# Patient Record
Sex: MALE | Race: WHITE | NOT HISPANIC OR LATINO | Employment: OTHER | URBAN - METROPOLITAN AREA
[De-identification: names, ages, dates, MRNs, and addresses within clinical notes are randomized per-mention and may not be internally consistent; named-entity substitution may affect disease eponyms.]

---

## 2019-08-13 RX ORDER — AMLODIPINE BESYLATE 10 MG/1
10 TABLET ORAL DAILY
Qty: 90 TABLET | Refills: 1 | Status: SHIPPED | OUTPATIENT
Start: 2019-08-13 | End: 2020-12-29 | Stop reason: SDUPTHER

## 2020-12-29 ENCOUNTER — OFFICE VISIT (OUTPATIENT)
Dept: FAMILY MEDICINE CLINIC | Facility: CLINIC | Age: 75
End: 2020-12-29

## 2020-12-29 VITALS
BODY MASS INDEX: 25.58 KG/M2 | HEART RATE: 72 BPM | DIASTOLIC BLOOD PRESSURE: 83 MMHG | SYSTOLIC BLOOD PRESSURE: 120 MMHG | WEIGHT: 163 LBS | OXYGEN SATURATION: 96 % | TEMPERATURE: 98.5 F | HEIGHT: 67 IN

## 2020-12-29 DIAGNOSIS — K42.9 UMBILICAL HERNIA WITHOUT OBSTRUCTION AND WITHOUT GANGRENE: ICD-10-CM

## 2020-12-29 DIAGNOSIS — I10 ESSENTIAL HYPERTENSION: ICD-10-CM

## 2020-12-29 DIAGNOSIS — Z00.00 MEDICARE ANNUAL WELLNESS VISIT, SUBSEQUENT: Primary | ICD-10-CM

## 2020-12-29 DIAGNOSIS — Z12.11 COLON CANCER SCREENING: ICD-10-CM

## 2020-12-29 DIAGNOSIS — R35.0 BENIGN PROSTATIC HYPERPLASIA WITH URINARY FREQUENCY: ICD-10-CM

## 2020-12-29 DIAGNOSIS — N40.1 BENIGN PROSTATIC HYPERPLASIA WITH URINARY FREQUENCY: ICD-10-CM

## 2020-12-29 DIAGNOSIS — K21.9 GASTROESOPHAGEAL REFLUX DISEASE WITHOUT ESOPHAGITIS: ICD-10-CM

## 2020-12-29 PROCEDURE — 96160 PT-FOCUSED HLTH RISK ASSMT: CPT | Performed by: FAMILY MEDICINE

## 2020-12-29 PROCEDURE — G0439 PPPS, SUBSEQ VISIT: HCPCS | Performed by: FAMILY MEDICINE

## 2020-12-29 RX ORDER — OMEPRAZOLE 20 MG/1
20 CAPSULE, DELAYED RELEASE ORAL DAILY
COMMUNITY
End: 2020-12-29 | Stop reason: SDUPTHER

## 2020-12-29 RX ORDER — AMLODIPINE BESYLATE 10 MG/1
10 TABLET ORAL DAILY
Qty: 90 TABLET | Refills: 3 | Status: SHIPPED | OUTPATIENT
Start: 2020-12-29 | End: 2021-07-26 | Stop reason: SDUPTHER

## 2020-12-29 RX ORDER — OMEPRAZOLE 20 MG/1
20 CAPSULE, DELAYED RELEASE ORAL DAILY
Qty: 90 CAPSULE | Refills: 3 | Status: SHIPPED | OUTPATIENT
Start: 2020-12-29 | End: 2022-12-07 | Stop reason: SDUPTHER

## 2020-12-29 NOTE — PROGRESS NOTES
The ABCs of the Annual Wellness Visit  Subsequent Medicare Wellness Visit    Chief Complaint   Patient presents with   • Hypertension       Subjective   History of Present Illness:  Erickson Gerard is a 75 y.o. male who presents for a Subsequent Medicare Wellness Visit.  FU HTN.  No orhtostasis.  Doing well with meds.  BP running 120/80s at home.    F/U [facility].  Doing well with meds.     FU BPH.  Nocturia 1 time at night.    HEALTH RISK ASSESSMENT    Recent Hospitalizations:  No hospitalization(s) within the last year.    Current Medical Providers:  Patient Care Team:  Farhad Metcalf MD as PCP - General (Family Medicine)    Smoking Status:  Social History     Tobacco Use   Smoking Status Never Smoker   Smokeless Tobacco Never Used       Alcohol Consumption:  Social History     Substance and Sexual Activity   Alcohol Use Never   • Frequency: Never       Depression Screen:   No flowsheet data found.    Fall Risk Screen:  JUNO Fall Risk Assessment has not been completed.    Health Habits and Functional and Cognitive Screening:  Functional & Cognitive Status 12/29/2020   Do you have difficulty preparing food and eating? No   Do you have difficulty bathing yourself, getting dressed or grooming yourself? No   Do you have difficulty using the toilet? No   Do you have difficulty moving around from place to place? No   Do you have trouble with steps or getting out of a bed or a chair? No   Current Diet Well Balanced Diet   Dental Exam Not up to date   Eye Exam Up to date   Exercise (times per week) 3 times per week   Current Exercises Include Walking   Do you need help using the phone?  No   Are you deaf or do you have serious difficulty hearing?  No   Do you need help with transportation? No   Do you need help shopping? No   Do you need help preparing meals?  No   Do you need help with housework?  No   Do you need help with laundry? No   Do you need help taking your medications? No   Do you need help managing money? No    Do you ever drive or ride in a car without wearing a seat belt? No   Have you felt unusual stress, anger or loneliness in the last month? No   Who do you live with? Spouse   If you need help, do you have trouble finding someone available to you? No   Have you been bothered in the last four weeks by sexual problems? No   Do you have difficulty concentrating, remembering or making decisions? No         Does the patient have evidence of cognitive impairment? No    Asprin use counseling:Does not need ASA (and currently is not on it)    Age-appropriate Screening Schedule:  Refer to the list below for future screening recommendations based on patient's age, sex and/or medical conditions. Orders for these recommended tests are listed in the plan section. The patient has been provided with a written plan.    Health Maintenance   Topic Date Due   • COLONOSCOPY  1945   • TDAP/TD VACCINES (1 - Tdap) 07/31/1965   • ZOSTER VACCINE (1 of 2) 02/13/2013   • INFLUENZA VACCINE  12/29/2021 (Originally 8/1/2020)          The following portions of the patient's history were reviewed and updated as appropriate: allergies, current medications, past family history, past medical history, past social history, past surgical history and problem list.    Outpatient Medications Prior to Visit   Medication Sig Dispense Refill   • amLODIPine (NORVASC) 10 MG tablet Take 1 tablet by mouth Daily. 90 tablet 1   • omeprazole (priLOSEC) 20 MG capsule Take 20 mg by mouth Daily.       No facility-administered medications prior to visit.        Patient Active Problem List   Diagnosis   • Essential hypertension   • GERD (gastroesophageal reflux disease)   • Medicare annual wellness visit, subsequent   • Benign prostatic hyperplasia with urinary frequency   • Umbilical hernia without obstruction and without gangrene   • Colon cancer screening       Advanced Care Planning:  ACP discussion was held with the patient during this visit. Patient does not  "have an advance directive, information provided.    Review of Systems   Constitutional: Negative for activity change, appetite change and fatigue.   HENT: Negative for hearing loss and postnasal drip.    Eyes: Negative for discharge and itching.   Respiratory: Negative for cough and shortness of breath.    Cardiovascular: Negative for chest pain and leg swelling.   Gastrointestinal: Negative for abdominal distention and abdominal pain.   Endocrine: Negative for cold intolerance and heat intolerance.   Genitourinary: Negative for difficulty urinating and flank pain.   Musculoskeletal: Negative for arthralgias and myalgias.   Skin: Negative for color change.   Neurological: Negative for dizziness and facial asymmetry.   Hematological: Negative for adenopathy.   Psychiatric/Behavioral: Negative for agitation and confusion.       Compared to one year ago, the patient feels his physical health is the same.  Compared to one year ago, the patient feels his mental health is the same.    Reviewed chart for potential of high risk medication in the elderly: yes  Reviewed chart for potential of harmful drug interactions in the elderly:yes    Objective         Vitals:    12/29/20 1128 12/29/20 1204   BP: (!) 165/103 120/83   BP Location: Right arm    Patient Position: Sitting    Pulse: 72    Temp: 98.5 °F (36.9 °C)    SpO2: 96%    Weight: 73.9 kg (163 lb)    Height: 170.2 cm (67\")        Body mass index is 25.53 kg/m².  Discussed the patient's BMI with him. The BMI is above average; BMI management plan is completed.    Physical Exam  Vitals signs reviewed.   Constitutional:       Appearance: He is well-developed. He is not diaphoretic.   HENT:      Head: Normocephalic and atraumatic.   Eyes:      General: No scleral icterus.     Pupils: Pupils are equal, round, and reactive to light.   Neck:      Thyroid: No thyromegaly.   Cardiovascular:      Rate and Rhythm: Normal rate and regular rhythm.      Heart sounds: No murmur. No " friction rub. No gallop.    Pulmonary:      Effort: Pulmonary effort is normal. No respiratory distress.      Breath sounds: No wheezing or rales.   Chest:      Chest wall: No tenderness.   Abdominal:      General: Bowel sounds are normal. There is no distension.      Palpations: Abdomen is soft.      Tenderness: There is no abdominal tenderness.      Hernia: A hernia is present.      Comments: Reducible 2 cm umbilical hernia   Musculoskeletal: Normal range of motion.         General: No deformity.   Lymphadenopathy:      Cervical: No cervical adenopathy.   Skin:     General: Skin is warm and dry.      Findings: No rash.   Neurological:      Cranial Nerves: No cranial nerve deficit.      Motor: No abnormal muscle tone.               Assessment/Plan   Medicare Risks and Personalized Health Plan  CMS Preventative Services Quick Reference  Inactivity/Sedentary    The above risks/problems have been discussed with the patient.  Pertinent information has been shared with the patient in the After Visit Summary.  Follow up plans and orders are seen below in the Assessment/Plan Section.    Diagnoses and all orders for this visit:    1. Medicare annual wellness visit, subsequent (Primary)    2. Gastroesophageal reflux disease without esophagitis  -     omeprazole (priLOSEC) 20 MG capsule; Take 1 capsule by mouth Daily.  Dispense: 90 capsule; Refill: 3    3. Essential hypertension  -     TSH Rfx On Abnormal To Free T4  -     Lipid Panel With / Chol / HDL Ratio  -     Comprehensive Metabolic Panel  -     amLODIPine (NORVASC) 10 MG tablet; Take 1 tablet by mouth Daily.  Dispense: 90 tablet; Refill: 3    4. Benign prostatic hyperplasia with urinary frequency  -     PSA DIAGNOSTIC    5. Umbilical hernia without obstruction and without gangrene  -     Ambulatory Referral to General Surgery    6. Colon cancer screening  -     Ambulatory Referral to General Surgery      Follow Up:  Return in about 4 months (around 4/29/2021).     An  After Visit Summary and PPPS were given to the patient.     Preventive Counseling:  See optometry and dentistry.   Get shingrix.  Pt declines flu shots.

## 2020-12-30 LAB
ALBUMIN SERPL-MCNC: 4.3 G/DL (ref 3.5–5.2)
ALBUMIN/GLOB SERPL: 1.6 G/DL
ALP SERPL-CCNC: 79 U/L (ref 39–117)
ALT SERPL-CCNC: 18 U/L (ref 1–41)
AST SERPL-CCNC: 18 U/L (ref 1–40)
BILIRUB SERPL-MCNC: 0.5 MG/DL (ref 0–1.2)
BUN SERPL-MCNC: 18 MG/DL (ref 8–23)
BUN/CREAT SERPL: 16.5 (ref 7–25)
CALCIUM SERPL-MCNC: 9.1 MG/DL (ref 8.6–10.5)
CHLORIDE SERPL-SCNC: 104 MMOL/L (ref 98–107)
CHOLEST SERPL-MCNC: 251 MG/DL (ref 0–200)
CHOLEST/HDLC SERPL: 5.02 {RATIO}
CO2 SERPL-SCNC: 26.1 MMOL/L (ref 22–29)
CREAT SERPL-MCNC: 1.09 MG/DL (ref 0.76–1.27)
GLOBULIN SER CALC-MCNC: 2.7 GM/DL
GLUCOSE SERPL-MCNC: 85 MG/DL (ref 65–99)
HDLC SERPL-MCNC: 50 MG/DL (ref 40–60)
LDLC SERPL CALC-MCNC: 168 MG/DL (ref 0–100)
POTASSIUM SERPL-SCNC: 4.1 MMOL/L (ref 3.5–5.2)
PROT SERPL-MCNC: 7 G/DL (ref 6–8.5)
PSA SERPL-MCNC: 1.32 NG/ML (ref 0–4)
SODIUM SERPL-SCNC: 140 MMOL/L (ref 136–145)
T4 FREE SERPL-MCNC: 1.04 NG/DL (ref 0.93–1.7)
TRIGL SERPL-MCNC: 177 MG/DL (ref 0–150)
TSH SERPL DL<=0.005 MIU/L-ACNC: 4.81 UIU/ML (ref 0.27–4.2)
VLDLC SERPL CALC-MCNC: 33 MG/DL (ref 5–40)

## 2021-03-15 ENCOUNTER — BULK ORDERING (OUTPATIENT)
Dept: CASE MANAGEMENT | Facility: OTHER | Age: 76
End: 2021-03-15

## 2021-03-15 DIAGNOSIS — Z23 IMMUNIZATION DUE: ICD-10-CM

## 2021-04-29 ENCOUNTER — OFFICE VISIT (OUTPATIENT)
Dept: FAMILY MEDICINE CLINIC | Facility: CLINIC | Age: 76
End: 2021-04-29

## 2021-04-29 VITALS
HEART RATE: 73 BPM | WEIGHT: 161 LBS | BODY MASS INDEX: 25.27 KG/M2 | DIASTOLIC BLOOD PRESSURE: 84 MMHG | OXYGEN SATURATION: 98 % | HEIGHT: 67 IN | TEMPERATURE: 98.4 F | SYSTOLIC BLOOD PRESSURE: 130 MMHG

## 2021-04-29 DIAGNOSIS — I10 ESSENTIAL HYPERTENSION: Primary | ICD-10-CM

## 2021-04-29 DIAGNOSIS — E78.2 MIXED HYPERLIPIDEMIA: ICD-10-CM

## 2021-04-29 DIAGNOSIS — K21.9 GASTROESOPHAGEAL REFLUX DISEASE WITHOUT ESOPHAGITIS: ICD-10-CM

## 2021-04-29 PROCEDURE — 99214 OFFICE O/P EST MOD 30 MIN: CPT | Performed by: FAMILY MEDICINE

## 2021-04-29 NOTE — PROGRESS NOTES
Chief Complaint   Patient presents with   • Hypertension       Subjective   Erickson Gerard is a 75 y.o. male.     History of Present Illness   F/U HTN.  No orthostasis.  Doing well with meds.  NO Se.    F/U [facility].  Doing well with meds.  No SE.  I am taking PPI every other day.     The following portions of the patient's history were reviewed and updated as appropriate: allergies, current medications, past family history, past medical history, past social history, past surgical history and problem list.    Review of Systems   Constitutional: Negative for appetite change and fatigue.   HENT: Negative for nosebleeds and sore throat.    Eyes: Negative for blurred vision and visual disturbance.   Respiratory: Negative for shortness of breath and wheezing.    Cardiovascular: Negative for chest pain and leg swelling.   Gastrointestinal: Negative for abdominal distention and abdominal pain.   Endocrine: Negative for cold intolerance and polyuria.   Genitourinary: Negative for dysuria and hematuria.   Musculoskeletal: Negative for arthralgias and myalgias.   Skin: Negative for color change and rash.   Neurological: Negative for weakness and confusion.   Psychiatric/Behavioral: Negative for agitation and depressed mood.       Patient Active Problem List   Diagnosis   • Essential hypertension   • GERD (gastroesophageal reflux disease)   • Medicare annual wellness visit, subsequent   • Benign prostatic hyperplasia with urinary frequency   • Umbilical hernia without obstruction and without gangrene   • Colon cancer screening   • Mixed hyperlipidemia       No Known Allergies      Current Outpatient Medications:   •  amLODIPine (NORVASC) 10 MG tablet, Take 1 tablet by mouth Daily., Disp: 90 tablet, Rfl: 3  •  omeprazole (priLOSEC) 20 MG capsule, Take 1 capsule by mouth Daily., Disp: 90 capsule, Rfl: 3    Past Medical History:   Diagnosis Date   • Hypertension        Past Surgical History:   Procedure Laterality Date   • HERNIA  REPAIR Bilateral    • VEIN LIGATION AND STRIPPING N/A        Family History   Problem Relation Age of Onset   • No Known Problems Mother    • Heart attack Father        Social History     Tobacco Use   • Smoking status: Never Smoker   • Smokeless tobacco: Never Used   Substance Use Topics   • Alcohol use: Never            Objective     Vitals:    04/29/21 0905   BP: 130/84   Pulse: 73   Temp: 98.4 °F (36.9 °C)   SpO2: 98%     Body mass index is 25.22 kg/m².    Physical Exam  Vitals reviewed.   Constitutional:       Appearance: He is well-developed. He is not diaphoretic.   HENT:      Head: Normocephalic and atraumatic.   Eyes:      General: No scleral icterus.     Pupils: Pupils are equal, round, and reactive to light.   Neck:      Thyroid: No thyromegaly.   Cardiovascular:      Rate and Rhythm: Normal rate and regular rhythm.      Heart sounds: No murmur heard.   No friction rub. No gallop.    Pulmonary:      Effort: Pulmonary effort is normal. No respiratory distress.      Breath sounds: No wheezing or rales.   Chest:      Chest wall: No tenderness.   Abdominal:      General: Bowel sounds are normal. There is no distension.      Palpations: Abdomen is soft.      Tenderness: There is no abdominal tenderness.   Musculoskeletal:         General: No deformity. Normal range of motion.   Lymphadenopathy:      Cervical: No cervical adenopathy.   Skin:     General: Skin is warm and dry.      Findings: No rash.   Neurological:      Cranial Nerves: No cranial nerve deficit.      Motor: No abnormal muscle tone.         Lab Results   Component Value Date    BUN 18 12/29/2020    CREATININE 1.09 12/29/2020    EGFRIFNONA 66 12/29/2020    EGFRIFAFRI 80 12/29/2020    BCR 16.5 12/29/2020    K 4.1 12/29/2020    CO2 26.1 12/29/2020    CALCIUM 9.1 12/29/2020    PROTENTOTREF 7.0 12/29/2020    ALBUMIN 4.30 12/29/2020    LABIL2 1.6 12/29/2020    AST 18 12/29/2020    ALT 18 12/29/2020       No results found for: WBC, RBC, HGB, HCT, MCV,  MCH, MCHC, RDW, RDWSD, MPV, PLT, NEUTRORELPCT, LYMPHORELPCT, MONORELPCT, EOSRELPCT, BASORELPCT, AUTOIGPER, NEUTROABS, LYMPHSABS, MONOSABS, EOSABS, BASOSABS, AUTOIGNUM, NRBC    No results found for: HGBA1C    No results found for: [id number]    TSH   Date Value Ref Range Status   12/29/2020 4.810 (H) 0.270 - 4.200 uIU/mL Final       No results found for: CHOL  Lab Results   Component Value Date    TRIG 177 (H) 12/29/2020     Lab Results   Component Value Date    HDL 50 12/29/2020     Lab Results   Component Value Date     (H) 12/29/2020     Lab Results   Component Value Date    VLDL 33 12/29/2020     No results found for: LDLHDL      Procedures    Assessment/Plan   Problems Addressed this Visit     Essential hypertension - Primary    GERD (gastroesophageal reflux disease)    Mixed hyperlipidemia      Diagnoses       Codes Comments    Essential hypertension    -  Primary ICD-10-CM: I10  ICD-9-CM: 401.9     Gastroesophageal reflux disease without esophagitis     ICD-10-CM: K21.9  ICD-9-CM: 530.81     Mixed hyperlipidemia     ICD-10-CM: E78.2  ICD-9-CM: 272.2       HTN controlled.  Continue amlodipine. BMP reviewed and normal.  TSH sl elevated with normal T4.  RX today.     LESA.  Controlled.   Continue PPI.  RX given.    Hyperlipidemia.  Uncontrolled.   12/20.  Counseled on TLC.      No orders of the defined types were placed in this encounter.      Current Outpatient Medications   Medication Sig Dispense Refill   • amLODIPine (NORVASC) 10 MG tablet Take 1 tablet by mouth Daily. 90 tablet 3   • omeprazole (priLOSEC) 20 MG capsule Take 1 capsule by mouth Daily. 90 capsule 3     No current facility-administered medications for this visit.       Erickson Gerard had no medications administered during this visit.    Return in about 35 weeks (around 12/30/2021) for Medicare wellness and OV, 30 minutes.    There are no Patient Instructions on file for this visit.

## 2021-07-26 ENCOUNTER — OFFICE VISIT (OUTPATIENT)
Dept: FAMILY MEDICINE CLINIC | Facility: CLINIC | Age: 76
End: 2021-07-26

## 2021-07-26 ENCOUNTER — HOSPITAL ENCOUNTER (OUTPATIENT)
Dept: CARDIOLOGY | Facility: HOSPITAL | Age: 76
Discharge: HOME | End: 2021-07-26
Admitting: FAMILY MEDICINE

## 2021-07-26 VITALS
HEART RATE: 67 BPM | BODY MASS INDEX: 25.11 KG/M2 | TEMPERATURE: 98 F | SYSTOLIC BLOOD PRESSURE: 122 MMHG | OXYGEN SATURATION: 98 % | HEIGHT: 67 IN | WEIGHT: 160 LBS | DIASTOLIC BLOOD PRESSURE: 88 MMHG

## 2021-07-26 DIAGNOSIS — M79.89 RIGHT LEG SWELLING: ICD-10-CM

## 2021-07-26 DIAGNOSIS — I10 ESSENTIAL HYPERTENSION: ICD-10-CM

## 2021-07-26 DIAGNOSIS — I82.4Z1 ACUTE DEEP VEIN THROMBOSIS (DVT) OF DISTAL VEIN OF RIGHT LOWER EXTREMITY (HCC): ICD-10-CM

## 2021-07-26 DIAGNOSIS — I72.4 ANEURYSM OF RIGHT POPLITEAL ARTERY (HCC): ICD-10-CM

## 2021-07-26 DIAGNOSIS — R60.0 LOCALIZED EDEMA: Primary | ICD-10-CM

## 2021-07-26 LAB
BH CV LOW VAS RIGHT PERONEAL VESSEL: 1
BH CV LOW VAS RIGHT SOLEAL VESSEL: 1
BH CV LOWER VASCULAR LEFT COMMON FEMORAL AUGMENT: NORMAL
BH CV LOWER VASCULAR LEFT COMMON FEMORAL COMPETENT: NORMAL
BH CV LOWER VASCULAR LEFT COMMON FEMORAL COMPRESS: NORMAL
BH CV LOWER VASCULAR LEFT COMMON FEMORAL PHASIC: NORMAL
BH CV LOWER VASCULAR LEFT COMMON FEMORAL SPONT: NORMAL
BH CV LOWER VASCULAR RIGHT COMMON FEMORAL AUGMENT: NORMAL
BH CV LOWER VASCULAR RIGHT COMMON FEMORAL COMPETENT: NORMAL
BH CV LOWER VASCULAR RIGHT COMMON FEMORAL COMPRESS: NORMAL
BH CV LOWER VASCULAR RIGHT COMMON FEMORAL PHASIC: NORMAL
BH CV LOWER VASCULAR RIGHT COMMON FEMORAL SPONT: NORMAL
BH CV LOWER VASCULAR RIGHT DISTAL FEMORAL COMPRESS: NORMAL
BH CV LOWER VASCULAR RIGHT GASTRONEMIUS COMPRESS: NORMAL
BH CV LOWER VASCULAR RIGHT GREATER SAPH AK COMPRESS: NORMAL
BH CV LOWER VASCULAR RIGHT GREATER SAPH BK COMPRESS: NORMAL
BH CV LOWER VASCULAR RIGHT LESSER SAPH COMPRESS: NORMAL
BH CV LOWER VASCULAR RIGHT MID FEMORAL AUGMENT: NORMAL
BH CV LOWER VASCULAR RIGHT MID FEMORAL COMPETENT: NORMAL
BH CV LOWER VASCULAR RIGHT MID FEMORAL COMPRESS: NORMAL
BH CV LOWER VASCULAR RIGHT MID FEMORAL PHASIC: NORMAL
BH CV LOWER VASCULAR RIGHT MID FEMORAL SPONT: NORMAL
BH CV LOWER VASCULAR RIGHT PERONEAL COMPRESS: NORMAL
BH CV LOWER VASCULAR RIGHT PERONEAL THROMBUS: NORMAL
BH CV LOWER VASCULAR RIGHT POPLITEAL AUGMENT: NORMAL
BH CV LOWER VASCULAR RIGHT POPLITEAL COMPETENT: NORMAL
BH CV LOWER VASCULAR RIGHT POPLITEAL COMPRESS: NORMAL
BH CV LOWER VASCULAR RIGHT POPLITEAL PHASIC: NORMAL
BH CV LOWER VASCULAR RIGHT POPLITEAL SPONT: NORMAL
BH CV LOWER VASCULAR RIGHT POSTERIOR TIBIAL COMPRESS: NORMAL
BH CV LOWER VASCULAR RIGHT PROFUNDA FEMORAL COMPRESS: NORMAL
BH CV LOWER VASCULAR RIGHT PROXIMAL FEMORAL COMPRESS: NORMAL
BH CV LOWER VASCULAR RIGHT SAPHENOFEMORAL JUNCTION COMPRESS: NORMAL
BH CV LOWER VASCULAR RIGHT SOLEAL COMPRESS: NORMAL
BH CV LOWER VASCULAR RIGHT SOLEAL THROMBUS: NORMAL
MAXIMAL PREDICTED HEART RATE: 146 BPM
STRESS TARGET HR: 124 BPM

## 2021-07-26 PROCEDURE — 93971 EXTREMITY STUDY: CPT

## 2021-07-26 PROCEDURE — 99214 OFFICE O/P EST MOD 30 MIN: CPT | Performed by: FAMILY MEDICINE

## 2021-07-26 RX ORDER — AMLODIPINE BESYLATE 5 MG/1
5 TABLET ORAL DAILY
Qty: 90 TABLET | Refills: 3 | Status: SHIPPED | OUTPATIENT
Start: 2021-07-26 | End: 2021-09-28 | Stop reason: SDUPTHER

## 2021-07-26 NOTE — PROGRESS NOTES
Right leg venous Doppler study reading by Dr. Marquez:   Acute right lower extremity deep vein thrombosis noted in the peroneal and soleal.  All other right sided veins appeared normal.  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       Called report to Farhad Metcalf MD and he spoke with patient to give further instructions.  Patient is going to ordering providers office immediately after appointment.

## 2021-07-26 NOTE — PROGRESS NOTES
Chief Complaint   Patient presents with   • Leg Swelling       Subjective   Erickson Gerard is a 75 y.o. male.     History of Present Illness   C/o R leg swelling for 3 months.  No leg pain.  Off and on worse.  Had airplane flight to [facility] 7/10-7/16.  Increased since then.     F/U HTN.  No orthostasis.    The following portions of the patient's history were reviewed and updated as appropriate: allergies, current medications, past family history, past medical history, past social history, past surgical history and problem list.    Review of Systems   Constitutional: Negative for appetite change and fatigue.   HENT: Negative for nosebleeds and sore throat.    Eyes: Negative for blurred vision and visual disturbance.   Respiratory: Negative for shortness of breath and wheezing.    Cardiovascular: Positive for leg swelling. Negative for chest pain.   Gastrointestinal: Negative for abdominal distention and abdominal pain.   Endocrine: Negative for cold intolerance and polyuria.   Genitourinary: Negative for dysuria and hematuria.   Musculoskeletal: Negative for arthralgias and myalgias.   Skin: Negative for color change and rash.   Neurological: Negative for weakness and confusion.   Psychiatric/Behavioral: Negative for agitation and depressed mood.       Patient Active Problem List   Diagnosis   • Essential hypertension   • GERD (gastroesophageal reflux disease)   • Medicare annual wellness visit, subsequent   • Benign prostatic hyperplasia with urinary frequency   • Umbilical hernia without obstruction and without gangrene   • Colon cancer screening   • Mixed hyperlipidemia   • Localized edema   • Right leg swelling       No Known Allergies      Current Outpatient Medications:   •  amLODIPine (NORVASC) 5 MG tablet, Take 1 tablet by mouth Daily., Disp: 90 tablet, Rfl: 3  •  omeprazole (priLOSEC) 20 MG capsule, Take 1 capsule by mouth Daily., Disp: 90 capsule, Rfl: 3    Past Medical History:   Diagnosis Date   •  Hypertension        Past Surgical History:   Procedure Laterality Date   • HERNIA REPAIR Bilateral    • VEIN LIGATION AND STRIPPING N/A        Family History   Problem Relation Age of Onset   • No Known Problems Mother    • Heart attack Father        Social History     Tobacco Use   • Smoking status: Never Smoker   • Smokeless tobacco: Never Used   Substance Use Topics   • Alcohol use: Never            Objective     Vitals:    07/26/21 0906   BP: 122/88   Pulse: 67   Temp: 98 °F (36.7 °C)   SpO2: 98%     Body mass index is 25.06 kg/m².    Physical Exam  Vitals reviewed.   Constitutional:       Appearance: He is well-developed. He is not diaphoretic.   HENT:      Head: Normocephalic and atraumatic.   Eyes:      General: No scleral icterus.     Pupils: Pupils are equal, round, and reactive to light.   Neck:      Thyroid: No thyromegaly.   Cardiovascular:      Rate and Rhythm: Normal rate and regular rhythm.      Heart sounds: No murmur heard.   No friction rub. No gallop.       Comments: R leg with 1 plus edema to mid calf.  No calf tenderness.    Pulmonary:      Effort: Pulmonary effort is normal. No respiratory distress.      Breath sounds: No wheezing or rales.   Chest:      Chest wall: No tenderness.   Abdominal:      General: Bowel sounds are normal. There is no distension.      Palpations: Abdomen is soft.      Tenderness: There is no abdominal tenderness.   Musculoskeletal:         General: No deformity. Normal range of motion.   Lymphadenopathy:      Cervical: No cervical adenopathy.   Skin:     General: Skin is warm and dry.      Findings: No rash.   Neurological:      Cranial Nerves: No cranial nerve deficit.      Motor: No abnormal muscle tone.         Lab Results   Component Value Date    BUN 18 12/29/2020    CREATININE 1.09 12/29/2020    EGFRIFNONA 66 12/29/2020    EGFRIFAFRI 80 12/29/2020    BCR 16.5 12/29/2020    K 4.1 12/29/2020    CO2 26.1 12/29/2020    CALCIUM 9.1 12/29/2020    PROTENTOTREF 7.0  12/29/2020    ALBUMIN 4.30 12/29/2020    LABIL2 1.6 12/29/2020    AST 18 12/29/2020    ALT 18 12/29/2020       No results found for: WBC, RBC, HGB, HCT, MCV, MCH, MCHC, RDW, RDWSD, MPV, PLT, NEUTRORELPCT, LYMPHORELPCT, MONORELPCT, EOSRELPCT, BASORELPCT, AUTOIGPER, NEUTROABS, LYMPHSABS, MONOSABS, EOSABS, BASOSABS, AUTOIGNUM, NRBC    No results found for: HGBA1C    No results found for: EYODHLAI85    TSH   Date Value Ref Range Status   12/29/2020 4.810 (H) 0.270 - 4.200 uIU/mL Final       No results found for: CHOL  Lab Results   Component Value Date    TRIG 177 (H) 12/29/2020     Lab Results   Component Value Date    HDL 50 12/29/2020     Lab Results   Component Value Date     (H) 12/29/2020     Lab Results   Component Value Date    VLDL 33 12/29/2020     No results found for: LDLHDL      Procedures    Assessment/Plan   Problems Addressed this Visit     Essential hypertension    Relevant Medications    amLODIPine (NORVASC) 5 MG tablet    Localized edema - Primary    Right leg swelling    Relevant Orders    Duplex Venous Lower Extremity - Right CAR      Diagnoses       Codes Comments    Localized edema    -  Primary ICD-10-CM: R60.0  ICD-9-CM: 782.3     Essential hypertension     ICD-10-CM: I10  ICD-9-CM: 401.9     Right leg swelling     ICD-10-CM: M79.89  ICD-9-CM: 729.81       R leg swelling.  New problem.  Likely sec to venous insuff plus amlo.  R/o DVT . Check venous u/s.    HTN.  Controlled.  Decrease amlodipine to 5mg a day.    Addendum:  R DVT noted on U/S with popliteal artery aneurysm.  Xarelto 15 BID samples for 21 days and then 20 mg a day for 6 months.  Pt informed.  F/U with me in 2 months.      No orders of the defined types were placed in this encounter.      Current Outpatient Medications   Medication Sig Dispense Refill   • amLODIPine (NORVASC) 5 MG tablet Take 1 tablet by mouth Daily. 90 tablet 3   • omeprazole (priLOSEC) 20 MG capsule Take 1 capsule by mouth Daily. 90 capsule 3     No current  facility-administered medications for this visit.       Erickson Rajani had no medications administered during this visit.    No follow-ups on file.    There are no Patient Instructions on file for this visit.

## 2021-09-09 ENCOUNTER — TRANSCRIBE ORDERS (OUTPATIENT)
Dept: ADMINISTRATIVE | Facility: HOSPITAL | Age: 76
End: 2021-09-09

## 2021-09-09 DIAGNOSIS — I72.4 POPLITEAL ANEURYSM (HCC): Primary | ICD-10-CM

## 2021-09-28 ENCOUNTER — OFFICE VISIT (OUTPATIENT)
Dept: FAMILY MEDICINE CLINIC | Facility: CLINIC | Age: 76
End: 2021-09-28

## 2021-09-28 VITALS
HEIGHT: 67 IN | WEIGHT: 160 LBS | SYSTOLIC BLOOD PRESSURE: 134 MMHG | OXYGEN SATURATION: 98 % | BODY MASS INDEX: 25.11 KG/M2 | HEART RATE: 71 BPM | DIASTOLIC BLOOD PRESSURE: 84 MMHG | TEMPERATURE: 97.8 F

## 2021-09-28 DIAGNOSIS — I10 ESSENTIAL HYPERTENSION: Primary | ICD-10-CM

## 2021-09-28 DIAGNOSIS — I82.4Z1 ACUTE DEEP VEIN THROMBOSIS (DVT) OF DISTAL VEIN OF RIGHT LOWER EXTREMITY (HCC): ICD-10-CM

## 2021-09-28 DIAGNOSIS — L57.0 ACTINIC KERATOSIS OF FOREHEAD: ICD-10-CM

## 2021-09-28 DIAGNOSIS — I72.4 ANEURYSM OF RIGHT POPLITEAL ARTERY (HCC): ICD-10-CM

## 2021-09-28 DIAGNOSIS — M51.36 DDD (DEGENERATIVE DISC DISEASE), LUMBAR: ICD-10-CM

## 2021-09-28 PROBLEM — M51.369 DDD (DEGENERATIVE DISC DISEASE), LUMBAR: Status: ACTIVE | Noted: 2021-09-28

## 2021-09-28 PROCEDURE — 17000 DESTRUCT PREMALG LESION: CPT | Performed by: FAMILY MEDICINE

## 2021-09-28 PROCEDURE — 99214 OFFICE O/P EST MOD 30 MIN: CPT | Performed by: FAMILY MEDICINE

## 2021-09-28 RX ORDER — AMLODIPINE BESYLATE 5 MG/1
5 TABLET ORAL DAILY
Qty: 90 TABLET | Refills: 3 | Status: SHIPPED | OUTPATIENT
Start: 2021-09-28 | End: 2022-01-24

## 2021-09-28 NOTE — PROGRESS NOTES
Chief Complaint   Patient presents with   • Hypertension       Subjective   Erickson Gerard is a 76 y.o. male.     History of Present Illness   F/U HTN.  No orthostasis.   F/u R popliteal aneurysm.  Seeing Dr Archer.  Doing well with meds.   C/o pain in low back. Going on months.  Hard to get out of chair.    C/o R forehead with lesion.   Scaly and comes back.    F/U [facility].  Doing wel with omeprazole.      The following portions of the patient's history were reviewed and updated as appropriate: allergies, current medications, past family history, past medical history, past social history, past surgical history and problem list.    Review of Systems   Constitutional: Negative for appetite change and fatigue.   HENT: Negative for nosebleeds and sore throat.    Eyes: Negative for blurred vision and visual disturbance.   Respiratory: Negative for shortness of breath and wheezing.    Cardiovascular: Negative for chest pain and leg swelling.   Gastrointestinal: Negative for abdominal distention and abdominal pain.   Endocrine: Negative for cold intolerance and polyuria.   Genitourinary: Negative for dysuria and hematuria.   Musculoskeletal: Positive for back pain. Negative for arthralgias and myalgias.   Skin: Positive for skin lesions. Negative for color change and rash.   Neurological: Negative for weakness and confusion.   Psychiatric/Behavioral: Negative for agitation and depressed mood.       Patient Active Problem List   Diagnosis   • Essential hypertension   • GERD (gastroesophageal reflux disease)   • Medicare annual wellness visit, subsequent   • Benign prostatic hyperplasia with urinary frequency   • Umbilical hernia without obstruction and without gangrene   • Colon cancer screening   • Mixed hyperlipidemia   • Localized edema   • Right leg swelling   • Aneurysm of right popliteal artery (CMS/HCC)   • Acute deep vein thrombosis (DVT) of distal vein of right lower extremity (CMS/HCC)   • DDD (degenerative disc  disease), lumbar   • Actinic keratosis of forehead       No Known Allergies      Current Outpatient Medications:   •  amLODIPine (NORVASC) 5 MG tablet, Take 1 tablet by mouth Daily., Disp: 90 tablet, Rfl: 3  •  omeprazole (priLOSEC) 20 MG capsule, Take 1 capsule by mouth Daily., Disp: 90 capsule, Rfl: 3    Past Medical History:   Diagnosis Date   • Hypertension        Past Surgical History:   Procedure Laterality Date   • HERNIA REPAIR Bilateral    • VEIN LIGATION AND STRIPPING N/A        Family History   Problem Relation Age of Onset   • No Known Problems Mother    • Heart attack Father        Social History     Tobacco Use   • Smoking status: Never Smoker   • Smokeless tobacco: Never Used   Substance Use Topics   • Alcohol use: Never            Objective     Vitals:    09/28/21 0927   BP: 134/84   Pulse: 71   Temp: 97.8 °F (36.6 °C)   SpO2: 98%     Body mass index is 25.06 kg/m².    Physical Exam  Vitals reviewed.   Constitutional:       Appearance: He is well-developed. He is not diaphoretic.   HENT:      Head: Normocephalic and atraumatic.   Eyes:      General: No scleral icterus.     Pupils: Pupils are equal, round, and reactive to light.   Neck:      Thyroid: No thyromegaly.   Cardiovascular:      Rate and Rhythm: Normal rate and regular rhythm.      Heart sounds: No murmur heard.   No friction rub. No gallop.    Pulmonary:      Effort: Pulmonary effort is normal. No respiratory distress.      Breath sounds: No wheezing or rales.   Chest:      Chest wall: No tenderness.   Abdominal:      General: Bowel sounds are normal. There is no distension.      Palpations: Abdomen is soft.      Tenderness: There is no abdominal tenderness.   Musculoskeletal:         General: No deformity. Normal range of motion.   Lymphadenopathy:      Cervical: No cervical adenopathy.   Skin:     General: Skin is warm and dry.      Findings: Lesion present. No rash.      Comments: R scalp with 0.5 cm scaly lesion.     Neurological:       Cranial Nerves: No cranial nerve deficit.      Motor: No abnormal muscle tone.         Lab Results   Component Value Date    BUN 18 12/29/2020    CREATININE 1.09 12/29/2020    EGFRIFNONA 66 12/29/2020    EGFRIFAFRI 80 12/29/2020    BCR 16.5 12/29/2020    K 4.1 12/29/2020    CO2 26.1 12/29/2020    CALCIUM 9.1 12/29/2020    PROTENTOTREF 7.0 12/29/2020    ALBUMIN 4.30 12/29/2020    LABIL2 1.6 12/29/2020    AST 18 12/29/2020    ALT 18 12/29/2020       No results found for: WBC, RBC, HGB, HCT, MCV, MCH, MCHC, RDW, RDWSD, MPV, PLT, NEUTRORELPCT, LYMPHORELPCT, MONORELPCT, EOSRELPCT, BASORELPCT, AUTOIGPER, NEUTROABS, LYMPHSABS, MONOSABS, EOSABS, BASOSABS, AUTOIGNUM, NRBC    No results found for: HGBA1C    No results found for: MSVAXKAL96    TSH   Date Value Ref Range Status   12/29/2020 4.810 (H) 0.270 - 4.200 uIU/mL Final       No results found for: CHOL  Lab Results   Component Value Date    TRIG 177 (H) 12/29/2020     Lab Results   Component Value Date    HDL 50 12/29/2020     Lab Results   Component Value Date     (H) 12/29/2020     Lab Results   Component Value Date    VLDL 33 12/29/2020     No results found for: LDLHDL      Cryotherapy, Skin Lesion    Date/Time: 9/28/2021 9:54 AM  Performed by: Farhad Metcalf MD  Authorized by: Farhad Metcalf MD   Local anesthesia used: no    Anesthesia:  Local anesthesia used: no    Sedation:  Patient sedated: no    Patient tolerance: patient tolerated the procedure well with no immediate complications  Comments: Verbal informed consent obtained.  LN applied for 10 seconds to R forehead lesion.          Assessment/Plan   Problems Addressed this Visit     Actinic keratosis of forehead    Acute deep vein thrombosis (DVT) of distal vein of right lower extremity (CMS/HCC)    Aneurysm of right popliteal artery (CMS/HCC)    Essential hypertension - Primary    Relevant Medications    amLODIPine (NORVASC) 5 MG tablet      Diagnoses       Codes Comments    Essential  hypertension    -  Primary ICD-10-CM: I10  ICD-9-CM: 401.9     Aneurysm of right popliteal artery (CMS/HCC)     ICD-10-CM: I72.4  ICD-9-CM: 442.3     Actinic keratosis of forehead     ICD-10-CM: L57.0  ICD-9-CM: 702.0     Acute deep vein thrombosis (DVT) of distal vein of right lower extremity (CMS/HCC)     ICD-10-CM: I82.4Z1  ICD-9-CM: 453.42         HTN.  Controlled.  Continue amlodipine. RF today.  R LE dvt.  U/S showed resolution 9/21.  Stay off xarelto.  Use asa 81 one week before trip.  Actinic keratosis.  Sp cryo.  LWC described.   Lumbar DDD. New problem.  Home PT given.  Use motrin prn.       Orders Placed This Encounter   Procedures   • Cryotherapy, Skin Lesion     This order was created via procedure documentation     Order Specific Question:   Release to patient     Answer:   Immediate       Current Outpatient Medications   Medication Sig Dispense Refill   • amLODIPine (NORVASC) 5 MG tablet Take 1 tablet by mouth Daily. 90 tablet 3   • omeprazole (priLOSEC) 20 MG capsule Take 1 capsule by mouth Daily. 90 capsule 3     No current facility-administered medications for this visit.       Erickson Gerard had no medications administered during this visit.    Return in about 6 months (around 3/28/2022).    There are no Patient Instructions on file for this visit.

## 2021-10-01 ENCOUNTER — HOSPITAL ENCOUNTER (OUTPATIENT)
Dept: CT IMAGING | Facility: HOSPITAL | Age: 76
Discharge: HOME | End: 2021-10-01
Admitting: SURGERY

## 2021-10-01 DIAGNOSIS — I72.4 POPLITEAL ANEURYSM (HCC): ICD-10-CM

## 2021-10-01 LAB — CREAT BLDA-MCNC: 1.2 MG/DL (ref 0.6–1.3)

## 2021-10-01 PROCEDURE — 75635 CT ANGIO ABDOMINAL ARTERIES: CPT

## 2021-10-01 PROCEDURE — 0 IOPAMIDOL PER 1 ML: Performed by: SURGERY

## 2021-10-01 PROCEDURE — 82565 ASSAY OF CREATININE: CPT

## 2021-10-01 RX ADMIN — IOPAMIDOL 100 ML: 755 INJECTION, SOLUTION INTRAVENOUS at 06:24

## 2021-10-26 ENCOUNTER — OFFICE VISIT (OUTPATIENT)
Dept: CARDIOLOGY | Facility: CLINIC | Age: 76
End: 2021-10-26

## 2021-10-26 VITALS
BODY MASS INDEX: 25.15 KG/M2 | WEIGHT: 160.2 LBS | HEIGHT: 67 IN | SYSTOLIC BLOOD PRESSURE: 140 MMHG | DIASTOLIC BLOOD PRESSURE: 98 MMHG | HEART RATE: 72 BPM

## 2021-10-26 DIAGNOSIS — R06.09 EXERTIONAL DYSPNEA: ICD-10-CM

## 2021-10-26 DIAGNOSIS — I71.40 AAA (ABDOMINAL AORTIC ANEURYSM) WITHOUT RUPTURE (HCC): Primary | ICD-10-CM

## 2021-10-26 PROCEDURE — 99204 OFFICE O/P NEW MOD 45 MIN: CPT | Performed by: INTERNAL MEDICINE

## 2021-10-26 PROCEDURE — 93000 ELECTROCARDIOGRAM COMPLETE: CPT | Performed by: INTERNAL MEDICINE

## 2021-10-26 NOTE — PROGRESS NOTES
[facility]      Patient Name: Erickson Gerard  :1945  Age: 76 y.o.  Encounter Provider:  Hitesh Erickson Jr, MD      Chief Complaint:   Chief Complaint   Patient presents with   • Pre-op Exam         HPI  Erickson Gerard is a 76 y.o. male past medical history of hypertension and dyslipidemia who presents for evaluation of peripheral arterial disease.  Patient was on vacation in July when he noted a unilateral leg swelling.  He was diagnosed with DVT and ultimately had scans which diagnosed popliteal arterial and abdominal aortic aneurysm.  He saw Dr. Archer in the clinic who had follow-up scans performed and is now scheduling 2 separate surgeries for popliteal aneurysm repair and then abdominal aortic repair.  Patient is active as far as weight training but does not do any aerobic exercise.  Can go up and down the stairs in his home but he will get short of breath after 1-2 trips.  He denies angina.  No history of heart failure.  No history of CVA or CKD.  He is not a diabetic.  Is a lifelong non-smoker denies alcohol or illicit drug use.  Father with history of fatal MI in his 70s.      The following portions of the patient's history were reviewed and updated as appropriate: allergies, current medications, past family history, past medical history, past social history, past surgical history and problem list.      Review of Systems   Constitutional: Negative for chills and fever.   HENT: Negative for hoarse voice and sore throat.    Eyes: Negative for double vision and photophobia.   Cardiovascular: Positive for leg swelling. Negative for chest pain, near-syncope, orthopnea, palpitations, paroxysmal nocturnal dyspnea and syncope.   Respiratory: Negative for cough and wheezing.    Skin: Negative for poor wound healing and rash.   Musculoskeletal: Negative for arthritis and joint swelling.   Gastrointestinal: Negative for bloating, abdominal pain, hematemesis and hematochezia.   Neurological:  "Negative for dizziness and focal weakness.   Psychiatric/Behavioral: Negative for depression and suicidal ideas.       OBJECTIVE:   Vital Signs  Vitals:    10/26/21 1115   BP: 140/98   Pulse: 72     Estimated body mass index is 25.09 kg/m² as calculated from the following:    Height as of this encounter: 170.2 cm (67\").    Weight as of this encounter: 72.7 kg (160 lb 3.2 oz).    Vitals reviewed.   Constitutional:       Appearance: Healthy appearance. Not in distress.   Neck:      Vascular: No JVR. JVD normal.   Pulmonary:      Effort: Pulmonary effort is normal.      Breath sounds: Normal breath sounds. No wheezing. No rhonchi. No rales.   Chest:      Chest wall: Not tender to palpatation.   Cardiovascular:      PMI at left midclavicular line. Normal rate. Regular rhythm. Normal S1. Normal S2.      Murmurs: There is no murmur.      No gallop. No click. No rub.   Pulses:     Intact distal pulses.   Edema:     Peripheral edema absent.   Abdominal:      General: Bowel sounds are normal.      Palpations: Abdomen is soft.      Tenderness: There is no abdominal tenderness.   Musculoskeletal: Normal range of motion.         General: No tenderness. Skin:     General: Skin is warm and dry.   Neurological:      General: No focal deficit present.      Mental Status: Alert and oriented to person, place and time.           ECG 12 Lead    Date/Time: 10/26/2021 11:41 AM  Performed by: Hitesh Erickson Jr., MD  Authorized by: Hitesh Erickson Jr., MD   Comparison: not compared with previous ECG   Previous ECG: no previous ECG available  Rhythm: sinus rhythm    Clinical impression: normal ECG                  ASSESSMENT:     Abdominal aortic aneurysm  Popliteal arterial aneurysm  History of DVT on anticoagulation  Family history of sudden cardiac death    PLAN OF CARE:     1. Abdominal aortic aneurysm -plan is for surgical repair of both identified aneurysms.  Given the patient's exertional dyspnea, decreased functional capacity, high " risk surgery and family history of sudden cardiac death we will plan for treadmill nuclear stress study.  2. Popliteal aneurysm -as above  3. History of DVT on anticoagulation  4. Family history of sudden cardiac death  5. Preoperative risk assessment -patient is at intermediate risk of perioperative MACE.  Given the reasons listed above we will plan for stress study prior to final risk analysis.    Return to clinic 6 months             Discharge Medications          Accurate as of October 26, 2021 11:20 AM. If you have any questions, ask your nurse or doctor.            Continue These Medications      Instructions Start Date   amLODIPine 5 MG tablet  Commonly known as: NORVASC   5 mg, Oral, Daily      omeprazole 20 MG capsule  Commonly known as: priLOSEC   20 mg, Oral, Daily             Thank you for allowing me to participate in the care of your patient,      Sincerely,   Hitesh Erickson MD  [facility]  10/26/21  11:20 EDT

## 2021-11-02 ENCOUNTER — TELEPHONE (OUTPATIENT)
Dept: CARDIOLOGY | Facility: CLINIC | Age: 76
End: 2021-11-02

## 2021-11-02 ENCOUNTER — PATIENT ROUNDING (BHMG ONLY) (OUTPATIENT)
Dept: CARDIOLOGY | Facility: CLINIC | Age: 76
End: 2021-11-02

## 2021-11-02 NOTE — PROGRESS NOTES
November 2, 2021    Hello, may I speak with Erickson Gerard?    My name is CARLO Arredondo Supervisor.      I am  with [facility]  [address]  [facility] [place] [address] [phone].    Before we get started may I verify your date of birth? 1945    I am calling to officially welcome you to our practice and ask about your recent visit. Is this a good time to talk? yes    Tell me about your visit with us. What things went well?  The visit was good.         We're always looking for ways to make our patients' experiences even better. Do you have recommendations on ways we may improve?  no    Overall were you satisfied with your first visit to our practice? yes       I appreciate you taking the time to speak with me today. Is there anything else I can do for you? no      Thank you, and have a great day.

## 2021-11-02 NOTE — TELEPHONE ENCOUNTER
Spoke with pt today during a f/u phone call and he was concerned that he was supposed to get a stress test before his operation next Tuesday, but it has not been scheduled yet.  He also states that he has recently twisted his ankle and would probably not be able to complete test at this time.  He thinks maybe you discussed with his other doctor and the stress test is not needed before the operation, so I told him I would touch base with you just to confirm that he could proceed with the operation without a stress test.    Thanks

## 2021-11-05 ENCOUNTER — HOSPITAL ENCOUNTER (OUTPATIENT)
Dept: CARDIOLOGY | Facility: HOSPITAL | Age: 76
Discharge: HOME | End: 2021-11-05
Admitting: INTERNAL MEDICINE

## 2021-11-05 ENCOUNTER — PRE-ADMISSION TESTING (OUTPATIENT)
Dept: PREADMISSION TESTING | Facility: HOSPITAL | Age: 76
End: 2021-11-05

## 2021-11-05 VITALS
HEIGHT: 67 IN | OXYGEN SATURATION: 96 % | HEART RATE: 77 BPM | BODY MASS INDEX: 25.38 KG/M2 | RESPIRATION RATE: 16 BRPM | SYSTOLIC BLOOD PRESSURE: 152 MMHG | DIASTOLIC BLOOD PRESSURE: 92 MMHG | WEIGHT: 161.7 LBS | TEMPERATURE: 98 F

## 2021-11-05 DIAGNOSIS — R06.09 EXERTIONAL DYSPNEA: ICD-10-CM

## 2021-11-05 LAB
ANION GAP SERPL CALCULATED.3IONS-SCNC: 9.6 MMOL/L (ref 5–15)
BH CV NUCLEAR PRIOR STUDY: 3
BH CV REST NUCLEAR ISOTOPE DOSE: 11.1 MCI
BH CV STRESS BP STAGE 1: NORMAL
BH CV STRESS COMMENTS STAGE 1: NORMAL
BH CV STRESS DOSE REGADENOSON STAGE 1: 0.4
BH CV STRESS DURATION MIN STAGE 1: 0
BH CV STRESS DURATION SEC STAGE 1: 10
BH CV STRESS HR STAGE 1: 118
BH CV STRESS NUCLEAR ISOTOPE DOSE: 33.6 MCI
BH CV STRESS PROTOCOL 1: NORMAL
BH CV STRESS RECOVERY BP: NORMAL MMHG
BH CV STRESS RECOVERY HR: 94 BPM
BH CV STRESS STAGE 1: 1
BUN SERPL-MCNC: 21 MG/DL (ref 8–23)
BUN/CREAT SERPL: 22.1 (ref 7–25)
CALCIUM SPEC-SCNC: 9.1 MG/DL (ref 8.6–10.5)
CHLORIDE SERPL-SCNC: 105 MMOL/L (ref 98–107)
CO2 SERPL-SCNC: 25.4 MMOL/L (ref 22–29)
CREAT SERPL-MCNC: 0.95 MG/DL (ref 0.76–1.27)
DEPRECATED RDW RBC AUTO: 41.7 FL (ref 37–54)
ERYTHROCYTE [DISTWIDTH] IN BLOOD BY AUTOMATED COUNT: 12.7 % (ref 12.3–15.4)
GFR SERPL CREATININE-BSD FRML MDRD: 77 ML/MIN/1.73
GLUCOSE SERPL-MCNC: 82 MG/DL (ref 65–99)
HCT VFR BLD AUTO: 45.7 % (ref 37.5–51)
HGB BLD-MCNC: 15.2 G/DL (ref 13–17.7)
LV EF NUC BP: 70 %
MAXIMAL PREDICTED HEART RATE: 145 BPM
MCH RBC QN AUTO: 30.4 PG (ref 26.6–33)
MCHC RBC AUTO-ENTMCNC: 33.3 G/DL (ref 31.5–35.7)
MCV RBC AUTO: 91.4 FL (ref 79–97)
PERCENT MAX PREDICTED HR: 81.38 %
PLATELET # BLD AUTO: 237 10*3/MM3 (ref 140–450)
PMV BLD AUTO: 9.2 FL (ref 6–12)
POTASSIUM SERPL-SCNC: 4.1 MMOL/L (ref 3.5–5.2)
RBC # BLD AUTO: 5 10*6/MM3 (ref 4.14–5.8)
SODIUM SERPL-SCNC: 140 MMOL/L (ref 136–145)
STRESS BASELINE BP: NORMAL MMHG
STRESS BASELINE HR: 78 BPM
STRESS PERCENT HR: 96 %
STRESS POST EXERCISE DUR SEC: 10 SEC
STRESS POST PEAK BP: NORMAL MMHG
STRESS POST PEAK HR: 118 BPM
STRESS TARGET HR: 123 BPM
WBC # BLD AUTO: 6.76 10*3/MM3 (ref 3.4–10.8)

## 2021-11-05 PROCEDURE — 93018 CV STRESS TEST I&R ONLY: CPT | Performed by: INTERNAL MEDICINE

## 2021-11-05 PROCEDURE — 93017 CV STRESS TEST TRACING ONLY: CPT

## 2021-11-05 PROCEDURE — 78452 HT MUSCLE IMAGE SPECT MULT: CPT

## 2021-11-05 PROCEDURE — 36415 COLL VENOUS BLD VENIPUNCTURE: CPT

## 2021-11-05 PROCEDURE — 25010000002 REGADENOSON 0.4 MG/5ML SOLUTION: Performed by: INTERNAL MEDICINE

## 2021-11-05 PROCEDURE — A9502 TC99M TETROFOSMIN: HCPCS | Performed by: INTERNAL MEDICINE

## 2021-11-05 PROCEDURE — 0 TECHNETIUM TETROFOSMIN KIT: Performed by: INTERNAL MEDICINE

## 2021-11-05 PROCEDURE — 85027 COMPLETE CBC AUTOMATED: CPT

## 2021-11-05 PROCEDURE — 80048 BASIC METABOLIC PNL TOTAL CA: CPT

## 2021-11-05 PROCEDURE — 78452 HT MUSCLE IMAGE SPECT MULT: CPT | Performed by: INTERNAL MEDICINE

## 2021-11-05 PROCEDURE — 93016 CV STRESS TEST SUPVJ ONLY: CPT | Performed by: INTERNAL MEDICINE

## 2021-11-05 RX ADMIN — REGADENOSON 0.4 MG: 0.08 INJECTION, SOLUTION INTRAVENOUS at 14:32

## 2021-11-05 RX ADMIN — TETROFOSMIN 1 DOSE: 1.38 INJECTION, POWDER, LYOPHILIZED, FOR SOLUTION INTRAVENOUS at 13:20

## 2021-11-05 RX ADMIN — TETROFOSMIN 1 DOSE: 1.38 INJECTION, POWDER, LYOPHILIZED, FOR SOLUTION INTRAVENOUS at 14:32

## 2021-11-05 NOTE — DISCHARGE INSTRUCTIONS
Arrive to hospital on your day of surgery at 530AM    Take the following medications the morning of surgery:  AMLODIPINE AND OMEPRAZOLE      If you are on prescription narcotic pain medication to control your pain you may also take that medication the morning of surgery.    General Instructions:  • Do not eat solid food after midnight the night before surgery.  • You may drink clear liquids day of surgery but must stop at least one hour before your hospital arrival time.  • It is beneficial for you to have a clear drink that contains carbohydrates the day of surgery.  We suggest a 12 to 20 ounce bottle of Gatorade or Powerade for non-diabetic patients or a 12 to 20 ounce bottle of G2 or Powerade Zero for diabetic patients. (Pediatric patients, are not advised to drink a 12 to 20 ounce carbohydrate drink)    Clear liquids are liquids you can see through.  Nothing red in color.     Plain water                               Sports drinks  Sodas                                   Gelatin (Jell-O)  Fruit juices without pulp such as white grape juice and apple juice  Popsicles that contain no fruit or yogurt  Tea or coffee (no cream or milk added)  Gatorade / Powerade  G2 / Powerade Zero    • Infants may have breast milk up to four hours before surgery.  • Infants drinking formula may drink formula up to six hours before surgery.   • Patients who avoid smoking, chewing tobacco and alcohol for 4 weeks prior to surgery have a reduced risk of post-operative complications.  Quit smoking as many days before surgery as you can.  • Do not smoke, use chewing tobacco or drink alcohol the day of surgery.   • If applicable bring your C-PAP/ BI-PAP machine.  • Bring any papers given to you in the doctor’s office.  • Wear clean comfortable clothes.  • Do not wear contact lenses, false eyelashes or make-up.  Bring a case for your glasses.   • Bring crutches or walker if applicable.  • Remove all piercings.  Leave jewelry and any other  valuables at home.  • Hair extensions with metal clips must be removed prior to surgery.  • The Pre-Admission Testing nurse will instruct you to bring medications if unable to obtain an accurate list in Pre-Admission Testing.        If you were given a blood bank ID arm band remember to bring it with you the day of surgery.    Preventing a Surgical Site Infection:  • For 2 to 3 days before surgery, avoid shaving with a razor because the razor can irritate skin and make it easier to develop an infection.    • Any areas of open skin can increase the risk of a post-operative wound infection by allowing bacteria to enter and travel throughout the body.  Notify your surgeon if you have any skin wounds / rashes even if it is not near the expected surgical site.  The area will need assessed to determine if surgery should be delayed until it is healed.  • The night prior to surgery shower using a fresh bar of anti-bacterial soap (such as Dial) and clean washcloth.  Sleep in a clean bed with clean clothing.  Do not allow pets to sleep with you.  • Shower on the morning of surgery using a fresh bar of anti-bacterial soap (such as Dial) and clean washcloth.  Dry with a clean towel and dress in clean clothing.  • Ask your surgeon if you will be receiving antibiotics prior to surgery.  • Make sure you, your family, and all healthcare providers clean their hands with soap and water or an alcohol based hand  before caring for you or your wound.    Day of surgery:  Your arrival time is approximately two hours before your scheduled surgery time.  Upon arrival, a Pre-op nurse and Anesthesiologist will review your health history, obtain vital signs, and answer questions you may have.  The only belongings needed at this time will be a list of your home medications and if applicable your C-PAP/BI-PAP machine.  A Pre-op nurse will start an IV and you may receive medication in preparation for surgery, including something to help  you relax.     Please be aware that surgery does come with discomfort.  We want to make every effort to control your discomfort so please discuss any uncontrolled symptoms with your nurse.   Your doctor will most likely have prescribed pain medications.      If you are going home after surgery you will receive individualized written care instructions before being discharged.  A responsible adult must drive you to and from the hospital on the day of your surgery and stay with you for 24 hours.  Discharge prescriptions can be filled by the hospital pharmacy during regular pharmacy hours.  If you are having surgery late in the day/evening your prescription may be e-prescribed to your pharmacy.  Please verify your pharmacy hours or chose a 24 hour pharmacy to avoid not having access to your prescription because your pharmacy has closed for the day.    If you are staying overnight following surgery, you will be transported to your hospital room following the recovery period.  [facility] has all private rooms.    If you have any questions please call Pre-Admission Testing at [phone].  Deductibles and co-payments are collected on the day of service. Please be prepared to pay the required co-pay, deductible or deposit on the day of service as defined by your plan.    Patient Education for Self-Quarantine Process    • Following your COVID testing, we strongly recommend that you wear a mask when you are with other people and practice social distancing.   • Limit your activities to only required outings.  • Wash your hands with soap and water frequently for at least 20 seconds.   • Avoid touching your eyes, nose and mouth with unwashed hands.  • Do not share anything - utensils, drinking glasses, food from the same bowl.   • Sanitize household surfaces daily. Include all high touch areas (door handles, light switches, phones, countertops, etc.)    Call your surgeon immediately if you experience any of the  following symptoms:  • Sore Throat  • Shortness of Breath or difficulty breathing  • Cough  • Chills  • Body soreness or muscle pain  • Headache  • Fever  • New loss of taste or smell  • Do not arrive for your surgery ill.  Your procedure will need to be rescheduled to another time.  You will need to call your physician before the day of surgery to avoid any unnecessary exposure to hospital staff as well as other patients.        CHLORHEXIDINE CLOTH INSTRUCTIONS  The morning of surgery follow these instructions using the Chlorhexidine cloths you've been given.  These steps reduce bacteria on the body.  Do not use the cloths near your eyes, ears mouth, genitalia or on open wounds.  Throw the cloths away after use but do not try to flush them down a toilet.      • Open and remove one cloth at a time from the package.    • Leave the cloth unfolded and begin the bathing.  • Massage the skin with the cloths using gentle pressure to remove bacteria.  Do not scrub harshly.   • Follow the steps below with one 2% CHG cloth per area (6 total cloths).  • One cloth for neck, shoulders and chest.  • One cloth for both arms, hands, fingers and underarms (do underarms last).  • One cloth for the abdomen followed by groin.  • One cloth for right leg and foot including between the toes.  • One cloth for left leg and foot including between the toes.  • The last cloth is to be used for the back of the neck, back and buttocks.    Allow the CHG to air dry 3 minutes on the skin which will give it time to work and decrease the chance of irritation.  The skin may feel sticky until it is dry.  Do not rinse with water or any other liquid or you will lose the beneficial effects of the CHG.  If mild skin irritation occurs, do rinse the skin to remove the CHG.  Report this to the nurse at time of admission.  Do not apply lotions, creams, ointments, deodorants or perfumes after using the clothes. Dress in clean clothes before coming to the  \[facility]\"".

## 2021-11-05 NOTE — PROGRESS NOTES
Please let patient know this is a normal stress study and I will send letter to his vascular surgeon.

## 2021-11-06 ENCOUNTER — LAB (OUTPATIENT)
Dept: LAB | Facility: HOSPITAL | Age: 76
End: 2021-11-06

## 2021-11-06 PROCEDURE — C9803 HOPD COVID-19 SPEC COLLECT: HCPCS

## 2021-11-06 PROCEDURE — U0004 COV-19 TEST NON-CDC HGH THRU: HCPCS

## 2021-11-07 LAB — SARS-COV-2 RNA PNL SPEC NAA+PROBE: NOT DETECTED

## 2021-11-08 ENCOUNTER — TELEPHONE (OUTPATIENT)
Dept: CARDIOLOGY | Facility: CLINIC | Age: 76
End: 2021-11-08

## 2021-11-08 NOTE — TELEPHONE ENCOUNTER
----- Message from Hitesh Erickson Jr., MD sent at 11/5/2021  4:29 PM EDT -----  Please let patient know this is a normal stress study and I will send letter to his vascular surgeon.

## 2021-11-09 ENCOUNTER — APPOINTMENT (OUTPATIENT)
Dept: GENERAL RADIOLOGY | Facility: HOSPITAL | Age: 76
End: 2021-11-09

## 2021-11-09 ENCOUNTER — APPOINTMENT (OUTPATIENT)
Dept: CARDIOLOGY | Facility: HOSPITAL | Age: 76
End: 2021-11-09

## 2021-11-09 ENCOUNTER — ANESTHESIA (OUTPATIENT)
Dept: PERIOP | Facility: HOSPITAL | Age: 76
End: 2021-11-09

## 2021-11-09 ENCOUNTER — ANESTHESIA EVENT (OUTPATIENT)
Dept: PERIOP | Facility: HOSPITAL | Age: 76
End: 2021-11-09

## 2021-11-09 ENCOUNTER — HOSPITAL ENCOUNTER (OUTPATIENT)
Facility: HOSPITAL | Age: 76
Setting detail: HOSPITAL OUTPATIENT SURGERY
Discharge: HOME | End: 2021-11-09
Attending: SURGERY | Admitting: SURGERY

## 2021-11-09 VITALS
OXYGEN SATURATION: 95 % | BODY MASS INDEX: 25.45 KG/M2 | HEART RATE: 68 BPM | TEMPERATURE: 97.8 F | DIASTOLIC BLOOD PRESSURE: 94 MMHG | SYSTOLIC BLOOD PRESSURE: 141 MMHG | RESPIRATION RATE: 16 BRPM | WEIGHT: 162.48 LBS

## 2021-11-09 DIAGNOSIS — I72.4 ANEURYSM OF RIGHT POPLITEAL ARTERY (HCC): ICD-10-CM

## 2021-11-09 DIAGNOSIS — I82.4Z1 ACUTE DEEP VEIN THROMBOSIS (DVT) OF DISTAL VEIN OF RIGHT LOWER EXTREMITY (HCC): Primary | ICD-10-CM

## 2021-11-09 PROBLEM — I71.40 ABDOMINAL AORTIC ANEURYSM (AAA) 3.0 CM TO 5.5 CM IN DIAMETER IN MALE (HCC): Status: ACTIVE | Noted: 2021-11-09

## 2021-11-09 LAB
ABO GROUP BLD: NORMAL
BH CV LOW VAS RIGHT GASTRONEMIUS VESSEL: 1
BH CV LOWER VASCULAR LEFT COMMON FEMORAL AUGMENT: NORMAL
BH CV LOWER VASCULAR LEFT COMMON FEMORAL COMPETENT: NORMAL
BH CV LOWER VASCULAR LEFT COMMON FEMORAL COMPRESS: NORMAL
BH CV LOWER VASCULAR LEFT COMMON FEMORAL PHASIC: NORMAL
BH CV LOWER VASCULAR LEFT COMMON FEMORAL SPONT: NORMAL
BH CV LOWER VASCULAR RIGHT COMMON FEMORAL AUGMENT: NORMAL
BH CV LOWER VASCULAR RIGHT COMMON FEMORAL COMPETENT: NORMAL
BH CV LOWER VASCULAR RIGHT COMMON FEMORAL COMPRESS: NORMAL
BH CV LOWER VASCULAR RIGHT COMMON FEMORAL PHASIC: NORMAL
BH CV LOWER VASCULAR RIGHT COMMON FEMORAL SPONT: NORMAL
BH CV LOWER VASCULAR RIGHT DISTAL FEMORAL COMPRESS: NORMAL
BH CV LOWER VASCULAR RIGHT GASTRONEMIUS COMPRESS: NORMAL
BH CV LOWER VASCULAR RIGHT GASTRONEMIUS THROMBUS: NORMAL
BH CV LOWER VASCULAR RIGHT GREATER SAPH AK COMPRESS: NORMAL
BH CV LOWER VASCULAR RIGHT GREATER SAPH BK COMPRESS: NORMAL
BH CV LOWER VASCULAR RIGHT LESSER SAPH COMPRESS: NORMAL
BH CV LOWER VASCULAR RIGHT MID FEMORAL AUGMENT: NORMAL
BH CV LOWER VASCULAR RIGHT MID FEMORAL COMPETENT: NORMAL
BH CV LOWER VASCULAR RIGHT MID FEMORAL COMPRESS: NORMAL
BH CV LOWER VASCULAR RIGHT MID FEMORAL PHASIC: NORMAL
BH CV LOWER VASCULAR RIGHT MID FEMORAL SPONT: NORMAL
BH CV LOWER VASCULAR RIGHT PERONEAL COMPRESS: NORMAL
BH CV LOWER VASCULAR RIGHT POPLITEAL AUGMENT: NORMAL
BH CV LOWER VASCULAR RIGHT POPLITEAL COMPETENT: NORMAL
BH CV LOWER VASCULAR RIGHT POPLITEAL COMPRESS: NORMAL
BH CV LOWER VASCULAR RIGHT POPLITEAL PHASIC: NORMAL
BH CV LOWER VASCULAR RIGHT POPLITEAL SPONT: NORMAL
BH CV LOWER VASCULAR RIGHT POSTERIOR TIBIAL COMPRESS: NORMAL
BH CV LOWER VASCULAR RIGHT PROFUNDA FEMORAL COMPRESS: NORMAL
BH CV LOWER VASCULAR RIGHT PROXIMAL FEMORAL COMPRESS: NORMAL
BH CV LOWER VASCULAR RIGHT SAPHENOFEMORAL JUNCTION COMPRESS: NORMAL
BLD GP AB SCN SERPL QL: NEGATIVE
MAXIMAL PREDICTED HEART RATE: 145 BPM
RH BLD: POSITIVE
STRESS TARGET HR: 123 BPM
T&S EXPIRATION DATE: NORMAL

## 2021-11-09 PROCEDURE — 25010000002 FENTANYL CITRATE (PF) 50 MCG/ML SOLUTION: Performed by: NURSE ANESTHETIST, CERTIFIED REGISTERED

## 2021-11-09 PROCEDURE — C1887 CATHETER, GUIDING: HCPCS | Performed by: SURGERY

## 2021-11-09 PROCEDURE — C1894 INTRO/SHEATH, NON-LASER: HCPCS | Performed by: SURGERY

## 2021-11-09 PROCEDURE — C1874 STENT, COATED/COV W/DEL SYS: HCPCS | Performed by: SURGERY

## 2021-11-09 PROCEDURE — 25010000002 PROPOFOL 10 MG/ML EMULSION: Performed by: NURSE ANESTHETIST, CERTIFIED REGISTERED

## 2021-11-09 PROCEDURE — C1760 CLOSURE DEV, VASC: HCPCS | Performed by: SURGERY

## 2021-11-09 PROCEDURE — 86850 RBC ANTIBODY SCREEN: CPT | Performed by: ANESTHESIOLOGY

## 2021-11-09 PROCEDURE — C1769 GUIDE WIRE: HCPCS | Performed by: SURGERY

## 2021-11-09 PROCEDURE — 25010000002 HEPARIN (PORCINE) PER 1000 UNITS: Performed by: SURGERY

## 2021-11-09 PROCEDURE — 25010000002 HEPARIN (PORCINE) PER 1000 UNITS: Performed by: NURSE ANESTHETIST, CERTIFIED REGISTERED

## 2021-11-09 PROCEDURE — C1725 CATH, TRANSLUMIN NON-LASER: HCPCS | Performed by: SURGERY

## 2021-11-09 PROCEDURE — 0 LIDOCAINE 1 % SOLUTION 20 ML VIAL: Performed by: SURGERY

## 2021-11-09 PROCEDURE — 0 CEFAZOLIN IN DEXTROSE 2-4 GM/100ML-% SOLUTION: Performed by: SURGERY

## 2021-11-09 PROCEDURE — 86901 BLOOD TYPING SEROLOGIC RH(D): CPT | Performed by: ANESTHESIOLOGY

## 2021-11-09 PROCEDURE — 85347 COAGULATION TIME ACTIVATED: CPT

## 2021-11-09 PROCEDURE — 25010000002 FENTANYL CITRATE (PF) 50 MCG/ML SOLUTION: Performed by: ANESTHESIOLOGY

## 2021-11-09 PROCEDURE — 25010000002 PHENYLEPHRINE 10 MG/ML SOLUTION: Performed by: NURSE ANESTHETIST, CERTIFIED REGISTERED

## 2021-11-09 PROCEDURE — 86900 BLOOD TYPING SEROLOGIC ABO: CPT | Performed by: ANESTHESIOLOGY

## 2021-11-09 PROCEDURE — 0 IOPAMIDOL PER 1 ML: Performed by: SURGERY

## 2021-11-09 PROCEDURE — 93971 EXTREMITY STUDY: CPT

## 2021-11-09 DEVICE — GORE VIABAHN ENDOPROSTHESIS WITH HEPARIN 10MMX10CM 8FR 120CMCATH
Type: IMPLANTABLE DEVICE | Site: LEG | Status: FUNCTIONAL
Brand: GORE VIABAHN ENDOPROSTHESIS WITH HEPARIN

## 2021-11-09 RX ORDER — SODIUM CHLORIDE 0.9 % (FLUSH) 0.9 %
3 SYRINGE (ML) INJECTION EVERY 12 HOURS SCHEDULED
Status: DISCONTINUED | OUTPATIENT
Start: 2021-11-09 | End: 2021-11-09 | Stop reason: HOSPADM

## 2021-11-09 RX ORDER — ASPIRIN 81 MG/1
81 TABLET ORAL DAILY
COMMUNITY
End: 2022-01-24

## 2021-11-09 RX ORDER — IBUPROFEN 600 MG/1
600 TABLET ORAL ONCE AS NEEDED
Status: DISCONTINUED | OUTPATIENT
Start: 2021-11-09 | End: 2021-11-09 | Stop reason: HOSPADM

## 2021-11-09 RX ORDER — FLUMAZENIL 0.1 MG/ML
0.2 INJECTION INTRAVENOUS AS NEEDED
Status: DISCONTINUED | OUTPATIENT
Start: 2021-11-09 | End: 2021-11-09 | Stop reason: HOSPADM

## 2021-11-09 RX ORDER — LABETALOL HYDROCHLORIDE 5 MG/ML
5 INJECTION, SOLUTION INTRAVENOUS
Status: DISCONTINUED | OUTPATIENT
Start: 2021-11-09 | End: 2021-11-09 | Stop reason: HOSPADM

## 2021-11-09 RX ORDER — MULTIPLE VITAMINS W/ MINERALS TAB 9MG-400MCG
1 TAB ORAL DAILY
COMMUNITY

## 2021-11-09 RX ORDER — HEPARIN SODIUM 1000 [USP'U]/ML
INJECTION, SOLUTION INTRAVENOUS; SUBCUTANEOUS AS NEEDED
Status: DISCONTINUED | OUTPATIENT
Start: 2021-11-09 | End: 2021-11-09 | Stop reason: SURG

## 2021-11-09 RX ORDER — HYDRALAZINE HYDROCHLORIDE 20 MG/ML
5 INJECTION INTRAMUSCULAR; INTRAVENOUS
Status: DISCONTINUED | OUTPATIENT
Start: 2021-11-09 | End: 2021-11-09 | Stop reason: HOSPADM

## 2021-11-09 RX ORDER — ATORVASTATIN CALCIUM 20 MG/1
20 TABLET, FILM COATED ORAL DAILY
Qty: 30 TABLET | Refills: 11 | Status: SHIPPED | OUTPATIENT
Start: 2021-11-09 | End: 2022-01-24

## 2021-11-09 RX ORDER — FAMOTIDINE 10 MG/ML
20 INJECTION, SOLUTION INTRAVENOUS ONCE
Status: COMPLETED | OUTPATIENT
Start: 2021-11-09 | End: 2021-11-09

## 2021-11-09 RX ORDER — LIDOCAINE HYDROCHLORIDE 10 MG/ML
0.5 INJECTION, SOLUTION EPIDURAL; INFILTRATION; INTRACAUDAL; PERINEURAL ONCE AS NEEDED
Status: DISCONTINUED | OUTPATIENT
Start: 2021-11-09 | End: 2021-11-09 | Stop reason: HOSPADM

## 2021-11-09 RX ORDER — FENTANYL CITRATE 50 UG/ML
INJECTION, SOLUTION INTRAMUSCULAR; INTRAVENOUS
Status: COMPLETED | OUTPATIENT
Start: 2021-11-09 | End: 2021-11-09

## 2021-11-09 RX ORDER — HYDROCODONE BITARTRATE AND ACETAMINOPHEN 7.5; 325 MG/1; MG/1
1 TABLET ORAL ONCE AS NEEDED
Status: DISCONTINUED | OUTPATIENT
Start: 2021-11-09 | End: 2021-11-09 | Stop reason: HOSPADM

## 2021-11-09 RX ORDER — LIDOCAINE HYDROCHLORIDE 20 MG/ML
INJECTION, SOLUTION INFILTRATION; PERINEURAL AS NEEDED
Status: DISCONTINUED | OUTPATIENT
Start: 2021-11-09 | End: 2021-11-09 | Stop reason: SURG

## 2021-11-09 RX ORDER — ONDANSETRON 2 MG/ML
4 INJECTION INTRAMUSCULAR; INTRAVENOUS ONCE AS NEEDED
Status: DISCONTINUED | OUTPATIENT
Start: 2021-11-09 | End: 2021-11-09 | Stop reason: HOSPADM

## 2021-11-09 RX ORDER — DIPHENHYDRAMINE HYDROCHLORIDE 50 MG/ML
12.5 INJECTION INTRAMUSCULAR; INTRAVENOUS
Status: DISCONTINUED | OUTPATIENT
Start: 2021-11-09 | End: 2021-11-09 | Stop reason: HOSPADM

## 2021-11-09 RX ORDER — HYDROMORPHONE HYDROCHLORIDE 1 MG/ML
0.25 INJECTION, SOLUTION INTRAMUSCULAR; INTRAVENOUS; SUBCUTANEOUS
Status: DISCONTINUED | OUTPATIENT
Start: 2021-11-09 | End: 2021-11-09 | Stop reason: HOSPADM

## 2021-11-09 RX ORDER — PROMETHAZINE HYDROCHLORIDE 25 MG/1
25 SUPPOSITORY RECTAL ONCE AS NEEDED
Status: DISCONTINUED | OUTPATIENT
Start: 2021-11-09 | End: 2021-11-09 | Stop reason: HOSPADM

## 2021-11-09 RX ORDER — SODIUM CHLORIDE 0.9 % (FLUSH) 0.9 %
3-10 SYRINGE (ML) INJECTION AS NEEDED
Status: DISCONTINUED | OUTPATIENT
Start: 2021-11-09 | End: 2021-11-09 | Stop reason: HOSPADM

## 2021-11-09 RX ORDER — EPHEDRINE SULFATE 50 MG/ML
5 INJECTION, SOLUTION INTRAVENOUS ONCE AS NEEDED
Status: DISCONTINUED | OUTPATIENT
Start: 2021-11-09 | End: 2021-11-09 | Stop reason: HOSPADM

## 2021-11-09 RX ORDER — PROMETHAZINE HYDROCHLORIDE 25 MG/1
25 TABLET ORAL ONCE AS NEEDED
Status: DISCONTINUED | OUTPATIENT
Start: 2021-11-09 | End: 2021-11-09 | Stop reason: HOSPADM

## 2021-11-09 RX ORDER — DIPHENHYDRAMINE HCL 25 MG
25 CAPSULE ORAL
Status: DISCONTINUED | OUTPATIENT
Start: 2021-11-09 | End: 2021-11-09 | Stop reason: HOSPADM

## 2021-11-09 RX ORDER — HYDROCODONE BITARTRATE AND ACETAMINOPHEN 5; 325 MG/1; MG/1
1-2 TABLET ORAL EVERY 6 HOURS PRN
Qty: 20 TABLET | Refills: 0 | Status: SHIPPED | OUTPATIENT
Start: 2021-11-09 | End: 2022-01-24

## 2021-11-09 RX ORDER — NALOXONE HCL 0.4 MG/ML
0.2 VIAL (ML) INJECTION AS NEEDED
Status: DISCONTINUED | OUTPATIENT
Start: 2021-11-09 | End: 2021-11-09 | Stop reason: HOSPADM

## 2021-11-09 RX ORDER — FENTANYL CITRATE 50 UG/ML
50 INJECTION, SOLUTION INTRAMUSCULAR; INTRAVENOUS
Status: DISCONTINUED | OUTPATIENT
Start: 2021-11-09 | End: 2021-11-09 | Stop reason: HOSPADM

## 2021-11-09 RX ORDER — FENTANYL CITRATE 50 UG/ML
INJECTION, SOLUTION INTRAMUSCULAR; INTRAVENOUS AS NEEDED
Status: DISCONTINUED | OUTPATIENT
Start: 2021-11-09 | End: 2021-11-09 | Stop reason: SURG

## 2021-11-09 RX ORDER — PHENYLEPHRINE HYDROCHLORIDE 10 MG/ML
INJECTION INTRAVENOUS AS NEEDED
Status: DISCONTINUED | OUTPATIENT
Start: 2021-11-09 | End: 2021-11-09 | Stop reason: SURG

## 2021-11-09 RX ORDER — ALBUTEROL SULFATE 2.5 MG/3ML
2.5 SOLUTION RESPIRATORY (INHALATION) ONCE AS NEEDED
Status: DISCONTINUED | OUTPATIENT
Start: 2021-11-09 | End: 2021-11-09 | Stop reason: HOSPADM

## 2021-11-09 RX ORDER — SODIUM CHLORIDE, SODIUM LACTATE, POTASSIUM CHLORIDE, CALCIUM CHLORIDE 600; 310; 30; 20 MG/100ML; MG/100ML; MG/100ML; MG/100ML
9 INJECTION, SOLUTION INTRAVENOUS CONTINUOUS
Status: DISCONTINUED | OUTPATIENT
Start: 2021-11-09 | End: 2021-11-09 | Stop reason: HOSPADM

## 2021-11-09 RX ORDER — GLYCOPYRROLATE 0.2 MG/ML
INJECTION INTRAMUSCULAR; INTRAVENOUS AS NEEDED
Status: DISCONTINUED | OUTPATIENT
Start: 2021-11-09 | End: 2021-11-09 | Stop reason: SURG

## 2021-11-09 RX ORDER — FENTANYL CITRATE 50 UG/ML
25 INJECTION, SOLUTION INTRAMUSCULAR; INTRAVENOUS
Status: DISCONTINUED | OUTPATIENT
Start: 2021-11-09 | End: 2021-11-09 | Stop reason: HOSPADM

## 2021-11-09 RX ORDER — PROPOFOL 10 MG/ML
VIAL (ML) INTRAVENOUS AS NEEDED
Status: DISCONTINUED | OUTPATIENT
Start: 2021-11-09 | End: 2021-11-09 | Stop reason: SURG

## 2021-11-09 RX ORDER — OXYCODONE AND ACETAMINOPHEN 10; 325 MG/1; MG/1
1 TABLET ORAL EVERY 4 HOURS PRN
Status: DISCONTINUED | OUTPATIENT
Start: 2021-11-09 | End: 2021-11-09 | Stop reason: HOSPADM

## 2021-11-09 RX ORDER — CEFAZOLIN SODIUM 2 G/100ML
2 INJECTION, SOLUTION INTRAVENOUS ONCE
Status: COMPLETED | OUTPATIENT
Start: 2021-11-09 | End: 2021-11-09

## 2021-11-09 RX ADMIN — PHENYLEPHRINE HYDROCHLORIDE 100 MCG: 10 INJECTION, SOLUTION INTRAVENOUS at 08:19

## 2021-11-09 RX ADMIN — CEFAZOLIN SODIUM 2 G: 2 INJECTION, SOLUTION INTRAVENOUS at 07:24

## 2021-11-09 RX ADMIN — HEPARIN SODIUM 5000 UNITS: 1000 INJECTION INTRAVENOUS; SUBCUTANEOUS at 08:33

## 2021-11-09 RX ADMIN — PHENYLEPHRINE HYDROCHLORIDE 100 MCG: 10 INJECTION, SOLUTION INTRAVENOUS at 08:58

## 2021-11-09 RX ADMIN — IOPAMIDOL 136 ML: 510 INJECTION, SOLUTION INTRAVASCULAR at 09:06

## 2021-11-09 RX ADMIN — PHENYLEPHRINE HYDROCHLORIDE 100 MCG: 10 INJECTION, SOLUTION INTRAVENOUS at 08:11

## 2021-11-09 RX ADMIN — PHENYLEPHRINE HYDROCHLORIDE 100 MCG: 10 INJECTION, SOLUTION INTRAVENOUS at 07:50

## 2021-11-09 RX ADMIN — PROPOFOL 100 MG: 10 INJECTION, EMULSION INTRAVENOUS at 07:47

## 2021-11-09 RX ADMIN — GLYCOPYRROLATE 0.2 MG: 0.2 INJECTION INTRAMUSCULAR; INTRAVENOUS at 07:43

## 2021-11-09 RX ADMIN — SODIUM CHLORIDE, POTASSIUM CHLORIDE, SODIUM LACTATE AND CALCIUM CHLORIDE 9 ML/HR: 600; 310; 30; 20 INJECTION, SOLUTION INTRAVENOUS at 06:57

## 2021-11-09 RX ADMIN — PHENYLEPHRINE HYDROCHLORIDE 100 MCG: 10 INJECTION, SOLUTION INTRAVENOUS at 08:30

## 2021-11-09 RX ADMIN — FENTANYL CITRATE 25 MCG: 0.05 INJECTION, SOLUTION INTRAMUSCULAR; INTRAVENOUS at 06:56

## 2021-11-09 RX ADMIN — PHENYLEPHRINE HYDROCHLORIDE 100 MCG: 10 INJECTION, SOLUTION INTRAVENOUS at 08:37

## 2021-11-09 RX ADMIN — FENTANYL CITRATE 50 MCG: 0.05 INJECTION, SOLUTION INTRAMUSCULAR; INTRAVENOUS at 07:43

## 2021-11-09 RX ADMIN — PHENYLEPHRINE HYDROCHLORIDE 100 MCG: 10 INJECTION, SOLUTION INTRAVENOUS at 08:26

## 2021-11-09 RX ADMIN — PHENYLEPHRINE HYDROCHLORIDE 100 MCG: 10 INJECTION, SOLUTION INTRAVENOUS at 09:05

## 2021-11-09 RX ADMIN — HEPARIN SODIUM 5000 UNITS: 1000 INJECTION INTRAVENOUS; SUBCUTANEOUS at 08:16

## 2021-11-09 RX ADMIN — PROPOFOL 100 MCG/KG/MIN: 10 INJECTION, EMULSION INTRAVENOUS at 07:48

## 2021-11-09 RX ADMIN — PHENYLEPHRINE HYDROCHLORIDE 100 MCG: 10 INJECTION, SOLUTION INTRAVENOUS at 08:07

## 2021-11-09 RX ADMIN — FENTANYL CITRATE 50 MCG: 0.05 INJECTION, SOLUTION INTRAMUSCULAR; INTRAVENOUS at 08:54

## 2021-11-09 RX ADMIN — PHENYLEPHRINE HYDROCHLORIDE 100 MCG: 10 INJECTION, SOLUTION INTRAVENOUS at 08:40

## 2021-11-09 RX ADMIN — LIDOCAINE HYDROCHLORIDE 80 MG: 20 INJECTION, SOLUTION INFILTRATION; PERINEURAL at 07:46

## 2021-11-09 RX ADMIN — FAMOTIDINE 20 MG: 10 INJECTION INTRAVENOUS at 06:56

## 2021-11-09 RX ADMIN — PHENYLEPHRINE HYDROCHLORIDE 100 MCG: 10 INJECTION, SOLUTION INTRAVENOUS at 08:00

## 2021-11-09 RX ADMIN — PHENYLEPHRINE HYDROCHLORIDE 100 MCG: 10 INJECTION, SOLUTION INTRAVENOUS at 08:48

## 2021-11-09 NOTE — ANESTHESIA PREPROCEDURE EVALUATION
Anesthesia Evaluation     Patient summary reviewed and Nursing notes reviewed   history of anesthetic complications: PONV  NPO Solid Status: > 8 hours  NPO Liquid Status: > 2 hours           Airway   Mallampati: II  Dental - normal exam     Pulmonary - normal exam   Cardiovascular - normal exam    ECG reviewed    (+) hypertension, PVD, DVT, hyperlipidemia,     ROS comment: Reviewed stress test    Neuro/Psych  GI/Hepatic/Renal/Endo    (+)  GERD,      Musculoskeletal     Abdominal    Substance History      OB/GYN          Other   arthritis,                    Anesthesia Plan    ASA 2     general   (Plan arterial line for hemodynamic monitoring and lab draws)    Anesthetic plan, all risks, benefits, and alternatives have been provided, discussed and informed consent has been obtained with: patient.

## 2021-11-09 NOTE — ANESTHESIA PROCEDURE NOTES
Arterial Line      Patient reassessed immediately prior to procedure    Patient location during procedure: holding area  Start time: 11/9/2021 6:58 AM  Stop Time:11/9/2021 7:01 AM       Performed By   Anesthesiologist: Alejandra Sheriff MD  Preanesthetic Checklist  Completed: patient identified, IV checked, site marked, risks and benefits discussed, surgical consent, monitors and equipment checked, pre-op evaluation and timeout performed  Arterial Line Prep   Sterile Tech: cap and gloves  Prep: ChloraPrep  Patient monitoring: blood pressure monitoring, continuous pulse oximetry and EKG  Arterial Line Procedure   Laterality:right  Location:  radial artery  Catheter size: 20 G   Guidance: ultrasound guided and landmark technique  Number of attempts: 1  Successful placement: yes  Post Assessment   Dressing Type: occlusive dressing applied, secured with tape and wrist guard applied.   Complications no  Circ/Move/Sens Assessment: unchanged.   Patient Tolerance: patient tolerated the procedure well with no apparent complications  Additional Notes  Ultrasound guidance was used to verify needle placement

## 2021-11-09 NOTE — OP NOTE
Operative Note  Date of Admission:  11/9/2021  OR Date: 11/9/2021    Pre-op Diagnosis:   Right popliteal aneurysm    Post-Op Diagnosis Codes:  Same with abdominal aortic aneurysm and right iliac aneurysms x2    Procedure:   1) duplex directed access with aortoiliofemoral arteriogram and right leg runoff, repair of right popliteal aneurysm with Viabahn stent graft x2 with angioplasty.  Selective catheterization right peroneal artery fourth order.  Left femoral Perclose closure device 8 Maori     Surgeon: Ghulam Archer MD    Assistant: Brandi AUSTIN CSA and they provided critical assistance during the case including suctioning, exposure, retraction, and reduction of blood loss.    Anesthesia: General    Staff:   Circulator: Joe Koch RN  Scrub Person: Drew Llamas; Ni White PCT  Vendor Representative: Josue Bertrand  Assistant: Brandi Morrison CSA  Vascular Radiology Technician: Ritu Dick Kevin    Estimated Blood Loss: 50 mL    Specimens:   Order Name Source Comment Collection Info Order Time   TYPE AND SCREEN   Collected By: Suellen Vazquez RN 11/9/2021  7:06 AM     Release to patient   Immediate             Complications: None    Findings: See dictation    Indications:    The patient is an 76 y.o. male seen for evaluation 2.5 cm popliteal aneurysm with a large amount mural thrombus in concerns for thrombotic events to the lower leg raise.  Patient understands risk benefits complications of the procedure and consents to the procedure.       Procedure:    The patient was prepped and draped sterilely under LMA anesthetic with Grewal catheter and A-line in place.  Puncture site on left groin under duplex direction was performed with Perclose device placed after direct angiogram confirmed position in the common femoral.  Wire was actually passed over to the right without a selective catheter and using the wire we passed a Gonzalez cross over selecting the peroneal artery  and performing angiogram of the right leg and preparation for repair.  With a Glidewire advantage placed into the peroneal artery after selective angiogram was performed, we use this is a rail for an 8 Portuguese sheath to be passed over the horn into the right SFA.  Through this a Viabahn 10 mm x 15 cm stent graft was deployed from the mid segment of the below-knee popliteal up to the distal above-knee popliteal.  A Viabahn 10 mm x 10 cm segment was then deployed to complete the exclusion of the aneurysmal segment at the above-knee popliteal level.  Attempts were made to keep all major collateral vessels patent.  Postdilatation with a 10 mm x 6 cm balloon was performed.  Final angiograms were performed of the leg showing exclusion of the aneurysmal segments and thrombus and good flow down to the runoff vessels which were intact and without evidence of thrombotic or embolic disease.  Patient then had right leg runoff completed as above with multiple images of the right internal iliac artery section.  Pigtail was then placed in the level of the aorta where aortogram was performed and then pigtail was pulled down and aortoiliofemoral arteriogram was performed.  Patient was fully heparinized throughout the course and after closing the groin with the preplaced Perclose device we did not reverse the heparin effect given his known hypercoagulability and prior DVT.  Patient was transferred to recovery room after completion of the general anesthetic stable without obvious complication.    Radiographic Findings:  Angiographic findings include aortogram with single renal arteries bilaterally with no stenosis.  There is an infrarenal abdominal aortic aneurysm with long stable neck.  The aneurysm extends to the bifurcation and there is right iliac common iliac aneurysm degeneration extending down to the bifurcation.  The bifurcation is splayed wide and there is a difficult to find right internal iliac aneurysm due to mural thrombus  filling it making it appear that there is very little aneurysmal degeneration there.  On the left side the internal iliac and external iliac are widely patent.  Right external iliac is also widely patent without aneurysmal change.  Both common femorals profundus and SFA origins are widely patent with puncture site on the left good for closure device.  Runoff on the right leg shows widely patent SFA down to above-knee popliteal which becomes aneurysmal at the adductor canal.  Aneurysm is large with mural thrombus throughout the infrageniculate popliteal.  There is complex plaque and thrombus at the distal portion of the infrageniculate popliteal.  There is adequate below-knee popliteal segment above the origin of the anterior tibial and tibioperoneal trunk.  There is primary runoff via the posterior tibial with three-vessel runoff to the mid calf and ankle there is much slower in the anterior tibial and peroneal.  Deployment of Viabahn stent grafts from the below-knee popliteal segment up to the above-knee popliteal is performed with postdilatation using 10 mm balloon and then 10 x 15 and 10 x 10 cm sections of Viabahn stent graft deployed.  There is preservation of multiple collaterals above and below the segments.  There is complete exclusion of the mural thrombus and there is a very late filling of the aneurysm sac with retained dye at the distal portion of the aneurysmal degeneration.  No type I or II endoleak's are noted at the completion angiogram.      Active Hospital Problems    Diagnosis  POA   • Aneurysm of right popliteal artery (HCC) [I72.4]  Unknown   • Abdominal aortic aneurysm (AAA) 3.0 cm to 5.5 cm in diameter in male (HCC) [I71.4]  Unknown      Resolved Hospital Problems   No resolved problems to display.      Campos Archer MD     Date: 11/9/2021  Time: 10:02 EST

## 2021-11-09 NOTE — ANESTHESIA POSTPROCEDURE EVALUATION
Patient: Erickson Gerard    Procedure Summary     Date: 11/09/21 Room / Location[facility]    Anesthesia Start: 0731 Anesthesia Stop: 0926    Procedure: RIGHT POPITEAL ANEURYSM REPAIR VIABOHN (Right ) Diagnosis:     Surgeons: Campos Archer MD Provider: Alejandra Sheriff MD    Anesthesia Type: general ASA Status: 2          Anesthesia Type: general    Vitals  Vitals Value Taken Time   /86 11/09/21 1131   Temp 36.6 °C (97.8 °F) 11/09/21 0922   Pulse 66 11/09/21 1134   Resp 16 11/09/21 1130   SpO2 91 % 11/09/21 1135   Vitals shown include unvalidated device data.        Post Anesthesia Care and Evaluation    Patient location during evaluation: bedside  Patient participation: complete - patient participated  Level of consciousness: sleepy but conscious  Pain score: 0  Pain management: adequate  Airway patency: patent  Anesthetic complications: No anesthetic complications    Cardiovascular status: acceptable  Respiratory status: acceptable  Hydration status: acceptable    Comments: /89   Pulse 65   Temp 36.6 °C (97.8 °F) (Oral)   Resp 16   Wt 73.7 kg (162 lb 7.7 oz)   SpO2 93%   BMI 25.45 kg/m²

## 2021-11-09 NOTE — BRIEF OP NOTE
ANGIOPLASTY POPLITEAL ARTERY POSSIBLE STENT  Progress Note    Erickson Gerard  11/9/2021    Pre-op Diagnosis:   Right popliteal aneurysm       Post-Op Diagnosis Codes:  Same    Procedure/CPT® Codes:        Procedure(s):  RIGHT POPITEAL ANEURYSM WITH REPAIR VIABAHN stent graft placement x2    Surgeon(s):  Campos Archer MD    Anesthesia: General    Staff:   Circulator: Joe Koch RN  Scrub Person: Drew Llamas; Ni White PCT  Vendor Representative: Josue Bertrand  Assistant: Brandi Morrison CSA  Vascular Radiology Technician: Ritu Dick Kevin  Assistant: Brandi Morrison CSA      Estimated Blood Loss: 50 mL    Urine Voided: * No values recorded between 11/9/2021  7:27 AM and 11/9/2021  9:17 AM *    Specimens:                None          Drains:   Urethral Catheter Silicone 16 Fr. (Active)       Findings: See dictation    Complications: None    Assistant: Brandi Morrison CSA  was responsible for performing the following activities: Closing, Placing Dressing and Wire management and their skilled assistance was necessary for the success of this case.    Campos Archer MD     Date: 11/9/2021  Time: 09:28 EST

## 2021-11-09 NOTE — ANESTHESIA PROCEDURE NOTES
Airway  Urgency: elective    Date/Time: 11/9/2021 7:58 AM  Airway not difficult    General Information and Staff    Patient location during procedure: OR  Anesthesiologist: Alejandra Sheriff MD  CRNA: Mike Amaya CRNA    Indications and Patient Condition  Indications for airway management: airway protection    Preoxygenated: yes  Mask difficulty assessment: 1 - vent by mask    Final Airway Details  Final airway type: supraglottic airway      Successful airway: unique  Size 5  Cuff Pressure (cm H2O): 15  Airway Seal Pressure (cm H2O): 22    Number of attempts at approach: 1

## 2021-11-09 NOTE — H&P
[facility]   HISTORY AND PHYSICAL    Patient Name:Erickson Gerard  : 1945  MRN: [id number]  Primary Care Physician: Farhad Metcalf MD  Date of admission: 2021    Subjective   Subjective     Chief Complaint: Right popliteal and abdominal aortic aneurysms    History of Present Illness   Erickson Gerard is a 76 y.o. male with a 2.5 cm right popliteal aneurysm with possible claudicatory symptoms in the right foot ankle and calf.  His aneurysm is significant mural thrombus and is a significant risk to his limb.  He also has a 4.7 cm abdominal aortic aneurysm that is complex in nature and multiple images will be obtained.  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    Review of Systems complains of right ankle and foot pain    Personal History     Past Medical History:   Diagnosis Date   • Anesthesia complication     AFTER VEIN SURGERY STATES HE WAS HARD TO WAKE UP   • Arthritis    • At risk for sleep apnea    • GERD (gastroesophageal reflux disease)    • Hyperlipidemia    • Hypertension    • Low back pain    • PAD (peripheral artery disease) (HCC)    • PONV (postoperative nausea and vomiting)    • Popliteal aneurysm (HCC)        Past Surgical History:   Procedure Laterality Date   • COLONOSCOPY     • HERNIA REPAIR Bilateral    • VEIN LIGATION AND STRIPPING N/A        Family History: His family history includes Heart attack in his father; No Known Problems in his mother; Stroke in his brother, brother, and sister.     Social History: He  reports that he has never smoked. He has never used smokeless tobacco. He reports that he does not drink alcohol and does not use drugs.    Home Medications:  Chlorhexidine Gluconate, amLODIPine, aspirin, multivitamin with minerals, and omeprazole    Allergies:  He has No Known Allergies.    Objective    Objective     Vitals:    Temp:  [97.6 °F (36.4 °C)] 97.6 °F (36.4 °C)  Heart Rate:  [74-81] 76  Resp:  [13-16] 13  BP: (126-131)/(70-85) 126/70  Flow  (L/min):  [2] 2    Physical Exam   Lungs clear and equal  Heart regular rate and rhythm  Abdomen benign  Extremities viable    Result Review    Result Review:  I have personally reviewed the results from the time of this admission to 11/9/2021 07:25 EST and agree with these findings:  []  Laboratory  []  Microbiology  [x]  Radiology  []  EKG/Telemetry   []  Cardiology/Vascular   []  Pathology  []  Old records  []  Other:  Most notable findings include: CT findings    Assessment/Plan   Assessment / Plan     Brief Patient Summary:  Erickson Gerard is a 76 y.o. male with a 2.5 cm right popliteal aneurysm and complex aortic and aortoiliac aneurysmal disease.  Repair of right popliteal aneurysm with Viabahn stent graft plan.  Patient will be placed on full dose anticoagulation postop.  He understands this.  We will work-up his abdominal aortic and iliac aneurysms as well.    Active Hospital Problems:  Active Hospital Problems    Diagnosis    • Aneurysm of right popliteal artery (HCC)    • Abdominal aortic aneurysm (AAA) 3.0 cm to 5.5 cm in diameter in male (HCC)      Plan:   Diagnostic and aortoiliofemoral arteriogram with right popliteal Viabahn stent graft repair of aneurysm    DVT prophylaxis:  Intraoperative anticoagulation plan

## 2021-11-09 NOTE — DISCHARGE INSTRUCTIONS
[facility]  Adrian Cornejo, Gianni, Lady Wilburn, Jimmy Jiménez Vinyard  [address]  [place] [address]  [phone]      Discharge Instructions For Popiteal Aneurysm Repair   1. Go home, rest and take it easy today.       2. You may experience some dizziness or memory loss from the anesthesia.  This may last for the next 24 hours.  Someone should plan on staying with you for the first 24 hours for your safety.     3. Do not make any important legal decisions or sign any legal papers for the next 24 hours.       4. Eat and drink lightly today.  Start off with liquids, jello, soup, crackers or other bland foods at first. You may advance your diet tomorrow as tolerated as long as you do not experience any nausea or vomiting.     5. You may resume routine medications including blood thinners. However, Glucophage should be not be started for 72 hours after the dye is given.       6. No lifting over 10 pounds and no strenuous activity for the next 2-3 days.     7. Try not to strain or bear down when your bowels move or when you empty your bladder.     8. Limit going up and down steps for 2 days.     9. No driving for the remainder of the day.  You may resume limited driving tomorrow if necessary.      10.  You may shower tomorrow.  No bath or hot tubs for at least 2 days after the procedure.           11. Leave the pressure dressing on until tomorrow morning.  You may then take this off, as well as the small see through dressing with gauze tomorrow.  If it doesn’t come off easily, do not pull this off.  If it is stuck, shower and let the warm water loosen it before removal.        12. Check your groin dressing regularly for bleeding through the dressing (under the pressure dressing).  A small amount of blood contained by the gauze is normal; the whole dressing should not be filled with blood or leaking out under the sides.      13. If you experience bleeding through the  gauze/clear sticky dressing, lay flat and have someone apply direct pressure for 15 minutes.  If bleeding has stopped after this, you may put a clean gauze and tape over the area.  Continue to lie flat for 1-2 hours.  If bleeding continues after 15 minutes of pressure, call us at the number listed above.  There is an MD available after hours.       14. If you experience heavy bleeding or large swelling, continue to hold firm pressure and               call 911.  Do not call the MD on call.      15.  If you experience pain or discomfort you may take Tylenol or Ibuprofen, whichever you normally use for minor discomfort, unless otherwise instructed.         16.  If the MD gives you a prescription for narcotic pain pills (Tylenol #3, Vicodin, Hydrocodone, Oxycodone or Percocet), you cannot drive a vehicle or operate machinery while taking these.     17.  Severe pain is not expected after this procedure.  If you experience severe pain, please call our office at [phone].     18.  Remember to contact our office for any of the following:    • Fever > 101 degrees  • Severe pain that cannot be controlled by taking your pain pills  • Severe nausea or vomiting   • Significant bleeding of your incisions  • Drainage that has a bad smell or is yellow or green in appearance  • Any other questions or concerns

## 2021-11-10 LAB
ACT BLD: 136 SECONDS (ref 82–152)
ACT BLD: 191 SECONDS (ref 82–152)
ACT BLD: 235 SECONDS (ref 82–152)

## 2022-01-24 ENCOUNTER — PRE-ADMISSION TESTING (OUTPATIENT)
Dept: PREADMISSION TESTING | Facility: HOSPITAL | Age: 77
End: 2022-01-24

## 2022-01-24 VITALS
HEART RATE: 87 BPM | DIASTOLIC BLOOD PRESSURE: 91 MMHG | SYSTOLIC BLOOD PRESSURE: 150 MMHG | TEMPERATURE: 99.2 F | BODY MASS INDEX: 26.23 KG/M2 | RESPIRATION RATE: 16 BRPM | WEIGHT: 163.2 LBS | OXYGEN SATURATION: 97 % | HEIGHT: 66 IN

## 2022-01-24 LAB
ANION GAP SERPL CALCULATED.3IONS-SCNC: 9 MMOL/L (ref 5–15)
BUN SERPL-MCNC: 19 MG/DL (ref 8–23)
BUN/CREAT SERPL: 19.4 (ref 7–25)
CALCIUM SPEC-SCNC: 9.1 MG/DL (ref 8.6–10.5)
CHLORIDE SERPL-SCNC: 105 MMOL/L (ref 98–107)
CO2 SERPL-SCNC: 24 MMOL/L (ref 22–29)
CREAT SERPL-MCNC: 0.98 MG/DL (ref 0.76–1.27)
DEPRECATED RDW RBC AUTO: 39.7 FL (ref 37–54)
ERYTHROCYTE [DISTWIDTH] IN BLOOD BY AUTOMATED COUNT: 12.1 % (ref 12.3–15.4)
GFR SERPL CREATININE-BSD FRML MDRD: 75 ML/MIN/1.73
GLUCOSE SERPL-MCNC: 98 MG/DL (ref 65–99)
HCT VFR BLD AUTO: 42.7 % (ref 37.5–51)
HGB BLD-MCNC: 14.5 G/DL (ref 13–17.7)
MCH RBC QN AUTO: 30.7 PG (ref 26.6–33)
MCHC RBC AUTO-ENTMCNC: 34 G/DL (ref 31.5–35.7)
MCV RBC AUTO: 90.3 FL (ref 79–97)
PLATELET # BLD AUTO: 224 10*3/MM3 (ref 140–450)
PMV BLD AUTO: 9.1 FL (ref 6–12)
POTASSIUM SERPL-SCNC: 3.7 MMOL/L (ref 3.5–5.2)
RBC # BLD AUTO: 4.73 10*6/MM3 (ref 4.14–5.8)
SARS-COV-2 ORF1AB RESP QL NAA+PROBE: NOT DETECTED
SODIUM SERPL-SCNC: 138 MMOL/L (ref 136–145)
WBC NRBC COR # BLD: 6.49 10*3/MM3 (ref 3.4–10.8)

## 2022-01-24 PROCEDURE — 80048 BASIC METABOLIC PNL TOTAL CA: CPT

## 2022-01-24 PROCEDURE — 36415 COLL VENOUS BLD VENIPUNCTURE: CPT

## 2022-01-24 PROCEDURE — U0004 COV-19 TEST NON-CDC HGH THRU: HCPCS

## 2022-01-24 PROCEDURE — 85027 COMPLETE CBC AUTOMATED: CPT

## 2022-01-24 PROCEDURE — C9803 HOPD COVID-19 SPEC COLLECT: HCPCS

## 2022-01-24 RX ORDER — AMLODIPINE BESYLATE 10 MG/1
10 TABLET ORAL DAILY
COMMUNITY
End: 2022-03-24

## 2022-01-24 NOTE — DISCHARGE INSTRUCTIONS

## 2022-01-25 ENCOUNTER — ANESTHESIA (OUTPATIENT)
Dept: PERIOP | Facility: HOSPITAL | Age: 77
End: 2022-01-25

## 2022-01-25 ENCOUNTER — ANESTHESIA EVENT (OUTPATIENT)
Dept: PERIOP | Facility: HOSPITAL | Age: 77
End: 2022-01-25

## 2022-01-25 ENCOUNTER — HOSPITAL ENCOUNTER (OUTPATIENT)
Facility: HOSPITAL | Age: 77
Setting detail: HOSPITAL OUTPATIENT SURGERY
Discharge: HOME | End: 2022-01-25
Attending: SURGERY | Admitting: SURGERY

## 2022-01-25 ENCOUNTER — APPOINTMENT (OUTPATIENT)
Dept: GENERAL RADIOLOGY | Facility: HOSPITAL | Age: 77
End: 2022-01-25

## 2022-01-25 VITALS
OXYGEN SATURATION: 97 % | SYSTOLIC BLOOD PRESSURE: 106 MMHG | HEART RATE: 78 BPM | DIASTOLIC BLOOD PRESSURE: 69 MMHG | WEIGHT: 164.02 LBS | BODY MASS INDEX: 26.47 KG/M2 | TEMPERATURE: 97.7 F | RESPIRATION RATE: 18 BRPM

## 2022-01-25 DIAGNOSIS — I72.4 ANEURYSM OF RIGHT POPLITEAL ARTERY: ICD-10-CM

## 2022-01-25 DIAGNOSIS — I71.40 ABDOMINAL AORTIC ANEURYSM (AAA) 3.0 CM TO 5.5 CM IN DIAMETER IN MALE: Primary | ICD-10-CM

## 2022-01-25 PROCEDURE — C1760 CLOSURE DEV, VASC: HCPCS | Performed by: SURGERY

## 2022-01-25 PROCEDURE — C1874 STENT, COATED/COV W/DEL SYS: HCPCS | Performed by: SURGERY

## 2022-01-25 PROCEDURE — C1894 INTRO/SHEATH, NON-LASER: HCPCS | Performed by: SURGERY

## 2022-01-25 PROCEDURE — 25010000002 ALBUMIN HUMAN 5% PER 50 ML: Performed by: ANESTHESIOLOGY

## 2022-01-25 PROCEDURE — C1769 GUIDE WIRE: HCPCS | Performed by: SURGERY

## 2022-01-25 PROCEDURE — 0 LIDOCAINE 1 % SOLUTION 20 ML VIAL: Performed by: SURGERY

## 2022-01-25 PROCEDURE — C1725 CATH, TRANSLUMIN NON-LASER: HCPCS | Performed by: SURGERY

## 2022-01-25 PROCEDURE — 25010000002 HEPARIN (PORCINE) PER 1000 UNITS: Performed by: SURGERY

## 2022-01-25 PROCEDURE — 0 CEFAZOLIN IN DEXTROSE 2-4 GM/100ML-% SOLUTION: Performed by: SURGERY

## 2022-01-25 PROCEDURE — 25010000002 HYDROMORPHONE PER 4 MG: Performed by: ANESTHESIOLOGY

## 2022-01-25 PROCEDURE — C1887 CATHETER, GUIDING: HCPCS | Performed by: SURGERY

## 2022-01-25 PROCEDURE — 25010000002 PROPOFOL 10 MG/ML EMULSION: Performed by: ANESTHESIOLOGY

## 2022-01-25 PROCEDURE — 25010000002 HEPARIN (PORCINE) PER 1000 UNITS: Performed by: ANESTHESIOLOGY

## 2022-01-25 PROCEDURE — 25010000002 MIDAZOLAM PER 1 MG: Performed by: ANESTHESIOLOGY

## 2022-01-25 PROCEDURE — 85347 COAGULATION TIME ACTIVATED: CPT

## 2022-01-25 PROCEDURE — 0 IOPAMIDOL PER 1 ML: Performed by: SURGERY

## 2022-01-25 PROCEDURE — P9041 ALBUMIN (HUMAN),5%, 50ML: HCPCS | Performed by: ANESTHESIOLOGY

## 2022-01-25 PROCEDURE — 25010000002 PHENYLEPHRINE 10 MG/ML SOLUTION: Performed by: ANESTHESIOLOGY

## 2022-01-25 DEVICE — GORE VIABAHN ENDOPROSTHESIS WITH HEPARIN 10MMX10CM 8FR 120CMCATH
Type: IMPLANTABLE DEVICE | Site: ARTERIAL | Status: FUNCTIONAL
Brand: GORE VIABAHN ENDOPROSTHESIS WITH HEPARIN

## 2022-01-25 RX ORDER — HYDROCODONE BITARTRATE AND ACETAMINOPHEN 7.5; 325 MG/1; MG/1
1 TABLET ORAL ONCE AS NEEDED
Status: DISCONTINUED | OUTPATIENT
Start: 2022-01-25 | End: 2022-01-25 | Stop reason: HOSPADM

## 2022-01-25 RX ORDER — FLUMAZENIL 0.1 MG/ML
0.2 INJECTION INTRAVENOUS AS NEEDED
Status: DISCONTINUED | OUTPATIENT
Start: 2022-01-25 | End: 2022-01-25 | Stop reason: HOSPADM

## 2022-01-25 RX ORDER — ALBUMIN, HUMAN INJ 5% 5 %
SOLUTION INTRAVENOUS CONTINUOUS PRN
Status: DISCONTINUED | OUTPATIENT
Start: 2022-01-25 | End: 2022-01-25 | Stop reason: SURG

## 2022-01-25 RX ORDER — IBUPROFEN 600 MG/1
600 TABLET ORAL ONCE AS NEEDED
Status: DISCONTINUED | OUTPATIENT
Start: 2022-01-25 | End: 2022-01-25 | Stop reason: HOSPADM

## 2022-01-25 RX ORDER — PROMETHAZINE HYDROCHLORIDE 25 MG/1
25 SUPPOSITORY RECTAL ONCE AS NEEDED
Status: DISCONTINUED | OUTPATIENT
Start: 2022-01-25 | End: 2022-01-25 | Stop reason: HOSPADM

## 2022-01-25 RX ORDER — ONDANSETRON 2 MG/ML
4 INJECTION INTRAMUSCULAR; INTRAVENOUS ONCE AS NEEDED
Status: DISCONTINUED | OUTPATIENT
Start: 2022-01-25 | End: 2022-01-25 | Stop reason: HOSPADM

## 2022-01-25 RX ORDER — NALOXONE HCL 0.4 MG/ML
0.2 VIAL (ML) INJECTION AS NEEDED
Status: DISCONTINUED | OUTPATIENT
Start: 2022-01-25 | End: 2022-01-25 | Stop reason: HOSPADM

## 2022-01-25 RX ORDER — FAMOTIDINE 10 MG/ML
20 INJECTION, SOLUTION INTRAVENOUS ONCE
Status: COMPLETED | OUTPATIENT
Start: 2022-01-25 | End: 2022-01-25

## 2022-01-25 RX ORDER — PHENYLEPHRINE HYDROCHLORIDE 10 MG/ML
INJECTION INTRAVENOUS AS NEEDED
Status: DISCONTINUED | OUTPATIENT
Start: 2022-01-25 | End: 2022-01-25 | Stop reason: SURG

## 2022-01-25 RX ORDER — EPHEDRINE SULFATE 50 MG/ML
5 INJECTION, SOLUTION INTRAVENOUS ONCE AS NEEDED
Status: DISCONTINUED | OUTPATIENT
Start: 2022-01-25 | End: 2022-01-25 | Stop reason: HOSPADM

## 2022-01-25 RX ORDER — PROMETHAZINE HYDROCHLORIDE 12.5 MG/1
25 TABLET ORAL ONCE AS NEEDED
Status: DISCONTINUED | OUTPATIENT
Start: 2022-01-25 | End: 2022-01-25 | Stop reason: HOSPADM

## 2022-01-25 RX ORDER — SODIUM CHLORIDE, SODIUM LACTATE, POTASSIUM CHLORIDE, CALCIUM CHLORIDE 600; 310; 30; 20 MG/100ML; MG/100ML; MG/100ML; MG/100ML
9 INJECTION, SOLUTION INTRAVENOUS CONTINUOUS
Status: DISCONTINUED | OUTPATIENT
Start: 2022-01-25 | End: 2022-01-25 | Stop reason: HOSPADM

## 2022-01-25 RX ORDER — FENTANYL CITRATE 50 UG/ML
50 INJECTION, SOLUTION INTRAMUSCULAR; INTRAVENOUS
Status: DISCONTINUED | OUTPATIENT
Start: 2022-01-25 | End: 2022-01-25 | Stop reason: HOSPADM

## 2022-01-25 RX ORDER — DIPHENHYDRAMINE HYDROCHLORIDE 50 MG/ML
12.5 INJECTION INTRAMUSCULAR; INTRAVENOUS
Status: DISCONTINUED | OUTPATIENT
Start: 2022-01-25 | End: 2022-01-25 | Stop reason: HOSPADM

## 2022-01-25 RX ORDER — HEPARIN SODIUM 1000 [USP'U]/ML
INJECTION, SOLUTION INTRAVENOUS; SUBCUTANEOUS AS NEEDED
Status: DISCONTINUED | OUTPATIENT
Start: 2022-01-25 | End: 2022-01-25 | Stop reason: SURG

## 2022-01-25 RX ORDER — HYDRALAZINE HYDROCHLORIDE 20 MG/ML
5 INJECTION INTRAMUSCULAR; INTRAVENOUS
Status: DISCONTINUED | OUTPATIENT
Start: 2022-01-25 | End: 2022-01-25 | Stop reason: HOSPADM

## 2022-01-25 RX ORDER — PROPOFOL 10 MG/ML
VIAL (ML) INTRAVENOUS AS NEEDED
Status: DISCONTINUED | OUTPATIENT
Start: 2022-01-25 | End: 2022-01-25 | Stop reason: SURG

## 2022-01-25 RX ORDER — HYDROMORPHONE HCL 110MG/55ML
PATIENT CONTROLLED ANALGESIA SYRINGE INTRAVENOUS AS NEEDED
Status: DISCONTINUED | OUTPATIENT
Start: 2022-01-25 | End: 2022-01-25 | Stop reason: SURG

## 2022-01-25 RX ORDER — LIDOCAINE HYDROCHLORIDE 10 MG/ML
0.5 INJECTION, SOLUTION EPIDURAL; INFILTRATION; INTRACAUDAL; PERINEURAL ONCE AS NEEDED
Status: DISCONTINUED | OUTPATIENT
Start: 2022-01-25 | End: 2022-01-25 | Stop reason: HOSPADM

## 2022-01-25 RX ORDER — PROPOFOL 10 MG/ML
VIAL (ML) INTRAVENOUS CONTINUOUS PRN
Status: DISCONTINUED | OUTPATIENT
Start: 2022-01-25 | End: 2022-01-25 | Stop reason: SURG

## 2022-01-25 RX ORDER — OXYCODONE AND ACETAMINOPHEN 7.5; 325 MG/1; MG/1
1 TABLET ORAL EVERY 4 HOURS PRN
Status: DISCONTINUED | OUTPATIENT
Start: 2022-01-25 | End: 2022-01-25 | Stop reason: HOSPADM

## 2022-01-25 RX ORDER — HYDROMORPHONE HYDROCHLORIDE 1 MG/ML
0.5 INJECTION, SOLUTION INTRAMUSCULAR; INTRAVENOUS; SUBCUTANEOUS
Status: DISCONTINUED | OUTPATIENT
Start: 2022-01-25 | End: 2022-01-25 | Stop reason: HOSPADM

## 2022-01-25 RX ORDER — HYDROCODONE BITARTRATE AND ACETAMINOPHEN 5; 325 MG/1; MG/1
1 TABLET ORAL EVERY 4 HOURS PRN
Qty: 20 TABLET | Refills: 0 | Status: SHIPPED | OUTPATIENT
Start: 2022-01-25

## 2022-01-25 RX ORDER — MIDAZOLAM HYDROCHLORIDE 1 MG/ML
0.5 INJECTION INTRAMUSCULAR; INTRAVENOUS
Status: DISCONTINUED | OUTPATIENT
Start: 2022-01-25 | End: 2022-01-25 | Stop reason: HOSPADM

## 2022-01-25 RX ORDER — CEFAZOLIN SODIUM 2 G/100ML
2 INJECTION, SOLUTION INTRAVENOUS ONCE
Status: COMPLETED | OUTPATIENT
Start: 2022-01-25 | End: 2022-01-25

## 2022-01-25 RX ORDER — DIPHENHYDRAMINE HCL 25 MG
25 CAPSULE ORAL
Status: DISCONTINUED | OUTPATIENT
Start: 2022-01-25 | End: 2022-01-25 | Stop reason: HOSPADM

## 2022-01-25 RX ORDER — LABETALOL HYDROCHLORIDE 5 MG/ML
5 INJECTION, SOLUTION INTRAVENOUS
Status: DISCONTINUED | OUTPATIENT
Start: 2022-01-25 | End: 2022-01-25 | Stop reason: HOSPADM

## 2022-01-25 RX ORDER — SODIUM CHLORIDE 0.9 % (FLUSH) 0.9 %
3 SYRINGE (ML) INJECTION EVERY 12 HOURS SCHEDULED
Status: DISCONTINUED | OUTPATIENT
Start: 2022-01-25 | End: 2022-01-25 | Stop reason: HOSPADM

## 2022-01-25 RX ORDER — KETAMINE HYDROCHLORIDE 10 MG/ML
INJECTION INTRAMUSCULAR; INTRAVENOUS AS NEEDED
Status: DISCONTINUED | OUTPATIENT
Start: 2022-01-25 | End: 2022-01-25 | Stop reason: SURG

## 2022-01-25 RX ORDER — SODIUM CHLORIDE 0.9 % (FLUSH) 0.9 %
3-10 SYRINGE (ML) INJECTION AS NEEDED
Status: DISCONTINUED | OUTPATIENT
Start: 2022-01-25 | End: 2022-01-25 | Stop reason: HOSPADM

## 2022-01-25 RX ORDER — MIDAZOLAM HYDROCHLORIDE 1 MG/ML
INJECTION INTRAMUSCULAR; INTRAVENOUS AS NEEDED
Status: DISCONTINUED | OUTPATIENT
Start: 2022-01-25 | End: 2022-01-25 | Stop reason: SURG

## 2022-01-25 RX ADMIN — PHENYLEPHRINE HYDROCHLORIDE 100 MCG: 10 INJECTION, SOLUTION INTRAVENOUS at 08:45

## 2022-01-25 RX ADMIN — Medication 50 MG: at 07:36

## 2022-01-25 RX ADMIN — Medication 30 MG: at 08:56

## 2022-01-25 RX ADMIN — IOPAMIDOL 92 ML: 510 INJECTION, SOLUTION INTRAVASCULAR at 09:06

## 2022-01-25 RX ADMIN — HEPARIN SODIUM 5000 UNITS: 1000 INJECTION INTRAVENOUS; SUBCUTANEOUS at 08:24

## 2022-01-25 RX ADMIN — KETAMINE HYDROCHLORIDE 10 MG: 10 INJECTION INTRAMUSCULAR; INTRAVENOUS at 08:09

## 2022-01-25 RX ADMIN — KETAMINE HYDROCHLORIDE 10 MG: 10 INJECTION INTRAMUSCULAR; INTRAVENOUS at 08:32

## 2022-01-25 RX ADMIN — HEPARIN SODIUM 10000 UNITS: 1000 INJECTION INTRAVENOUS; SUBCUTANEOUS at 08:06

## 2022-01-25 RX ADMIN — KETAMINE HYDROCHLORIDE 10 MG: 10 INJECTION INTRAMUSCULAR; INTRAVENOUS at 08:55

## 2022-01-25 RX ADMIN — PHENYLEPHRINE HYDROCHLORIDE 100 MCG: 10 INJECTION, SOLUTION INTRAVENOUS at 08:41

## 2022-01-25 RX ADMIN — SODIUM CHLORIDE, POTASSIUM CHLORIDE, SODIUM LACTATE AND CALCIUM CHLORIDE: 600; 310; 30; 20 INJECTION, SOLUTION INTRAVENOUS at 07:48

## 2022-01-25 RX ADMIN — FAMOTIDINE 20 MG: 10 INJECTION, SOLUTION INTRAVENOUS at 07:27

## 2022-01-25 RX ADMIN — CEFAZOLIN SODIUM 2 G: 2 INJECTION, SOLUTION INTRAVENOUS at 07:27

## 2022-01-25 RX ADMIN — KETAMINE HYDROCHLORIDE 20 MG: 10 INJECTION INTRAMUSCULAR; INTRAVENOUS at 07:49

## 2022-01-25 RX ADMIN — SODIUM CHLORIDE, POTASSIUM CHLORIDE, SODIUM LACTATE AND CALCIUM CHLORIDE: 600; 310; 30; 20 INJECTION, SOLUTION INTRAVENOUS at 08:32

## 2022-01-25 RX ADMIN — MIDAZOLAM 2 MG: 1 INJECTION INTRAMUSCULAR; INTRAVENOUS at 07:32

## 2022-01-25 RX ADMIN — HYDROMORPHONE HYDROCHLORIDE 0.5 MG: 2 INJECTION, SOLUTION INTRAMUSCULAR; INTRAVENOUS; SUBCUTANEOUS at 07:37

## 2022-01-25 RX ADMIN — PHENYLEPHRINE HYDROCHLORIDE 200 MCG: 10 INJECTION, SOLUTION INTRAVENOUS at 08:53

## 2022-01-25 RX ADMIN — ALBUMIN HUMAN: 0.05 INJECTION, SOLUTION INTRAVENOUS at 08:51

## 2022-01-25 RX ADMIN — PROPOFOL 75 MCG/KG/MIN: 10 INJECTION, EMULSION INTRAVENOUS at 07:36

## 2022-01-25 RX ADMIN — PHENYLEPHRINE HYDROCHLORIDE 100 MCG: 10 INJECTION, SOLUTION INTRAVENOUS at 08:48

## 2022-01-25 RX ADMIN — SODIUM CHLORIDE, POTASSIUM CHLORIDE, SODIUM LACTATE AND CALCIUM CHLORIDE 9 ML/HR: 600; 310; 30; 20 INJECTION, SOLUTION INTRAVENOUS at 07:27

## 2022-01-25 RX ADMIN — ALBUMIN HUMAN: 0.05 INJECTION, SOLUTION INTRAVENOUS at 08:58

## 2022-01-25 NOTE — DISCHARGE INSTRUCTIONS
**SEE STENT CARD AND CLOSURE DEVICE GUIDE**    [facility]  Adrian Cornejo, Gianni, Lady Wilburn, Tino,Jimmy, Delvin  [address]  [place] [address]  [phone]      Discharge Instructions for Angiogram    1. Go home, rest and take it easy today.       2. You may experience some dizziness or memory loss from the anesthesia.  This may last for the next 24 hours.  Someone should plan on staying with you for the first 24 hours for your safety.     3. Do not make any important legal decisions or sign any legal papers for the next 24 hours.       4. Eat and drink lightly today.  Start off with liquids, jello, soup, crackers or other bland foods at first. You may advance your diet tomorrow as tolerated as long as you do not experience any nausea or vomiting.     5. You may resume routine medications including blood thinners. However, Glucophage should be not be started for 72 hours after the dye is given.       6. No lifting over 10 pounds and no strenuous activity for the next 2-3 days.     7. Hold light pressure on your groin site when you cough, laugh, sneeze or change positions, do this for the next 2 days.Try not to strain or bear down when your bowels move or when you empty your bladder.     8. Limit going up and down steps for 2 days.     9. No driving for the remainder of the day.  You may resume limited driving tomorrow if necessary.      10.  You may shower tomorrow.  No bath or hot tubs for at least 1 week after the procedure.            11. Check your groin regularly for bleeding and swelling.     12. If you experience bleeding, lay flat and have someone apply direct pressure for 15 minutes.  If bleeding has stopped after this, you may put a clean gauze and tape over the area.  Continue to lie flat for 1-2 hours.  If bleeding continues after 15 minutes of pressure, call us at the number listed above.  There is an MD available after hours.       13. If you  experience heavy bleeding or large swelling, continue to hold firm pressure and               call 911.  Do not call the MD on call.      14.  If you experience pain or discomfort you may take Tylenol or Ibuprofen, whichever you normally use for minor discomfort, unless otherwise instructed.         15.  If the MD gives you a prescription for narcotic pain pills (Tylenol #3, Vicodin, Hydrocodone, Oxycodone or Percocet), you cannot drive a vehicle or operate machinery while taking these.     16.  Severe pain is not expected after this procedure.  If you experience severe pain, please call our office at [phone].     17.  Remember to contact our office for any of the following:    • Fever > 101 degrees  • Severe pain that cannot be controlled by taking your pain pills  • Severe nausea or vomiting   • Significant bleeding of your incisions  • Drainage that has a bad smell or is yellow or green in appearance  • Any other questions or concerns

## 2022-01-25 NOTE — ANESTHESIA POSTPROCEDURE EVALUATION
Patient: Erickson Gerard    Procedure Summary     Date: 01/25/22 [facility]    Anesthesia Start: 0731 Anesthesia Stop: 0914    Procedure: AORTO ILIAC FEMORAL RIGHT POPLITEAL VIABOHN STENT PLACEMENT X 2 (Right ) Diagnosis:     Surgeons: Campos Archer MD Provider: Jeremy Arthur MD    Anesthesia Type: MAC ASA Status: 4          Anesthesia Type: MAC    Vitals  Vitals Value Taken Time   /76 01/25/22 1016   Temp 36.5 °C (97.7 °F) 01/25/22 0915   Pulse 71 01/25/22 1022   Resp 16 01/25/22 1015   SpO2 93 % 01/25/22 1022   Vitals shown include unvalidated device data.        Post Anesthesia Care and Evaluation    Patient location during evaluation: bedside  Patient participation: complete - patient participated  Level of consciousness: awake  Pain score: 2  Pain management: adequate  Airway patency: patent  Anesthetic complications: No anesthetic complications  PONV Status: none  Cardiovascular status: acceptable  Respiratory status: acceptable  Hydration status: acceptable    Comments: /76   Pulse 66   Temp 36.5 °C (97.7 °F) (Oral)   Resp 16   Wt 74.4 kg (164 lb 0.4 oz)   SpO2 96%   BMI 26.47 kg/m²

## 2022-01-25 NOTE — H&P
[facility]   HISTORY AND PHYSICAL    Patient Name:Erickson Gerard  : 1945  MRN: [id number]  Primary Care Physician: Farhad Metcalf MD  Date of admission: 2022    Subjective   Subjective     Chief Complaint: Right popliteal aneurysm with distal endoleak type Ib    History of Present Illness   Erickson Gerard is a 76 y.o. male with right popliteal aneurysm stent graft with type Ib endoleak.  Plans for popliteal stent extension made.  Unclear as to the reason for the large endoleak.  Patient understands risk benefits complications and agrees.    Review of Systems patient denies right leg pain    Personal History     Past Medical History:   Diagnosis Date   • Anesthesia complication     AFTER VEIN SURGERY STATES HE WAS HARD TO WAKE UP   • Arthritis    • GERD (gastroesophageal reflux disease)    • Hyperlipidemia    • Hypertension    • Low back pain    • PAD (peripheral artery disease) ([facility]    • PONV (postoperative nausea and vomiting)    • Popliteal aneurysm ([facility])        Past Surgical History:   Procedure Laterality Date   • ANGIOPLASTY POPLITEAL ARTERY Right 2021    Procedure: RIGHT POPITEAL ANEURYSM REPAIR VIABOHN;  Surgeon: Campos Archer MD;  Location: [facility] ;  Service: Vascular;  Laterality: Right;   • COLONOSCOPY     • HERNIA REPAIR Bilateral    • VEIN LIGATION AND STRIPPING N/A        Family History: His family history includes Heart attack in his father; No Known Problems in his mother; Stroke in his brother, brother, and sister.     Social History: He  reports that he has never smoked. He has never used smokeless tobacco. He reports that he does not drink alcohol and does not use drugs.    Home Medications:  Chlorhexidine Gluconate, amLODIPine, multivitamin with minerals, omeprazole, and rivaroxaban    Allergies:  He is allergic to bee venom.    Objective    Objective     Vitals:    Temp:  [98.5 °F (36.9 °C)-99.2 °F (37.3 °C)] 98.5 °F (36.9 °C)  Heart Rate:  [75-87]  75  Resp:  [16] 16  BP: (128-150)/(91) 128/91    Physical Exam   Lungs clear and equal  Heart regular rate and rhythm  Abdomen benign  Extremities viable      Result Review    Result Review:  I have personally reviewed the results from the time of this admission to 1/25/2022 07:29 EST and agree with these findings:  []  Laboratory  []  Microbiology  []  Radiology  []  EKG/Telemetry   []  Cardiology/Vascular   []  Pathology  []  Old records  []  Other:  Most notable findings include: Type Ib endoleak on duplex    Assessment/Plan   Assessment / Plan     Brief Patient Summary:  Erickson Gerard is a 76 y.o. male with large type Ib endoleak with right popliteal aneurysm on duplex.  Patient is undergoing extension of Viabahn stent graft.  He understands risk benefits complications of the procedure and consents to the procedure.    Active Hospital Problems:  Active Hospital Problems    Diagnosis    • Aneurysm of right popliteal artery (HCC)      Plan:   Viabahn stent graft repair of endoleak type Ib of prior repair of popliteal aneurysm on the right    DVT prophylaxis:  Intraoperative heparinization planned    Campos Archer MD

## 2022-01-25 NOTE — ANESTHESIA PREPROCEDURE EVALUATION
Anesthesia Evaluation     Patient summary reviewed and Nursing notes reviewed   history of anesthetic complications: PONV  NPO Solid Status: > 8 hours  NPO Liquid Status: > 2 hours           Airway   Mallampati: II  Dental - normal exam     Pulmonary - normal exam   Cardiovascular - normal exam    ECG reviewed    (+) hypertension, PVD, DVT, hyperlipidemia,     ROS comment: Reviewed stress test    Neuro/Psych  GI/Hepatic/Renal/Endo    (+)  GERD,      Musculoskeletal     Abdominal    Substance History      OB/GYN          Other   arthritis,                        Anesthesia Plan    ASA 4     MAC   (D/W pt. MAC and possible awareness intra op.  Pt understands MAC and GA are not the same and the possibility of GA being required for failed MAC)  intravenous induction     Anesthetic plan, all risks, benefits, and alternatives have been provided, discussed and informed consent has been obtained with: patient.

## 2022-01-25 NOTE — BRIEF OP NOTE
ANGIOPLASTY POPLITEAL ARTERY POSSIBLE STENT  Progress Note    Erickson Gerard  1/25/2022    Pre-op Diagnosis:   Right popliteal aneurysm endoleak type Ib       Post-Op Diagnosis Codes:   same    Procedure/CPT® Codes:        Procedure(s):  AORTO ILIAC FEMORAL RIGHT POPLITEAL VIABOHN STENT PLACEMENT X 2    Surgeon(s):  Campos Archer MD    Anesthesia: Monitored Anesthesia Care    Staff:   Circulator: Alyce Limon RN  Scrub Person: Drew Llamas  Vendor Representative: Josue Bertrand  Assistant: Petra Espinoza  Vascular Radiology Technician: Ritu Dick  Assistant: Petra Espinoza      Estimated Blood Loss: 300 mL    Urine Voided: * No values recorded between 1/25/2022  7:31 AM and 1/25/2022  9:11 AM *    Specimens:                None          Drains:   Urethral Catheter Silicone 16 Fr. (Active)       Findings: See dictation    Complications: None    Assistant: Petra Espinoza  was responsible for performing the following activities: Wire and catheter management and their skilled assistance was necessary for the success of this case.    Campos Archer MD     Date: 1/25/2022  Time: 09:27 EST

## 2022-01-26 NOTE — OP NOTE
Operative Note  Date of Admission:  1/25/2022  OR Date: 1/25/2022    Pre-op Diagnosis:   Right popliteal artery aneurysm stent graft repair with type Ib endoleak    Post-Op Diagnosis Codes:  Same    Procedure:   1) aortoiliofemoral arteriogram with right lower extremity runoff, repair of right popliteal aneurysm  with right popliteal Viabahn stent graft placement x2    Surgeon: Ghulam Archer MD    Assistant: Petra Espinoza RN and they provided critical assistance during the case including suctioning, exposure, retraction, and reduction of blood loss.    Anesthesia: Monitored Anesthesia Care    Staff:   Circulator: Alyce Limon RN  Scrub Person: Drew Llamas  Vendor Representative: Josue Bertrand  Assistant: Petra Espinoza  Vascular Radiology Technician: Ritu Dick    Estimated Blood Loss: 300 mL    Specimens: * No orders in the log *     Complications: None    Findings: See dictation    Indications:    The patient is an 76 y.o. male seen for evaluation right popliteal artery stent graft repair with type Ib endoleak filling the aneurysm sac briskly.  This is found on duplex and direct intervention is indicated.  Patient and family understand risk benefits complications of the procedure and agreed to the procedure.       Procedure:    Patient was prepped and draped sterilely under MAC sedation.  Access to the left femoral artery was performed with micropuncture and confirmation of the puncture site is performed with injection and then true Perclose closures were placed in a preclosed manner on the left groin.  Following this aortogram was performed followed by pulldown of the catheter to the bifurcation iliofemoral arteriograms performed crossing over the horn is performed with the lot of tortuosity noted.  There is positioning of a 10 [place] dry seal sheath over the horn into the right SFA.  Through this angiogram was performed of the right lower extremity and distal endoleak is noted at the popliteal  segment.  11 mm x 10 cm Viabahn stent graft is deployed across the endoleak section from the below-knee popliteal to the midsection of the infrageniculate popliteal.  Angioplasty is performed but causes dissection of likely pannus type of intimal layering of the popliteal segment.  This is subsequently treated with a 10 mm x 10 cm Viabahn stent graft placement.  No further angioplasty is performed and excellent flows noted down through the posterior tibial primary runoff.  No complicating features are seen and the patient has removal of the catheters and closure of the left groin with the Perclose's.  Patient tolerated procedure and was transferred to the recovery room stable.    Radiographic Findings:  Angiographic findings include single renal arteries with a renal fusiform abdominal aortic aneurysm and bilateral iliac systems are widely patent.  Both common femoral arteries widely patent with good puncture site on the left.  Both profunda and SFA origins are widely patent.  Right SFA is patent down through the above-knee popliteal where there is stent graft placement from there through the infrageniculate and into the below-knee popliteal.  The stent grafts in place have actually moved back a centimeter and a half causing a distal type Ib endoleak filling the aneurysm at the right popliteal site retrogreat.  Runoff views on the right dominant posterior tibial runoff with a patent anterior tibial and peroneal that are fairly atretic in nature.  Deployment of new Viabahn stent graft segment 11 mm x 10 cm segment to the mid segment of the below-knee popliteal is performed.  Subsequent angioplasty causes some dissection within a mural thrombus load at the popliteal segment and secondary placement of 10 mm x 10 cm Viabahn stent graft is deployed across the knee segment of the Viabahn stent graft trapping the dissected pannus.  No further angioplasty is needed.  Final angiograms were performed and there is no  complicating feature and excellent flow with late type II endoleak noted at the popliteal aneurysm level from collaterals.      Active Hospital Problems    Diagnosis  POA   • Aneurysm of right popliteal artery (HCC) [I72.4]  Yes      Resolved Hospital Problems   No resolved problems to display.      Campos Archer MD     Date: 1/25/2022  Time: 20:19 EST

## 2022-01-28 LAB — ACT BLD: 279 SECONDS (ref 82–152)

## 2022-03-24 RX ORDER — AMLODIPINE BESYLATE 10 MG/1
TABLET ORAL
Qty: 90 TABLET | Refills: 1 | Status: SHIPPED | OUTPATIENT
Start: 2022-03-24 | End: 2022-10-20

## 2022-07-21 ENCOUNTER — TRANSCRIBE ORDERS (OUTPATIENT)
Dept: ADMINISTRATIVE | Facility: HOSPITAL | Age: 77
End: 2022-07-21

## 2022-07-21 DIAGNOSIS — I73.9 PAD (PERIPHERAL ARTERY DISEASE): Primary | ICD-10-CM

## 2022-08-31 ENCOUNTER — TRANSCRIBE ORDERS (OUTPATIENT)
Dept: ADMINISTRATIVE | Facility: HOSPITAL | Age: 77
End: 2022-08-31

## 2022-08-31 DIAGNOSIS — I73.9 PAD (PERIPHERAL ARTERY DISEASE): Primary | ICD-10-CM

## 2022-09-02 ENCOUNTER — HOSPITAL ENCOUNTER (OUTPATIENT)
Dept: CT IMAGING | Facility: HOSPITAL | Age: 77
Discharge: HOME | End: 2022-09-02
Admitting: SURGERY

## 2022-09-02 DIAGNOSIS — I73.9 PAD (PERIPHERAL ARTERY DISEASE): ICD-10-CM

## 2022-09-02 PROCEDURE — 82565 ASSAY OF CREATININE: CPT

## 2022-09-02 PROCEDURE — 75635 CT ANGIO ABDOMINAL ARTERIES: CPT

## 2022-09-02 PROCEDURE — 0 IOPAMIDOL PER 1 ML: Performed by: SURGERY

## 2022-09-02 RX ADMIN — IOPAMIDOL 95 ML: 755 INJECTION, SOLUTION INTRAVENOUS at 12:53

## 2022-09-03 LAB — CREAT BLDA-MCNC: 1.1 MG/DL (ref 0.6–1.3)

## 2022-10-20 RX ORDER — AMLODIPINE BESYLATE 10 MG/1
TABLET ORAL
Qty: 90 TABLET | Refills: 1 | Status: SHIPPED | OUTPATIENT
Start: 2022-10-20 | End: 2022-12-07 | Stop reason: SDUPTHER

## 2022-12-07 ENCOUNTER — OFFICE VISIT (OUTPATIENT)
Dept: FAMILY MEDICINE CLINIC | Facility: CLINIC | Age: 77
End: 2022-12-07

## 2022-12-07 VITALS
HEART RATE: 84 BPM | TEMPERATURE: 98.6 F | HEIGHT: 66 IN | BODY MASS INDEX: 25.88 KG/M2 | DIASTOLIC BLOOD PRESSURE: 60 MMHG | OXYGEN SATURATION: 97 % | WEIGHT: 161 LBS | SYSTOLIC BLOOD PRESSURE: 112 MMHG

## 2022-12-07 DIAGNOSIS — Z79.899 HIGH RISK MEDICATION USE: ICD-10-CM

## 2022-12-07 DIAGNOSIS — I73.9 PVD (PERIPHERAL VASCULAR DISEASE): ICD-10-CM

## 2022-12-07 DIAGNOSIS — I72.4 ANEURYSM OF RIGHT POPLITEAL ARTERY: ICD-10-CM

## 2022-12-07 DIAGNOSIS — Z28.39 IMMUNIZATION DEFICIENCY: ICD-10-CM

## 2022-12-07 DIAGNOSIS — I10 ESSENTIAL HYPERTENSION: Primary | ICD-10-CM

## 2022-12-07 DIAGNOSIS — K21.9 GASTROESOPHAGEAL REFLUX DISEASE WITHOUT ESOPHAGITIS: ICD-10-CM

## 2022-12-07 DIAGNOSIS — Z78.9 STATIN INTOLERANCE: ICD-10-CM

## 2022-12-07 DIAGNOSIS — Z23 ENCOUNTER FOR IMMUNIZATION: ICD-10-CM

## 2022-12-07 PROCEDURE — 90677 PCV20 VACCINE IM: CPT | Performed by: FAMILY MEDICINE

## 2022-12-07 PROCEDURE — G0009 ADMIN PNEUMOCOCCAL VACCINE: HCPCS | Performed by: FAMILY MEDICINE

## 2022-12-07 PROCEDURE — 99214 OFFICE O/P EST MOD 30 MIN: CPT | Performed by: FAMILY MEDICINE

## 2022-12-07 RX ORDER — OMEPRAZOLE 20 MG/1
20 CAPSULE, DELAYED RELEASE ORAL DAILY
Qty: 90 CAPSULE | Refills: 3 | Status: SHIPPED | OUTPATIENT
Start: 2022-12-07

## 2022-12-07 RX ORDER — AMLODIPINE BESYLATE 10 MG/1
10 TABLET ORAL DAILY
Qty: 90 TABLET | Refills: 3 | Status: SHIPPED | OUTPATIENT
Start: 2022-12-07

## 2022-12-07 NOTE — PROGRESS NOTES
Chief Complaint   Patient presents with   • Hypertension       Subjective   Erickson Gerard is a 77 y.o. male.     History of Present Illness   F/U HTN.  Doing well with meds.    F/U Donny.  Doing well with meds.    F/u PVD with aneurysm.  Has R popliteal stent.  Kept on xarelto per pt by Dr Archer.    The following portions of the patient's history were reviewed and updated as appropriate: allergies, current medications, past family history, past medical history, past social history, past surgical history and problem list.    Review of Systems   Constitutional: Negative for appetite change and fatigue.   HENT: Negative for nosebleeds and sore throat.    Eyes: Negative for blurred vision and visual disturbance.   Respiratory: Negative for shortness of breath and wheezing.    Cardiovascular: Negative for chest pain and leg swelling.   Gastrointestinal: Negative for abdominal distention and abdominal pain.   Endocrine: Negative for cold intolerance and polyuria.   Genitourinary: Negative for dysuria and hematuria.   Musculoskeletal: Negative for arthralgias and myalgias.   Skin: Negative for color change and rash.   Neurological: Negative for weakness and confusion.   Psychiatric/Behavioral: Negative for agitation and depressed mood.       Patient Active Problem List   Diagnosis   • Essential hypertension   • GERD (gastroesophageal reflux disease)   • Medicare annual wellness visit, subsequent   • Benign prostatic hyperplasia with urinary frequency   • Umbilical hernia without obstruction and without gangrene   • Colon cancer screening   • Mixed hyperlipidemia   • Localized edema   • Right leg swelling   • Aneurysm of right popliteal artery (HCC)   • Acute deep vein thrombosis (DVT) of distal vein of right lower extremity (HCC)   • DDD (degenerative disc disease), lumbar   • Actinic keratosis of forehead   • Aneurysm of right popliteal artery (HCC)   • Abdominal aortic aneurysm (AAA) 3.0 cm to 5.5 cm in diameter in male   •  PVD (peripheral vascular disease) ([facility])   • High risk medication use   • Statin intolerance   • Immunization deficiency       Allergies   Allergen Reactions   • Bee Venom Anaphylaxis         Current Outpatient Medications:   •  amLODIPine (NORVASC) 10 MG tablet, TAKE 1 TABLET BY MOUTH DAILY, Disp: 90 tablet, Rfl: 1  •  CHLORHEXIDINE GLUCONATE CLOTH EX, Apply 1 application topically Take As Directed. PRIOR TO SURGERY, Disp: , Rfl:   •  HYDROcodone-acetaminophen (NORCO) 5-325 MG per tablet, Take 1 tablet by mouth Every 4 (Four) Hours As Needed for Severe Pain., Disp: 20 tablet, Rfl: 0  •  multivitamin with minerals tablet tablet, Take 1 tablet by mouth Daily., Disp: , Rfl:   •  omeprazole (priLOSEC) 20 MG capsule, Take 1 capsule by mouth Daily., Disp: 90 capsule, Rfl: 3  •  rivaroxaban (Xarelto) 20 MG tablet, Take 1 tablet by mouth Daily With Dinner. (Patient taking differently: Take 20 mg by mouth Daily With Dinner. HELD PER MD INSTRUCTIONS 2 DAYS PRIOR TO SURGERY), Disp: 30 tablet, Rfl: 6    Past Medical History:   Diagnosis Date   • Anesthesia complication     AFTER VEIN SURGERY STATES HE WAS HARD TO WAKE UP   • Arthritis    • GERD (gastroesophageal reflux disease)    • Hyperlipidemia    • Hypertension    • Low back pain    • PAD (peripheral artery disease) ([facility])    • PONV (postoperative nausea and vomiting)    • Popliteal aneurysm ([facility])        Past Surgical History:   Procedure Laterality Date   • ANGIOPLASTY POPLITEAL ARTERY Right 11/9/2021    Procedure: RIGHT POPITEAL ANEURYSM REPAIR VIABOHN;  Surgeon: Campos Archer MD;  Location: [facility] OR 18/19;  Service: Vascular;  Laterality: Right;   • ANGIOPLASTY POPLITEAL ARTERY Right 1/25/2022    Procedure: AORTO ILIAC FEMORAL RIGHT POPLITEAL VIABOHN STENT PLACEMENT X 2;  Surgeon: Campos Archer MD;  Location: [facility] OR 18/19;  Service: Vascular;  Laterality: Right;   • COLONOSCOPY     • HERNIA REPAIR Bilateral    • VEIN LIGATION AND STRIPPING N/A         Family History   Problem Relation Age of Onset   • No Known Problems Mother    • Heart attack Father    • Stroke Sister    • Stroke Brother    • Stroke Brother    • Malig Hyperthermia Neg Hx        Social History     Tobacco Use   • Smoking status: Never   • Smokeless tobacco: Never   Substance Use Topics   • Alcohol use: Never     Comment: caffeine use- coffee, soda            Objective     Vitals:    12/07/22 1422   BP: 112/60   Pulse: 84   Temp: 98.6 °F (37 °C)   SpO2: 97%     Body mass index is 25.89 kg/m².    Physical Exam  Vitals reviewed.   Constitutional:       Appearance: He is well-developed. He is not diaphoretic.   HENT:      Head: Normocephalic and atraumatic.   Eyes:      General: No scleral icterus.     Pupils: Pupils are equal, round, and reactive to light.   Neck:      Thyroid: No thyromegaly.   Cardiovascular:      Rate and Rhythm: Normal rate and regular rhythm.      Heart sounds: No murmur heard.    No friction rub. No gallop.   Pulmonary:      Effort: Pulmonary effort is normal. No respiratory distress.      Breath sounds: No wheezing or rales.   Chest:      Chest wall: No tenderness.   Abdominal:      General: Bowel sounds are normal. There is no distension.      Palpations: Abdomen is soft.      Tenderness: There is no abdominal tenderness.   Musculoskeletal:         General: No deformity. Normal range of motion.   Lymphadenopathy:      Cervical: No cervical adenopathy.   Skin:     General: Skin is warm and dry.      Findings: No rash.   Neurological:      Cranial Nerves: No cranial nerve deficit.      Motor: No abnormal muscle tone.         Lab Results   Component Value Date    GLUCOSE 98 01/24/2022    BUN 19 01/24/2022    CREATININE 1.10 09/02/2022    EGFRIFNONA 75 01/24/2022    EGFRIFAFRI 80 12/29/2020    BCR 19.4 01/24/2022    K 3.7 01/24/2022    CO2 24.0 01/24/2022    CALCIUM 9.1 01/24/2022    PROTENTOTREF 7.0 12/29/2020    ALBUMIN 4.30 12/29/2020    LABIL2 1.6 12/29/2020    AST 18  12/29/2020    ALT 18 12/29/2020       WBC   Date Value Ref Range Status   01/24/2022 6.49 3.40 - 10.80 10*3/mm3 Final     RBC   Date Value Ref Range Status   01/24/2022 4.73 4.14 - 5.80 10*6/mm3 Final     Hemoglobin   Date Value Ref Range Status   01/24/2022 14.5 13.0 - 17.7 g/dL Final     Hematocrit   Date Value Ref Range Status   01/24/2022 42.7 37.5 - 51.0 % Final     MCV   Date Value Ref Range Status   01/24/2022 90.3 79.0 - 97.0 fL Final     MCH   Date Value Ref Range Status   01/24/2022 30.7 26.6 - 33.0 pg Final     MCHC   Date Value Ref Range Status   01/24/2022 34.0 31.5 - 35.7 g/dL Final     RDW   Date Value Ref Range Status   01/24/2022 12.1 (L) 12.3 - 15.4 % Final     RDW-SD   Date Value Ref Range Status   01/24/2022 39.7 37.0 - 54.0 fl Final     MPV   Date Value Ref Range Status   01/24/2022 9.1 6.0 - 12.0 fL Final     Platelets   Date Value Ref Range Status   01/24/2022 224 140 - 450 10*3/mm3 Final       No results found for: HGBA1C    No results found for: NWMSEFTK60    TSH   Date Value Ref Range Status   12/29/2020 4.810 (H) 0.270 - 4.200 uIU/mL Final       No results found for: CHOL  Lab Results   Component Value Date    TRIG 177 (H) 12/29/2020     Lab Results   Component Value Date    HDL 50 12/29/2020     Lab Results   Component Value Date     (H) 12/29/2020     Lab Results   Component Value Date    VLDL 33 12/29/2020     No results found for: LDLHDL      Procedures    Assessment & Plan   Problems Addressed this Visit     Aneurysm of right popliteal artery (HCC)    Essential hypertension - Primary    GERD (gastroesophageal reflux disease)    High risk medication use    Immunization deficiency    PVD (peripheral vascular disease) (HCC)    Statin intolerance   Diagnoses       Codes Comments    Essential hypertension    -  Primary ICD-10-CM: I10  ICD-9-CM: 401.9     Gastroesophageal reflux disease without esophagitis     ICD-10-CM: K21.9  ICD-9-CM: 530.81     PVD (peripheral vascular disease)  (HCC)     ICD-10-CM: I73.9  ICD-9-CM: 443.9     Statin intolerance     ICD-10-CM: Z78.9  ICD-9-CM: 995.27     Aneurysm of right popliteal artery (HCC)     ICD-10-CM: I72.4  ICD-9-CM: 442.3     Immunization deficiency     ICD-10-CM: Z28.39  ICD-9-CM: V15.83     High risk medication use     ICD-10-CM: Z79.899  ICD-9-CM: V58.69       HTN.  Controlled.  Continue meds.  RF amlodipine.    Check CMp, FLP.    F/U LESA.  Doing well with meds.  RF omeprazole.  PCV 20 today.     HRM.  Check Cbc.      No orders of the defined types were placed in this encounter.      Current Outpatient Medications   Medication Sig Dispense Refill   • amLODIPine (NORVASC) 10 MG tablet TAKE 1 TABLET BY MOUTH DAILY 90 tablet 1   • CHLORHEXIDINE GLUCONATE CLOTH EX Apply 1 application topically Take As Directed. PRIOR TO SURGERY     • HYDROcodone-acetaminophen (NORCO) 5-325 MG per tablet Take 1 tablet by mouth Every 4 (Four) Hours As Needed for Severe Pain. 20 tablet 0   • multivitamin with minerals tablet tablet Take 1 tablet by mouth Daily.     • omeprazole (priLOSEC) 20 MG capsule Take 1 capsule by mouth Daily. 90 capsule 3   • rivaroxaban (Xarelto) 20 MG tablet Take 1 tablet by mouth Daily With Dinner. (Patient taking differently: Take 20 mg by mouth Daily With Dinner. HELD PER MD INSTRUCTIONS 2 DAYS PRIOR TO SURGERY) 30 tablet 6     No current facility-administered medications for this visit.       Erickson Gerard had no medications administered during this visit.    Return in about 1 month (around 1/7/2023) for Medicare Wellness.    There are no Patient Instructions on file for this visit.

## 2022-12-08 LAB
ALBUMIN SERPL-MCNC: 4.5 G/DL (ref 3.5–5.2)
ALBUMIN/GLOB SERPL: 2 G/DL
ALP SERPL-CCNC: 87 U/L (ref 39–117)
ALT SERPL-CCNC: 11 U/L (ref 1–41)
AST SERPL-CCNC: 13 U/L (ref 1–40)
BASOPHILS # BLD AUTO: 0.04 10*3/MM3 (ref 0–0.2)
BASOPHILS NFR BLD AUTO: 0.6 % (ref 0–1.5)
BILIRUB SERPL-MCNC: 0.3 MG/DL (ref 0–1.2)
BUN SERPL-MCNC: 24 MG/DL (ref 8–23)
BUN/CREAT SERPL: 20 (ref 7–25)
CALCIUM SERPL-MCNC: 9 MG/DL (ref 8.6–10.5)
CHLORIDE SERPL-SCNC: 104 MMOL/L (ref 98–107)
CHOLEST SERPL-MCNC: 246 MG/DL (ref 0–200)
CHOLEST/HDLC SERPL: 5.72 {RATIO}
CO2 SERPL-SCNC: 24.1 MMOL/L (ref 22–29)
CREAT SERPL-MCNC: 1.2 MG/DL (ref 0.76–1.27)
EGFRCR SERPLBLD CKD-EPI 2021: 62.7 ML/MIN/1.73
EOSINOPHIL # BLD AUTO: 0.15 10*3/MM3 (ref 0–0.4)
EOSINOPHIL NFR BLD AUTO: 2.2 % (ref 0.3–6.2)
ERYTHROCYTE [DISTWIDTH] IN BLOOD BY AUTOMATED COUNT: 13.2 % (ref 12.3–15.4)
GLOBULIN SER CALC-MCNC: 2.3 GM/DL
GLUCOSE SERPL-MCNC: 141 MG/DL (ref 65–99)
HCT VFR BLD AUTO: 43.6 % (ref 37.5–51)
HDLC SERPL-MCNC: 43 MG/DL (ref 40–60)
HGB BLD-MCNC: 14.8 G/DL (ref 13–17.7)
IMM GRANULOCYTES # BLD AUTO: 0.03 10*3/MM3 (ref 0–0.05)
IMM GRANULOCYTES NFR BLD AUTO: 0.4 % (ref 0–0.5)
LDLC SERPL CALC-MCNC: 151 MG/DL (ref 0–100)
LYMPHOCYTES # BLD AUTO: 1.6 10*3/MM3 (ref 0.7–3.1)
LYMPHOCYTES NFR BLD AUTO: 23.4 % (ref 19.6–45.3)
MCH RBC QN AUTO: 31 PG (ref 26.6–33)
MCHC RBC AUTO-ENTMCNC: 33.9 G/DL (ref 31.5–35.7)
MCV RBC AUTO: 91.4 FL (ref 79–97)
MONOCYTES # BLD AUTO: 0.65 10*3/MM3 (ref 0.1–0.9)
MONOCYTES NFR BLD AUTO: 9.5 % (ref 5–12)
NEUTROPHILS # BLD AUTO: 4.37 10*3/MM3 (ref 1.7–7)
NEUTROPHILS NFR BLD AUTO: 63.9 % (ref 42.7–76)
NRBC BLD AUTO-RTO: 0 /100 WBC (ref 0–0.2)
PLATELET # BLD AUTO: 231 10*3/MM3 (ref 140–450)
POTASSIUM SERPL-SCNC: 3.8 MMOL/L (ref 3.5–5.2)
PROT SERPL-MCNC: 6.8 G/DL (ref 6–8.5)
RBC # BLD AUTO: 4.77 10*6/MM3 (ref 4.14–5.8)
SODIUM SERPL-SCNC: 138 MMOL/L (ref 136–145)
TRIGL SERPL-MCNC: 282 MG/DL (ref 0–150)
VLDLC SERPL CALC-MCNC: 52 MG/DL (ref 5–40)
WBC # BLD AUTO: 6.84 10*3/MM3 (ref 3.4–10.8)

## 2023-12-09 DIAGNOSIS — K21.9 GASTROESOPHAGEAL REFLUX DISEASE WITHOUT ESOPHAGITIS: ICD-10-CM

## 2023-12-10 RX ORDER — OMEPRAZOLE 20 MG/1
20 CAPSULE, DELAYED RELEASE ORAL DAILY
Qty: 30 CAPSULE | Refills: 0 | Status: SHIPPED | OUTPATIENT
Start: 2023-12-10

## 2023-12-10 RX ORDER — AMLODIPINE BESYLATE 10 MG/1
10 TABLET ORAL DAILY
Qty: 30 TABLET | Refills: 0 | Status: SHIPPED | OUTPATIENT
Start: 2023-12-10

## 2024-02-15 ENCOUNTER — OFFICE VISIT (OUTPATIENT)
Dept: FAMILY MEDICINE CLINIC | Facility: CLINIC | Age: 79
End: 2024-02-15
Payer: MEDICARE

## 2024-02-15 VITALS
WEIGHT: 166 LBS | DIASTOLIC BLOOD PRESSURE: 78 MMHG | BODY MASS INDEX: 26.68 KG/M2 | HEIGHT: 66 IN | SYSTOLIC BLOOD PRESSURE: 120 MMHG | RESPIRATION RATE: 17 BRPM | OXYGEN SATURATION: 96 % | HEART RATE: 76 BPM

## 2024-02-15 DIAGNOSIS — Z79.899 HIGH RISK MEDICATION USE: ICD-10-CM

## 2024-02-15 DIAGNOSIS — I10 ESSENTIAL HYPERTENSION: ICD-10-CM

## 2024-02-15 DIAGNOSIS — E78.2 MIXED HYPERLIPIDEMIA: ICD-10-CM

## 2024-02-15 DIAGNOSIS — Z11.59 ENCOUNTER FOR HEPATITIS C SCREENING TEST FOR LOW RISK PATIENT: ICD-10-CM

## 2024-02-15 DIAGNOSIS — Z78.9 STATIN INTOLERANCE: ICD-10-CM

## 2024-02-15 DIAGNOSIS — I73.9 PVD (PERIPHERAL VASCULAR DISEASE): Primary | ICD-10-CM

## 2024-02-15 PROBLEM — I82.509 CHRONIC DEEP VEIN THROMBOSIS (DVT): Status: ACTIVE | Noted: 2024-02-15

## 2024-02-16 DIAGNOSIS — K21.9 GASTROESOPHAGEAL REFLUX DISEASE WITHOUT ESOPHAGITIS: ICD-10-CM

## 2024-02-16 LAB
ALBUMIN SERPL-MCNC: 4.4 G/DL (ref 3.5–5.2)
ALBUMIN/GLOB SERPL: 2 G/DL
ALP SERPL-CCNC: 81 U/L (ref 39–117)
ALT SERPL-CCNC: 20 U/L (ref 1–41)
AST SERPL-CCNC: 21 U/L (ref 1–40)
BASOPHILS # BLD AUTO: 0.04 10*3/MM3 (ref 0–0.2)
BASOPHILS NFR BLD AUTO: 0.6 % (ref 0–1.5)
BILIRUB SERPL-MCNC: 0.4 MG/DL (ref 0–1.2)
BUN SERPL-MCNC: 19 MG/DL (ref 8–23)
BUN/CREAT SERPL: 18.6 (ref 7–25)
CALCIUM SERPL-MCNC: 9.3 MG/DL (ref 8.6–10.5)
CHLORIDE SERPL-SCNC: 103 MMOL/L (ref 98–107)
CHOLEST SERPL-MCNC: 245 MG/DL (ref 0–200)
CHOLEST/HDLC SERPL: 5 {RATIO}
CO2 SERPL-SCNC: 23.9 MMOL/L (ref 22–29)
CREAT SERPL-MCNC: 1.02 MG/DL (ref 0.76–1.27)
EGFRCR SERPLBLD CKD-EPI 2021: 75.7 ML/MIN/1.73
EOSINOPHIL # BLD AUTO: 0.15 10*3/MM3 (ref 0–0.4)
EOSINOPHIL NFR BLD AUTO: 2.3 % (ref 0.3–6.2)
ERYTHROCYTE [DISTWIDTH] IN BLOOD BY AUTOMATED COUNT: 12.7 % (ref 12.3–15.4)
GLOBULIN SER CALC-MCNC: 2.2 GM/DL
GLUCOSE SERPL-MCNC: 110 MG/DL (ref 65–99)
HCT VFR BLD AUTO: 45.1 % (ref 37.5–51)
HCV IGG SERPL QL IA: NON REACTIVE
HDLC SERPL-MCNC: 49 MG/DL (ref 40–60)
HGB BLD-MCNC: 15.4 G/DL (ref 13–17.7)
IMM GRANULOCYTES # BLD AUTO: 0.04 10*3/MM3 (ref 0–0.05)
IMM GRANULOCYTES NFR BLD AUTO: 0.6 % (ref 0–0.5)
LDLC SERPL CALC-MCNC: 158 MG/DL (ref 0–100)
LYMPHOCYTES # BLD AUTO: 1.33 10*3/MM3 (ref 0.7–3.1)
LYMPHOCYTES NFR BLD AUTO: 20 % (ref 19.6–45.3)
MCH RBC QN AUTO: 30.3 PG (ref 26.6–33)
MCHC RBC AUTO-ENTMCNC: 34.1 G/DL (ref 31.5–35.7)
MCV RBC AUTO: 88.8 FL (ref 79–97)
MONOCYTES # BLD AUTO: 0.73 10*3/MM3 (ref 0.1–0.9)
MONOCYTES NFR BLD AUTO: 11 % (ref 5–12)
NEUTROPHILS # BLD AUTO: 4.36 10*3/MM3 (ref 1.7–7)
NEUTROPHILS NFR BLD AUTO: 65.5 % (ref 42.7–76)
NRBC BLD AUTO-RTO: 0 /100 WBC (ref 0–0.2)
PLATELET # BLD AUTO: 209 10*3/MM3 (ref 140–450)
POTASSIUM SERPL-SCNC: 4.1 MMOL/L (ref 3.5–5.2)
PROT SERPL-MCNC: 6.6 G/DL (ref 6–8.5)
RBC # BLD AUTO: 5.08 10*6/MM3 (ref 4.14–5.8)
SODIUM SERPL-SCNC: 139 MMOL/L (ref 136–145)
TRIGL SERPL-MCNC: 210 MG/DL (ref 0–150)
VLDLC SERPL CALC-MCNC: 38 MG/DL (ref 5–40)
WBC # BLD AUTO: 6.65 10*3/MM3 (ref 3.4–10.8)

## 2024-02-16 RX ORDER — OMEPRAZOLE 20 MG/1
20 CAPSULE, DELAYED RELEASE ORAL DAILY
Qty: 90 CAPSULE | Refills: 2 | Status: SHIPPED | OUTPATIENT
Start: 2024-02-16

## 2024-02-20 RX ORDER — AMLODIPINE BESYLATE 10 MG/1
10 TABLET ORAL DAILY
Qty: 90 TABLET | Refills: 3 | Status: SHIPPED | OUTPATIENT
Start: 2024-02-20

## 2024-04-10 PROBLEM — I72.8: Status: ACTIVE | Noted: 2024-04-10

## 2024-04-11 ENCOUNTER — HOSPITAL ENCOUNTER (OUTPATIENT)
Facility: HOSPITAL | Age: 79
Discharge: HOME | End: 2024-04-11
Payer: MEDICARE

## 2024-04-11 ENCOUNTER — OFFICE VISIT (OUTPATIENT)
Age: 79
End: 2024-04-11
Payer: MEDICARE

## 2024-04-11 VITALS
BODY MASS INDEX: 25.71 KG/M2 | SYSTOLIC BLOOD PRESSURE: 156 MMHG | DIASTOLIC BLOOD PRESSURE: 97 MMHG | HEIGHT: 66 IN | HEART RATE: 72 BPM | WEIGHT: 160 LBS

## 2024-04-11 DIAGNOSIS — I72.4 ANEURYSM OF RIGHT POPLITEAL ARTERY: ICD-10-CM

## 2024-04-11 DIAGNOSIS — I72.8: ICD-10-CM

## 2024-04-11 DIAGNOSIS — I72.4 ANEURYSM OF POPLITEAL ARTERY: ICD-10-CM

## 2024-04-11 DIAGNOSIS — I73.9 PVD (PERIPHERAL VASCULAR DISEASE): Primary | ICD-10-CM

## 2024-04-11 DIAGNOSIS — I71.43 INFRARENAL ABDOMINAL AORTIC ANEURYSM (AAA) WITHOUT RUPTURE: ICD-10-CM

## 2024-04-11 PROCEDURE — 93926 LOWER EXTREMITY STUDY: CPT

## 2024-04-11 PROCEDURE — 93978 VASCULAR STUDY: CPT

## 2024-04-11 NOTE — PROGRESS NOTES
Patient Name: Erickson Gerard    MRN: [id number] Encounter Date: 04/11/2024      Consulting Service: Vascular Surgery    Referring Provider: No ref. provider found       CHIEF COMPLAINT:  Chief Complaint   Patient presents with    Peripheral Vascular Disease    Aortic Aneurysm    Deep Vein Thrombosis       Subjective    HPI: Erickson Gerard is a 78 y.o. male is being seen for evaluation/management of known right popliteal aneurysm repair with Viabahn stent graft.  He has a type Ia endoleak that remained stable and was present on his last study.  His aneurysm is grown slightly from 2.7 to 3 cm but is sealed distally.  At this point in time we are not so worried about rupture of a popliteal aneurysm his clotting and the stent grafts look widely patent with good flow.  He is maintained on Xarelto 10 mg dosing daily.  His abdominal aortic aneurysm remains stable at 4.6 cm relative to his 6-month follow-up.    PAST MEDICAL HISTORY:   Past Medical History:   Diagnosis Date    AAA (abdominal aortic aneurysm) without rupture 10/14/2021    Abdominal aortic aneurysm, without rupture, unspecified 03/14/2023    Acute embolism and thrombosis of unspecified deep veins of right distal lower extremity     Anesthesia complication     AFTER VEIN SURGERY STATES HE WAS HARD TO WAKE UP    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     Aneurysm of artery of lower extremity 09/09/2021    Arthritis     Essential (primary) hypertension     GERD (gastroesophageal reflux disease)     Hyperlipidemia     Hypertension     Iliac artery aneurysm 10/14/2021    Localized edema     Low back pain     Mixed hyperlipidemia     Other specified soft tissue disorders     PAD (peripheral artery disease)     PONV (postoperative nausea and vomiting)     Popliteal aneurysm     Thrombosis 09/09/2021    Calf Vein Thrombosis Rt tibial vein    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      PAST SURGICAL HISTORY:   Past Surgical History:   Procedure Laterality Date     ANGIOPLASTY POPLITEAL ARTERY Right 11/09/2021    Procedure: RIGHT POPITEAL ANEURYSM REPAIR VIABOHN;  Surgeon: Campos Archer MD;  Location: [facility] OR 18/19;  Service: Vascular;  Laterality: Right;    ANGIOPLASTY POPLITEAL ARTERY Right 01/25/2022    Procedure: AORTO ILIAC FEMORAL RIGHT POPLITEAL VIABOHN STENT PLACEMENT X 2;  Surgeon: Campos Archer MD;  Location: [facility] OR 18/19;  Service: Vascular;  Laterality: Right;    COLONOSCOPY      HERNIA REPAIR Bilateral     POLITEAL ARTERY ANEURYSM REPAIR Right 11/09/2021    Viabahn Stent Graft    VEIN LIGATION AND STRIPPING N/A       FAMILY HISTORY:   Family History   Problem Relation Age of Onset    No Known Problems Mother     Heart attack Father     Heart disease Father     Stroke Sister     Stroke Brother     Stroke Brother     Malig Hyperthermia Neg Hx       SOCIAL HISTORY:   Social History     Tobacco Use    Smoking status: Never    Smokeless tobacco: Never    Tobacco comments:     Patient does not smoke   Vaping Use    Vaping status: Never Used   Substance Use Topics    Alcohol use: Never     Comment: caffeine use- coffee, soda; Patient is non drinker.    Drug use: Never     Comment: Drug Abuse: none      MEDICATIONS:   Current Outpatient Medications on File Prior to Visit   Medication Sig Dispense Refill    amLODIPine (NORVASC) 10 MG tablet TAKE 1 TABLET BY MOUTH DAILY 90 tablet 3    multivitamin with minerals tablet tablet Take 1 tablet by mouth Daily.      omeprazole (priLOSEC) 20 MG capsule TAKE 1 CAPSULE BY MOUTH DAILY 90 capsule 2    rivaroxaban (Xarelto) 10 MG tablet Take 1 tablet by mouth Daily With Dinner. 90 tablet 3    amLODIPine (NORVASC) 5 MG tablet Take 1 tablet by mouth Take As Directed.      rivaroxaban (XARELTO) 20 MG tablet Take 1 tablet by mouth Daily.       No current facility-administered medications on file prior to visit.       ALLERGIES: Bee venom       Objective   Vitals:    04/11/24 0958   BP: 156/97   Pulse: 72  "  Weight: 72.6 kg (160 lb)   Height: 168 cm (66.14\")     Body mass index is 25.72 kg/m².  BMI is >= 25 and <30. (Overweight) The following options were offered after discussion;: Information on healthy weight added to patient's after visit summary.      PHYSICAL EXAM:   Physical Exam  Constitutional:       Appearance: Normal appearance. He is normal weight.   HENT:      Head: Normocephalic and atraumatic.      Nose: Nose normal.   Eyes:      Extraocular Movements: Extraocular movements intact.      Pupils: Pupils are equal, round, and reactive to light.   Cardiovascular:      Rate and Rhythm: Normal rate.      Pulses:           Carotid pulses are 2+ on the right side and 2+ on the left side.       Radial pulses are 2+ on the right side and 2+ on the left side.        Femoral pulses are 2+ on the right side and 2+ on the left side.       Popliteal pulses are 3+ on the right side and 2+ on the left side.        Dorsalis pedis pulses are 2+ on the right side and 2+ on the left side.        Posterior tibial pulses are 2+ on the right side and 2+ on the left side.      Heart sounds: Normal heart sounds.   Pulmonary:      Effort: Pulmonary effort is normal.      Breath sounds: Normal breath sounds.   Abdominal:      General: Abdomen is flat. Bowel sounds are normal.      Palpations: Abdomen is soft.   Musculoskeletal:         General: Normal range of motion.      Cervical back: Normal range of motion and neck supple.      Right lower leg: No edema.      Left lower leg: No edema.   Skin:     General: Skin is warm and dry.   Neurological:      General: No focal deficit present.      Mental Status: He is alert and oriented to person, place, and time. Mental status is at baseline.   Psychiatric:         Mood and Affect: Mood normal.         Thought Content: Thought content normal.          Result Review   LABS:      Results Review:       I reviewed the patient's new clinical results.    The following radiologic or " non-invasive studies have been reviewed by me:   No radiology results for the last 30 days.                ASSESSMENT/PLAN:   Diagnoses and all orders for this visit:    1. PVD (peripheral vascular disease) (Primary)  -     Duplex Aorta IVC Iliac Graft Complete CAR; Future  -     Duplex Lower Extremity Art / Grafts - Right CAR; Future  -     Duplex Lower Extremity Art / Grafts - Left CAR; Future    2. Peripheral artery aneurysm  -     Duplex Aorta IVC Iliac Graft Complete CAR; Future  -     Duplex Lower Extremity Art / Grafts - Right CAR; Future  -     Duplex Lower Extremity Art / Grafts - Left CAR; Future    3. Aneurysm of right popliteal artery  -     Duplex Aorta IVC Iliac Graft Complete CAR; Future  -     Duplex Lower Extremity Art / Grafts - Right CAR; Future  -     Duplex Lower Extremity Art / Grafts - Left CAR; Future       78 y.o. 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  We discussed the nature of these aneurysms and the current risk between 1 and 2% for rupture.  At this juncture if he gets over 5 cm and I think a CT angiogram and intervention would be reasonable.  On the other side of the groin his right popliteal aneurysm is at 3 cm and still has a small type I endoleak if this continues to grow we may have to extend stent graft proximally.  He has a small left popliteal aneurysm that we will follow with sizing at 6 months when he is back for his right popliteal study as well.  At this point in time he will maintain the Xarelto 10 mg dosing and he does have a history of known DVT which is being covered by this as well.  At this juncture we will follow him up in 6 months with the appropriate studies  I discussed the plan with the patient and family who are agreeable to the plan of care at this point. Thank you for this consult.   Follow Up  Return in about 6 months (around 10/11/2024).    Campos Archer MD    04/11/24

## 2024-04-13 LAB
ABDOMINAL DIST AORTA AP: 4.6 CM
ABDOMINAL DIST AORTA TRANS: 4.6 CM
ABDOMINAL DIST AORTA VEL: 31.8 CM/S
ABDOMINAL LT COM ILIAC AP: 1.82 CM
ABDOMINAL LT COM ILIAC TRANS: 1.83 CM
ABDOMINAL LT COM ILIAC VEL: 72.7 CM/S
ABDOMINAL LT EXT ILIAC VEL: 83.7 CM/S
ABDOMINAL MID AORTA AP: 2.61 CM
ABDOMINAL MID AORTA TRANS: 2.61 CM
ABDOMINAL MID AORTA VEL: 50.1 CM/S
ABDOMINAL PROX AORTA AP: 2.73 CM
ABDOMINAL PROX AORTA TRANS: 2.89 CM
ABDOMINAL PROX AORTA VEL: 59.9 CM/S
ABDOMINAL RT COM ILIAC AP: 2.36 CM
ABDOMINAL RT COM ILIAC VEL: 38.9 CM/S
ABDOMINAL RT EXT ILIAC VEL: 58.7 CM/S
BH CV VAS ABD AO LT EXTERNAL ILIAC AP: 1.38 CM
BH CV VAS ABD AO RT EXTERNAL ILIAC AP: 1.3 CM
BH CV VAS LEA RIGHT FREE TEXT STENT ONE: NORMAL
BH CV VAS LEA RIGHT STENT ONE DIST STENT PSV: 35.8 CM/S
BH CV VAS LEA RIGHT STENT ONE MID STENT PSV: 38.3 CM/S
BH CV VAS LEA RIGHT STENT ONE POST STENT PSV: 23.8 CM/S
BH CV VAS LEA RIGHT STENT ONE PRE STENT PSV: 32.6 CM/S
BH CV VAS LEA RIGHT STENT ONE PROX STENT PSV: 42 CM/S

## 2024-08-15 ENCOUNTER — OFFICE VISIT (OUTPATIENT)
Dept: FAMILY MEDICINE CLINIC | Facility: CLINIC | Age: 79
End: 2024-08-15
Payer: MEDICARE

## 2024-08-15 VITALS
TEMPERATURE: 97.9 F | SYSTOLIC BLOOD PRESSURE: 124 MMHG | OXYGEN SATURATION: 95 % | HEART RATE: 78 BPM | BODY MASS INDEX: 25.94 KG/M2 | WEIGHT: 161.4 LBS | RESPIRATION RATE: 17 BRPM | DIASTOLIC BLOOD PRESSURE: 70 MMHG | HEIGHT: 66 IN

## 2024-08-15 DIAGNOSIS — Z78.9 STATIN INTOLERANCE: ICD-10-CM

## 2024-08-15 DIAGNOSIS — E78.2 MIXED HYPERLIPIDEMIA: ICD-10-CM

## 2024-08-15 DIAGNOSIS — Z00.00 MEDICARE ANNUAL WELLNESS VISIT, SUBSEQUENT: Primary | ICD-10-CM

## 2024-08-15 DIAGNOSIS — M25.521 RIGHT ELBOW PAIN: ICD-10-CM

## 2024-08-15 RX ORDER — EZETIMIBE 10 MG/1
10 TABLET ORAL DAILY
Qty: 90 TABLET | Refills: 3 | Status: SHIPPED | OUTPATIENT
Start: 2024-08-15

## 2024-08-15 NOTE — PROGRESS NOTES
Subjective   The ABCs of the Annual Wellness Visit  Medicare Wellness Visit      Erickson Gerard is a 79 y.o. patient who presents for a Medicare Wellness Visit.    The following portions of the patient's history were reviewed and   updated as appropriate: allergies, current medications, past family history, past medical history, past social history, past surgical history, and problem list.    Compared to one year ago, the patient's physical   health is the same.  Compared to one year ago, the patient's mental   health is the same.    Recent Hospitalizations:  He was not admitted to the hospital during the last year.     Current Medical Providers:  Patient Care Team:  Farhad Metcalf MD as PCP - General (Family Medicine)    Outpatient Medications Prior to Visit   Medication Sig Dispense Refill    amLODIPine (NORVASC) 10 MG tablet TAKE 1 TABLET BY MOUTH DAILY 90 tablet 3    multivitamin with minerals tablet tablet Take 1 tablet by mouth Daily.      omeprazole (priLOSEC) 20 MG capsule TAKE 1 CAPSULE BY MOUTH DAILY 90 capsule 2    rivaroxaban (Xarelto) 10 MG tablet Take 1 tablet by mouth Daily With Dinner. 90 tablet 3    amLODIPine (NORVASC) 5 MG tablet Take 1 tablet by mouth Take As Directed. (Patient not taking: Reported on 8/15/2024)      rivaroxaban (XARELTO) 20 MG tablet Take 1 tablet by mouth Daily. (Patient not taking: Reported on 8/15/2024)       No facility-administered medications prior to visit.     No opioid medication identified on active medication list. I have reviewed chart for other potential  high risk medication/s and harmful drug interactions in the elderly.      Aspirin is not on active medication list.  Aspirin use is not indicated based on review of current medical condition/s. Risk of harm outweighs potential benefits.  .    Patient Active Problem List   Diagnosis    Essential hypertension    GERD (gastroesophageal reflux disease)    Medicare annual wellness visit, subsequent    Benign  "prostatic hyperplasia with urinary frequency    Umbilical hernia without obstruction and without gangrene    Colon cancer screening    Mixed hyperlipidemia    Localized edema    Right leg swelling    Aneurysm of right popliteal artery    Acute deep vein thrombosis (DVT) of distal vein of right lower extremity    DDD (degenerative disc disease), lumbar    Actinic keratosis of forehead    Aneurysm of right popliteal artery    Abdominal aortic aneurysm (AAA) 3.0 cm to 5.5 cm in diameter in male    PVD (peripheral vascular disease)    High risk medication use    Statin intolerance    Immunization deficiency    Chronic deep vein thrombosis (DVT)    Encounter for hepatitis C screening test for low risk patient    Peripheral artery aneurysm     Advance Care Planning Advance Directive is not on file.  ACP discussion was held with the patient during this visit. Patient has an advance directive (not in EMR), copy requested.            Objective   Vitals:    08/15/24 0919   BP: 124/70   BP Location: Left arm   Patient Position: Sitting   Cuff Size: Small Adult   Pulse: 78   Resp: 17   Temp: 97.9 °F (36.6 °C)   TempSrc: Temporal   SpO2: 95%   Weight: 73.2 kg (161 lb 6.4 oz)   Height: 168 cm (66.14\")       Estimated body mass index is 25.94 kg/m² as calculated from the following:    Height as of this encounter: 168 cm (66.14\").    Weight as of this encounter: 73.2 kg (161 lb 6.4 oz).            Does the patient have evidence of cognitive impairment? No                                                                                                Health  Risk Assessment    Smoking Status:  Social History     Tobacco Use   Smoking Status Never   Smokeless Tobacco Never   Tobacco Comments    Patient does not smoke     Alcohol Consumption:  Social History     Substance and Sexual Activity   Alcohol Use Never    Comment: caffeine use- coffee, soda; Patient is non drinker.       Fall Risk Screen  IGLESIAADI Fall Risk Assessment was " completed, and patient is at HIGH risk for falls. Assessment completed on:8/15/2024    Depression Screenin/15/2024     9:26 AM   PHQ-2/PHQ-9 Depression Screening   Little Interest or Pleasure in Doing Things 0-->not at all   Feeling Down, Depressed or Hopeless 0-->not at all   PHQ-9: Brief Depression Severity Measure Score 0     Health Habits and Functional and Cognitive Screenin/15/2024     9:25 AM   Functional & Cognitive Status   Current Diet Well Balanced Diet   Dental Exam Not up to date   Eye Exam Not up to date   Exercise (times per week) 0 times per week   Current Exercises Include No Regular Exercise   Do you need help using the phone?  No   Are you deaf or do you have serious difficulty hearing?  No   Do you need help to go to places out of walking distance? No   Do you need help shopping? No   Do you need help preparing meals?  No   Do you need help with housework?  No   Do you need help with laundry? No   Do you need help taking your medications? No   Do you need help managing money? No   Do you ever drive or ride in a car without wearing a seat belt? No   Have you felt unusual stress, anger or loneliness in the last month? No   Who do you live with? Spouse   If you need help, do you have trouble finding someone available to you? No   Have you been bothered in the last four weeks by sexual problems? No   Do you have difficulty concentrating, remembering or making decisions? No           Age-appropriate Screening Schedule:  Refer to the list below for future screening recommendations based on patient's age, sex and/or medical conditions. Orders for these recommended tests are listed in the plan section. The patient has been provided with a written plan.    Health Maintenance List  Health Maintenance   Topic Date Due    RSV Vaccine - Adults (1 - 1-dose 60+ series) Never done    ZOSTER VACCINE (2 of 3) 2013    ANNUAL WELLNESS VISIT  2021    COVID-19 Vaccine (3 - 2023- season)  09/01/2023    INFLUENZA VACCINE  08/01/2024    LIPID PANEL  02/15/2025    BMI FOLLOWUP  04/11/2025    TDAP/TD VACCINES (2 - Tdap) 11/26/2028    HEPATITIS C SCREENING  Completed    Pneumococcal Vaccine 65+  Completed                                                                                                                                                CMS Preventative Services Quick Reference  Risk Factors Identified During Encounter  Inactivity/Sedentary: Patient was advised to exercise at least 150 minutes a week per CDC recommendations.    The above risks/problems have been discussed with the patient.  Pertinent information has been shared with the patient in the After Visit Summary.  An After Visit Summary and PPPS were made available to the patient.    Follow Up:   Next Medicare Wellness visit to be scheduled in 1 year.         Additional E&M Note during same encounter follows:  Patient has additional, significant, and separately identifiable condition(s)/problem(s) that require work above and beyond the Medicare Wellness Visit     Chief Complaint  Arm Injury (R arm ) and Elbow Injury (Right elbow )    Subjective   Arm Injury   Pertinent negatives include no chest pain.   Elbow Injury  Associated symptoms include arthralgias. Pertinent negatives include no abdominal pain, chest pain, chills, coughing, diaphoresis, fatigue or rash.     Erickson is also being seen today for additional medical problem/s.    Review of Systems   Constitutional:  Negative for chills, diaphoresis and fatigue.   HENT:  Negative for rhinorrhea.    Respiratory:  Negative for cough and shortness of breath.    Cardiovascular:  Negative for chest pain and leg swelling.   Gastrointestinal:  Negative for abdominal distention and abdominal pain.   Musculoskeletal:  Positive for arthralgias. Negative for back pain.   Skin:  Negative for rash.      C/o fall on R elbow after trying to get away from yard sprinkler.  Occurred 3 days ago.   "Tripped and hit r elbow on concrete. Swelled immedidately  with tenderness.  Tenderness is better.      F/U hyperlipidmeia.  Statin intolerant.        Objective   Vital Signs:  /70 (BP Location: Left arm, Patient Position: Sitting, Cuff Size: Small Adult)   Pulse 78   Temp 97.9 °F (36.6 °C) (Temporal)   Resp 17   Ht 168 cm (66.14\")   Wt 73.2 kg (161 lb 6.4 oz)   SpO2 95%   BMI 25.94 kg/m²   Physical Exam  Vitals reviewed.   Constitutional:       Appearance: He is well-developed. He is not diaphoretic.   HENT:      Head: Normocephalic and atraumatic.   Eyes:      General: No scleral icterus.     Pupils: Pupils are equal, round, and reactive to light.   Neck:      Thyroid: No thyromegaly.   Cardiovascular:      Rate and Rhythm: Normal rate and regular rhythm.      Heart sounds: No murmur heard.     No friction rub. No gallop.   Pulmonary:      Effort: Pulmonary effort is normal. No respiratory distress.      Breath sounds: No wheezing or rales.   Chest:      Chest wall: No tenderness.   Abdominal:      General: Bowel sounds are normal. There is no distension.      Palpations: Abdomen is soft.      Tenderness: There is no abdominal tenderness.   Musculoskeletal:         General: No deformity. Normal range of motion.      Comments: R elbow with TTP over olecranon area. Gen swelling noted.     Lymphadenopathy:      Cervical: No cervical adenopathy.   Skin:     General: Skin is warm and dry.      Findings: No rash.   Neurological:      Cranial Nerves: No cranial nerve deficit.      Motor: No abnormal muscle tone.         Procedure:    Right elbow x-ray 3 views performed of the right elbow pain.  No comparisons.  Findings: No acute fracture, generalized swelling noted.          Assessment and Plan               Medicare annual wellness visit, subsequent    Mixed hyperlipidemia     Right elbow pain    Statin intolerance    No orders of the defined types were placed in this encounter.    Hyperlipdimeia  " Uncontrolled. Chronic.   Start zetia(statin intolerant).   R elbow pain.  X-ray negative for fracture.  Generalized soft tissue swelling noted.  Ace bandage with heat 3 times daily.  Preventive Counseling:  Encouraged to stay active.  Covid vaccine declined.  FlU declined.  Pneumovax UTD.  Shingrix and RSV recommended.    Dentist recommended.  Optho recommended.              Follow Up   No follow-ups on file.  Patient was given instructions and counseling regarding his condition or for health maintenance advice. Please see specific information pulled into the AVS if appropriate.

## 2024-10-24 ENCOUNTER — HOSPITAL ENCOUNTER (OUTPATIENT)
Facility: HOSPITAL | Age: 79
Discharge: HOME | End: 2024-10-24
Payer: MEDICARE

## 2024-10-24 ENCOUNTER — OFFICE VISIT (OUTPATIENT)
Age: 79
End: 2024-10-24
Payer: MEDICARE

## 2024-10-24 VITALS
DIASTOLIC BLOOD PRESSURE: 91 MMHG | HEART RATE: 69 BPM | BODY MASS INDEX: 25.71 KG/M2 | HEIGHT: 66 IN | SYSTOLIC BLOOD PRESSURE: 153 MMHG | WEIGHT: 160 LBS

## 2024-10-24 DIAGNOSIS — I73.9 PVD (PERIPHERAL VASCULAR DISEASE): ICD-10-CM

## 2024-10-24 DIAGNOSIS — I72.8: ICD-10-CM

## 2024-10-24 DIAGNOSIS — I72.4 ANEURYSM OF RIGHT POPLITEAL ARTERY: ICD-10-CM

## 2024-10-24 DIAGNOSIS — I71.40 ABDOMINAL AORTIC ANEURYSM (AAA) 3.0 CM TO 5.5 CM IN DIAMETER IN MALE: Primary | ICD-10-CM

## 2024-10-24 DIAGNOSIS — I71.42 JUXTARENAL ABDOMINAL AORTIC ANEURYSM, WITHOUT RUPTURE: ICD-10-CM

## 2024-10-24 PROCEDURE — 93925 LOWER EXTREMITY STUDY: CPT

## 2024-10-24 PROCEDURE — 93978 VASCULAR STUDY: CPT

## 2024-10-24 NOTE — PROGRESS NOTES
Chief Complaint  Peripheral Vascular Disease    Subjective        Erickson Gerard presents to [facility] VASCULAR SURGERY  HPI   Erickson Gerard is a 79 y.o. male that has been followed in our office by Dr. 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  He returns today in follow up along with an aortic and popliteal artery duplex. He reports he  has been doing well without hospitalizations or surgeries. He denies any abdominal pain, back pain, pain that radiates to his  groin. He denies any worsening claudication symptoms, rest pain, or tissue loss.     Review of Systems   Constitutional:  Negative for fever.   Eyes:  Negative for visual disturbance.   Cardiovascular:  Negative for leg swelling.   Gastrointestinal:  Negative for abdominal pain.   Musculoskeletal:  Negative for back pain.   Skin:  Negative for color change, pallor and wound.   Neurological:  Negative for dizziness, facial asymmetry, speech difficulty and weakness.        Erickson Gerard  reports that he has never smoked. He has never used smokeless tobacco..        Objective   Vital Signs:  Vitals:    10/24/24 0847   BP: 153/91   Pulse: 69      Body mass index is 25.72 kg/m².           Physical Exam  Vitals reviewed.   Constitutional:       Appearance: Normal appearance.   HENT:      Head: Normocephalic.   Cardiovascular:      Rate and Rhythm: Normal rate and regular rhythm.      Pulses:           Dorsalis pedis pulses are 3+ on the right side and 3+ on the left side.        Posterior tibial pulses are 3+ on the right side and 3+ on the left side.   Pulmonary:      Effort: Pulmonary effort is normal.   Skin:     General: Skin is warm.   Neurological:      General: No focal deficit present.      Mental Status: He is alert and oriented to person, place, and time.   Psychiatric:         Mood and Affect: Mood normal.           Result Review :    Previous duplex: Largest measurement of his aorta 4.57 cm.  Right common iliac artery is 2.3 and the left 1.86.  His popliteal artery on the right is measuring 2.6 cm with evidence of an endoleak, though this is unchanged from previous imaging.    Previous ABIs greater than 1.    Duplex Lower Extremity Art / Grafts - Bilateral CAR (10/24/2024 08:44)   Duplex Aorta IVC Iliac Graft Complete CAR (10/24/2024 08:41)                  Assessment and Plan     Diagnoses and all orders for this visit:    1. Abdominal aortic aneurysm (AAA) 3.0 cm to 5.5 cm in diameter in male (Primary)  -     Duplex Aorta IVC Iliac Graft Complete CAR; Future    2. Peripheral artery aneurysm  -     Duplex Lower Extremity Art / Grafts - Right CAR; Future    3. Juxtarenal abdominal aortic aneurysm, without rupture  -     Duplex Aorta IVC Iliac Graft Complete CAR; Future          Patient presents today for follow up of his aneurysmal disease.  His right popliteal stent graft continues to have a type II endoleak, though today the largest size of his popliteal artery is 2.8 cm.  This is essentially stable from previous imaging.  He has a small left-sided popliteal artery aneurysm at 1.8 cm.  His abdominal aortic aneurysm is measuring 4.6 cm today.  He  is to continue his Xarelto.  He is to continue his statin for cholesterol control.  We discussed adequate blood pressure management.  We discussed continuing his walking protocol.  He will return in 6 months along with a right popliteal artery duplex and an aortic duplex.  We will check his left popliteal artery duplex in 1 year.         Follow Up     Return in about 6 months (around 4/24/2025) for aortic duplex, ADELINA.  Patient was given instructions and counseling regarding his condition or for health maintenance advice. Please see specific information pulled into the AVS if appropriate.     Jessica Khan, TATI

## 2024-10-25 LAB
ABDOMINAL DIST AORTA AP: 4.7 CM
ABDOMINAL DIST AORTA TRANS: 4.7 CM
ABDOMINAL DIST AORTA VEL: 36 CM/S
ABDOMINAL LT COM ILIAC AP: 1.4 CM
ABDOMINAL LT COM ILIAC TRANS: 1.5 CM
ABDOMINAL LT COM ILIAC VEL: 96.4 CM/S
ABDOMINAL LT EXT ILIAC VEL: 96.4 CM/S
ABDOMINAL MID AORTA AP: 2.9 CM
ABDOMINAL MID AORTA TRANS: 3 CM
ABDOMINAL MID AORTA VEL: 51.6 CM/S
ABDOMINAL PROX AORTA AP: 2.6 CM
ABDOMINAL PROX AORTA TRANS: 2.6 CM
ABDOMINAL PROX AORTA VEL: 71.1 CM/S
ABDOMINAL RT COM ILIAC AP: 2.2 CM
ABDOMINAL RT COM ILIAC TRANS: 2.2 CM
ABDOMINAL RT COM ILIAC VEL: 113.9 CM/S
ABDOMINAL RT EXT ILIAC VEL: 76 CM/S
BH CV VAS ABD AO LT EXTERNAL ILIAC AP: 1.1 CM
BH CV VAS ABD AO RT EXTERNAL ILIAC AP: 1.3 CM

## 2024-10-28 LAB
BH CV LEA LEFT CFA DISTAL PSV: 51.1 CM/S
BH CV LEA LEFT DFA PROX PSV: 41.3 CM/S
BH CV LEA LEFT POPITEAL A  DISTAL PSV: 26.1 CM/S
BH CV LEA LEFT POPITEAL A  MID PSV: 29.8 CM/S
BH CV LEA LEFT POPITEAL A  PROX PSV: 53.4 CM/S
BH CV LEA LEFT PTA PROX PSV: 46 CM/S
BH CV LEA LEFT SFA DISTAL PSV: 47.2 CM/S
BH CV LEA LEFT SFA MID PSV: 93.2 CM/S
BH CV LEA LEFT SFA PROX PSV: 58.8 CM/S
BH CV LEA LEFT TIBEOPERONEAL PSV: 48.5 CM/S
BH CV LEA RIGHT CFA DISTAL PSV: 41.5 CM/S
BH CV LEA RIGHT DFA PROX PSV: 41.6 CM/S
BH CV LEA RIGHT POPITEAL A  DISTAL PSV: 48.3 CM/S
BH CV LEA RIGHT POPITEAL A  MID PSV: 51.6 CM/S
BH CV LEA RIGHT POPITEAL A  PROX PSV: 58.2 CM/S
BH CV LEA RIGHT PTA PROX PSV: -46.9 CM/S
BH CV LEA RIGHT SFA DISTAL PSV: 39.1 CM/S
BH CV LEA RIGHT SFA MID PSV: 54.7 CM/S
BH CV LEA RIGHT SFA PROX PSV: 45.4 CM/S
BH CV LEA RIGHT TIBEOPERONEAL PSV: 48.3 CM/S
BH CV VAS LEA LEFT CFA DISTAL ANEURYSM LONG: 1.6 CM
BH CV VAS LEA LEFT CFA DISTAL ANEURYSM TRANS: 1.7 CM
BH CV VAS LEA LEFT POPLITEAL DISTAL ANEURYSM LONG: 1.2 CM
BH CV VAS LEA LEFT POPLITEAL DISTAL ANEURYSM TRANS: 1.2 CM
BH CV VAS LEA LEFT POPLITEAL MID ANEURYSM LONG: 1.9 CM
BH CV VAS LEA LEFT POPLITEAL MID ANEURYSM TRANS: 1.9 CM
BH CV VAS LEA LEFT POPLITEAL PROX ANEURYSM LONG: 1.2 CM
BH CV VAS LEA LEFT POPLITEAL PROX ANEURYSM TRANS: 1.2 CM
BH CV VAS LEA LEFT SFA DISTAL ANEURYSM LONG: 1.1 CM
BH CV VAS LEA LEFT SFA DISTAL ANEURYSM TRANS: 1.2 CM
BH CV VAS LEA LEFT SFA MID ANEURYSM LONG: 1 CM
BH CV VAS LEA LEFT SFA MID ANEURYSM TRANS: 1 CM
BH CV VAS LEA LEFT SFA PROX ANEURYSM LONG: 1.1 CM
BH CV VAS LEA LEFT SFA PROX ANEURYSM TRANS: 1.1 CM
BH CV VAS LEA RIGHT CFA DISTAL ANEURYSM LONG: 1.6 CM
BH CV VAS LEA RIGHT CFA DISTAL ANEURYSM TRANS: 1.6 CM
BH CV VAS LEA RIGHT FREE TEXT STENT ONE: NORMAL
BH CV VAS LEA RIGHT POPLITEAL DISTAL ANEURYSM LONG: 1.5 CM
BH CV VAS LEA RIGHT POPLITEAL DISTAL ANEURYSM TRANS: 1.7 CM
BH CV VAS LEA RIGHT POPLITEAL MID ANEURYSM LONG: 2.7 CM
BH CV VAS LEA RIGHT POPLITEAL MID ANEURYSM TRANS: 2.9 CM
BH CV VAS LEA RIGHT POPLITEAL PROX ANEURYSM LONG: 2.2 CM
BH CV VAS LEA RIGHT POPLITEAL PROX ANEURYSM TRANS: 2.3 CM
BH CV VAS LEA RIGHT SFA DISTAL ANEURYSM LONG: 1 CM
BH CV VAS LEA RIGHT SFA DISTAL ANEURYSM TRANS: 1.1 CM
BH CV VAS LEA RIGHT SFA MID ANEURYSM LONG: 1.1 CM
BH CV VAS LEA RIGHT SFA MID ANEURYSM TRANS: 1.1 CM
BH CV VAS LEA RIGHT SFA PROX ANEURYSM LONG: 1.1 CM
BH CV VAS LEA RIGHT SFA PROX ANEURYSM TRANS: 1.1 CM
BH CV VAS LEA RIGHT STENT ONE DIST STENT PSV: 48.3 CM/S
BH CV VAS LEA RIGHT STENT ONE MID STENT PSV: 58.2 CM/S
BH CV VAS LEA RIGHT STENT ONE POST STENT PSV: 46.2 CM/S
BH CV VAS LEA RIGHT STENT ONE PRE STENT PSV: 54.7 CM/S
BH CV VAS LEA RIGHT STENT ONE PROX STENT PSV: 39.1 CM/S
LEFT GROIN CFA SYS: 51.1 CM/SEC
Lab: 0.6 CM
Lab: 0.7 CM
Lab: 0.7 CM
Lab: 0.9 CM
Lab: 0.9 CM
RIGHT GROIN CFA SYS: 41.5 CM/SEC

## 2024-11-22 ENCOUNTER — OFFICE VISIT (OUTPATIENT)
Dept: FAMILY MEDICINE CLINIC | Facility: CLINIC | Age: 79
End: 2024-11-22
Payer: MEDICARE

## 2024-11-22 VITALS
RESPIRATION RATE: 14 BRPM | SYSTOLIC BLOOD PRESSURE: 136 MMHG | TEMPERATURE: 97.9 F | WEIGHT: 160.2 LBS | HEART RATE: 79 BPM | HEIGHT: 66 IN | BODY MASS INDEX: 25.75 KG/M2 | OXYGEN SATURATION: 97 % | DIASTOLIC BLOOD PRESSURE: 78 MMHG

## 2024-11-22 DIAGNOSIS — H53.8 BLURRED VISION: Primary | ICD-10-CM

## 2024-11-22 DIAGNOSIS — R11.0 NAUSEA: ICD-10-CM

## 2024-11-22 DIAGNOSIS — K21.9 GASTROESOPHAGEAL REFLUX DISEASE WITHOUT ESOPHAGITIS: ICD-10-CM

## 2024-11-22 DIAGNOSIS — R42 DIZZINESS: ICD-10-CM

## 2024-11-22 DIAGNOSIS — I73.9 PVD (PERIPHERAL VASCULAR DISEASE): ICD-10-CM

## 2024-11-22 NOTE — PROGRESS NOTES
"Chief Complaint  Abdominal Pain (With Vomiting x 3 weeks)    Subjective        Erickson Gerard presents to [facility] PRIMARY CARE  Abdominal Pain      Pt has been having some abd pain and nausea with motion sickness feeling for past 18 days.  He also left eye blurred vision about 18 days ago.  It has occurred 2 x over past 6 months.  Each time lasted about 2-3 minutes.  Currently stomach feels ok but feels a little light headed.  He does have PVD and has stent in right leg.  He has not seen eye doc in few years and had cataracts taken out then.  He did feel a little queazy in the morning today.   No diarrhea or constipation.  Normal diet but has been watching what he eats to see if he notices any issues with nausea.    He takes med for reflux- omeprazole.    Dramamine helps with the motion sickness when he takes it.  He had dark stool  a few months ago 1 time then cleared up  then occurred again more recently but then cleared up.    Objective   Vital Signs:  /78 (BP Location: Left arm, Patient Position: Sitting)   Pulse 79   Temp 97.9 °F (36.6 °C)   Resp 14   Ht 168 cm (66.14\")   Wt 72.7 kg (160 lb 3.2 oz)   SpO2 97%   BMI 25.75 kg/m²   Estimated body mass index is 25.75 kg/m² as calculated from the following:    Height as of this encounter: 168 cm (66.14\").    Weight as of this encounter: 72.7 kg (160 lb 3.2 oz).            Physical Exam  Vitals and nursing note reviewed.   Constitutional:       Appearance: Normal appearance. He is well-developed.   HENT:      Head: Normocephalic and atraumatic.      Right Ear: Tympanic membrane, ear canal and external ear normal. There is no impacted cerumen.      Left Ear: Tympanic membrane, ear canal and external ear normal. There is no impacted cerumen.      Nose: Nose normal. No congestion or rhinorrhea.      Mouth/Throat:      Mouth: Mucous membranes are moist.      Pharynx: Oropharynx is clear. No oropharyngeal exudate or posterior oropharyngeal " erythema.   Eyes:      General: No scleral icterus.        Right eye: No discharge.         Left eye: No discharge.      Extraocular Movements: Extraocular movements intact.      Conjunctiva/sclera: Conjunctivae normal.      Pupils: Pupils are equal, round, and reactive to light.   Neck:      Vascular: No carotid bruit.   Cardiovascular:      Rate and Rhythm: Normal rate and regular rhythm.      Heart sounds: Normal heart sounds. No murmur heard.  Pulmonary:      Effort: Pulmonary effort is normal. No respiratory distress.      Breath sounds: Normal breath sounds. No stridor. No wheezing or rhonchi.   Musculoskeletal:      Cervical back: Normal range of motion and neck supple. No rigidity or tenderness.   Lymphadenopathy:      Cervical: No cervical adenopathy.   Neurological:      General: No focal deficit present.      Mental Status: He is alert and oriented to person, place, and time. He is not disoriented.   Psychiatric:         Mood and Affect: Mood normal.         Behavior: Behavior normal.        Result Review :                Assessment and Plan   Diagnoses and all orders for this visit:    1. Blurred vision (Primary)  -     Ambulatory Referral to Ophthalmology    2. Nausea  -     CBC & Differential  -     Comprehensive Metabolic Panel    3. Dizziness    4. PVD (peripheral vascular disease)    5. Gastroesophageal reflux disease without esophagitis             Follow Up   No follow-ups on file.  Patient was given instructions and counseling regarding his condition or for health maintenance advice. Please see specific information pulled into the AVS if appropriate.       Recommend mwe 8/16/25  ER warning signs.  Consider GI doc.    Will set up with eye doc.    Labs today.      Answers submitted by the patient for this visit:  Primary Reason for Visit (Submitted on 11/20/2024)  What is the primary reason for your visit?: Problem Not Listed

## 2024-11-23 LAB
ALBUMIN SERPL-MCNC: 4.2 G/DL (ref 3.5–5.2)
ALBUMIN/GLOB SERPL: 1.6 G/DL
ALP SERPL-CCNC: 87 U/L (ref 39–117)
ALT SERPL-CCNC: 13 U/L (ref 1–41)
AST SERPL-CCNC: 17 U/L (ref 1–40)
BASOPHILS # BLD AUTO: 0.05 10*3/MM3 (ref 0–0.2)
BASOPHILS NFR BLD AUTO: 0.6 % (ref 0–1.5)
BILIRUB SERPL-MCNC: 0.7 MG/DL (ref 0–1.2)
BUN SERPL-MCNC: 19 MG/DL (ref 8–23)
BUN/CREAT SERPL: 15.4 (ref 7–25)
CALCIUM SERPL-MCNC: 9 MG/DL (ref 8.6–10.5)
CHLORIDE SERPL-SCNC: 103 MMOL/L (ref 98–107)
CO2 SERPL-SCNC: 24.8 MMOL/L (ref 22–29)
CREAT SERPL-MCNC: 1.23 MG/DL (ref 0.76–1.27)
EGFRCR SERPLBLD CKD-EPI 2021: 60.1 ML/MIN/1.73
EOSINOPHIL # BLD AUTO: 0.12 10*3/MM3 (ref 0–0.4)
EOSINOPHIL NFR BLD AUTO: 1.5 % (ref 0.3–6.2)
ERYTHROCYTE [DISTWIDTH] IN BLOOD BY AUTOMATED COUNT: 12.5 % (ref 12.3–15.4)
GLOBULIN SER CALC-MCNC: 2.7 GM/DL
GLUCOSE SERPL-MCNC: 97 MG/DL (ref 65–99)
HCT VFR BLD AUTO: 47.1 % (ref 37.5–51)
HGB BLD-MCNC: 15.3 G/DL (ref 13–17.7)
IMM GRANULOCYTES # BLD AUTO: 0.03 10*3/MM3 (ref 0–0.05)
IMM GRANULOCYTES NFR BLD AUTO: 0.4 % (ref 0–0.5)
LYMPHOCYTES # BLD AUTO: 1.58 10*3/MM3 (ref 0.7–3.1)
LYMPHOCYTES NFR BLD AUTO: 19.9 % (ref 19.6–45.3)
MCH RBC QN AUTO: 29.9 PG (ref 26.6–33)
MCHC RBC AUTO-ENTMCNC: 32.5 G/DL (ref 31.5–35.7)
MCV RBC AUTO: 92 FL (ref 79–97)
MONOCYTES # BLD AUTO: 0.94 10*3/MM3 (ref 0.1–0.9)
MONOCYTES NFR BLD AUTO: 11.9 % (ref 5–12)
NEUTROPHILS # BLD AUTO: 5.21 10*3/MM3 (ref 1.7–7)
NEUTROPHILS NFR BLD AUTO: 65.7 % (ref 42.7–76)
NRBC BLD AUTO-RTO: 0 /100 WBC (ref 0–0.2)
PLATELET # BLD AUTO: 265 10*3/MM3 (ref 140–450)
POTASSIUM SERPL-SCNC: 4.1 MMOL/L (ref 3.5–5.2)
PROT SERPL-MCNC: 6.9 G/DL (ref 6–8.5)
RBC # BLD AUTO: 5.12 10*6/MM3 (ref 4.14–5.8)
SODIUM SERPL-SCNC: 141 MMOL/L (ref 136–145)
WBC # BLD AUTO: 7.93 10*3/MM3 (ref 3.4–10.8)

## 2024-12-09 ENCOUNTER — OFFICE VISIT (OUTPATIENT)
Dept: FAMILY MEDICINE CLINIC | Facility: CLINIC | Age: 79
End: 2024-12-09
Payer: MEDICARE

## 2024-12-09 VITALS
HEIGHT: 66 IN | DIASTOLIC BLOOD PRESSURE: 100 MMHG | TEMPERATURE: 98.2 F | WEIGHT: 154.2 LBS | BODY MASS INDEX: 24.78 KG/M2 | SYSTOLIC BLOOD PRESSURE: 150 MMHG | OXYGEN SATURATION: 97 % | HEART RATE: 78 BPM

## 2024-12-09 DIAGNOSIS — H53.122 TRANSIENT VISUAL LOSS, LEFT EYE: ICD-10-CM

## 2024-12-09 DIAGNOSIS — H53.9 VISUAL DISTURBANCE: Primary | ICD-10-CM

## 2024-12-09 DIAGNOSIS — I10 ESSENTIAL HYPERTENSION: ICD-10-CM

## 2024-12-09 PROCEDURE — 99214 OFFICE O/P EST MOD 30 MIN: CPT | Performed by: FAMILY MEDICINE

## 2024-12-09 PROCEDURE — 1126F AMNT PAIN NOTED NONE PRSNT: CPT | Performed by: FAMILY MEDICINE

## 2024-12-09 PROCEDURE — 3077F SYST BP >= 140 MM HG: CPT | Performed by: FAMILY MEDICINE

## 2024-12-09 PROCEDURE — G2211 COMPLEX E/M VISIT ADD ON: HCPCS | Performed by: FAMILY MEDICINE

## 2024-12-09 PROCEDURE — 3080F DIAST BP >= 90 MM HG: CPT | Performed by: FAMILY MEDICINE

## 2024-12-09 RX ORDER — VALSARTAN 80 MG/1
80 TABLET ORAL DAILY
Qty: 90 TABLET | Refills: 3 | Status: SHIPPED | OUTPATIENT
Start: 2024-12-09

## 2024-12-09 NOTE — PROGRESS NOTES
Chief Complaint   Patient presents with    Blurred Vision       Follow up/needs carotid us       Subjective   Erickson Gerard is a 79 y.o. male.     History of Present Illness   C/o trouble with episodes times 3 in last 4 months.  L eye gets blurred.  Lasted 3-4 minutes.  Seems like they are always in the car.  No darkness comes down.  Saw optho and no issues with this.  CBC, CMP normal from 2 weeks ago.    The following portions of the patient's history were reviewed and updated as appropriate: allergies, current medications, past family history, past medical history, past social history, past surgical history and problem list.    Review of Systems   Constitutional:  Negative for appetite change and fatigue.   HENT:  Negative for nosebleeds and sore throat.    Eyes:  Negative for blurred vision and visual disturbance.   Respiratory:  Negative for shortness of breath and wheezing.    Cardiovascular:  Negative for chest pain and leg swelling.   Gastrointestinal:  Negative for abdominal distention and abdominal pain.   Endocrine: Negative for cold intolerance and polyuria.   Genitourinary:  Negative for dysuria and hematuria.   Musculoskeletal:  Negative for arthralgias and myalgias.   Skin:  Negative for color change and rash.   Neurological:  Negative for weakness and confusion.   Psychiatric/Behavioral:  Negative for agitation and depressed mood.        Patient Active Problem List   Diagnosis    Essential hypertension    GERD (gastroesophageal reflux disease)    Medicare annual wellness visit, subsequent    Benign prostatic hyperplasia with urinary frequency    Umbilical hernia without obstruction and without gangrene    Colon cancer screening    Mixed hyperlipidemia    Localized edema    Right leg swelling    Aneurysm of right popliteal artery    Acute deep vein thrombosis (DVT) of distal vein of right lower extremity    DDD (degenerative disc disease), lumbar    Actinic keratosis of forehead    Aneurysm of right  popliteal artery    Abdominal aortic aneurysm (AAA) 3.0 cm to 5.5 cm in diameter in male    PVD (peripheral vascular disease)    High risk medication use    Statin intolerance    Immunization deficiency    Chronic deep vein thrombosis (DVT)    Encounter for hepatitis C screening test for low risk patient    Peripheral artery aneurysm    Right elbow pain    Blurred vision    Nausea    Dizziness    Visual disturbance       Allergies   Allergen Reactions    Bee Venom Anaphylaxis         Current Outpatient Medications:     amLODIPine (NORVASC) 10 MG tablet, TAKE 1 TABLET BY MOUTH DAILY, Disp: 90 tablet, Rfl: 3    multivitamin with minerals tablet tablet, Take 1 tablet by mouth Daily., Disp: , Rfl:     omeprazole (priLOSEC) 20 MG capsule, TAKE 1 CAPSULE BY MOUTH DAILY, Disp: 90 capsule, Rfl: 2    rivaroxaban (Xarelto) 10 MG tablet, Take 1 tablet by mouth Daily With Dinner., Disp: 90 tablet, Rfl: 3    ezetimibe (Zetia) 10 MG tablet, Take 1 tablet by mouth Daily. 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     Aneurysm of artery of lower extremity 09/09/2021    Arthritis     Essential (primary) hypertension     GERD (gastroesophageal reflux disease)     Hyperlipidemia     Hypertension     Iliac artery aneurysm 10/14/2021    Localized edema     Low back pain     Mixed hyperlipidemia     Other specified soft tissue disorders     PAD (peripheral artery disease)     PONV (postoperative nausea and vomiting)     Popliteal aneurysm      Thrombosis 09/09/2021    Calf Vein Thrombosis Rt tibial vein    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       Past Surgical History:   Procedure Laterality Date    ANGIOPLASTY POPLITEAL ARTERY Right 11/09/2021    Procedure: RIGHT POPITEAL ANEURYSM REPAIR VIABOHN;  Surgeon: Campos Archer MD;  Location: [facility] OR 18/19;  Service: Vascular;  Laterality: Right;    ANGIOPLASTY POPLITEAL ARTERY Right 01/25/2022    Procedure: AORTO ILIAC FEMORAL RIGHT POPLITEAL VIABOHN STENT PLACEMENT X 2;  Surgeon: Campos Archer MD;  Location: [facility] OR 18/19;  Service: Vascular;  Laterality: Right;    COLONOSCOPY      HERNIA REPAIR Bilateral     POLITEAL ARTERY ANEURYSM REPAIR Right 11/09/2021    Viabahn Stent Graft    VEIN LIGATION AND STRIPPING N/A        Family History   Problem Relation Age of Onset    No Known Problems Mother     Heart attack Father     Heart disease Father     Stroke Sister     Stroke Brother     Stroke Brother     Malig Hyperthermia Neg Hx        Social History     Tobacco Use    Smoking status: Never    Smokeless tobacco: Never    Tobacco comments:     Patient does not smoke   Substance Use Topics    Alcohol use: Never     Comment: caffeine use- coffee, soda; Patient is non drinker.            Objective     Vitals:    12/09/24 1327   BP: 150/100   Pulse:    Temp:    SpO2:      Body mass index is 24.89 kg/m².    Physical Exam  Vitals reviewed.   Constitutional:       Appearance: He is well-developed. He is not diaphoretic.   HENT:      Head: Normocephalic and atraumatic.   Eyes:      General: No scleral icterus.     Pupils: Pupils are equal, round, and reactive to light.   Neck:      Thyroid: No thyromegaly.   Cardiovascular:      Rate and Rhythm: Normal rate and regular rhythm.      Heart sounds: No murmur heard.     No friction rub. No gallop.   Pulmonary:      Effort: Pulmonary effort is normal. No respiratory distress.      Breath sounds: No wheezing or rales.   Chest:      Chest  wall: No tenderness.   Abdominal:      General: Bowel sounds are normal. There is no distension.      Palpations: Abdomen is soft.      Tenderness: There is no abdominal tenderness.   Musculoskeletal:         General: No deformity. Normal range of motion.   Lymphadenopathy:      Cervical: No cervical adenopathy.   Skin:     General: Skin is warm and dry.      Findings: No rash.   Neurological:      Cranial Nerves: No cranial nerve deficit.      Motor: No abnormal muscle tone.         Lab Results   Component Value Date    GLUCOSE 97 11/22/2024    BUN 19 11/22/2024    CREATININE 1.23 11/22/2024    EGFRIFNONA 75 01/24/2022    EGFRIFAFRI 80 12/29/2020    BCR 15.4 11/22/2024    K 4.1 11/22/2024    CO2 24.8 11/22/2024    CALCIUM 9.0 11/22/2024    PROTENTOTREF 6.9 11/22/2024    ALBUMIN 4.2 11/22/2024    LABIL2 1.6 11/22/2024    AST 17 11/22/2024    ALT 13 11/22/2024       WBC   Date Value Ref Range Status   11/22/2024 7.93 3.40 - 10.80 10*3/mm3 Final     Comment:     **Effective December 2, 2024 profile [id number] WBC will be made**    non-orderable as a stand-alone order code.       RBC   Date Value Ref Range Status   11/22/2024 5.12 4.14 - 5.80 10*6/mm3 Final     Hemoglobin   Date Value Ref Range Status   11/22/2024 15.3 13.0 - 17.7 g/dL Final   01/24/2022 14.5 13.0 - 17.7 g/dL Final     Hematocrit   Date Value Ref Range Status   11/22/2024 47.1 37.5 - 51.0 % Final   01/24/2022 42.7 37.5 - 51.0 % Final     MCV   Date Value Ref Range Status   11/22/2024 92.0 79.0 - 97.0 fL Final   01/24/2022 90.3 79.0 - 97.0 fL Final     MCH   Date Value Ref Range Status   11/22/2024 29.9 26.6 - 33.0 pg Final   01/24/2022 30.7 26.6 - 33.0 pg Final     MCHC   Date Value Ref Range Status   11/22/2024 32.5 31.5 - 35.7 g/dL Final   01/24/2022 34.0 31.5 - 35.7 g/dL Final     RDW   Date Value Ref Range Status   11/22/2024 12.5 12.3 - 15.4 % Final   01/24/2022 12.1 (L) 12.3 - 15.4 % Final     RDW-SD   Date Value Ref Range Status   01/24/2022 39.7  "37.0 - 54.0 fl Final     MPV   Date Value Ref Range Status   01/24/2022 9.1 6.0 - 12.0 fL Final     Platelets   Date Value Ref Range Status   11/22/2024 265 140 - 450 10*3/mm3 Final   01/24/2022 224 140 - 450 10*3/mm3 Final     Neutrophil Rel %   Date Value Ref Range Status   11/22/2024 65.7 42.7 - 76.0 % Final     Lymphocyte Rel %   Date Value Ref Range Status   11/22/2024 19.9 19.6 - 45.3 % Final     Monocyte Rel %   Date Value Ref Range Status   11/22/2024 11.9 5.0 - 12.0 % Final     Eosinophil Rel %   Date Value Ref Range Status   11/22/2024 1.5 0.3 - 6.2 % Final     Basophil Rel %   Date Value Ref Range Status   11/22/2024 0.6 0.0 - 1.5 % Final     Neutrophils Absolute   Date Value Ref Range Status   11/22/2024 5.21 1.70 - 7.00 10*3/mm3 Final     Lymphocytes Absolute   Date Value Ref Range Status   11/22/2024 1.58 0.70 - 3.10 10*3/mm3 Final     Monocytes Absolute   Date Value Ref Range Status   11/22/2024 0.94 (H) 0.10 - 0.90 10*3/mm3 Final     Eosinophils Absolute   Date Value Ref Range Status   11/22/2024 0.12 0.00 - 0.40 10*3/mm3 Final     Basophils Absolute   Date Value Ref Range Status   11/22/2024 0.05 0.00 - 0.20 10*3/mm3 Final     nRBC   Date Value Ref Range Status   11/22/2024 0.0 0.0 - 0.2 /100 WBC Final       No results found for: \"HGBA1C\"    No results found for: \"GXXFONDZ47\"    TSH   Date Value Ref Range Status   12/29/2020 4.810 (H) 0.270 - 4.200 uIU/mL Final       No results found for: \"CHOL\"  Lab Results   Component Value Date    TRIG 210 (H) 02/15/2024     Lab Results   Component Value Date    HDL 49 02/15/2024     Lab Results   Component Value Date     (H) 02/15/2024     Lab Results   Component Value Date    VLDL 38 02/15/2024     No results found for: \"LDLHDL\"      Procedures    Assessment & Plan   Problems Addressed this Visit       Essential hypertension    Relevant Medications    valsartan (Diovan) 80 MG tablet    Visual disturbance - Primary    Relevant Orders    Duplex Carotid " Ultrasound CAR     Other Visit Diagnoses       Transient visual loss, left eye        Relevant Orders    Duplex Carotid Ultrasound CAR          Diagnoses         Codes Comments    Visual disturbance    -  Primary ICD-10-CM: H53.9  ICD-9-CM: 368.9     Essential hypertension     ICD-10-CM: I10  ICD-9-CM: 401.9     Transient visual loss, left eye     ICD-10-CM: H53.122  ICD-9-CM: 368.12           Visual disturbance.  New problem.  CBC, CMP okay.  Check carotid u/s.   HTN.  Uncontrolled.  Stay on amlo.  Start valsartan 80 mg a day.    No orders of the defined types were placed in this encounter.      Current Outpatient Medications   Medication Sig Dispense Refill    amLODIPine (NORVASC) 10 MG tablet TAKE 1 TABLET BY MOUTH DAILY 90 tablet 3    multivitamin with minerals tablet tablet Take 1 tablet by mouth Daily.      omeprazole (priLOSEC) 20 MG capsule TAKE 1 CAPSULE BY MOUTH DAILY 90 capsule 2    rivaroxaban (Xarelto) 10 MG tablet Take 1 tablet by mouth Daily With Dinner. 90 tablet 3    ezetimibe (Zetia) 10 MG tablet Take 1 tablet by mouth Daily. (Patient not taking: Reported on 12/9/2024) 90 tablet 3    valsartan (Diovan) 80 MG tablet Take 1 tablet by mouth Daily. 90 tablet 3     No current facility-administered medications for this visit.       Erickson Gerard had no medications administered during this visit.    No follow-ups on file.    There are no Patient Instructions on file for this visit.

## 2025-01-13 ENCOUNTER — HOSPITAL ENCOUNTER (OUTPATIENT)
Facility: HOSPITAL | Age: 80
Discharge: HOME | End: 2025-01-13
Admitting: FAMILY MEDICINE
Payer: MEDICARE

## 2025-01-13 DIAGNOSIS — H53.122 TRANSIENT VISUAL LOSS, LEFT EYE: ICD-10-CM

## 2025-01-13 DIAGNOSIS — H53.9 VISUAL DISTURBANCE: ICD-10-CM

## 2025-01-13 LAB
BH CV XLRA MEAS LEFT CAROTID BULB EDV: 23 CM/SEC
BH CV XLRA MEAS LEFT CAROTID BULB PSV: 56.5 CM/SEC
BH CV XLRA MEAS LEFT DIST CCA EDV: 23 CM/SEC
BH CV XLRA MEAS LEFT DIST CCA PSV: 69 CM/SEC
BH CV XLRA MEAS LEFT DIST ICA EDV: -19.9 CM/SEC
BH CV XLRA MEAS LEFT DIST ICA PSV: -41 CM/SEC
BH CV XLRA MEAS LEFT ICA/CCA RATIO: 0.84
BH CV XLRA MEAS LEFT MID CCA EDV: 24.2 CM/SEC
BH CV XLRA MEAS LEFT MID CCA PSV: 75.2 CM/SEC
BH CV XLRA MEAS LEFT MID ICA EDV: -26.1 CM/SEC
BH CV XLRA MEAS LEFT MID ICA PSV: -58.4 CM/SEC
BH CV XLRA MEAS LEFT PROX CCA EDV: 19.3 CM/SEC
BH CV XLRA MEAS LEFT PROX CCA PSV: 78.3 CM/SEC
BH CV XLRA MEAS LEFT PROX ECA EDV: -13.7 CM/SEC
BH CV XLRA MEAS LEFT PROX ECA PSV: -78.3 CM/SEC
BH CV XLRA MEAS LEFT PROX ICA EDV: -19.9 CM/SEC
BH CV XLRA MEAS LEFT PROX ICA PSV: -48.5 CM/SEC
BH CV XLRA MEAS LEFT PROX SCLA PSV: 47.2 CM/SEC
BH CV XLRA MEAS LEFT VERTEBRAL A EDV: 16.2 CM/SEC
BH CV XLRA MEAS LEFT VERTEBRAL A PSV: 41 CM/SEC
BH CV XLRA MEAS RIGHT CAROTID BULB EDV: -18.6 CM/SEC
BH CV XLRA MEAS RIGHT CAROTID BULB PSV: -65.2 CM/SEC
BH CV XLRA MEAS RIGHT DIST CCA EDV: 21.7 CM/SEC
BH CV XLRA MEAS RIGHT DIST CCA PSV: 70.2 CM/SEC
BH CV XLRA MEAS RIGHT DIST ICA EDV: -26.7 CM/SEC
BH CV XLRA MEAS RIGHT DIST ICA PSV: -65.9 CM/SEC
BH CV XLRA MEAS RIGHT ICA/CCA RATIO: 0.94
BH CV XLRA MEAS RIGHT MID CCA EDV: 23 CM/SEC
BH CV XLRA MEAS RIGHT MID CCA PSV: 71.4 CM/SEC
BH CV XLRA MEAS RIGHT MID ICA EDV: -22.4 CM/SEC
BH CV XLRA MEAS RIGHT MID ICA PSV: -55.3 CM/SEC
BH CV XLRA MEAS RIGHT PROX CCA EDV: 24.2 CM/SEC
BH CV XLRA MEAS RIGHT PROX CCA PSV: 75.2 CM/SEC
BH CV XLRA MEAS RIGHT PROX ECA EDV: -14.3 CM/SEC
BH CV XLRA MEAS RIGHT PROX ECA PSV: -77 CM/SEC
BH CV XLRA MEAS RIGHT PROX ICA EDV: -19.3 CM/SEC
BH CV XLRA MEAS RIGHT PROX ICA PSV: -52.2 CM/SEC
BH CV XLRA MEAS RIGHT PROX SCLA PSV: 88.2 CM/SEC
BH CV XLRA MEAS RIGHT VERTEBRAL A EDV: -13.7 CM/SEC
BH CV XLRA MEAS RIGHT VERTEBRAL A PSV: -34.2 CM/SEC
LEFT ARM BP: NORMAL MMHG
RIGHT ARM BP: NORMAL MMHG

## 2025-01-13 PROCEDURE — 93880 EXTRACRANIAL BILAT STUDY: CPT

## 2025-01-13 PROCEDURE — 93880 EXTRACRANIAL BILAT STUDY: CPT | Performed by: SURGERY

## 2025-01-29 ENCOUNTER — OFFICE VISIT (OUTPATIENT)
Dept: FAMILY MEDICINE CLINIC | Facility: CLINIC | Age: 80
End: 2025-01-29
Payer: MEDICARE

## 2025-01-29 VITALS
DIASTOLIC BLOOD PRESSURE: 78 MMHG | WEIGHT: 155.8 LBS | BODY MASS INDEX: 25.04 KG/M2 | RESPIRATION RATE: 17 BRPM | OXYGEN SATURATION: 98 % | HEART RATE: 71 BPM | HEIGHT: 66 IN | SYSTOLIC BLOOD PRESSURE: 132 MMHG | TEMPERATURE: 97.5 F

## 2025-01-29 DIAGNOSIS — I73.9 PVD (PERIPHERAL VASCULAR DISEASE): ICD-10-CM

## 2025-01-29 DIAGNOSIS — Z79.899 HIGH RISK MEDICATION USE: ICD-10-CM

## 2025-01-29 DIAGNOSIS — E78.2 MIXED HYPERLIPIDEMIA: ICD-10-CM

## 2025-01-29 DIAGNOSIS — I10 ESSENTIAL HYPERTENSION: Primary | ICD-10-CM

## 2025-01-29 PROCEDURE — 3075F SYST BP GE 130 - 139MM HG: CPT | Performed by: FAMILY MEDICINE

## 2025-01-29 PROCEDURE — 1126F AMNT PAIN NOTED NONE PRSNT: CPT | Performed by: FAMILY MEDICINE

## 2025-01-29 PROCEDURE — G2211 COMPLEX E/M VISIT ADD ON: HCPCS | Performed by: FAMILY MEDICINE

## 2025-01-29 PROCEDURE — 99214 OFFICE O/P EST MOD 30 MIN: CPT | Performed by: FAMILY MEDICINE

## 2025-01-29 PROCEDURE — 3078F DIAST BP <80 MM HG: CPT | Performed by: FAMILY MEDICINE

## 2025-01-29 RX ORDER — AMLODIPINE BESYLATE 10 MG/1
10 TABLET ORAL DAILY
Qty: 90 TABLET | Refills: 3 | Status: SHIPPED | OUTPATIENT
Start: 2025-01-29

## 2025-01-29 NOTE — PROGRESS NOTES
Chief Complaint   Patient presents with    Hypertension    Hyperlipidemia       Subjective   Erickson Gerard is a 79 y.o. male.     Hypertension  Pertinent negatives include no blurred vision, chest pain or shortness of breath.   Hyperlipidemia  Pertinent negatives include no chest pain, myalgias or shortness of breath.      F/U HTN.  No SE.  Doing well with meds.   F/U Hyperlipidemia.  Doing well with meds.  Had trouble with the statins .    The following portions of the patient's history were reviewed and updated as appropriate: allergies, current medications, past family history, past medical history, past social history, past surgical history and problem list.    Review of Systems   Constitutional:  Negative for appetite change and fatigue.   HENT:  Negative for nosebleeds and sore throat.    Eyes:  Negative for blurred vision and visual disturbance.   Respiratory:  Negative for shortness of breath and wheezing.    Cardiovascular:  Negative for chest pain and leg swelling.   Gastrointestinal:  Negative for abdominal distention and abdominal pain.   Endocrine: Negative for cold intolerance and polyuria.   Genitourinary:  Negative for dysuria and hematuria.   Musculoskeletal:  Negative for arthralgias and myalgias.   Skin:  Negative for color change and rash.   Neurological:  Negative for weakness and confusion.   Psychiatric/Behavioral:  Negative for agitation and depressed mood.        Patient Active Problem List   Diagnosis    Essential hypertension    GERD (gastroesophageal reflux disease)    Medicare annual wellness visit, subsequent    Benign prostatic hyperplasia with urinary frequency    Umbilical hernia without obstruction and without gangrene    Colon cancer screening    Mixed hyperlipidemia    Localized edema    Right leg swelling    Aneurysm of right popliteal artery    Acute deep vein thrombosis (DVT) of distal vein of right lower extremity    DDD (degenerative disc disease), lumbar    Actinic keratosis  of forehead    Aneurysm of right popliteal artery    Abdominal aortic aneurysm (AAA) 3.0 cm to 5.5 cm in diameter in male    PVD (peripheral vascular disease)    High risk medication use    Statin intolerance    Immunization deficiency    Chronic deep vein thrombosis (DVT)    Encounter for hepatitis C screening test for low risk patient    Peripheral artery aneurysm    Right elbow pain    Blurred vision    Nausea    Dizziness    Visual disturbance       Allergies   Allergen Reactions    Bee Venom Anaphylaxis         Current Outpatient Medications:     amLODIPine (NORVASC) 10 MG tablet, Take 1 tablet by mouth Daily., Disp: 90 tablet, Rfl: 3    multivitamin with minerals tablet tablet, Take 1 tablet by mouth Daily., Disp: , Rfl:     omeprazole (priLOSEC) 20 MG capsule, TAKE 1 CAPSULE BY MOUTH DAILY, Disp: 90 capsule, Rfl: 2    rivaroxaban (Xarelto) 10 MG tablet, Take 1 tablet by mouth Daily With Dinner., Disp: 90 tablet, Rfl: 3    valsartan (Diovan) 80 MG tablet, Take 1 tablet by mouth Daily., Disp: 90 tablet, Rfl: 3    ezetimibe (Zetia) 10 MG tablet, Take 1 tablet by mouth Daily. 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     Aneurysm of artery of lower extremity 09/09/2021    Arthritis     Essential (primary) hypertension     GERD (gastroesophageal reflux disease)     Hyperlipidemia     Hypertension     Iliac artery aneurysm 10/14/2021    Localized edema     Low back pain     Mixed hyperlipidemia     Other specified soft tissue disorders     PAD (peripheral artery disease)     PONV (postoperative nausea  and vomiting)     Popliteal aneurysm     Thrombosis 09/09/2021    Calf Vein Thrombosis Rt tibial vein    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       Past Surgical History:   Procedure Laterality Date    ANGIOPLASTY POPLITEAL ARTERY Right 11/09/2021    Procedure: RIGHT POPITEAL ANEURYSM REPAIR VIABOHN;  Surgeon: Campos Archer MD;  Location: [facility] OR 18/19;  Service: Vascular;  Laterality: Right;    ANGIOPLASTY POPLITEAL ARTERY Right 01/25/2022    Procedure: AORTO ILIAC FEMORAL RIGHT POPLITEAL VIABOHN STENT PLACEMENT X 2;  Surgeon: Campos Archer MD;  Location: [facility] OR 18/19;  Service: Vascular;  Laterality: Right;    COLONOSCOPY      HERNIA REPAIR Bilateral     POLITEAL ARTERY ANEURYSM REPAIR Right 11/09/2021    Viabahn Stent Graft    VEIN LIGATION AND STRIPPING N/A        Family History   Problem Relation Age of Onset    No Known Problems Mother     Heart attack Father     Heart disease Father     Stroke Sister     Stroke Brother     Stroke Brother     Malig Hyperthermia Neg Hx        Social History     Tobacco Use    Smoking status: Never    Smokeless tobacco: Never    Tobacco comments:     Patient does not smoke   Substance Use Topics    Alcohol use: Never     Comment: caffeine use- coffee, soda; Patient is non drinker.            Objective     Vitals:    01/29/25 1550   BP: 132/78   Pulse: 71   Resp: 17   Temp: 97.5 °F (36.4 °C)   SpO2: 98%     Body mass index is 25.16 kg/m².    Physical Exam  Vitals reviewed.   Constitutional:       Appearance: He is well-developed. He is not diaphoretic.   HENT:      Head: Normocephalic and atraumatic.   Eyes:      General: No scleral icterus.     Pupils: Pupils are equal, round, and reactive to light.   Neck:      Thyroid: No thyromegaly.   Cardiovascular:      Rate and Rhythm: Normal rate and regular rhythm.      Heart sounds: No murmur heard.     No friction rub. No gallop.   Pulmonary:      Effort: Pulmonary effort is normal. No respiratory  distress.      Breath sounds: No wheezing or rales.   Chest:      Chest wall: No tenderness.   Abdominal:      General: Bowel sounds are normal. There is no distension.      Palpations: Abdomen is soft.      Tenderness: There is no abdominal tenderness.   Musculoskeletal:         General: No deformity. Normal range of motion.   Lymphadenopathy:      Cervical: No cervical adenopathy.   Skin:     General: Skin is warm and dry.      Findings: No rash.   Neurological:      Cranial Nerves: No cranial nerve deficit.      Motor: No abnormal muscle tone.         Lab Results   Component Value Date    GLUCOSE 97 11/22/2024    BUN 19 11/22/2024    CREATININE 1.23 11/22/2024    EGFRIFNONA 75 01/24/2022    EGFRIFAFRI 80 12/29/2020    BCR 15.4 11/22/2024    K 4.1 11/22/2024    CO2 24.8 11/22/2024    CALCIUM 9.0 11/22/2024    PROTENTOTREF 6.9 11/22/2024    ALBUMIN 4.2 11/22/2024    LABIL2 1.6 11/22/2024    AST 17 11/22/2024    ALT 13 11/22/2024       WBC   Date Value Ref Range Status   11/22/2024 7.93 3.40 - 10.80 10*3/mm3 Final     Comment:     **Effective December 2, 2024 profile [id number] WBC will be made**    non-orderable as a stand-alone order code.       RBC   Date Value Ref Range Status   11/22/2024 5.12 4.14 - 5.80 10*6/mm3 Final     Hemoglobin   Date Value Ref Range Status   11/22/2024 15.3 13.0 - 17.7 g/dL Final   01/24/2022 14.5 13.0 - 17.7 g/dL Final     Hematocrit   Date Value Ref Range Status   11/22/2024 47.1 37.5 - 51.0 % Final   01/24/2022 42.7 37.5 - 51.0 % Final     MCV   Date Value Ref Range Status   11/22/2024 92.0 79.0 - 97.0 fL Final   01/24/2022 90.3 79.0 - 97.0 fL Final     MCH   Date Value Ref Range Status   11/22/2024 29.9 26.6 - 33.0 pg Final   01/24/2022 30.7 26.6 - 33.0 pg Final     MCHC   Date Value Ref Range Status   11/22/2024 32.5 31.5 - 35.7 g/dL Final   01/24/2022 34.0 31.5 - 35.7 g/dL Final     RDW   Date Value Ref Range Status   11/22/2024 12.5 12.3 - 15.4 % Final   01/24/2022 12.1 (L) 12.3 -  "15.4 % Final     RDW-SD   Date Value Ref Range Status   01/24/2022 39.7 37.0 - 54.0 fl Final     MPV   Date Value Ref Range Status   01/24/2022 9.1 6.0 - 12.0 fL Final     Platelets   Date Value Ref Range Status   11/22/2024 265 140 - 450 10*3/mm3 Final   01/24/2022 224 140 - 450 10*3/mm3 Final     Neutrophil Rel %   Date Value Ref Range Status   11/22/2024 65.7 42.7 - 76.0 % Final     Lymphocyte Rel %   Date Value Ref Range Status   11/22/2024 19.9 19.6 - 45.3 % Final     Monocyte Rel %   Date Value Ref Range Status   11/22/2024 11.9 5.0 - 12.0 % Final     Eosinophil Rel %   Date Value Ref Range Status   11/22/2024 1.5 0.3 - 6.2 % Final     Basophil Rel %   Date Value Ref Range Status   11/22/2024 0.6 0.0 - 1.5 % Final     Neutrophils Absolute   Date Value Ref Range Status   11/22/2024 5.21 1.70 - 7.00 10*3/mm3 Final     Lymphocytes Absolute   Date Value Ref Range Status   11/22/2024 1.58 0.70 - 3.10 10*3/mm3 Final     Monocytes Absolute   Date Value Ref Range Status   11/22/2024 0.94 (H) 0.10 - 0.90 10*3/mm3 Final     Eosinophils Absolute   Date Value Ref Range Status   11/22/2024 0.12 0.00 - 0.40 10*3/mm3 Final     Basophils Absolute   Date Value Ref Range Status   11/22/2024 0.05 0.00 - 0.20 10*3/mm3 Final     nRBC   Date Value Ref Range Status   11/22/2024 0.0 0.0 - 0.2 /100 WBC Final       No results found for: \"HGBA1C\"    No results found for: \"GAKEIHOK96\"    TSH   Date Value Ref Range Status   12/29/2020 4.810 (H) 0.270 - 4.200 uIU/mL Final       No results found for: \"CHOL\"  Lab Results   Component Value Date    TRIG 210 (H) 02/15/2024     Lab Results   Component Value Date    HDL 49 02/15/2024     Lab Results   Component Value Date     (H) 02/15/2024     Lab Results   Component Value Date    VLDL 38 02/15/2024     No results found for: \"LDLHDL\"      Procedures    Assessment & Plan   Problems Addressed this Visit       Essential hypertension - Primary    Relevant Medications    amLODIPine (NORVASC) " 10 MG tablet    Other Relevant Orders    Comprehensive Metabolic Panel    Mixed hyperlipidemia    Relevant Orders    Comprehensive Metabolic Panel    Lipid Panel With / Chol / HDL Ratio    PVD (peripheral vascular disease)    Relevant Medications    rivaroxaban (Xarelto) 10 MG tablet    High risk medication use    Relevant Orders    CBC & Differential     Diagnoses         Codes Comments    Essential hypertension    -  Primary ICD-10-CM: I10  ICD-9-CM: 401.9     Mixed hyperlipidemia     ICD-10-CM: E78.2  ICD-9-CM: 272.2     PVD (peripheral vascular disease)     ICD-10-CM: I73.9  ICD-9-CM: 443.9     High risk medication use     ICD-10-CM: Z79.899  ICD-9-CM: V58.69           HTN.  Controlled.  RF amlo.  Contneu valsartan.   Hyperlipidmeia.  Uncontrolled.  Contue zetia.  Intolerant of statins in the past.    Orders Placed This Encounter   Procedures    Comprehensive Metabolic Panel     Order Specific Question:   Release to patient     Answer:   Routine Release [[id number]]    Lipid Panel With / Chol / HDL Ratio     Order Specific Question:   Release to patient     Answer:   Routine Release [[id number]]    CBC & Differential     Order Specific Question:   Manual Differential     Answer:   No     Order Specific Question:   Release to patient     Answer:   Routine Release [[id number]]       Current Outpatient Medications   Medication Sig Dispense Refill    amLODIPine (NORVASC) 10 MG tablet Take 1 tablet by mouth Daily. 90 tablet 3    multivitamin with minerals tablet tablet Take 1 tablet by mouth Daily.      omeprazole (priLOSEC) 20 MG capsule TAKE 1 CAPSULE BY MOUTH DAILY 90 capsule 2    rivaroxaban (Xarelto) 10 MG tablet Take 1 tablet by mouth Daily With Dinner. 90 tablet 3    valsartan (Diovan) 80 MG tablet Take 1 tablet by mouth Daily. 90 tablet 3    ezetimibe (Zetia) 10 MG tablet Take 1 tablet by mouth Daily. (Patient not taking: Reported on 1/29/2025) 90 tablet 3     No current facility-administered medications for  this visit.       Erickson Gerard had no medications administered during this visit.    Return in about 6 months (around 7/29/2025).    There are no Patient Instructions on file for this visit.

## 2025-01-30 LAB
ALBUMIN SERPL-MCNC: 4.5 G/DL (ref 3.8–4.8)
ALP SERPL-CCNC: 95 IU/L (ref 44–121)
ALT SERPL-CCNC: 13 IU/L (ref 0–44)
AST SERPL-CCNC: 20 IU/L (ref 0–40)
BASOPHILS # BLD AUTO: 0.1 X10E3/UL (ref 0–0.2)
BASOPHILS NFR BLD AUTO: 1 %
BILIRUB SERPL-MCNC: 0.7 MG/DL (ref 0–1.2)
BUN SERPL-MCNC: 21 MG/DL (ref 8–27)
BUN/CREAT SERPL: 17 (ref 10–24)
CALCIUM SERPL-MCNC: 9.4 MG/DL (ref 8.6–10.2)
CHLORIDE SERPL-SCNC: 104 MMOL/L (ref 96–106)
CHOLEST SERPL-MCNC: 198 MG/DL (ref 100–199)
CHOLEST/HDLC SERPL: 4.1 RATIO (ref 0–5)
CO2 SERPL-SCNC: 21 MMOL/L (ref 20–29)
CREAT SERPL-MCNC: 1.25 MG/DL (ref 0.76–1.27)
EGFRCR SERPLBLD CKD-EPI 2021: 59 ML/MIN/1.73
EOSINOPHIL # BLD AUTO: 0.2 X10E3/UL (ref 0–0.4)
EOSINOPHIL NFR BLD AUTO: 2 %
ERYTHROCYTE [DISTWIDTH] IN BLOOD BY AUTOMATED COUNT: 12.4 % (ref 11.6–15.4)
GLOBULIN SER CALC-MCNC: 2.6 G/DL (ref 1.5–4.5)
GLUCOSE SERPL-MCNC: 84 MG/DL (ref 70–99)
HCT VFR BLD AUTO: 44 % (ref 37.5–51)
HDLC SERPL-MCNC: 48 MG/DL
HGB BLD-MCNC: 14.8 G/DL (ref 13–17.7)
IMM GRANULOCYTES # BLD AUTO: 0 X10E3/UL (ref 0–0.1)
IMM GRANULOCYTES NFR BLD AUTO: 1 %
LDLC SERPL CALC-MCNC: 125 MG/DL (ref 0–99)
LYMPHOCYTES # BLD AUTO: 1.6 X10E3/UL (ref 0.7–3.1)
LYMPHOCYTES NFR BLD AUTO: 20 %
MCH RBC QN AUTO: 30.1 PG (ref 26.6–33)
MCHC RBC AUTO-ENTMCNC: 33.6 G/DL (ref 31.5–35.7)
MCV RBC AUTO: 90 FL (ref 79–97)
MONOCYTES # BLD AUTO: 0.8 X10E3/UL (ref 0.1–0.9)
MONOCYTES NFR BLD AUTO: 11 %
NEUTROPHILS # BLD AUTO: 5.2 X10E3/UL (ref 1.4–7)
NEUTROPHILS NFR BLD AUTO: 65 %
PLATELET # BLD AUTO: 251 X10E3/UL (ref 150–450)
POTASSIUM SERPL-SCNC: 4.1 MMOL/L (ref 3.5–5.2)
PROT SERPL-MCNC: 7.1 G/DL (ref 6–8.5)
RBC # BLD AUTO: 4.91 X10E6/UL (ref 4.14–5.8)
SODIUM SERPL-SCNC: 140 MMOL/L (ref 134–144)
TRIGL SERPL-MCNC: 141 MG/DL (ref 0–149)
VLDLC SERPL CALC-MCNC: 25 MG/DL (ref 5–40)
WBC # BLD AUTO: 7.8 X10E3/UL (ref 3.4–10.8)

## 2025-04-24 ENCOUNTER — HOSPITAL ENCOUNTER (OUTPATIENT)
Facility: HOSPITAL | Age: 80
Discharge: HOME | End: 2025-04-24
Payer: MEDICARE

## 2025-04-24 ENCOUNTER — OFFICE VISIT (OUTPATIENT)
Age: 80
End: 2025-04-24
Payer: MEDICARE

## 2025-04-24 VITALS
DIASTOLIC BLOOD PRESSURE: 84 MMHG | BODY MASS INDEX: 25.28 KG/M2 | SYSTOLIC BLOOD PRESSURE: 144 MMHG | HEIGHT: 66 IN | WEIGHT: 157.3 LBS

## 2025-04-24 DIAGNOSIS — I71.42 JUXTARENAL ABDOMINAL AORTIC ANEURYSM, WITHOUT RUPTURE: ICD-10-CM

## 2025-04-24 DIAGNOSIS — I72.8: ICD-10-CM

## 2025-04-24 DIAGNOSIS — I71.40 ABDOMINAL AORTIC ANEURYSM (AAA) 3.0 CM TO 5.5 CM IN DIAMETER IN MALE: ICD-10-CM

## 2025-04-24 DIAGNOSIS — I72.4 ANEURYSM OF RIGHT POPLITEAL ARTERY: ICD-10-CM

## 2025-04-24 LAB
ABDOMINAL DIST AORTA AP: 4.8 CM
ABDOMINAL DIST AORTA TRANS: 4.7 CM
ABDOMINAL DIST AORTA VEL: 32.4 CM/S
ABDOMINAL LT COM ILIAC AP: 1.7 CM
ABDOMINAL LT COM ILIAC TRANS: 1.7 CM
ABDOMINAL LT COM ILIAC VEL: 66.4 CM/S
ABDOMINAL LT EXT ILIAC VEL: 79.1 CM/S
ABDOMINAL MID AORTA AP: 2.6 CM
ABDOMINAL MID AORTA TRANS: 2.4 CM
ABDOMINAL MID AORTA VEL: 53.5 CM/S
ABDOMINAL PROX AORTA AP: 2.8 CM
ABDOMINAL PROX AORTA TRANS: 2.8 CM
ABDOMINAL PROX AORTA VEL: 63.3 CM/S
ABDOMINAL RT COM ILIAC AP: 2.2 CM
ABDOMINAL RT COM ILIAC TRANS: 2.2 CM
ABDOMINAL RT COM ILIAC VEL: 60.2 CM/S
ABDOMINAL RT EXT ILIAC VEL: 67.5 CM/S
BH CV LEA RIGHT CFA DISTAL PSV: 32.5 CM/S
BH CV LEA RIGHT CFA PROX PSV: 34.4 CM/S
BH CV LEA RIGHT DFA PROX PSV: 38.8 CM/S
BH CV LEA RIGHT PERONEAL  PROX PSV: -43.8 CM/S
BH CV LEA RIGHT POPITEAL A  DISTAL PSV: -26.6 CM/S
BH CV LEA RIGHT POPITEAL A  MID PSV: 45.9 CM/S
BH CV LEA RIGHT POPITEAL A  PROX PSV: 57.6 CM/S
BH CV LEA RIGHT PTA PROX PSV: 49 CM/S
BH CV LEA RIGHT SFA DISTAL PSV: -31.7 CM/S
BH CV LEA RIGHT SFA MID PSV: -54.1 CM/S
BH CV LEA RIGHT SFA PROX PSV: 40.8 CM/S
BH CV LEA RIGHT TIBEOPERONEAL PSV: 43.1 CM/S
BH CV VAS ABD AO LT EXTERNAL ILIAC AP: 1.4 CM
BH CV VAS ABD AO RT EXTERNAL ILIAC AP: 1.4 CM
BH CV VAS LEA RIGHT FREE TEXT STENT ONE: NORMAL
BH CV VAS LEA RIGHT POPLITEAL DISTAL ANEURYSM LONG: 1.5 CM
BH CV VAS LEA RIGHT POPLITEAL DISTAL ANEURYSM TRANS: 1.6 CM
BH CV VAS LEA RIGHT POPLITEAL MID ANEURYSM LONG: 2.9 CM
BH CV VAS LEA RIGHT POPLITEAL MID ANEURYSM TRANS: 3.1 CM
BH CV VAS LEA RIGHT POPLITEAL PROX ANEURYSM LONG: 2.2 CM
BH CV VAS LEA RIGHT POPLITEAL PROX ANEURYSM TRANS: 2.3 CM
BH CV VAS LEA RIGHT PROX CFA ANEURYSM LONG: 1.5 CM
BH CV VAS LEA RIGHT PROX CFA ANEURYSM TRANS: 1.5 CM
BH CV VAS LEA RIGHT SFA DISTAL ANEURYSM LONG: 1.1 CM
BH CV VAS LEA RIGHT SFA DISTAL ANEURYSM TRANS: 1.1 CM
BH CV VAS LEA RIGHT SFA MID ANEURYSM LONG: 1 CM
BH CV VAS LEA RIGHT SFA MID ANEURYSM TRANS: 1 CM
BH CV VAS LEA RIGHT SFA PROX ANEURYSM LONG: 1.1 CM
BH CV VAS LEA RIGHT SFA PROX ANEURYSM TRANS: 1.2 CM
BH CV VAS LEA RIGHT STENT ONE DIST STENT PSV: 26.6 CM/S
BH CV VAS LEA RIGHT STENT ONE MID STENT PSV: 57.6 CM/S
BH CV VAS LEA RIGHT STENT ONE PRE STENT PSV: 57.5 CM/S
BH CV VAS LEA RIGHT STENT ONE PROX STENT PSV: 31.7 CM/S
Lab: 0.5 CM
Lab: 0.8 CM
Lab: 1 CM

## 2025-04-24 PROCEDURE — 93978 VASCULAR STUDY: CPT

## 2025-04-24 PROCEDURE — 93926 LOWER EXTREMITY STUDY: CPT

## 2025-04-24 NOTE — PROGRESS NOTES
Chief Complaint  Aortic Aneurysm    Subjective        Erickson Gerard presents to [facility] VASCULAR SURGERY  HPI   Erickson Gerard is a 79 y.o. male that has been followed in our office by Dr. 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  He returns today in follow up along with an aortic and popliteal artery duplex. He reports he  has been doing well without hospitalizations or surgeries. He denies any abdominal pain, back pain, pain that radiates to his  groin. He denies any worsening claudication symptoms, rest pain, or tissue loss.     Review of Systems   Constitutional:  Negative for fever.   Eyes:  Negative for visual disturbance.   Cardiovascular:  Negative for leg swelling.   Gastrointestinal:  Negative for abdominal pain.   Musculoskeletal:  Negative for back pain.   Skin:  Negative for color change, pallor and wound.   Neurological:  Negative for dizziness, facial asymmetry, speech difficulty and weakness.        Erickson Gerard  reports that he has never smoked. He has never been exposed to tobacco smoke. He has never used smokeless tobacco..        Objective   Vital Signs:  Vitals:    04/24/25 0858   BP: 144/84        Body mass index is 25.4 kg/m².           Physical Exam  Vitals reviewed.   Constitutional:       Appearance: Normal appearance.   HENT:      Head: Normocephalic.   Cardiovascular:      Rate and Rhythm: Normal rate and regular rhythm.      Pulses:           Dorsalis pedis pulses are 3+ on the right side and 3+ on the left side.        Posterior tibial pulses are 3+ on the right side and 3+ on the left side.   Pulmonary:      Effort: Pulmonary effort is normal.   Skin:     General: Skin is warm.   Neurological:      General: No focal deficit present.      Mental Status: He is alert and oriented to person, place, and time.   Psychiatric:         Mood and  Affect: Mood normal.        Result Review :    Previous duplex: Duplex Lower Extremity Art / Grafts - Bilateral CAR (10/24/2024 08:44)   Duplex Aorta IVC Iliac Graft Complete CAR (10/24/2024 08:41)       Duplex Carotid Ultrasound CAR (01/13/2025 12:02)   Duplex Lower Extremity Art / Grafts - Right CAR (04/24/2025 08:54)   Duplex Aorta IVC Iliac Graft Complete CAR (04/24/2025 08:26)     Comprehensive Metabolic Panel (01/29/2025 16:23)          Assessment and Plan     Diagnoses and all orders for this visit:    1. Abdominal aortic aneurysm (AAA) 3.0 cm to 5.5 cm in diameter in male    2. Aneurysm of right popliteal artery  -     CT Angio Abdominal Aorta Bilateral Iliofem Runoff; Future            Patient presents today for follow up of his aneurysmal disease.  His right popliteal stent graft continues to have a type II endoleak, though today the largest size of his popliteal artery is 3.09 cm.  There has been some growth since the previous imaging, where this measured 2.8 cm.  He has a small left-sided popliteal artery aneurysm at 1.8 cm, which was imaged 6 months ago.  His abdominal aortic aneurysm is measuring 4.8 cm today.  He  is to continue his Xarelto.  He is to continue his statin for cholesterol control.  We discussed adequate blood pressure management.  Due to the increase of size to his right popliteal artery with a known endoleak, I have recommended that he proceed with a CTA of the abdomen and pelvis with bilateral lower extremity runoff.  He will have this done at his earliest convenience and return to the office to discuss results with Dr. Archer.       Follow Up     Return in about 1 month (around 5/24/2025) for CTA review with MTJ.  Patient was given instructions and counseling regarding his condition or for health maintenance advice. Please see specific information pulled into the AVS if appropriate.     TATI Duran

## 2025-05-21 ENCOUNTER — HOSPITAL ENCOUNTER (OUTPATIENT)
Facility: HOSPITAL | Age: 80
Discharge: HOME | End: 2025-05-21
Admitting: NURSE PRACTITIONER
Payer: MEDICARE

## 2025-05-21 DIAGNOSIS — I72.4 ANEURYSM OF RIGHT POPLITEAL ARTERY: ICD-10-CM

## 2025-05-21 PROCEDURE — 25510000001 IOPAMIDOL PER 1 ML: Performed by: NURSE PRACTITIONER

## 2025-05-21 PROCEDURE — 75635 CT ANGIO ABDOMINAL ARTERIES: CPT

## 2025-05-21 RX ORDER — IOPAMIDOL 755 MG/ML
100 INJECTION, SOLUTION INTRAVASCULAR
Status: COMPLETED | OUTPATIENT
Start: 2025-05-21 | End: 2025-05-21

## 2025-05-21 RX ADMIN — IOPAMIDOL 95 ML: 755 INJECTION, SOLUTION INTRAVENOUS at 13:28

## 2025-05-27 DIAGNOSIS — I73.9 PVD (PERIPHERAL VASCULAR DISEASE): ICD-10-CM

## 2025-05-27 DIAGNOSIS — K21.9 GASTROESOPHAGEAL REFLUX DISEASE WITHOUT ESOPHAGITIS: ICD-10-CM

## 2025-05-28 RX ORDER — AMLODIPINE BESYLATE 10 MG/1
10 TABLET ORAL DAILY
Qty: 90 TABLET | Refills: 3 | Status: SHIPPED | OUTPATIENT
Start: 2025-05-28

## 2025-05-28 RX ORDER — RIVAROXABAN 10 MG/1
10 TABLET, FILM COATED ORAL
Qty: 90 TABLET | Refills: 3 | Status: SHIPPED | OUTPATIENT
Start: 2025-05-28

## 2025-05-28 RX ORDER — OMEPRAZOLE 20 MG/1
20 CAPSULE, DELAYED RELEASE ORAL DAILY
Qty: 90 CAPSULE | Refills: 2 | Status: SHIPPED | OUTPATIENT
Start: 2025-05-28

## 2025-05-28 NOTE — TELEPHONE ENCOUNTER
LOV                  1/29/2025                    NOV                  8/19/2025  LAST REFILL     2/16/2024, 1/29/2025,   PROTOCOL       Met

## 2025-05-29 ENCOUNTER — OFFICE VISIT (OUTPATIENT)
Age: 80
End: 2025-05-29
Payer: MEDICARE

## 2025-05-29 ENCOUNTER — TELEPHONE (OUTPATIENT)
Dept: CARDIOLOGY | Age: 80
End: 2025-05-29

## 2025-05-29 VITALS
DIASTOLIC BLOOD PRESSURE: 78 MMHG | HEIGHT: 66 IN | RESPIRATION RATE: 17 BRPM | WEIGHT: 157 LBS | BODY MASS INDEX: 25.23 KG/M2 | HEART RATE: 66 BPM | SYSTOLIC BLOOD PRESSURE: 128 MMHG

## 2025-05-29 DIAGNOSIS — I72.4 ANEURYSM OF RIGHT POPLITEAL ARTERY: ICD-10-CM

## 2025-05-29 DIAGNOSIS — Z78.9 STATIN INTOLERANCE: Primary | ICD-10-CM

## 2025-05-29 DIAGNOSIS — I73.9 PVD (PERIPHERAL VASCULAR DISEASE): ICD-10-CM

## 2025-05-29 DIAGNOSIS — I72.8: ICD-10-CM

## 2025-05-29 DIAGNOSIS — I71.40 ABDOMINAL AORTIC ANEURYSM (AAA) 3.0 CM TO 5.5 CM IN DIAMETER IN MALE: ICD-10-CM

## 2025-05-29 DIAGNOSIS — I82.511 CHRONIC DEEP VEIN THROMBOSIS (DVT) OF FEMORAL VEIN OF RIGHT LOWER EXTREMITY: ICD-10-CM

## 2025-05-29 RX ORDER — SODIUM CHLORIDE 0.9 % (FLUSH) 0.9 %
10 SYRINGE (ML) INJECTION EVERY 12 HOURS SCHEDULED
OUTPATIENT
Start: 2025-05-29

## 2025-05-29 RX ORDER — SODIUM CHLORIDE 0.9 % (FLUSH) 0.9 %
10 SYRINGE (ML) INJECTION AS NEEDED
OUTPATIENT
Start: 2025-05-29

## 2025-05-29 RX ORDER — SODIUM CHLORIDE 9 MG/ML
40 INJECTION, SOLUTION INTRAVENOUS AS NEEDED
OUTPATIENT
Start: 2025-05-29

## 2025-05-29 NOTE — TELEPHONE ENCOUNTER
Caller: DR. MARTELL'S OFFICE    Relationship to patient:     Best call back number: [phone]    Patient is needing: DR. MARTELL'S OFFICE IS NEEDING TO SCHEDULE PT FOR PRE-OPERATIVE CLEARANCE. NEEDING TO SCHEDULE SURGERY IN 5-6 WEEKS. PT HAS NOT BEEN SEEN IN OFFICE 10.26.2021. PLEASE CALL DR. MARTELL'S OFFICE TO SCHEDULE

## 2025-05-29 NOTE — PROGRESS NOTES
Patient Name: Erickson Gerard    MRN: [id number] Encounter Date: 05/29/2025      Consulting Service: Vascular Surgery    Referring Provider: No ref. provider found       CHIEF COMPLAINT:  Chief Complaint   Patient presents with    Popliteal Aneurysm       Subjective    HPI: Erickson Gerard is a 79 y.o. male being seen for evaluation/management of known aortic and peripheral aneurysmal disease.  The patient has known abdominal aortic, right common iliac, bilateral internal iliac, and bilateral popliteal aneurysm that is being followed.  Prior treatment of the popliteal artery on the right has been performed with endovascular stent graft repair.  They remain aware of the genetic component of aneurysm disease and the need to avoid smoking, uncontrolled hypertension and possibly the fluoroquinolone family of antibiotics as independent risk factors for aneurysm growth and rupture.  Patient follows up today for review of their current imaging studies including CT angiogram.  Today it appears the aneurysm disease has shown significant growth relative to last studies.  Based on these findings we have recommended intervention.    PAST MEDICAL HISTORY:   Past Medical History:   Diagnosis Date    AAA (abdominal aortic aneurysm) without rupture 10/14/2021    Abdominal aortic aneurysm, without rupture, unspecified 03/14/2023    Acute embolism and thrombosis of unspecified deep veins of right distal lower extremity     Anesthesia complication     AFTER VEIN SURGERY STATES HE WAS HARD TO WAKE UP    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     Aneurysm of artery of lower extremity 09/09/2021    Arthritis     Essential (primary) hypertension     GERD (gastroesophageal reflux disease)     Hyperlipidemia     Hypertension     Iliac artery aneurysm 10/14/2021    Localized edema     Low back pain     Mixed hyperlipidemia     Other specified soft tissue disorders     PAD (peripheral artery disease)     PONV (postoperative nausea and  vomiting)     Popliteal aneurysm     Thrombosis 09/09/2021    Calf Vein Thrombosis Rt tibial vein    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      PAST SURGICAL HISTORY:   Past Surgical History:   Procedure Laterality Date    ANGIOPLASTY POPLITEAL ARTERY Right 11/09/2021    Procedure: RIGHT POPITEAL ANEURYSM REPAIR VIABOHN;  Surgeon: Campos Archer MD;  Location: [facility] OR 18/19;  Service: Vascular;  Laterality: Right;    ANGIOPLASTY POPLITEAL ARTERY Right 01/25/2022    Procedure: AORTO ILIAC FEMORAL RIGHT POPLITEAL VIABOHN STENT PLACEMENT X 2;  Surgeon: Campos Archer MD;  Location: [facility] OR 18/19;  Service: Vascular;  Laterality: Right;    COLONOSCOPY      HERNIA REPAIR Bilateral     POLITEAL ARTERY ANEURYSM REPAIR Right 11/09/2021    Viabahn Stent Graft    VEIN LIGATION AND STRIPPING N/A       FAMILY HISTORY:   Family History   Problem Relation Age of Onset    No Known Problems Mother     Heart attack Father     Heart disease Father     Stroke Sister     Stroke Brother     Stroke Brother     Malig Hyperthermia Neg Hx       SOCIAL HISTORY:   Social History     Tobacco Use    Smoking status: Never     Passive exposure: Never    Smokeless tobacco: Never    Tobacco comments:     Patient does not smoke   Vaping Use    Vaping status: Never Used   Substance Use Topics    Alcohol use: Never     Comment: caffeine use- coffee, soda; Patient is non drinker.    Drug use: Never     Comment: Drug Abuse: none      MEDICATIONS:   Current Outpatient Medications on File Prior to Visit   Medication Sig Dispense Refill    amLODIPine (NORVASC) 10 MG tablet TAKE 1 TABLET BY MOUTH DAILY 90 tablet 3    ezetimibe (Zetia) 10 MG tablet Take 1 tablet by mouth Daily. 90 tablet 3    multivitamin with minerals tablet tablet Take 1 tablet by mouth Daily.      omeprazole (priLOSEC) 20 MG capsule TAKE 1 CAPSULE BY MOUTH DAILY 90 capsule 2    valsartan (Diovan) 80 MG tablet Take 1 tablet by mouth Daily. 90 tablet 3    Xarelto  "10 MG tablet TAKE 1 TABLET BY MOUTH DAILY WITH DINNER (Patient not taking: Reported on 5/29/2025) 90 tablet 3     No current facility-administered medications on file prior to visit.       ALLERGIES: Bee venom       Objective   Vitals:    05/29/25 1126   BP: 128/78   BP Location: Left arm   Patient Position: Sitting   Cuff Size: Adult   Pulse: 66   Resp: 17   Weight: 71.2 kg (157 lb)   Height: 167 cm (65.75\")     Body mass index is 25.54 kg/m².  BMI is >= 25 and <30. (Overweight) The following options were offered after discussion;: weight loss educational material (shared in after visit summary), exercise counseling/recommendations, and Information on healthy weight added to patient's after visit summary.      PHYSICAL EXAM:   Physical Exam     Result Review   LABS:    CBC          11/22/2024    11:44 1/29/2025    16:23   CBC   WBC 7.93  7.8    RBC 5.12  4.91    Hemoglobin 15.3  14.8    Hematocrit 47.1  44.0    MCV 92.0  90    MCH 29.9  30.1    MCHC 32.5  33.6    RDW 12.5  12.4    Platelets 265  251      BMP          11/22/2024    11:44 1/29/2025    16:23   BMP   BUN 19  21    Creatinine 1.23  1.25    Sodium 141  140    Potassium 4.1  4.1    Chloride 103  104    CO2 24.8  21    Calcium 9.0  9.4      Lipid Panel          1/29/2025    16:23   Lipid Panel   Total Cholesterol 198    Triglycerides 141    HDL Cholesterol 48    VLDL Cholesterol 25    LDL Cholesterol  125               Results Review:       I reviewed the patient's new clinical results.    The following radiologic or non-invasive studies have been reviewed by me: CT angiogram reviewed with images reviewed noting multiple aneurysms at both the aortic level internal iliac level and popliteal levels all of which are showing growth to some degree.  There is a endoleak at the popliteal on the right is difficult to visualize.  Duplex Lower Extremity Art / Grafts Right CAR - TONY/CHRISS 04/24/2025    Interpretation Summary    Arteriomegaly noted in right lower " extremity vessels with the common femoral artery 1.5 cm in diameter.  Patent superficial femoral artery without stenosis.  Covered stent present in popliteal artery aneurysm but with endoleak noted.  Aneurysm sac size remains 2.87 cm.  Runoff vessels with triphasic waveforms.  Left popliteal artery aneurysm measures 1.8 cm.     CT Angio Abdominal Aorta Bilateral Iliofem Runoff  Result Date: 5/28/2025  1. Infrarenal abdominal aortic aneurysm has increased in size now measuring about 5.3 cm in greatest AP dimension 2. Bilateral internal iliac artery aneurysms have increased in size, measurements above 3. Stent graft within the distal right superficial femoral artery extending into the right popliteal artery again demonstrated. The right popliteal artery aneurysm sac has increased in size and there is a sizable endoleak present within the aneurysm sac. 4. Mild fusiform aneurysmal dilation of the left popliteal artery has increased    Radiation dose reduction techniques were utilized, including automated exposure control and exposure modulation based on body size.   This report was finalized on 5/28/2025 1:40 PM by Dr. Jerzy Patrick M.D on Workstation: [id number]                    ASSESSMENT/PLAN:   Diagnoses and all orders for this visit:    1. Statin intolerance (Primary)    2. Aneurysm of right popliteal artery  -     Case Request; Standing  -     sodium chloride 0.9 % flush 10 mL  -     sodium chloride 0.9 % flush 10 mL  -     sodium chloride 0.9 % infusion 40 mL  -     ceFAZolin (ANCEF) 2,000 mg in sodium chloride 0.9 % 100 mL IVPB  -     Case Request  -     Ambulatory Referral to Cardiology for Preop Clearance    3. Abdominal aortic aneurysm (AAA) 3.0 cm to 5.5 cm in diameter in male  -     Case Request; Standing  -     sodium chloride 0.9 % flush 10 mL  -     sodium chloride 0.9 % flush 10 mL  -     sodium chloride 0.9 % infusion 40 mL  -     ceFAZolin (ANCEF) 2,000 mg in sodium chloride 0.9 % 100 mL IVPB  -      Case Request  -     Ambulatory Referral to Cardiology for Preop Clearance    4. PVD (peripheral vascular disease)    5. Peripheral artery aneurysm    6. Chronic deep vein thrombosis (DVT) of femoral vein of right lower extremity    Other orders  -     Follow Anesthesia Guidelines / Protocol; Future  -     Follow Anesthesia Guidelines / Protocol; Standing  -     Verify NPO Status; Standing  -     Clip Operative Site; Standing  -     Patient to Void Prior to Transfer to OR; Standing  -     No Hand or Wrist IV Placement, Place IV in AC; Standing  -     Verify / Perform Chlorhexidine Skin Prep; Standing  -     Provide Patient With Instructions on NPO Status; Future  -     Provide Chlorhexidine Skin Prep Wipes and Instructions; Future  -     Type & Screen; Standing  -     Insert Peripheral IV x2; Standing  -     Saline Lock & Maintain IV Access; Standing  -     Place Sequential Compression Device; Standing  -     Maintain Sequential Compression Device; Standing       79 y.o. male with expansion of his abdominal aortic aneurysm and right internal iliac aneurysm both which I believe can be fixed with stent graft based on CT angiogram findings but we will go over these results with a CAT scan with [employer] and see if an RICHARD could be used.  I think we will also try to fix the endoleak in the right popliteal space at the time of intervention.  Will be more difficult later on to do so once we got the stent graft in place.  His left popliteal has not grown significantly enough to pursue.  The left internal iliac aneurysm is stable at size to be watched.    I discussed the plan with the patient and family who are agreeable to the plan of care at this point. Thank you for this consult.   Follow Up  Return in about 1 month (around 6/29/2025).    Campos Archer MD   05/29/25

## 2025-06-09 ENCOUNTER — OFFICE VISIT (OUTPATIENT)
Dept: CARDIOLOGY | Age: 80
End: 2025-06-09
Payer: MEDICARE

## 2025-06-09 VITALS
SYSTOLIC BLOOD PRESSURE: 145 MMHG | OXYGEN SATURATION: 95 % | HEART RATE: 66 BPM | WEIGHT: 159 LBS | BODY MASS INDEX: 25.86 KG/M2 | DIASTOLIC BLOOD PRESSURE: 91 MMHG

## 2025-06-09 DIAGNOSIS — I71.40 ABDOMINAL AORTIC ANEURYSM (AAA) 3.0 CM TO 5.5 CM IN DIAMETER IN MALE: Primary | ICD-10-CM

## 2025-06-09 DIAGNOSIS — R06.09 DYSPNEA ON EXERTION: ICD-10-CM

## 2025-06-09 DIAGNOSIS — I73.9 PVD (PERIPHERAL VASCULAR DISEASE): ICD-10-CM

## 2025-06-09 DIAGNOSIS — E78.2 MIXED HYPERLIPIDEMIA: ICD-10-CM

## 2025-06-09 DIAGNOSIS — K21.9 GASTROESOPHAGEAL REFLUX DISEASE WITHOUT ESOPHAGITIS: ICD-10-CM

## 2025-06-09 DIAGNOSIS — Z01.810 PREOPERATIVE CARDIOVASCULAR EXAMINATION: ICD-10-CM

## 2025-06-09 DIAGNOSIS — I82.5Y9 CHRONIC DEEP VEIN THROMBOSIS (DVT) OF PROXIMAL VEIN OF LOWER EXTREMITY, UNSPECIFIED LATERALITY: ICD-10-CM

## 2025-06-09 PROCEDURE — 3080F DIAST BP >= 90 MM HG: CPT | Performed by: INTERNAL MEDICINE

## 2025-06-09 PROCEDURE — 1159F MED LIST DOCD IN RCRD: CPT | Performed by: INTERNAL MEDICINE

## 2025-06-09 PROCEDURE — 1160F RVW MEDS BY RX/DR IN RCRD: CPT | Performed by: INTERNAL MEDICINE

## 2025-06-09 PROCEDURE — 99204 OFFICE O/P NEW MOD 45 MIN: CPT | Performed by: INTERNAL MEDICINE

## 2025-06-09 PROCEDURE — 93000 ELECTROCARDIOGRAM COMPLETE: CPT | Performed by: INTERNAL MEDICINE

## 2025-06-09 PROCEDURE — 3077F SYST BP >= 140 MM HG: CPT | Performed by: INTERNAL MEDICINE

## 2025-06-09 RX ORDER — ROSUVASTATIN CALCIUM 5 MG/1
5 TABLET, COATED ORAL 3 TIMES WEEKLY
Qty: 36 TABLET | Refills: 3 | Status: SHIPPED | OUTPATIENT
Start: 2025-06-09

## 2025-06-09 RX ORDER — VALSARTAN 160 MG/1
160 TABLET ORAL DAILY
Qty: 90 TABLET | Refills: 3 | Status: SHIPPED | OUTPATIENT
Start: 2025-06-09

## 2025-06-09 NOTE — H&P (VIEW-ONLY)
CARDIOLOGY    Trace Cabrales MD    ENCOUNTER DATE:  06/09/25      Erickson Gerard / 79 y.o. / male        CHIEF COMPLAINT / REASON FOR OFFICE VISIT     No chief complaint on file.      HISTORY OF PRESENT ILLNESS       HPI    Erickson Gerard is a 79 y.o. male with PMH of PVD, DVT, hypertension, hyperlipidemia who presents to establish care as a new patient to    Per vascular note, patient has known abdominal aortic, right common iliac, bilateral internal iliac, and bilateral popliteal aneurysm with prior treatment of the popliteal artery on the right has been performed with endovascular stent graft repair.  He is being planned for additional endovascular aneurysm repair by Dr. Archer of vascular surgery.    Today, patient has no acute complaints. Retired  and tobacco worker. Does not exercise regularly. Does walk regularly and denies exertional chest pain or claudication, though he does get short of breath with climbing stairs which is not new but more notable recently. Denies palpitation, leg edema, orthopnea, PND Questionable dizziness and feeling off-balance, denies syncope or unexpected falls. No bleeding on Xarelto 10 daily. Does not recall having had a heart attack or stroke. Never smoker, denies alcohol or substance abuse.    REVIEW OF SYSTEMS     Review of Systems   Constitutional: Negative for weight loss.   Cardiovascular:  Positive for dyspnea on exertion. Negative for chest pain, claudication, leg swelling, orthopnea, palpitations, paroxysmal nocturnal dyspnea and syncope.   Respiratory:  Positive for shortness of breath.    Hematologic/Lymphatic: Negative for bleeding problem.   Musculoskeletal:  Negative for falls.   Gastrointestinal:  Negative for hematochezia and melena.   Genitourinary:  Negative for hematuria.   Neurological:  Positive for dizziness and loss of balance. Negative for light-headedness.   Psychiatric/Behavioral:  Negative for altered mental status.            MEDICATIONS       Outpatient Encounter Medications as of 6/9/2025   Medication Sig Dispense Refill    amLODIPine (NORVASC) 10 MG tablet TAKE 1 TABLET BY MOUTH DAILY 90 tablet 3    ezetimibe (Zetia) 10 MG tablet Take 1 tablet by mouth Daily. 90 tablet 3    multivitamin with minerals tablet tablet Take 1 tablet by mouth Daily.      omeprazole (priLOSEC) 20 MG capsule TAKE 1 CAPSULE BY MOUTH DAILY 90 capsule 2    valsartan (Diovan) 80 MG tablet Take 1 tablet by mouth Daily. 90 tablet 3    Xarelto 10 MG tablet TAKE 1 TABLET BY MOUTH DAILY WITH DINNER 90 tablet 3     No facility-administered encounter medications on file as of 6/9/2025.         VITAL SIGNS     Visit Vitals  /91   Pulse 66   Wt 72.1 kg (159 lb)   SpO2 95%   BMI 25.86 kg/m²         Wt Readings from Last 3 Encounters:   06/09/25 72.1 kg (159 lb)   05/29/25 71.2 kg (157 lb)   04/24/25 71.4 kg (157 lb 4.8 oz)     Body mass index is 25.86 kg/m².      PHYSICAL EXAMINATION     Vitals and nursing note reviewed.   Constitutional:       Appearance: Not in distress.   Neck:      Vascular: No JVR.   Pulmonary:      Effort: Pulmonary effort is normal.      Breath sounds: Normal breath sounds. No wheezing. No rhonchi. No rales.   Cardiovascular:      Normal rate. Regular rhythm.      Murmurs: There is no murmur.   Edema:     Peripheral edema absent.   Abdominal:      General: There is no distension.      Palpations: Abdomen is soft.      Tenderness: There is no abdominal tenderness.   Musculoskeletal:      Cervical back: Neck supple. Skin:     General: Skin is cool.   Neurological:      Mental Status: Alert and oriented to person, place and time.         REVIEWED DATA       ECG 12 Lead    Date/Time: 6/9/2025 11:13 AM  Performed by: Trace Cabrales MD    Authorized by: Trace Cabrales MD  Comparison: compared with previous ECG from 10/26/2021  Similar to previous ECG  Rhythm: sinus rhythm  Other findings: poor R wave progression    Clinical impression: non-specific ECG        Lab  "Results   Component Value Date    WBC 7.8 01/29/2025    HGB 14.8 01/29/2025    HCT 44.0 01/29/2025    MCV 90 01/29/2025     01/29/2025       No results found for: \"HGBA1C\"    Lab Results   Component Value Date    GLUCOSE 84 01/29/2025    BUN 21 01/29/2025    CREATININE 1.25 01/29/2025    EGFR 59 (L) 01/29/2025    BCR 17 01/29/2025     01/29/2025    K 4.1 01/29/2025    CO2 21 01/29/2025    CALCIUM 9.4 01/29/2025    ALBUMIN 4.5 01/29/2025    BILITOT 0.7 01/29/2025    AST 20 01/29/2025    ALT 13 01/29/2025       Lab Results   Component Value Date    CHLPL 198 01/29/2025    TRIG 141 01/29/2025    HDL 48 01/29/2025     (H) 01/29/2025       No results found for this or any previous visit.        ASSESSMENT & PLAN     Diagnoses and all orders for this visit:    1. Abdominal aortic aneurysm (AAA) 3.0 cm to 5.5 cm in diameter in male (Primary)  Overview:  Vascular Aneurysm Evaluation:Artery Aneurysm: Patient with an abdominal aorta aneurysm with a diameter 4.57 cm. Peripheral Aneurysm: Aneurysm The aneurysm was previously repaired. at right popliteal cm.        2. Mixed hyperlipidemia  Overview:  Had SE with statins.        3. PVD (peripheral vascular disease)    4. Gastroesophageal reflux disease without esophagitis    5. Chronic deep vein thrombosis (DVT) of proximal vein of lower extremity, unspecified laterality  Overview:  R tibial vein.          Pre-operative evaluation: Being planned for additional endovascular aneurysm repair by Dr. Archer of vascular surgery.  Patient not too physically active but denies obvious angina with activities, although he is getting a bit more short of breath.  Last NM stress test in 11/2021 was low risk.  ECG in office today showed NSR with abnormal R wave progression but no acute ischemic changes.  RCRI score 1, will arrange for outpatient echo and NM stress study per surgeon request, need to accommodate this week given upcoming travel plans.  BP and lipid control, see " below.  AAA/PAD: Per vascular note, patient has known abdominal aortic, right common iliac, bilateral internal iliac, and bilateral popliteal aneurysm with prior treatment of the popliteal artery on the right has been performed with endovascular stent graft repair.   Last ultrasound earlier this year 4/2025 reported infrarenal AAA at 4.8 cm.  Management per vascular surgery.  History of DVT: On Xarelto 10 daily for 2-3 years after developing DVT following last stenting for AAA and peripheral aneurysm as above.   Hypertension: In office /91, elevated, HR 66.  On amlodipine 10 daily, valsartan 80 daily, will uptitrate valsartan to 160 daily for better BP control.  Hyperlipidemia/statin intolerance: Lipid panel earlier this year 1/2025 showed HDL 48, , not controlled. Pt reported significant fatigue and intolerance with atorvastatin, unsure if he has been on other statins. On Zetia 10 daily, we had a long discussion on trying an alternative statin and patient reluctantly agrees to start Crestor 5, which we will initiate at 3 times per week and uptitrate if able.  GERD: Management per primary team and other specialists.    I spent 46 minutes caring for Erickson on this date of service. This time includes time spent by me in the following activities: preparing for the visit, reviewing tests, performing a medically appropriate examination and/or evaluation, counseling and educating the patient/family/caregiver, and referring and communicating with other health care professionals.     Additionally, I am providing longitudinal care which includes continuously being a focal point for all of the patient's health care services and ongoing medical care related to preoperative evaluation and hyperlipidemia as documented above.    Trace Cabrales MD. PhD. [facility]  06/09/25  10:47 EDT    Part of this note may be an electronic transcription/translation of spoken language to printed text using the Dragon dictation system.

## 2025-06-09 NOTE — PROGRESS NOTES
CARDIOLOGY    Trace Cabrales MD    ENCOUNTER DATE:  06/09/25      Erickson Gerard / 79 y.o. / male        CHIEF COMPLAINT / REASON FOR OFFICE VISIT     No chief complaint on file.      HISTORY OF PRESENT ILLNESS       HPI    Erickson Gerard is a 79 y.o. male with PMH of PVD, DVT, hypertension, hyperlipidemia who presents to establish care as a new patient to    Per vascular note, patient has known abdominal aortic, right common iliac, bilateral internal iliac, and bilateral popliteal aneurysm with prior treatment of the popliteal artery on the right has been performed with endovascular stent graft repair.  He is being planned for additional endovascular aneurysm repair by Dr. Archer of vascular surgery.    Today, patient has no acute complaints. Retired  and tobacco worker. Does not exercise regularly. Does walk regularly and denies exertional chest pain or claudication, though he does get short of breath with climbing stairs which is not new but more notable recently. Denies palpitation, leg edema, orthopnea, PND Questionable dizziness and feeling off-balance, denies syncope or unexpected falls. No bleeding on Xarelto 10 daily. Does not recall having had a heart attack or stroke. Never smoker, denies alcohol or substance abuse.    REVIEW OF SYSTEMS     Review of Systems   Constitutional: Negative for weight loss.   Cardiovascular:  Positive for dyspnea on exertion. Negative for chest pain, claudication, leg swelling, orthopnea, palpitations, paroxysmal nocturnal dyspnea and syncope.   Respiratory:  Positive for shortness of breath.    Hematologic/Lymphatic: Negative for bleeding problem.   Musculoskeletal:  Negative for falls.   Gastrointestinal:  Negative for hematochezia and melena.   Genitourinary:  Negative for hematuria.   Neurological:  Positive for dizziness and loss of balance. Negative for light-headedness.   Psychiatric/Behavioral:  Negative for altered mental status.            MEDICATIONS       Outpatient Encounter Medications as of 6/9/2025   Medication Sig Dispense Refill    amLODIPine (NORVASC) 10 MG tablet TAKE 1 TABLET BY MOUTH DAILY 90 tablet 3    ezetimibe (Zetia) 10 MG tablet Take 1 tablet by mouth Daily. 90 tablet 3    multivitamin with minerals tablet tablet Take 1 tablet by mouth Daily.      omeprazole (priLOSEC) 20 MG capsule TAKE 1 CAPSULE BY MOUTH DAILY 90 capsule 2    valsartan (Diovan) 80 MG tablet Take 1 tablet by mouth Daily. 90 tablet 3    Xarelto 10 MG tablet TAKE 1 TABLET BY MOUTH DAILY WITH DINNER 90 tablet 3     No facility-administered encounter medications on file as of 6/9/2025.         VITAL SIGNS     Visit Vitals  /91   Pulse 66   Wt 72.1 kg (159 lb)   SpO2 95%   BMI 25.86 kg/m²         Wt Readings from Last 3 Encounters:   06/09/25 72.1 kg (159 lb)   05/29/25 71.2 kg (157 lb)   04/24/25 71.4 kg (157 lb 4.8 oz)     Body mass index is 25.86 kg/m².      PHYSICAL EXAMINATION     Vitals and nursing note reviewed.   Constitutional:       Appearance: Not in distress.   Neck:      Vascular: No JVR.   Pulmonary:      Effort: Pulmonary effort is normal.      Breath sounds: Normal breath sounds. No wheezing. No rhonchi. No rales.   Cardiovascular:      Normal rate. Regular rhythm.      Murmurs: There is no murmur.   Edema:     Peripheral edema absent.   Abdominal:      General: There is no distension.      Palpations: Abdomen is soft.      Tenderness: There is no abdominal tenderness.   Musculoskeletal:      Cervical back: Neck supple. Skin:     General: Skin is cool.   Neurological:      Mental Status: Alert and oriented to person, place and time.         REVIEWED DATA       ECG 12 Lead    Date/Time: 6/9/2025 11:13 AM  Performed by: Trace Cabrales MD    Authorized by: Trace Cabrales MD  Comparison: compared with previous ECG from 10/26/2021  Similar to previous ECG  Rhythm: sinus rhythm  Other findings: poor R wave progression    Clinical impression: non-specific ECG        Lab  "Results   Component Value Date    WBC 7.8 01/29/2025    HGB 14.8 01/29/2025    HCT 44.0 01/29/2025    MCV 90 01/29/2025     01/29/2025       No results found for: \"HGBA1C\"    Lab Results   Component Value Date    GLUCOSE 84 01/29/2025    BUN 21 01/29/2025    CREATININE 1.25 01/29/2025    EGFR 59 (L) 01/29/2025    BCR 17 01/29/2025     01/29/2025    K 4.1 01/29/2025    CO2 21 01/29/2025    CALCIUM 9.4 01/29/2025    ALBUMIN 4.5 01/29/2025    BILITOT 0.7 01/29/2025    AST 20 01/29/2025    ALT 13 01/29/2025       Lab Results   Component Value Date    CHLPL 198 01/29/2025    TRIG 141 01/29/2025    HDL 48 01/29/2025     (H) 01/29/2025       No results found for this or any previous visit.        ASSESSMENT & PLAN     Diagnoses and all orders for this visit:    1. Abdominal aortic aneurysm (AAA) 3.0 cm to 5.5 cm in diameter in male (Primary)  Overview:  Vascular Aneurysm Evaluation:Artery Aneurysm: Patient with an abdominal aorta aneurysm with a diameter 4.57 cm. Peripheral Aneurysm: Aneurysm The aneurysm was previously repaired. at right popliteal cm.        2. Mixed hyperlipidemia  Overview:  Had SE with statins.        3. PVD (peripheral vascular disease)    4. Gastroesophageal reflux disease without esophagitis    5. Chronic deep vein thrombosis (DVT) of proximal vein of lower extremity, unspecified laterality  Overview:  R tibial vein.          Pre-operative evaluation: Being planned for additional endovascular aneurysm repair by Dr. Archer of vascular surgery.  Patient not too physically active but denies obvious angina with activities, although he is getting a bit more short of breath.  Last NM stress test in 11/2021 was low risk.  ECG in office today showed NSR with abnormal R wave progression but no acute ischemic changes.  RCRI score 1, will arrange for outpatient echo and NM stress study per surgeon request, need to accommodate this week given upcoming travel plans.  BP and lipid control, see " below.  AAA/PAD: Per vascular note, patient has known abdominal aortic, right common iliac, bilateral internal iliac, and bilateral popliteal aneurysm with prior treatment of the popliteal artery on the right has been performed with endovascular stent graft repair.   Last ultrasound earlier this year 4/2025 reported infrarenal AAA at 4.8 cm.  Management per vascular surgery.  History of DVT: On Xarelto 10 daily for 2-3 years after developing DVT following last stenting for AAA and peripheral aneurysm as above.   Hypertension: In office /91, elevated, HR 66.  On amlodipine 10 daily, valsartan 80 daily, will uptitrate valsartan to 160 daily for better BP control.  Hyperlipidemia/statin intolerance: Lipid panel earlier this year 1/2025 showed HDL 48, , not controlled. Pt reported significant fatigue and intolerance with atorvastatin, unsure if he has been on other statins. On Zetia 10 daily, we had a long discussion on trying an alternative statin and patient reluctantly agrees to start Crestor 5, which we will initiate at 3 times per week and uptitrate if able.  GERD: Management per primary team and other specialists.    I spent 46 minutes caring for Erickson on this date of service. This time includes time spent by me in the following activities: preparing for the visit, reviewing tests, performing a medically appropriate examination and/or evaluation, counseling and educating the patient/family/caregiver, and referring and communicating with other health care professionals.     Additionally, I am providing longitudinal care which includes continuously being a focal point for all of the patient's health care services and ongoing medical care related to preoperative evaluation and hyperlipidemia as documented above.    Trace Cabrales MD. PhD. [facility]  06/09/25  10:47 EDT    Part of this note may be an electronic transcription/translation of spoken language to printed text using the Dragon dictation system.

## 2025-06-10 ENCOUNTER — TELEPHONE (OUTPATIENT)
Dept: CARDIOLOGY | Age: 80
End: 2025-06-10
Payer: MEDICARE

## 2025-06-11 ENCOUNTER — HOSPITAL ENCOUNTER (OUTPATIENT)
Dept: CARDIOLOGY | Facility: HOSPITAL | Age: 80
Discharge: HOME | End: 2025-06-11
Payer: MEDICARE

## 2025-06-11 ENCOUNTER — TELEPHONE (OUTPATIENT)
Dept: CARDIOLOGY | Age: 80
End: 2025-06-11
Payer: MEDICARE

## 2025-06-11 VITALS — SYSTOLIC BLOOD PRESSURE: 130 MMHG | HEART RATE: 70 BPM | DIASTOLIC BLOOD PRESSURE: 72 MMHG

## 2025-06-11 VITALS — WEIGHT: 158.95 LBS | BODY MASS INDEX: 25.55 KG/M2 | HEIGHT: 66 IN

## 2025-06-11 DIAGNOSIS — I73.9 PVD (PERIPHERAL VASCULAR DISEASE): ICD-10-CM

## 2025-06-11 DIAGNOSIS — E78.2 MIXED HYPERLIPIDEMIA: ICD-10-CM

## 2025-06-11 DIAGNOSIS — I71.40 ABDOMINAL AORTIC ANEURYSM (AAA) 3.0 CM TO 5.5 CM IN DIAMETER IN MALE: ICD-10-CM

## 2025-06-11 DIAGNOSIS — K21.9 GASTROESOPHAGEAL REFLUX DISEASE WITHOUT ESOPHAGITIS: ICD-10-CM

## 2025-06-11 DIAGNOSIS — R06.09 DYSPNEA ON EXERTION: ICD-10-CM

## 2025-06-11 DIAGNOSIS — I25.118 CORONARY ARTERY DISEASE OF NATIVE ARTERY OF NATIVE HEART WITH STABLE ANGINA PECTORIS: Primary | ICD-10-CM

## 2025-06-11 DIAGNOSIS — I10 PRIMARY HYPERTENSION: Primary | ICD-10-CM

## 2025-06-11 DIAGNOSIS — R94.39 ABNORMAL STRESS TEST: ICD-10-CM

## 2025-06-11 LAB
AORTIC ARCH: 2.9 CM
AORTIC DIMENSIONLESS INDEX: 0.72 (DI)
ASCENDING AORTA: 3.5 CM
AV MEAN PRESS GRAD SYS DOP V1V2: 5 MMHG
AV VMAX SYS DOP: 152 CM/SEC
BH CV ECHO LEFT VENTRICLE GLOBAL LONGITUDINAL STRAIN: -18.8 %
BH CV ECHO MEAS - ACS: 1.91 CM
BH CV ECHO MEAS - AI P1/2T: 388.1 MSEC
BH CV ECHO MEAS - AO MAX PG: 9.2 MMHG
BH CV ECHO MEAS - AO ROOT AREA (BSA CORRECTED): 1.9 CM2
BH CV ECHO MEAS - AO ROOT DIAM: 3.5 CM
BH CV ECHO MEAS - AO V2 VTI: 33.1 CM
BH CV ECHO MEAS - AVA(I,D): 2.6 CM2
BH CV ECHO MEAS - EDV(CUBED): 86.8 ML
BH CV ECHO MEAS - EDV(MOD-SP2): 118 ML
BH CV ECHO MEAS - EDV(MOD-SP4): 139 ML
BH CV ECHO MEAS - EF(MOD-SP2): 55.9 %
BH CV ECHO MEAS - EF(MOD-SP4): 57.6 %
BH CV ECHO MEAS - ESV(CUBED): 24.7 ML
BH CV ECHO MEAS - ESV(MOD-SP2): 52 ML
BH CV ECHO MEAS - ESV(MOD-SP4): 59 ML
BH CV ECHO MEAS - FS: 34.2 %
BH CV ECHO MEAS - IVS/LVPW: 1.26 CM
BH CV ECHO MEAS - IVSD: 1.18 CM
BH CV ECHO MEAS - LAT PEAK E' VEL: 8.1 CM/SEC
BH CV ECHO MEAS - LV DIASTOLIC VOL/BSA (35-75): 76.8 CM2
BH CV ECHO MEAS - LV MASS(C)D: 161.9 GRAMS
BH CV ECHO MEAS - LV MAX PG: 4.9 MMHG
BH CV ECHO MEAS - LV MEAN PG: 3 MMHG
BH CV ECHO MEAS - LV SYSTOLIC VOL/BSA (12-30): 32.6 CM2
BH CV ECHO MEAS - LV V1 MAX: 111 CM/SEC
BH CV ECHO MEAS - LV V1 VTI: 23.7 CM
BH CV ECHO MEAS - LVIDD: 4.4 CM
BH CV ECHO MEAS - LVIDS: 2.9 CM
BH CV ECHO MEAS - LVOT AREA: 3.6 CM2
BH CV ECHO MEAS - LVOT DIAM: 2.15 CM
BH CV ECHO MEAS - LVPWD: 0.94 CM
BH CV ECHO MEAS - MED PEAK E' VEL: 6.9 CM/SEC
BH CV ECHO MEAS - MV A DUR: 0.17 SEC
BH CV ECHO MEAS - MV A MAX VEL: 93 CM/SEC
BH CV ECHO MEAS - MV DEC SLOPE: 238.8 CM/SEC2
BH CV ECHO MEAS - MV DEC TIME: 0.19 SEC
BH CV ECHO MEAS - MV E MAX VEL: 83.1 CM/SEC
BH CV ECHO MEAS - MV E/A: 0.89
BH CV ECHO MEAS - MV MAX PG: 3.3 MMHG
BH CV ECHO MEAS - MV MEAN PG: 1.61 MMHG
BH CV ECHO MEAS - MV P1/2T: 97.5 MSEC
BH CV ECHO MEAS - MV V2 VTI: 23.9 CM
BH CV ECHO MEAS - MVA(P1/2T): 2.26 CM2
BH CV ECHO MEAS - MVA(VTI): 3.6 CM2
BH CV ECHO MEAS - PA ACC TIME: 0.12 SEC
BH CV ECHO MEAS - PA V2 MAX: 122.2 CM/SEC
BH CV ECHO MEAS - PULM A REVS DUR: 0.15 SEC
BH CV ECHO MEAS - PULM A REVS VEL: 30.5 CM/SEC
BH CV ECHO MEAS - PULM DIAS VEL: 32.8 CM/SEC
BH CV ECHO MEAS - PULM S/D: 1.72
BH CV ECHO MEAS - PULM SYS VEL: 56.7 CM/SEC
BH CV ECHO MEAS - QP/QS: 0.58
BH CV ECHO MEAS - RAP SYSTOLE: 3 MMHG
BH CV ECHO MEAS - RV MAX PG: 1.72 MMHG
BH CV ECHO MEAS - RV V1 MAX: 65.6 CM/SEC
BH CV ECHO MEAS - RV V1 VTI: 15.1 CM
BH CV ECHO MEAS - RVOT DIAM: 2.05 CM
BH CV ECHO MEAS - RVSP: 26 MMHG
BH CV ECHO MEAS - SUP REN AO DIAM: 2.5 CM
BH CV ECHO MEAS - SV(LVOT): 85.7 ML
BH CV ECHO MEAS - SV(MOD-SP2): 66 ML
BH CV ECHO MEAS - SV(MOD-SP4): 80 ML
BH CV ECHO MEAS - SV(RVOT): 50 ML
BH CV ECHO MEAS - SVI(LVOT): 47.4 ML/M2
BH CV ECHO MEAS - SVI(MOD-SP2): 36.5 ML/M2
BH CV ECHO MEAS - SVI(MOD-SP4): 44.2 ML/M2
BH CV ECHO MEAS - TAPSE (>1.6): 2.04 CM
BH CV ECHO MEAS - TR MAX PG: 22.6 MMHG
BH CV ECHO MEAS - TR MAX VEL: 237.5 CM/SEC
BH CV ECHO MEASUREMENTS AVERAGE E/E' RATIO: 11.08
BH CV NUCLEAR PRIOR STUDY: 1
BH CV REST NUCLEAR ISOTOPE DOSE: 18.8 MCI
BH CV STRESS BP STAGE 1: NORMAL
BH CV STRESS COMMENTS STAGE 1: NORMAL
BH CV STRESS DOSE REGADENOSON STAGE 1: 0.4
BH CV STRESS DURATION MIN STAGE 1: 0
BH CV STRESS DURATION SEC STAGE 1: 10
BH CV STRESS HR STAGE 1: 93
BH CV STRESS NUCLEAR ISOTOPE DOSE: 18.8 MCI
BH CV STRESS PROTOCOL 1: NORMAL
BH CV STRESS RECOVERY BP: NORMAL MMHG
BH CV STRESS RECOVERY HR: 81 BPM
BH CV STRESS STAGE 1: 1
BH CV XLRA - RV BASE: 2.8 CM
BH CV XLRA - RV LENGTH: 7.3 CM
BH CV XLRA - RV MID: 2.47 CM
BH CV XLRA - TDI S': 13.4 CM/SEC
LEFT ATRIUM VOLUME INDEX: 33.5 ML/M2
LV EF BIPLANE MOD: 56.1 %
MAXIMAL PREDICTED HEART RATE: 142 BPM
PERCENT MAX PREDICTED HR: 65.49 %
SINUS: 3.3 CM
SPECT HRT GATED+EF W RNC IV: 46 %
STJ: 2.45 CM
STRESS BASELINE BP: NORMAL MMHG
STRESS BASELINE HR: 65 BPM
STRESS O2 SAT REST: 94 %
STRESS PERCENT HR: 77 %
STRESS POST EXERCISE DUR SEC: 10 SEC
STRESS POST O2 SAT PEAK: 92 %
STRESS POST PEAK BP: NORMAL MMHG
STRESS POST PEAK HR: 93 BPM
STRESS TARGET HR: 121 BPM

## 2025-06-11 PROCEDURE — 78492 MYOCRD IMG PET MLT RST&STRS: CPT

## 2025-06-11 PROCEDURE — A9555 RB82 RUBIDIUM: HCPCS | Performed by: INTERNAL MEDICINE

## 2025-06-11 PROCEDURE — 25010000002 REGADENOSON 0.4 MG/5ML SOLUTION: Performed by: INTERNAL MEDICINE

## 2025-06-11 PROCEDURE — 25510000001 PERFLUTREN 6.52 MG/ML SUSPENSION 2 ML VIAL: Performed by: INTERNAL MEDICINE

## 2025-06-11 PROCEDURE — 34310000005 RUBIDIUM CHLORIDE: Performed by: INTERNAL MEDICINE

## 2025-06-11 PROCEDURE — 93356 MYOCRD STRAIN IMG SPCKL TRCK: CPT

## 2025-06-11 PROCEDURE — 93306 TTE W/DOPPLER COMPLETE: CPT

## 2025-06-11 PROCEDURE — 93017 CV STRESS TEST TRACING ONLY: CPT

## 2025-06-11 RX ORDER — REGADENOSON 0.08 MG/ML
0.4 INJECTION, SOLUTION INTRAVENOUS
Status: COMPLETED | OUTPATIENT
Start: 2025-06-11 | End: 2025-06-11

## 2025-06-11 RX ADMIN — PERFLUTREN 2 ML: 6.52 INJECTION, SUSPENSION INTRAVENOUS at 08:53

## 2025-06-11 RX ADMIN — REGADENOSON 0.4 MG: 0.08 INJECTION, SOLUTION INTRAVENOUS at 08:09

## 2025-06-11 NOTE — TELEPHONE ENCOUNTER
Procedure Confirmed With Patient On: 6.11.2025    Date Scheduled & Patient Was Given Instructions:   6.11.2025                  Via: OFFICE (AND THEN PHONE & MYCHART)    Labs Completed On:     Patient Verbalized Understanding Of Arrival Time (7 am) Where To Park, Instructions For Procedure, and Medications To Hold: Yes

## 2025-06-12 ENCOUNTER — LAB (OUTPATIENT)
Facility: HOSPITAL | Age: 80
End: 2025-06-12
Payer: MEDICARE

## 2025-06-12 DIAGNOSIS — I10 PRIMARY HYPERTENSION: ICD-10-CM

## 2025-06-12 LAB
ANION GAP SERPL CALCULATED.3IONS-SCNC: 15 MMOL/L (ref 5–15)
BASOPHILS # BLD AUTO: 0.04 10*3/MM3 (ref 0–0.2)
BASOPHILS NFR BLD AUTO: 0.7 % (ref 0–1.5)
BUN SERPL-MCNC: 20 MG/DL (ref 8–23)
BUN/CREAT SERPL: 16.9 (ref 7–25)
CALCIUM SPEC-SCNC: 9 MG/DL (ref 8.6–10.5)
CHLORIDE SERPL-SCNC: 106 MMOL/L (ref 98–107)
CO2 SERPL-SCNC: 20 MMOL/L (ref 22–29)
CREAT SERPL-MCNC: 1.18 MG/DL (ref 0.76–1.27)
DEPRECATED RDW RBC AUTO: 43.3 FL (ref 37–54)
EGFRCR SERPLBLD CKD-EPI 2021: 63.2 ML/MIN/1.73
EOSINOPHIL # BLD AUTO: 0.17 10*3/MM3 (ref 0–0.4)
EOSINOPHIL NFR BLD AUTO: 3.1 % (ref 0.3–6.2)
ERYTHROCYTE [DISTWIDTH] IN BLOOD BY AUTOMATED COUNT: 12.7 % (ref 12.3–15.4)
GLUCOSE SERPL-MCNC: 98 MG/DL (ref 65–99)
HCT VFR BLD AUTO: 44.3 % (ref 37.5–51)
HGB BLD-MCNC: 14.8 G/DL (ref 13–17.7)
IMM GRANULOCYTES # BLD AUTO: 0.02 10*3/MM3 (ref 0–0.05)
IMM GRANULOCYTES NFR BLD AUTO: 0.4 % (ref 0–0.5)
LYMPHOCYTES # BLD AUTO: 1.44 10*3/MM3 (ref 0.7–3.1)
LYMPHOCYTES NFR BLD AUTO: 26.2 % (ref 19.6–45.3)
MCH RBC QN AUTO: 30.9 PG (ref 26.6–33)
MCHC RBC AUTO-ENTMCNC: 33.4 G/DL (ref 31.5–35.7)
MCV RBC AUTO: 92.5 FL (ref 79–97)
MONOCYTES # BLD AUTO: 0.6 10*3/MM3 (ref 0.1–0.9)
MONOCYTES NFR BLD AUTO: 10.9 % (ref 5–12)
NEUTROPHILS NFR BLD AUTO: 3.22 10*3/MM3 (ref 1.7–7)
NEUTROPHILS NFR BLD AUTO: 58.7 % (ref 42.7–76)
NRBC BLD AUTO-RTO: 0 /100 WBC (ref 0–0.2)
PLATELET # BLD AUTO: 230 10*3/MM3 (ref 140–450)
PMV BLD AUTO: 10.2 FL (ref 6–12)
POTASSIUM SERPL-SCNC: 3.9 MMOL/L (ref 3.5–5.2)
RBC # BLD AUTO: 4.79 10*6/MM3 (ref 4.14–5.8)
SODIUM SERPL-SCNC: 141 MMOL/L (ref 136–145)
WBC NRBC COR # BLD AUTO: 5.49 10*3/MM3 (ref 3.4–10.8)

## 2025-06-12 PROCEDURE — 36415 COLL VENOUS BLD VENIPUNCTURE: CPT

## 2025-06-12 PROCEDURE — 80048 BASIC METABOLIC PNL TOTAL CA: CPT

## 2025-06-12 PROCEDURE — 85025 COMPLETE CBC W/AUTO DIFF WBC: CPT

## 2025-06-18 LAB
AORTIC ARCH: 2.9 CM
AORTIC DIMENSIONLESS INDEX: 0.72 (DI)
ASCENDING AORTA: 3.5 CM
AV MEAN PRESS GRAD SYS DOP V1V2: 5 MMHG
AV VMAX SYS DOP: 152 CM/SEC
BH CV ECHO LEFT VENTRICLE GLOBAL LONGITUDINAL STRAIN: -18.8 %
BH CV ECHO MEAS - ACS: 1.91 CM
BH CV ECHO MEAS - AI P1/2T: 388.1 MSEC
BH CV ECHO MEAS - AO MAX PG: 9.2 MMHG
BH CV ECHO MEAS - AO ROOT AREA (BSA CORRECTED): 1.9 CM2
BH CV ECHO MEAS - AO ROOT DIAM: 3.5 CM
BH CV ECHO MEAS - AO V2 VTI: 33.1 CM
BH CV ECHO MEAS - AVA(I,D): 2.6 CM2
BH CV ECHO MEAS - EDV(CUBED): 86.8 ML
BH CV ECHO MEAS - EDV(MOD-SP2): 118 ML
BH CV ECHO MEAS - EDV(MOD-SP4): 139 ML
BH CV ECHO MEAS - EF(MOD-SP2): 55.9 %
BH CV ECHO MEAS - EF(MOD-SP4): 57.6 %
BH CV ECHO MEAS - ESV(CUBED): 24.7 ML
BH CV ECHO MEAS - ESV(MOD-SP2): 52 ML
BH CV ECHO MEAS - ESV(MOD-SP4): 59 ML
BH CV ECHO MEAS - FS: 34.2 %
BH CV ECHO MEAS - IVS/LVPW: 1.26 CM
BH CV ECHO MEAS - IVSD: 1.18 CM
BH CV ECHO MEAS - LAT PEAK E' VEL: 8.1 CM/SEC
BH CV ECHO MEAS - LV DIASTOLIC VOL/BSA (35-75): 76.8 CM2
BH CV ECHO MEAS - LV MASS(C)D: 161.9 GRAMS
BH CV ECHO MEAS - LV MAX PG: 4.9 MMHG
BH CV ECHO MEAS - LV MEAN PG: 3 MMHG
BH CV ECHO MEAS - LV SYSTOLIC VOL/BSA (12-30): 32.6 CM2
BH CV ECHO MEAS - LV V1 MAX: 111 CM/SEC
BH CV ECHO MEAS - LV V1 VTI: 23.7 CM
BH CV ECHO MEAS - LVIDD: 4.4 CM
BH CV ECHO MEAS - LVIDS: 2.9 CM
BH CV ECHO MEAS - LVOT AREA: 3.6 CM2
BH CV ECHO MEAS - LVOT DIAM: 2.15 CM
BH CV ECHO MEAS - LVPWD: 0.94 CM
BH CV ECHO MEAS - MED PEAK E' VEL: 6.9 CM/SEC
BH CV ECHO MEAS - MV A DUR: 0.17 SEC
BH CV ECHO MEAS - MV A MAX VEL: 93 CM/SEC
BH CV ECHO MEAS - MV DEC SLOPE: 238.8 CM/SEC2
BH CV ECHO MEAS - MV DEC TIME: 0.19 SEC
BH CV ECHO MEAS - MV E MAX VEL: 83.1 CM/SEC
BH CV ECHO MEAS - MV E/A: 0.89
BH CV ECHO MEAS - MV MAX PG: 3.3 MMHG
BH CV ECHO MEAS - MV MEAN PG: 1.61 MMHG
BH CV ECHO MEAS - MV P1/2T: 97.5 MSEC
BH CV ECHO MEAS - MV V2 VTI: 23.9 CM
BH CV ECHO MEAS - MVA(P1/2T): 2.26 CM2
BH CV ECHO MEAS - MVA(VTI): 3.6 CM2
BH CV ECHO MEAS - PA ACC TIME: 0.12 SEC
BH CV ECHO MEAS - PA V2 MAX: 122.2 CM/SEC
BH CV ECHO MEAS - PULM A REVS DUR: 0.15 SEC
BH CV ECHO MEAS - PULM A REVS VEL: 30.5 CM/SEC
BH CV ECHO MEAS - PULM DIAS VEL: 32.8 CM/SEC
BH CV ECHO MEAS - PULM S/D: 1.72
BH CV ECHO MEAS - PULM SYS VEL: 56.7 CM/SEC
BH CV ECHO MEAS - QP/QS: 0.58
BH CV ECHO MEAS - RAP SYSTOLE: 3 MMHG
BH CV ECHO MEAS - RV MAX PG: 1.72 MMHG
BH CV ECHO MEAS - RV V1 MAX: 65.6 CM/SEC
BH CV ECHO MEAS - RV V1 VTI: 15.1 CM
BH CV ECHO MEAS - RVOT DIAM: 2.05 CM
BH CV ECHO MEAS - RVSP: 26 MMHG
BH CV ECHO MEAS - SUP REN AO DIAM: 2.5 CM
BH CV ECHO MEAS - SV(LVOT): 85.7 ML
BH CV ECHO MEAS - SV(MOD-SP2): 66 ML
BH CV ECHO MEAS - SV(MOD-SP4): 80 ML
BH CV ECHO MEAS - SV(RVOT): 50 ML
BH CV ECHO MEAS - SVI(LVOT): 47.4 ML/M2
BH CV ECHO MEAS - SVI(MOD-SP2): 36.5 ML/M2
BH CV ECHO MEAS - SVI(MOD-SP4): 44.2 ML/M2
BH CV ECHO MEAS - TAPSE (>1.6): 2.04 CM
BH CV ECHO MEAS - TR MAX PG: 22.6 MMHG
BH CV ECHO MEAS - TR MAX VEL: 237.5 CM/SEC
BH CV ECHO MEASUREMENTS AVERAGE E/E' RATIO: 11.08
BH CV XLRA - RV BASE: 2.8 CM
BH CV XLRA - RV LENGTH: 7.3 CM
BH CV XLRA - RV MID: 2.47 CM
BH CV XLRA - TDI S': 13.4 CM/SEC
LEFT ATRIUM VOLUME INDEX: 33.5 ML/M2
LV EF BIPLANE MOD: 56.1 %
SINUS: 3.3 CM
STJ: 2.45 CM

## 2025-06-19 ENCOUNTER — HOSPITAL ENCOUNTER (OUTPATIENT)
Facility: HOSPITAL | Age: 80
Setting detail: HOSPITAL OUTPATIENT SURGERY
Discharge: HOME | End: 2025-06-19
Attending: STUDENT IN AN ORGANIZED HEALTH CARE EDUCATION/TRAINING PROGRAM | Admitting: STUDENT IN AN ORGANIZED HEALTH CARE EDUCATION/TRAINING PROGRAM
Payer: MEDICARE

## 2025-06-19 ENCOUNTER — PREP FOR SURGERY (OUTPATIENT)
Dept: OTHER | Facility: HOSPITAL | Age: 80
End: 2025-06-19
Payer: MEDICARE

## 2025-06-19 VITALS
RESPIRATION RATE: 16 BRPM | BODY MASS INDEX: 25.11 KG/M2 | WEIGHT: 160 LBS | HEART RATE: 85 BPM | HEIGHT: 67 IN | TEMPERATURE: 97.5 F | SYSTOLIC BLOOD PRESSURE: 136 MMHG | DIASTOLIC BLOOD PRESSURE: 95 MMHG | OXYGEN SATURATION: 93 %

## 2025-06-19 DIAGNOSIS — R79.1 ABNORMAL COAGULATION PROFILE: ICD-10-CM

## 2025-06-19 DIAGNOSIS — R93.1 ABNORMAL FINDINGS ON DIAGNOSTIC IMAGING OF HEART AND CORONARY CIRCULATION: ICD-10-CM

## 2025-06-19 DIAGNOSIS — R94.39 ABNORMAL STRESS TEST: ICD-10-CM

## 2025-06-19 DIAGNOSIS — I11.0 HYPERTENSIVE HEART DISEASE WITH HEART FAILURE: ICD-10-CM

## 2025-06-19 DIAGNOSIS — I25.10 CORONARY ARTERY DISEASE INVOLVING NATIVE HEART, UNSPECIFIED VESSEL OR LESION TYPE, UNSPECIFIED WHETHER ANGINA PRESENT: Primary | ICD-10-CM

## 2025-06-19 DIAGNOSIS — R79.9 ABNORMAL FINDING OF BLOOD CHEMISTRY, UNSPECIFIED: ICD-10-CM

## 2025-06-19 DIAGNOSIS — I79.8 OTHER DISORDERS OF ARTERIES, ARTERIOLES AND CAPILLARIES IN DISEASES CLASSIFIED ELSEWHERE: ICD-10-CM

## 2025-06-19 DIAGNOSIS — I25.118 CORONARY ARTERY DISEASE OF NATIVE ARTERY OF NATIVE HEART WITH STABLE ANGINA PECTORIS: ICD-10-CM

## 2025-06-19 PROCEDURE — 25010000002 MIDAZOLAM PER 1 MG: Performed by: STUDENT IN AN ORGANIZED HEALTH CARE EDUCATION/TRAINING PROGRAM

## 2025-06-19 PROCEDURE — 25010000002 HEPARIN (PORCINE) PER 1000 UNITS: Performed by: STUDENT IN AN ORGANIZED HEALTH CARE EDUCATION/TRAINING PROGRAM

## 2025-06-19 PROCEDURE — 25010000002 FENTANYL CITRATE (PF) 50 MCG/ML SOLUTION: Performed by: STUDENT IN AN ORGANIZED HEALTH CARE EDUCATION/TRAINING PROGRAM

## 2025-06-19 PROCEDURE — C1894 INTRO/SHEATH, NON-LASER: HCPCS | Performed by: STUDENT IN AN ORGANIZED HEALTH CARE EDUCATION/TRAINING PROGRAM

## 2025-06-19 PROCEDURE — 93458 L HRT ARTERY/VENTRICLE ANGIO: CPT | Performed by: STUDENT IN AN ORGANIZED HEALTH CARE EDUCATION/TRAINING PROGRAM

## 2025-06-19 PROCEDURE — 25510000001 IOPAMIDOL PER 1 ML: Performed by: STUDENT IN AN ORGANIZED HEALTH CARE EDUCATION/TRAINING PROGRAM

## 2025-06-19 PROCEDURE — 25810000003 SODIUM CHLORIDE 0.9 % SOLUTION: Performed by: STUDENT IN AN ORGANIZED HEALTH CARE EDUCATION/TRAINING PROGRAM

## 2025-06-19 PROCEDURE — 99152 MOD SED SAME PHYS/QHP 5/>YRS: CPT | Performed by: STUDENT IN AN ORGANIZED HEALTH CARE EDUCATION/TRAINING PROGRAM

## 2025-06-19 PROCEDURE — 25010000002 LIDOCAINE 2% SOLUTION: Performed by: STUDENT IN AN ORGANIZED HEALTH CARE EDUCATION/TRAINING PROGRAM

## 2025-06-19 PROCEDURE — 99215 OFFICE O/P EST HI 40 MIN: CPT | Performed by: NURSE PRACTITIONER

## 2025-06-19 PROCEDURE — C1769 GUIDE WIRE: HCPCS | Performed by: STUDENT IN AN ORGANIZED HEALTH CARE EDUCATION/TRAINING PROGRAM

## 2025-06-19 RX ORDER — HEPARIN SODIUM 1000 [USP'U]/ML
INJECTION, SOLUTION INTRAVENOUS; SUBCUTANEOUS
Status: DISCONTINUED | OUTPATIENT
Start: 2025-06-19 | End: 2025-06-19 | Stop reason: HOSPADM

## 2025-06-19 RX ORDER — FENTANYL CITRATE 50 UG/ML
INJECTION, SOLUTION INTRAMUSCULAR; INTRAVENOUS
Status: DISCONTINUED | OUTPATIENT
Start: 2025-06-19 | End: 2025-06-19 | Stop reason: HOSPADM

## 2025-06-19 RX ORDER — MIDAZOLAM HYDROCHLORIDE 1 MG/ML
INJECTION, SOLUTION INTRAMUSCULAR; INTRAVENOUS
Status: DISCONTINUED | OUTPATIENT
Start: 2025-06-19 | End: 2025-06-19 | Stop reason: HOSPADM

## 2025-06-19 RX ORDER — VERAPAMIL HYDROCHLORIDE 2.5 MG/ML
INJECTION INTRAVENOUS
Status: DISCONTINUED | OUTPATIENT
Start: 2025-06-19 | End: 2025-06-19 | Stop reason: HOSPADM

## 2025-06-19 RX ORDER — METOPROLOL TARTRATE 25 MG/1
12.5 TABLET, FILM COATED ORAL
OUTPATIENT
Start: 2025-06-20 | End: 2025-06-21

## 2025-06-19 RX ORDER — CHLORHEXIDINE GLUCONATE 500 MG/1
1 CLOTH TOPICAL EVERY 12 HOURS PRN
OUTPATIENT
Start: 2025-06-19

## 2025-06-19 RX ORDER — CHLORHEXIDINE GLUCONATE ORAL RINSE 1.2 MG/ML
15 SOLUTION DENTAL EVERY 12 HOURS
OUTPATIENT
Start: 2025-06-19 | End: 2025-06-20

## 2025-06-19 RX ORDER — SODIUM CHLORIDE 0.9 % (FLUSH) 0.9 %
10 SYRINGE (ML) INJECTION AS NEEDED
Status: DISCONTINUED | OUTPATIENT
Start: 2025-06-19 | End: 2025-06-19 | Stop reason: HOSPADM

## 2025-06-19 RX ORDER — SODIUM CHLORIDE 9 MG/ML
75 INJECTION, SOLUTION INTRAVENOUS CONTINUOUS
Status: DISCONTINUED | OUTPATIENT
Start: 2025-06-19 | End: 2025-06-19 | Stop reason: HOSPADM

## 2025-06-19 RX ORDER — LIDOCAINE HYDROCHLORIDE 20 MG/ML
INJECTION, SOLUTION INFILTRATION; PERINEURAL
Status: DISCONTINUED | OUTPATIENT
Start: 2025-06-19 | End: 2025-06-19 | Stop reason: HOSPADM

## 2025-06-19 RX ORDER — ACETAMINOPHEN 325 MG/1
650 TABLET ORAL EVERY 4 HOURS PRN
Status: DISCONTINUED | OUTPATIENT
Start: 2025-06-19 | End: 2025-06-19 | Stop reason: HOSPADM

## 2025-06-19 RX ORDER — METOPROLOL SUCCINATE 50 MG/1
50 TABLET, EXTENDED RELEASE ORAL DAILY
Qty: 90 TABLET | Refills: 3 | Status: SHIPPED | OUTPATIENT
Start: 2025-06-19

## 2025-06-19 RX ORDER — CHLORHEXIDINE GLUCONATE ORAL RINSE 1.2 MG/ML
15 SOLUTION DENTAL ONCE
OUTPATIENT
Start: 2025-06-19 | End: 2025-06-19

## 2025-06-19 RX ORDER — IOPAMIDOL 755 MG/ML
INJECTION, SOLUTION INTRAVASCULAR
Status: DISCONTINUED | OUTPATIENT
Start: 2025-06-19 | End: 2025-06-19 | Stop reason: HOSPADM

## 2025-06-19 RX ADMIN — SODIUM CHLORIDE 75 ML/HR: 9 INJECTION, SOLUTION INTRAVENOUS at 07:32

## 2025-06-19 NOTE — CONSULTS
[facility] Cardiac Surgery      Patient Care Team:  Farhad Metcalf MD as PCP - General (Family Medicine)    Chief complaint  Severe MV-CAD    Subjective     History of Present Illness  Patient is a 79 y.o. male with a past medical history of HTN, HLD, PAD, hx DVT, AAA (hx aorto iliac femoral right popliteal stent repair x2 with Dr. Archer) who was recently seen by cardiology on 6/9. Patient has been having worsening shortness of breath with exertion. Stress test was completed on 6/11 and this was abnormal. Patient underwent elective cardiac catheterization today and was found to have severe multi-vessel CAD (report currently pending. Cardiac surgery was consulted for evaluation and recommendation regarding surgical intervention. Patient does not smoke or drink alcohol. Patient takes Xarelto at home. Of note, patient is followed by Dr. Archer for infrarenal AAA measuring 5.3cm which per Dr. Archer is not urgent and can be postponed.    Review of Systems   Constitutional:  Positive for activity change and fatigue.   Respiratory:  Positive for chest tightness and shortness of breath.         Past Medical History:   Diagnosis Date    AAA (abdominal aortic aneurysm) without rupture 10/14/2021    Abdominal aortic aneurysm, without rupture, unspecified 03/14/2023    Acute embolism and thrombosis of unspecified deep veins of right distal lower extremity     Anesthesia complication     AFTER VEIN SURGERY STATES HE WAS HARD TO WAKE UP    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     Aneurysm of artery of lower extremity 09/09/2021    Arthritis     Essential (primary) hypertension     GERD (gastroesophageal reflux disease)     Hyperlipidemia     Hypertension     Iliac artery aneurysm 10/14/2021    Localized edema     Low back pain     Mixed hyperlipidemia     Other specified soft tissue disorders     PAD (peripheral artery disease)     PONV (postoperative nausea and vomiting)     Popliteal aneurysm     Thrombosis  09/09/2021    Calf Vein Thrombosis Rt tibial vein    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     Past Surgical History:   Procedure Laterality Date    ANGIOPLASTY POPLITEAL ARTERY Right 11/09/2021    Procedure: RIGHT POPITEAL ANEURYSM REPAIR VIABOHN;  Surgeon: Campos Archer MD;  Location: [facility] the [facility] OR 18/19;  Service: Vascular;  Laterality: Right;    ANGIOPLASTY POPLITEAL ARTERY Right 01/25/2022    Procedure: AORTO ILIAC FEMORAL RIGHT POPLITEAL VIABOHN STENT PLACEMENT X 2;  Surgeon: Campos Archer MD;  Location: [facility] includes the [facility] OR 18/19;  Service: Vascular;  Laterality: Right;    COLONOSCOPY      HERNIA REPAIR Bilateral     POLITEAL ARTERY ANEURYSM REPAIR Right 11/09/2021    Viabahn Stent Graft    VEIN LIGATION AND STRIPPING N/A      Family History   Problem Relation Age of Onset    No Known Problems Mother     Heart attack Father     Heart disease Father     Stroke Sister     Stroke Brother     Stroke Brother     Malig Hyperthermia Neg Hx      Social History     Tobacco Use    Smoking status: Never     Passive exposure: Never    Smokeless tobacco: Never    Tobacco comments:     Patient does not smoke   Vaping Use    Vaping status: Never Used   Substance Use Topics    Alcohol use: Never     Comment: caffeine use- coffee, soda; Patient is non drinker.    Drug use: Never     Comment: Drug Abuse: none     Medications Prior to Admission   Medication Sig Dispense Refill Last Dose/Taking    amLODIPine (NORVASC) 10 MG tablet TAKE 1 TABLET BY MOUTH DAILY 90 tablet 3 6/19/2025 Morning    ezetimibe (Zetia) 10 MG tablet Take 1 tablet by mouth Daily. 90 tablet 3 6/19/2025 Morning    omeprazole (priLOSEC) 20 MG capsule TAKE 1 CAPSULE BY MOUTH DAILY 90 capsule 2 6/19/2025 Morning    rosuvastatin (CRESTOR) 5 MG tablet Take 1 tablet by mouth 3 (Three) Times a Week. 36 tablet 3 6/18/2025    valsartan (DIOVAN) 160 MG tablet Take 1 tablet by mouth Daily. 90 tablet 3 6/19/2025 Morning    VITAMIN D, CHOLECALCIFEROL, PO Take 1  "capsule by mouth 2 (Two) Times a Week.   Past Week    multivitamin with minerals tablet tablet Take 1 tablet by mouth Daily.       Xarelto 10 MG tablet TAKE 1 TABLET BY MOUTH DAILY WITH DINNER 90 tablet 3 6/16/2025        Allergies:  Bee venom    Objective      Vital Signs  Temp:  [97.5 °F (36.4 °C)] 97.5 °F (36.4 °C)  Heart Rate:  [67-85] 85  Resp:  [14-21] 16  BP: (125-145)/() 136/95    Flowsheet Rows      Flowsheet Row First Filed Value   Admission Height 170.2 cm (67\") Documented at 06/19/2025 0733   Admission Weight 72.6 kg (160 lb) Documented at 06/19/2025 0733          170.2 cm (67\")    Physical Exam  Vitals and nursing note reviewed.   Constitutional:       Appearance: Normal appearance. He is not ill-appearing.   HENT:      Head: Normocephalic and atraumatic.   Eyes:      General: No scleral icterus.     Pupils: Pupils are equal, round, and reactive to light.   Cardiovascular:      Pulses: Normal pulses.      Heart sounds: Murmur heard.   Pulmonary:      Effort: Pulmonary effort is normal. No respiratory distress.      Breath sounds: Normal breath sounds.   Abdominal:      General: There is no distension.      Tenderness: There is no guarding.   Skin:     General: Skin is warm and dry.   Neurological:      General: No focal deficit present.      Mental Status: He is alert and oriented to person, place, and time.         Results Review:   Lab Results (last 24 hours)       ** No results found for the last 24 hours. **                Assessment & Plan       Coronary artery disease of native artery of native heart with stable angina pectoris    Abnormal stress test      Assessment & Plan    - Severe MV-CAD   - AAA (hx aorto iliac femoral right popliteal stent repair x2 with Dr. Archer) -- AAA measuring 5.3cm  - HTN  - HLD  - PAD  - Hx DVT  -right leg vein stripping      He is going to [place] to help his son.  He will be back on the 7/12.  Will plan on doing surgery on 7/15.  Patient takes Xarelto at home " last dose of xarelto will be on 7/11.  Our office will contact with PAT and OR times.    Thank you for the opportunity to participate in this patient' care    TATI Rocha  06/19/25  12:10 EDT

## 2025-06-19 NOTE — INTERVAL H&P NOTE
H&P updated. The patient was examined and the following changes are noted:  Stress test with lateral wall defect. Echo with hypokinesis of apical anterior, mid anterolateral, and apical lateral walls.

## 2025-06-19 NOTE — Clinical Note
Hemostasis started on the right radial artery. Radial compression device applied to vessel. Hemostasis achieved successfully. Closure device additional comment: Vasc band 12 cc air

## 2025-06-19 NOTE — DISCHARGE INSTRUCTIONS
[facility]  [address] [place] [address]    Coronary Angiogram (Radial/Ulnar Approach) After Care    Refer to this sheet in the next few weeks. These instructions provide you with information on caring for yourself after your procedure. Your caregiver may also give you more specific instructions. Your treatment has been planned according to current medical practices, but problems sometimes occur. Call your caregiver if you have any problems or questions after your procedure.    Home Care Instructions:  You may shower the day after the procedure. Remove the bandage (dressing) and gently wash the site with plain soap and water. Gently pat the site dry. You may apply a band aid daily for 2 days if desired.    Do not apply powder or lotion to the site.  Do not submerge the affected site in water for 3 to 5 days or until the site is completely healed.   Do not lift, push or pull anything over 5 pounds for 5 days after your procedure or as directed by your physician.  As a reference, a gallon of milk weighs 8 pounds.   Inspect the site at least twice daily. You may notice some bruising at the site and it may be tender for 1 to 2 weeks.     Increase your fluid intake for the next 2 days.    Keep arm elevated for 24 hours. For the remainder of the day, keep your arm in “Pledge of Allegiance” position when up and about.     You may drive 24 hours after the procedure unless otherwise instructed by your caregiver.  Do not operate machinery or power tools for 24 hours.  A responsible adult should be with you for the first 24 hours after you arrive home. Do not make any important legal decisions or sign legal papers for 24 hours.  Do not drink alcohol for 24 hours.    Metformin or any medications containing Metformin should not be taken for 48 hours after your procedure.      Call Your Doctor if:   You have unusual pain at the radial/ulnar (wrist) site.  You have redness, warmth, swelling, or pain at the  radial/ulnar (wrist) site.  You have drainage (other than a small amount of blood on the dressing).  `You have chills or a fever > 101.  Your arm becomes pale or dark, cool, tingly, or numb.  You develop chest pain, shortness of breath, feel faint or pass out.    You have heavy bleeding from the site, hold pressure on the site for 20 minutes.  If the bleeding stops, apply a fresh bandage and call your cardiologist.  However, if you        continue to have bleeding, call 911 and continue to apply pressure to the site.   You have any symptoms of a stroke.  Remember BE FAST  B-balance. Sudden trouble walking or loss of balance.  E-eyes.  Sudden changes in how you see or a sudden onset of a very bad headache.   F-face. Sudden weakness or loss of feeling of the face or facial droop on one side.   A-arms Sudden weakness or numbness in one arm.  One arm drifts down if they are both held out in front of you. This happens suddenly and usually on one side of the body.   S-speech.  Sudden trouble speaking, slurred speech or trouble understanding what are saying.   T-time  Time to call emergency services.  Write down the symptoms and the time they started.

## 2025-06-19 NOTE — H&P (VIEW-ONLY)
[facility] Cardiac Surgery      Patient Care Team:  Farhad Metcalf MD as PCP - General (Family Medicine)    Chief complaint  Severe MV-CAD    Subjective     History of Present Illness  Patient is a 79 y.o. male with a past medical history of HTN, HLD, PAD, hx DVT, AAA (hx aorto iliac femoral right popliteal stent repair x2 with Dr. Archer) who was recently seen by cardiology on 6/9. Patient has been having worsening shortness of breath with exertion. Stress test was completed on 6/11 and this was abnormal. Patient underwent elective cardiac catheterization today and was found to have severe multi-vessel CAD (report currently pending. Cardiac surgery was consulted for evaluation and recommendation regarding surgical intervention. Patient does not smoke or drink alcohol. Patient takes Xarelto at home. Of note, patient is followed by Dr. Archer for infrarenal AAA measuring 5.3cm which per Dr. Archer is not urgent and can be postponed.    Review of Systems   Constitutional:  Positive for activity change and fatigue.   Respiratory:  Positive for chest tightness and shortness of breath.         Past Medical History:   Diagnosis Date    AAA (abdominal aortic aneurysm) without rupture 10/14/2021    Abdominal aortic aneurysm, without rupture, unspecified 03/14/2023    Acute embolism and thrombosis of unspecified deep veins of right distal lower extremity     Anesthesia complication     AFTER VEIN SURGERY STATES HE WAS HARD TO WAKE UP    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     Aneurysm of artery of lower extremity 09/09/2021    Arthritis     Essential (primary) hypertension     GERD (gastroesophageal reflux disease)     Hyperlipidemia     Hypertension     Iliac artery aneurysm 10/14/2021    Localized edema     Low back pain     Mixed hyperlipidemia     Other specified soft tissue disorders     PAD (peripheral artery disease)     PONV (postoperative nausea and vomiting)     Popliteal aneurysm     Thrombosis  09/09/2021    Calf Vein Thrombosis Rt tibial vein    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     Past Surgical History:   Procedure Laterality Date    ANGIOPLASTY POPLITEAL ARTERY Right 11/09/2021    Procedure: RIGHT POPITEAL ANEURYSM REPAIR VIABOHN;  Surgeon: Campos Archer MD;  Location: [facility] OR 18/19;  Service: Vascular;  Laterality: Right;    ANGIOPLASTY POPLITEAL ARTERY Right 01/25/2022    Procedure: AORTO ILIAC FEMORAL RIGHT POPLITEAL VIABOHN STENT PLACEMENT X 2;  Surgeon: Campos Archer MD;  Location: [facility] OR 18/19;  Service: Vascular;  Laterality: Right;    COLONOSCOPY      HERNIA REPAIR Bilateral     POLITEAL ARTERY ANEURYSM REPAIR Right 11/09/2021    Viabahn Stent Graft    VEIN LIGATION AND STRIPPING N/A      Family History   Problem Relation Age of Onset    No Known Problems Mother     Heart attack Father     Heart disease Father     Stroke Sister     Stroke Brother     Stroke Brother     Malig Hyperthermia Neg Hx      Social History     Tobacco Use    Smoking status: Never     Passive exposure: Never    Smokeless tobacco: Never    Tobacco comments:     Patient does not smoke   Vaping Use    Vaping status: Never Used   Substance Use Topics    Alcohol use: Never     Comment: caffeine use- coffee, soda; Patient is non drinker.    Drug use: Never     Comment: Drug Abuse: none     Medications Prior to Admission   Medication Sig Dispense Refill Last Dose/Taking    amLODIPine (NORVASC) 10 MG tablet TAKE 1 TABLET BY MOUTH DAILY 90 tablet 3 6/19/2025 Morning    ezetimibe (Zetia) 10 MG tablet Take 1 tablet by mouth Daily. 90 tablet 3 6/19/2025 Morning    omeprazole (priLOSEC) 20 MG capsule TAKE 1 CAPSULE BY MOUTH DAILY 90 capsule 2 6/19/2025 Morning    rosuvastatin (CRESTOR) 5 MG tablet Take 1 tablet by mouth 3 (Three) Times a Week. 36 tablet 3 6/18/2025    valsartan (DIOVAN) 160 MG tablet Take 1 tablet by mouth Daily. 90 tablet 3 6/19/2025 Morning    VITAMIN D, CHOLECALCIFEROL, PO Take 1  "capsule by mouth 2 (Two) Times a Week.   Past Week    multivitamin with minerals tablet tablet Take 1 tablet by mouth Daily.       Xarelto 10 MG tablet TAKE 1 TABLET BY MOUTH DAILY WITH DINNER 90 tablet 3 6/16/2025        Allergies:  Bee venom    Objective      Vital Signs  Temp:  [97.5 °F (36.4 °C)] 97.5 °F (36.4 °C)  Heart Rate:  [67-85] 85  Resp:  [14-21] 16  BP: (125-145)/() 136/95    Flowsheet Rows      Flowsheet Row First Filed Value   Admission Height 170.2 cm (67\") Documented at 06/19/2025 0733   Admission Weight 72.6 kg (160 lb) Documented at 06/19/2025 0733          170.2 cm (67\")    Physical Exam  Vitals and nursing note reviewed.   Constitutional:       Appearance: Normal appearance. He is not ill-appearing.   HENT:      Head: Normocephalic and atraumatic.   Eyes:      General: No scleral icterus.     Pupils: Pupils are equal, round, and reactive to light.   Cardiovascular:      Pulses: Normal pulses.      Heart sounds: Murmur heard.   Pulmonary:      Effort: Pulmonary effort is normal. No respiratory distress.      Breath sounds: Normal breath sounds.   Abdominal:      General: There is no distension.      Tenderness: There is no guarding.   Skin:     General: Skin is warm and dry.   Neurological:      General: No focal deficit present.      Mental Status: He is alert and oriented to person, place, and time.         Results Review:   Lab Results (last 24 hours)       ** No results found for the last 24 hours. **                Assessment & Plan       Coronary artery disease of native artery of native heart with stable angina pectoris    Abnormal stress test      Assessment & Plan    - Severe MV-CAD   - AAA (hx aorto iliac femoral right popliteal stent repair x2 with Dr. Archer) -- AAA measuring 5.3cm  - HTN  - HLD  - PAD  - Hx DVT  -right leg vein stripping      He is going to [place] to help his son.  He will be back on the 7/12.  Will plan on doing surgery on 7/15.  Patient takes Xarelto at home " last dose of xarelto will be on 7/11.  Our office will contact with PAT and OR times.    Thank you for the opportunity to participate in this patient' care    TATI Rocha  06/19/25  12:10 EDT

## 2025-06-20 PROBLEM — R93.1 ABNORMAL FINDINGS ON DIAGNOSTIC IMAGING OF HEART AND CORONARY CIRCULATION: Status: ACTIVE | Noted: 2025-06-19

## 2025-06-20 PROBLEM — I25.10 CORONARY ARTERY DISEASE INVOLVING NATIVE HEART: Status: ACTIVE | Noted: 2025-06-19

## 2025-07-14 ENCOUNTER — TELEPHONE (OUTPATIENT)
Dept: CARDIAC SURGERY | Facility: CLINIC | Age: 80
End: 2025-07-14
Payer: MEDICARE

## 2025-07-14 ENCOUNTER — HOSPITAL ENCOUNTER (OUTPATIENT)
Dept: GENERAL RADIOLOGY | Facility: HOSPITAL | Age: 80
Discharge: HOME | End: 2025-07-14
Payer: MEDICARE

## 2025-07-14 ENCOUNTER — ANESTHESIA EVENT (OUTPATIENT)
Dept: PERIOP | Facility: HOSPITAL | Age: 80
End: 2025-07-14
Payer: MEDICARE

## 2025-07-14 ENCOUNTER — PRE-ADMISSION TESTING (OUTPATIENT)
Dept: PREADMISSION TESTING | Facility: HOSPITAL | Age: 80
DRG: 235 | End: 2025-07-14
Payer: MEDICARE

## 2025-07-14 ENCOUNTER — HOSPITAL ENCOUNTER (OUTPATIENT)
Dept: CARDIOLOGY | Facility: HOSPITAL | Age: 80
Discharge: HOME | End: 2025-07-14
Payer: MEDICARE

## 2025-07-14 VITALS
TEMPERATURE: 97.2 F | OXYGEN SATURATION: 95 % | HEART RATE: 71 BPM | HEIGHT: 65 IN | SYSTOLIC BLOOD PRESSURE: 127 MMHG | WEIGHT: 160.8 LBS | RESPIRATION RATE: 16 BRPM | DIASTOLIC BLOOD PRESSURE: 83 MMHG | BODY MASS INDEX: 26.79 KG/M2

## 2025-07-14 DIAGNOSIS — I25.10 CORONARY ARTERY DISEASE INVOLVING NATIVE HEART, UNSPECIFIED VESSEL OR LESION TYPE, UNSPECIFIED WHETHER ANGINA PRESENT: ICD-10-CM

## 2025-07-14 DIAGNOSIS — I11.0 HYPERTENSIVE HEART DISEASE WITH HEART FAILURE: ICD-10-CM

## 2025-07-14 DIAGNOSIS — I79.8 OTHER DISORDERS OF ARTERIES, ARTERIOLES AND CAPILLARIES IN DISEASES CLASSIFIED ELSEWHERE: ICD-10-CM

## 2025-07-14 DIAGNOSIS — R79.9 ABNORMAL FINDING OF BLOOD CHEMISTRY, UNSPECIFIED: ICD-10-CM

## 2025-07-14 DIAGNOSIS — R79.1 ABNORMAL COAGULATION PROFILE: ICD-10-CM

## 2025-07-14 LAB
ABO GROUP BLD: NORMAL
ALBUMIN SERPL-MCNC: 3.8 G/DL (ref 3.5–5.2)
ALBUMIN/GLOB SERPL: 1.3 G/DL
ALP SERPL-CCNC: 82 U/L (ref 39–117)
ALT SERPL W P-5'-P-CCNC: 18 U/L (ref 1–41)
ANION GAP SERPL CALCULATED.3IONS-SCNC: 12.8 MMOL/L (ref 5–15)
APTT PPP: 33.2 SECONDS (ref 22.7–35.4)
ARTERIAL PATENCY WRIST A: ABNORMAL
AST SERPL-CCNC: 20 U/L (ref 1–40)
ATMOSPHERIC PRESS: 749.7 MMHG
BASE EXCESS BLDA CALC-SCNC: -3.9 MMOL/L (ref 0–2)
BASOPHILS # BLD AUTO: 0.03 10*3/MM3 (ref 0–0.2)
BASOPHILS NFR BLD AUTO: 0.5 % (ref 0–1.5)
BDY SITE: ABNORMAL
BH CV XLRA MEAS - DIST GSV CALF DIST LEFT: 0.21 CM
BH CV XLRA MEAS - DIST GSV THIGH DIST LEFT: 0.18 CM
BH CV XLRA MEAS - DIST LSV CALF DIST LEFT: 0.21 CM
BH CV XLRA MEAS - DIST LSV CALF DIST RIGHT: 0.22 CM
BH CV XLRA MEAS - GSV ANKLE DIST LEFT: 0.32 CM
BH CV XLRA MEAS - GSV KNEE DIST LEFT: 0.16 CM
BH CV XLRA MEAS - GSV ORIGIN DIST LEFT: 0.71 CM
BH CV XLRA MEAS - MID GSV CALF LEFT: 0.22 CM
BH CV XLRA MEAS - MID GSV THIGH  LEFT: 0.19 CM
BH CV XLRA MEAS - MID LSV CALF DIST LEFT: 0.21 CM
BH CV XLRA MEAS - MID LSV CALF DIST RIGHT: 0.23 CM
BH CV XLRA MEAS - PROX GSV CALF DIST LEFT: 0.18 CM
BH CV XLRA MEAS - PROX GSV THIGH  LEFT: 0.21 CM
BH CV XLRA MEAS - PROX LSV CALF DIST LEFT: 0.4 CM
BH CV XLRA MEAS - PROX LSV CALF DIST RIGHT: 0.16 CM
BH CV XLRA MEAS LEFT DIST CCA EDV: -18 CM/SEC
BH CV XLRA MEAS LEFT DIST CCA PSV: -63.2 CM/SEC
BH CV XLRA MEAS LEFT DIST ICA EDV: -28 CM/SEC
BH CV XLRA MEAS LEFT DIST ICA PSV: -90.1 CM/SEC
BH CV XLRA MEAS LEFT ICA/CCA RATIO: 1.43
BH CV XLRA MEAS LEFT MID ICA EDV: -24.6 CM/SEC
BH CV XLRA MEAS LEFT MID ICA PSV: -53.6 CM/SEC
BH CV XLRA MEAS LEFT PROX CCA EDV: 10.1 CM/SEC
BH CV XLRA MEAS LEFT PROX CCA PSV: 57.1 CM/SEC
BH CV XLRA MEAS LEFT PROX ECA EDV: -5.7 CM/SEC
BH CV XLRA MEAS LEFT PROX ECA PSV: -58 CM/SEC
BH CV XLRA MEAS LEFT PROX ICA EDV: -18 CM/SEC
BH CV XLRA MEAS LEFT PROX ICA PSV: -38.2 CM/SEC
BH CV XLRA MEAS LEFT PROX SCLA PSV: 51.6 CM/SEC
BH CV XLRA MEAS LEFT VERTEBRAL A EDV: -14.1 CM/SEC
BH CV XLRA MEAS LEFT VERTEBRAL A PSV: -46.3 CM/SEC
BH CV XLRA MEAS RIGHT DIST CCA EDV: -16.9 CM/SEC
BH CV XLRA MEAS RIGHT DIST CCA PSV: -58.8 CM/SEC
BH CV XLRA MEAS RIGHT DIST ICA EDV: -23 CM/SEC
BH CV XLRA MEAS RIGHT DIST ICA PSV: -70.2 CM/SEC
BH CV XLRA MEAS RIGHT ICA/CCA RATIO: 1.19
BH CV XLRA MEAS RIGHT MID ICA EDV: 20.3 CM/SEC
BH CV XLRA MEAS RIGHT MID ICA PSV: 68 CM/SEC
BH CV XLRA MEAS RIGHT PROX CCA EDV: 11.8 CM/SEC
BH CV XLRA MEAS RIGHT PROX CCA PSV: 49 CM/SEC
BH CV XLRA MEAS RIGHT PROX ECA EDV: -9.4 CM/SEC
BH CV XLRA MEAS RIGHT PROX ECA PSV: -58.8 CM/SEC
BH CV XLRA MEAS RIGHT PROX ICA EDV: -15.3 CM/SEC
BH CV XLRA MEAS RIGHT PROX ICA PSV: -56.4 CM/SEC
BH CV XLRA MEAS RIGHT PROX SCLA PSV: 69.4 CM/SEC
BH CV XLRA MEAS RIGHT VERTEBRAL A EDV: 8.5 CM/SEC
BH CV XLRA MEAS RIGHT VERTEBRAL A PSV: 27.3 CM/SEC
BILIRUB SERPL-MCNC: 0.5 MG/DL (ref 0–1.2)
BILIRUB UR QL STRIP: NEGATIVE
BLD GP AB SCN SERPL QL: NEGATIVE
BUN SERPL-MCNC: 26 MG/DL (ref 8–23)
BUN/CREAT SERPL: 19 (ref 7–25)
CALCIUM SPEC-SCNC: 8.8 MG/DL (ref 8.6–10.5)
CHLORIDE SERPL-SCNC: 106 MMOL/L (ref 98–107)
CHOLEST SERPL-MCNC: 141 MG/DL (ref 0–200)
CLARITY UR: CLEAR
CLOSE TME COLL+ADP + EPINEP PNL BLD: 97 % (ref 86–100)
CO2 SERPL-SCNC: 22.2 MMOL/L (ref 22–29)
COLOR UR: YELLOW
CREAT SERPL-MCNC: 1.37 MG/DL (ref 0.76–1.27)
DEPRECATED RDW RBC AUTO: 41 FL (ref 37–54)
EGFRCR SERPLBLD CKD-EPI 2021: 52.8 ML/MIN/1.73
EOSINOPHIL # BLD AUTO: 0.2 10*3/MM3 (ref 0–0.4)
EOSINOPHIL NFR BLD AUTO: 3.1 % (ref 0.3–6.2)
ERYTHROCYTE [DISTWIDTH] IN BLOOD BY AUTOMATED COUNT: 12.1 % (ref 12.3–15.4)
GLOBULIN UR ELPH-MCNC: 2.9 GM/DL
GLUCOSE SERPL-MCNC: 131 MG/DL (ref 65–99)
GLUCOSE UR STRIP-MCNC: NEGATIVE MG/DL
HBA1C MFR BLD: 6.1 % (ref 4.8–5.6)
HCO3 BLDA-SCNC: 20.5 MMOL/L (ref 22–28)
HCT VFR BLD AUTO: 41.8 % (ref 37.5–51)
HDLC SERPL-MCNC: 42 MG/DL (ref 40–60)
HEMODILUTION: NO
HGB BLD-MCNC: 13.7 G/DL (ref 13–17.7)
HGB UR QL STRIP.AUTO: NEGATIVE
IMM GRANULOCYTES # BLD AUTO: 0.04 10*3/MM3 (ref 0–0.05)
IMM GRANULOCYTES NFR BLD AUTO: 0.6 % (ref 0–0.5)
INR PPP: 1.27 (ref 0.9–1.1)
KETONES UR QL STRIP: NEGATIVE
LDLC SERPL CALC-MCNC: 75 MG/DL (ref 0–100)
LDLC/HDLC SERPL: 1.7 {RATIO}
LEFT ARM BP: NORMAL MMHG
LEUKOCYTE ESTERASE UR QL STRIP.AUTO: NEGATIVE
LYMPHOCYTES # BLD AUTO: 1.46 10*3/MM3 (ref 0.7–3.1)
LYMPHOCYTES NFR BLD AUTO: 22.3 % (ref 19.6–45.3)
MAGNESIUM SERPL-MCNC: 2.1 MG/DL (ref 1.6–2.4)
MCH RBC QN AUTO: 30.2 PG (ref 26.6–33)
MCHC RBC AUTO-ENTMCNC: 32.8 G/DL (ref 31.5–35.7)
MCV RBC AUTO: 92.3 FL (ref 79–97)
MODALITY: ABNORMAL
MONOCYTES # BLD AUTO: 0.62 10*3/MM3 (ref 0.1–0.9)
MONOCYTES NFR BLD AUTO: 9.5 % (ref 5–12)
NEUTROPHILS NFR BLD AUTO: 4.19 10*3/MM3 (ref 1.7–7)
NEUTROPHILS NFR BLD AUTO: 64 % (ref 42.7–76)
NITRITE UR QL STRIP: NEGATIVE
NRBC BLD AUTO-RTO: 0 /100 WBC (ref 0–0.2)
NT-PROBNP SERPL-MCNC: 216 PG/ML (ref 0–1800)
PCO2 BLDA: 34.4 MM HG (ref 35–45)
PH BLDA: 7.38 PH UNITS (ref 7.35–7.45)
PH UR STRIP.AUTO: 5.5 [PH] (ref 5–8)
PLATELET # BLD AUTO: 168 10*3/MM3 (ref 140–450)
PMV BLD AUTO: 9.3 FL (ref 6–12)
PO2 BLDA: 74.6 MM HG (ref 80–100)
POTASSIUM SERPL-SCNC: 3.6 MMOL/L (ref 3.5–5.2)
PROT SERPL-MCNC: 6.7 G/DL (ref 6–8.5)
PROT UR QL STRIP: NEGATIVE
PROTHROMBIN TIME: 15.9 SECONDS (ref 11.7–14.2)
QT INTERVAL: 400 MS
QTC INTERVAL: 407 MS
RBC # BLD AUTO: 4.53 10*6/MM3 (ref 4.14–5.8)
RH BLD: POSITIVE
RIGHT ARM BP: NORMAL MMHG
SAO2 % BLDCOA: 94.7 % (ref 92–98.5)
SODIUM SERPL-SCNC: 141 MMOL/L (ref 136–145)
SP GR UR STRIP: 1.02 (ref 1–1.03)
T&S EXPIRATION DATE: NORMAL
TOTAL RATE: 14 BREATHS/MINUTE
TRIGL SERPL-MCNC: 139 MG/DL (ref 0–150)
UROBILINOGEN UR QL STRIP: NORMAL
VLDLC SERPL-MCNC: 24 MG/DL (ref 5–40)
WBC NRBC COR # BLD AUTO: 6.54 10*3/MM3 (ref 3.4–10.8)

## 2025-07-14 PROCEDURE — 36415 COLL VENOUS BLD VENIPUNCTURE: CPT

## 2025-07-14 PROCEDURE — 85730 THROMBOPLASTIN TIME PARTIAL: CPT

## 2025-07-14 PROCEDURE — 83880 ASSAY OF NATRIURETIC PEPTIDE: CPT

## 2025-07-14 PROCEDURE — 71046 X-RAY EXAM CHEST 2 VIEWS: CPT

## 2025-07-14 PROCEDURE — 86900 BLOOD TYPING SEROLOGIC ABO: CPT

## 2025-07-14 PROCEDURE — 36600 WITHDRAWAL OF ARTERIAL BLOOD: CPT | Performed by: THORACIC SURGERY (CARDIOTHORACIC VASCULAR SURGERY)

## 2025-07-14 PROCEDURE — 93880 EXTRACRANIAL BILAT STUDY: CPT | Performed by: SURGERY

## 2025-07-14 PROCEDURE — 93005 ELECTROCARDIOGRAM TRACING: CPT

## 2025-07-14 PROCEDURE — 93010 ELECTROCARDIOGRAM REPORT: CPT | Performed by: INTERNAL MEDICINE

## 2025-07-14 PROCEDURE — 86923 COMPATIBILITY TEST ELECTRIC: CPT

## 2025-07-14 PROCEDURE — 86850 RBC ANTIBODY SCREEN: CPT

## 2025-07-14 PROCEDURE — 86901 BLOOD TYPING SEROLOGIC RH(D): CPT

## 2025-07-14 PROCEDURE — 83036 HEMOGLOBIN GLYCOSYLATED A1C: CPT

## 2025-07-14 PROCEDURE — 81003 URINALYSIS AUTO W/O SCOPE: CPT

## 2025-07-14 PROCEDURE — 85025 COMPLETE CBC W/AUTO DIFF WBC: CPT

## 2025-07-14 PROCEDURE — 93970 EXTREMITY STUDY: CPT

## 2025-07-14 PROCEDURE — 93880 EXTRACRANIAL BILAT STUDY: CPT

## 2025-07-14 PROCEDURE — 85576 BLOOD PLATELET AGGREGATION: CPT

## 2025-07-14 PROCEDURE — 80053 COMPREHEN METABOLIC PANEL: CPT

## 2025-07-14 PROCEDURE — 80061 LIPID PANEL: CPT

## 2025-07-14 PROCEDURE — 83735 ASSAY OF MAGNESIUM: CPT

## 2025-07-14 PROCEDURE — 82803 BLOOD GASES ANY COMBINATION: CPT | Performed by: THORACIC SURGERY (CARDIOTHORACIC VASCULAR SURGERY)

## 2025-07-14 PROCEDURE — 85610 PROTHROMBIN TIME: CPT

## 2025-07-14 RX ORDER — MUPIROCIN 2 %
1 OINTMENT (GRAM) TOPICAL 3 TIMES DAILY
COMMUNITY
End: 2025-07-25 | Stop reason: HOSPADM

## 2025-07-14 RX ORDER — POLYMYXIN B SULFATE AND TRIMETHOPRIM 1; 10000 MG/ML; [USP'U]/ML
1 SOLUTION OPHTHALMIC EVERY 6 HOURS
COMMUNITY
Start: 2025-07-08

## 2025-07-14 RX ORDER — CHLORHEXIDINE GLUCONATE ORAL RINSE 1.2 MG/ML
15 SOLUTION DENTAL 2 TIMES DAILY
COMMUNITY
End: 2025-07-25 | Stop reason: HOSPADM

## 2025-07-14 RX ORDER — CHLORHEXIDINE GLUCONATE ORAL RINSE 1.2 MG/ML
15 SOLUTION DENTAL EVERY 12 HOURS
Status: DISPENSED | OUTPATIENT
Start: 2025-07-14 | End: 2025-07-15

## 2025-07-14 NOTE — TELEPHONE ENCOUNTER
Spoke to Maribell at surgery scheduling informing scheduling that Dr. Patino wanted to switch case times.  This patient is to be the the follow case. Office staff will call them to tell them to come at 8am.

## 2025-07-14 NOTE — PAT
ADDENDUM 7/14/2025:     Attested           Attestation signed by Jr Oscar Patino MD at 06/19/25 3106     I have reviewed this documentation and agree.  We are consulted for elective CABG.  His son is being treated for cancer in [place] and they will be back in town around 12 July.  Will schedule surgery for the 15th.  He will stop his Xarelto on the 11th.  I discussed all this with the patient and his family.  He has had vein stripping on the right and we will check the left preoperatively.               Expand All Collapse All    [facility] Cardiac Surgery        Patient Care Team:  Farhad Metcalf MD as PCP - General (Family Medicine)     Chief complaint  Severe MV-CAD     Subjective  History of Present Illness  Patient is a 79 y.o. male with a past medical history of HTN, HLD, PAD, hx DVT, AAA (hx aorto iliac femoral right popliteal stent repair x2 with Dr. Archer) who was recently seen by cardiology on 6/9. Patient has been having worsening shortness of breath with exertion. Stress test was completed on 6/11 and this was abnormal. Patient underwent elective cardiac catheterization today and was found to have severe multi-vessel CAD (report currently pending. Cardiac surgery was consulted for evaluation and recommendation regarding surgical intervention. Patient does not smoke or drink alcohol. Patient takes Xarelto at home. Of note, patient is followed by Dr. Archer for infrarenal AAA measuring 5.3cm which per Dr. Archer is not urgent and can be postponed.     Review of Systems   Constitutional:  Positive for activity change and fatigue.   Respiratory:  Positive for chest tightness and shortness of breath.          Medical History        Past Medical History:   Diagnosis Date    AAA (abdominal aortic aneurysm) without rupture 10/14/2021    Abdominal aortic aneurysm, without rupture, unspecified 03/14/2023    Acute embolism and thrombosis of unspecified deep veins of right distal lower extremity       Anesthesia complication       AFTER VEIN SURGERY STATES HE WAS HARD TO WAKE UP    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      Aneurysm of artery of lower extremity 09/09/2021    Arthritis      Essential (primary) hypertension      GERD (gastroesophageal reflux disease)      Hyperlipidemia      Hypertension      Iliac artery aneurysm 10/14/2021    Localized edema      Low back pain      Mixed hyperlipidemia      Other specified soft tissue disorders      PAD (peripheral artery disease)      PONV (postoperative nausea and vomiting)      Popliteal aneurysm      Thrombosis 09/09/2021     Calf Vein Thrombosis Rt tibial vein    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         Surgical History         Past Surgical History:   Procedure Laterality Date    ANGIOPLASTY POPLITEAL ARTERY Right 11/09/2021     Procedure: RIGHT POPITEAL ANEURYSM REPAIR VIABOHN;  Surgeon: Campos Archer MD;  Location: [facility] OR 18/19;  Service: Vascular;  Laterality: Right;    ANGIOPLASTY POPLITEAL ARTERY Right 01/25/2022     Procedure: AORTO ILIAC FEMORAL RIGHT POPLITEAL VIABOHN STENT PLACEMENT X 2;  Surgeon: Campos Archer MD;  Location: [facility] OR 18/19;  Service: Vascular;  Laterality: Right;    COLONOSCOPY        HERNIA REPAIR Bilateral      POLITEAL ARTERY ANEURYSM REPAIR Right 11/09/2021     Viabahn Stent Graft    VEIN LIGATION AND STRIPPING N/A                 Family History   Problem Relation Age of Onset    No Known Problems Mother      Heart attack Father      Heart disease Father      Stroke Sister      Stroke Brother      Stroke Brother      Malig Hyperthermia Neg Hx        Social History   Social History            Tobacco Use    Smoking status: Never       Passive exposure: Never    Smokeless tobacco: Never    Tobacco comments:       Patient does not smoke   Vaping Use    Vaping status: Never Used   Substance Use Topics    Alcohol use: Never       Comment: caffeine use- coffee, soda; Patient is non drinker.     "Drug use: Never       Comment: Drug Abuse: none         Prescriptions Prior to Admission           Medications Prior to Admission   Medication Sig Dispense Refill Last Dose/Taking    amLODIPine (NORVASC) 10 MG tablet TAKE 1 TABLET BY MOUTH DAILY 90 tablet 3 6/19/2025 Morning    ezetimibe (Zetia) 10 MG tablet Take 1 tablet by mouth Daily. 90 tablet 3 6/19/2025 Morning    omeprazole (priLOSEC) 20 MG capsule TAKE 1 CAPSULE BY MOUTH DAILY 90 capsule 2 6/19/2025 Morning    rosuvastatin (CRESTOR) 5 MG tablet Take 1 tablet by mouth 3 (Three) Times a Week. 36 tablet 3 6/18/2025    valsartan (DIOVAN) 160 MG tablet Take 1 tablet by mouth Daily. 90 tablet 3 6/19/2025 Morning    VITAMIN D, CHOLECALCIFEROL, PO Take 1 capsule by mouth 2 (Two) Times a Week.     Past Week    multivitamin with minerals tablet tablet Take 1 tablet by mouth Daily.          Xarelto 10 MG tablet TAKE 1 TABLET BY MOUTH DAILY WITH DINNER 90 tablet 3 6/16/2025         Scheduled Medication         Allergies:  Bee venom     Objective  Vital Signs  Temp:  [97.5 °F (36.4 °C)] 97.5 °F (36.4 °C)  Heart Rate:  [67-85] 85  Resp:  [14-21] 16  BP: (125-145)/() 136/95     Flowsheet Rows       Flowsheet Row First Filed Value   Admission Height 170.2 cm (67\") Documented at 06/19/2025 0733   Admission Weight 72.6 kg (160 lb) Documented at 06/19/2025 0733             170.2 cm (67\")     Physical Exam  Vitals and nursing note reviewed.   Constitutional:       Appearance: Normal appearance. He is not ill-appearing.   HENT:      Head: Normocephalic and atraumatic.   Eyes:      General: No scleral icterus.     Pupils: Pupils are equal, round, and reactive to light.   Cardiovascular:      Pulses: Normal pulses.      Heart sounds: Murmur heard.   Pulmonary:      Effort: Pulmonary effort is normal. No respiratory distress.      Breath sounds: Normal breath sounds.   Abdominal:      General: There is no distension.      Tenderness: There is no guarding.   Skin:     General: " Skin is warm and dry.   Neurological:      General: No focal deficit present.      Mental Status: He is alert and oriented to person, place, and time.            Results Review:   Lab Results (last 24 hours)         ** No results found for the last 24 hours. **                         Assessment & Plan    Coronary artery disease of native artery of native heart with stable angina pectoris    Abnormal stress test        Assessment & Plan     - Severe MV-CAD   - AAA (hx aorto iliac femoral right popliteal stent repair x2 with Dr. Archer) -- AAA measuring 5.3cm  - HTN  - HLD  - PAD  - Hx DVT  -right leg vein stripping        He is going to [place] to help his son.  He will be back on the 7/12.  Will plan on doing surgery on 7/15.  Patient takes Xarelto at home last dose of xarelto will be on 7/11.  Our office will contact with PAT and OR times.     Thank you for the opportunity to participate in this patient' care     Adelia Fregoso, TATI  06/19/25  12:10 EDT                ADDENDUM 7/14/2025:  Plan for Coronary Artery Bypass Grafting with MARCOS procedure on 7/15/2025 with Dr. Patino. Labs and diagnostics reviewed. H&P unchanged from above. Pre-operative testing listed below. Patient states last dose of Xarelto was on 7/11/2025. Patient instructed not to eat/drink/take any medications after midnight the night before surgery or morning of surgery. Patient verbalized understanding. Patient denies recent fever, rash, or lesion. Patient states he is taking polymyxin B sulfate and Trimethoprim eye drops for right conjunctivitis. Patient denies smoking or drinking. Spoke at length regarding surgery and preoperative course. All questions and concerns were addressed to patient satisfaction.    Pre-admission Labs:  ABG: pH 7.383, pCO2 34.4, pO2 74.6, HCO3 20.5, base excess -3.9  A1C: 6.10  Platelet aggregation: 97 (plt ct 168)  Lipids: WNL  Carotid duplex: PENDING  Vein mapping: PENDING. Patient has had known vein stripping of  the right leg  UA: WNL  CXR: WNL  EKG: WNL, SR    Review of Systems   Constitutional: Negative.   HENT: Negative.     Eyes:  Positive for redness.        Right eye conjunctivitis   Cardiovascular: Negative.    Respiratory: Negative.     Endocrine: Negative.    Hematologic/Lymphatic: Negative.    Skin: Negative.    Musculoskeletal: Negative.    Gastrointestinal: Negative.    Genitourinary: Negative.    Neurological: Negative.    Psychiatric/Behavioral: Negative.     Allergic/Immunologic: Negative.    All other systems reviewed and are negative.     Constitutional:       General: Awake.      Appearance: Normal and healthy appearance. Well-groomed and not in distress. Not ill-appearing.   Eyes:      General:         Right eye: No discharge.         Left eye: No discharge.      Pupils: Pupils are equal, round, and reactive to light.   Pulmonary:      Effort: Pulmonary effort is normal.      Breath sounds: Normal breath sounds.   Cardiovascular:      PMI at left midclavicular line. Normal rate. Regular rhythm.      Murmurs: There is no murmur.      No gallop.  No click. No rub.   Pulses:     Radial: 2+ bilaterally.  Abdominal:      General: Abdomen is flat. Bowel sounds are normal.   Musculoskeletal: Normal range of motion.      Cervical back: Normal range of motion and neck supple. Skin:     General: Skin is warm and dry.   Neurological:      General: No focal deficit present.      Mental Status: Alert and oriented to person, place, and time.   Psychiatric:         Attention and Perception: Attention normal.         Mood and Affect: Mood and affect normal.         Speech: Speech normal.         Behavior: Behavior normal. Behavior is cooperative.         Thought Content: Thought content normal.         Cognition and Memory: Cognition and memory normal.         Judgment: Judgment normal.        Maria Elena Patrick, TATI  7/14/2025

## 2025-07-14 NOTE — DISCHARGE INSTRUCTIONS
Take the following medications the morning of surgery with a small sip of water:  ONLY TAKE THE MEDICATION'S YOU ARE INSTRUCTED TO TAKE PER LEMUEL PALACIO'S NURSE    BE PREPARED THE DAY OF SURGERY TO TELL YOUR NURSE THE LAST DATE YOU TOOK EACH OF YOUR MEDICATION'S      If you are on an Aspirin or a Blood Thinner please clarify with the surgeon and prescribing physician if and when you are to hold the medication or if you are to continue the medication.  If you are on prescription narcotic pain medication to control your pain you may also take that medication the morning of surgery.    General Instructions:  Do not eat or drink anything after midnight the night before surgery.  Infants may have breast milk up to four hours before surgery.  Infants drinking formula may drink formula up to six hours before surgery.   Patients who avoid smoking, chewing tobacco and alcohol for 4 weeks prior to surgery have a reduced risk of post-operative complications.  Quit smoking as many days before surgery as you can.  Do not smoke, use chewing tobacco or drink alcohol the day of surgery.   If applicable bring your C-PAP/ BI-PAP machine in with you to preop day of surgery.  Bring any papers given to you in the doctor’s office.  Wear clean comfortable clothes.  Do not wear contact lenses, false eyelashes or make-up.  Bring a case for your glasses.   Bring crutches or walker if applicable.  Remove all piercings.  Leave jewelry and any other valuables at home.  Hair extensions with metal clips must be removed prior to surgery.  The Pre-Admission Testing nurse will instruct you to bring medications if unable to obtain an accurate list in Pre-Admission Testing.    Day of surgery you will need to let the preoperative nurse know the last time you took each of your medications.  To ensure a safe environment for patients and staff, we kindly ask that children under the age of 16 not accompany patients.  If you must bring a dependent child or  dependent adult please ensure a responsible adult, other than yourself, is present to supervise them.      If you were given a blood bank ID arm band remember to bring it with you the day of surgery.    Preventing a Surgical Site Infection:  For 2 to 3 days before surgery, avoid shaving with a razor because the razor can irritate skin and make it easier to develop an infection.    Any areas of open skin can increase the risk of a post-operative wound infection by allowing bacteria to enter and travel throughout the body.  Notify your surgeon if you have any skin wounds / rashes even if it is not near the expected surgical site.  The area will need assessed to determine if surgery should be delayed until it is healed.  The night prior to surgery shower using a fresh bar of anti-bacterial soap (such as Dial) and clean washcloth.  Sleep in a clean bed with clean clothing.  Do not allow pets to sleep with you.  Shower on the morning of surgery using a fresh bar of anti-bacterial soap (such as Dial) and clean washcloth.  Dry with a clean towel and dress in clean clothing.  Ask your surgeon if you will be receiving antibiotics prior to surgery.  Make sure you, your family, and all healthcare providers clean their hands with soap and water or an alcohol based hand  before caring for you or your wound.    Day of surgery:  Your arrival time is approximately two hours before your scheduled surgery time.  Please note if you have an early arrival time the surgery doors do not open before 5:00 AM.  Upon arrival, a Pre-op nurse and Anesthesiologist will review your health history, obtain vital signs, and answer questions you may have.  The only belongings needed at this time will be your home medications and if applicable your C-PAP/BI-PAP machine.  A Pre-op nurse will start an IV and you may receive medication in preparation for surgery, including something to help you relax.      Please be aware that surgery does come  with discomfort.  We want to make every effort to control your discomfort so please discuss any uncontrolled symptoms with your nurse.   Your doctor will most likely have prescribed pain medications.      If you are going home after surgery you will receive individualized written care instructions before being discharged.  A responsible adult must drive you to and from the hospital on the day of your surgery and ideally stay with you through the night.  Discharge prescriptions can be filled by the hospital pharmacy during regular pharmacy hours.  If you are having surgery late in the day/evening your prescription may be e-prescribed to your pharmacy.  Please verify your pharmacy hours or chose a 24 hour pharmacy to avoid not having access to your prescription because your pharmacy has closed for the day.    If you are staying overnight following surgery, you will be transported to your hospital room following the recovery period.  [facility] has all private rooms.    If you have any questions please call Pre-Admission Testing at [phone].  Deductibles and co-payments are collected on the day of service. Please be prepared to pay the required co-pay, deductible or deposit on the day of service as defined by your plan.    Call your surgeon immediately if you experience any of the following symptoms:  Sore Throat  Shortness of Breath or difficulty breathing  Cough  Chills  Body soreness or muscle pain  Headache  Fever  New loss of taste or smell  Do not arrive for your surgery ill.  Your procedure will need to be rescheduled to another time.  You will need to call your physician before the day of surgery to avoid any unnecessary exposure to hospital staff as well as other patients.            CHLORHEXIDINE CLOTH INSTRUCTIONS  The morning of surgery follow these instructions using the Chlorhexidine cloths you've been given.  These steps reduce bacteria on the body.  Do not use the cloths near your  eyes, ears mouth, genitalia or on open wounds.  Throw the cloths away after use but do not try to flush them down a toilet.      Open and remove one cloth at a time from the package.    Leave the cloth unfolded and begin the bathing.  Massage the skin with the cloths using gentle pressure to remove bacteria.  Do not scrub harshly.   Follow the steps below with one 2% CHG cloth per area (6 total cloths).  One cloth for neck, shoulders and chest.  One cloth for both arms, hands, fingers and underarms (do underarms last).  One cloth for the abdomen followed by groin.  One cloth for right leg and foot including between the toes.  One cloth for left leg and foot including between the toes.  The last cloth is to be used for the back of the neck, back and buttocks.    Allow the CHG to air dry 3 minutes on the skin which will give it time to work and decrease the chance of irritation.  The skin may feel sticky until it is dry.  Do not rinse with water or any other liquid or you will lose the beneficial effects of the CHG.  If mild skin irritation occurs, do rinse the skin to remove the CHG.  Report this to the nurse at time of admission.  Do not apply lotions, creams, ointments, deodorants or perfumes after using the clothes. Dress in clean clothes before coming to the hospital.    BACTROBAN NASAL OINTMENT  There are many germs normally in your nose. Bactroban is an ointment that will help reduce these germs. Please follow these instructions for Bactroban use:      ____The day before surgery in the morning  Date________    ____The day before surgery in the evening              Date________    ____The day of surgery in the morning    Date________    **Squirt ½ package of Bactroban Ointment onto a cotton applicator and apply to inside of 1st nostril.  Squirt the remaining Bactroban and apply to the inside of the other nostril.    PERIDEX- ORAL:  Use only if your surgeon has ordered  Use the night before and morning of surgery  - Swish, gargle, and spit - do not swallow.

## 2025-07-14 NOTE — ANESTHESIA PREPROCEDURE EVALUATION
Anesthesia Evaluation     history of anesthetic complications:  PONV, prolonged sedation  NPO Solid Status: > 8 hours             Airway   Mallampati: I  TM distance: >3 FB  Neck ROM: full  Dental - normal exam     Pulmonary - normal exam   Cardiovascular - normal exam    (+) hypertension, CAD, angina, PVD (abdominal aortic aneurysms, unruptured), DVT, hyperlipidemia      Neuro/Psych  GI/Hepatic/Renal/Endo    (+) GERD    Musculoskeletal     Abdominal    Substance History      OB/GYN          Other                      Anesthesia Plan    ASA 4     general, Jenni, CVL and PAC     (MARCOS    I have reviewed the patient's history with the patient and the chart, including all pertinent laboratory results and imaging. I have explained the risks of anesthesia including but not limited to dental damage, sore throat, nausea, corneal abrasion, nerve injury, MI, stroke, and death.  )  intravenous induction   Postoperative Plan: Expected vent after surgery  Anesthetic plan, risks, benefits, and alternatives have been provided, discussed and informed consent has been obtained with: patient.    CODE STATUS:

## 2025-07-15 ENCOUNTER — ANESTHESIA (OUTPATIENT)
Dept: PERIOP | Facility: HOSPITAL | Age: 80
End: 2025-07-15
Payer: MEDICARE

## 2025-07-15 ENCOUNTER — ANCILLARY PROCEDURE (OUTPATIENT)
Dept: PERIOP | Facility: HOSPITAL | Age: 80
DRG: 235 | End: 2025-07-15
Payer: MEDICARE

## 2025-07-15 ENCOUNTER — HOSPITAL ENCOUNTER (INPATIENT)
Facility: HOSPITAL | Age: 80
LOS: 10 days | Discharge: HOME | DRG: 235 | End: 2025-07-25
Attending: THORACIC SURGERY (CARDIOTHORACIC VASCULAR SURGERY) | Admitting: THORACIC SURGERY (CARDIOTHORACIC VASCULAR SURGERY)
Payer: MEDICARE

## 2025-07-15 ENCOUNTER — APPOINTMENT (OUTPATIENT)
Dept: GENERAL RADIOLOGY | Facility: HOSPITAL | Age: 80
DRG: 235 | End: 2025-07-15
Payer: MEDICARE

## 2025-07-15 DIAGNOSIS — I25.10 CORONARY ARTERY DISEASE INVOLVING NATIVE HEART, UNSPECIFIED VESSEL OR LESION TYPE, UNSPECIFIED WHETHER ANGINA PRESENT: ICD-10-CM

## 2025-07-15 DIAGNOSIS — Z95.1 S/P CABG (CORONARY ARTERY BYPASS GRAFT): ICD-10-CM

## 2025-07-15 DIAGNOSIS — R09.02 HYPOXIA: ICD-10-CM

## 2025-07-15 DIAGNOSIS — Z78.9 DECREASED ACTIVITIES OF DAILY LIVING (ADL): Primary | ICD-10-CM

## 2025-07-15 DIAGNOSIS — R93.1 ABNORMAL FINDINGS ON DIAGNOSTIC IMAGING OF HEART AND CORONARY CIRCULATION: ICD-10-CM

## 2025-07-15 LAB
ACT BLD: 124 SECONDS (ref 82–152)
ACT BLD: 141 SECONDS (ref 82–152)
ACT BLD: 452 SECONDS (ref 82–152)
ACT BLD: 619 SECONDS (ref 82–152)
ACT BLD: 734 SECONDS (ref 82–152)
ACT BLD: 803 SECONDS (ref 82–152)
ALBUMIN SERPL-MCNC: 4.1 G/DL (ref 3.5–5.2)
ALBUMIN SERPL-MCNC: 4.5 G/DL (ref 3.5–5.2)
ANION GAP SERPL CALCULATED.3IONS-SCNC: 11.3 MMOL/L (ref 5–15)
ANION GAP SERPL CALCULATED.3IONS-SCNC: 13.3 MMOL/L (ref 5–15)
APTT PPP: 36.7 SECONDS (ref 22.7–35.4)
ARTERIAL PATENCY WRIST A: ABNORMAL
ARTERIAL PATENCY WRIST A: ABNORMAL
ATMOSPHERIC PRESS: 746.5 MMHG
ATMOSPHERIC PRESS: 748 MMHG
BASE EXCESS BLDA CALC-SCNC: -1 MMOL/L (ref -5–5)
BASE EXCESS BLDA CALC-SCNC: -2.3 MMOL/L (ref 0–2)
BASE EXCESS BLDA CALC-SCNC: -3.7 MMOL/L (ref 0–2)
BASE EXCESS BLDA CALC-SCNC: -5 MMOL/L (ref -5–5)
BASE EXCESS BLDA CALC-SCNC: 0 MMOL/L (ref -5–5)
BASE EXCESS BLDA CALC-SCNC: 1 MMOL/L (ref -5–5)
BASE EXCESS BLDA CALC-SCNC: 1 MMOL/L (ref -5–5)
BASE EXCESS BLDA CALC-SCNC: 3 MMOL/L (ref -5–5)
BASOPHILS # BLD AUTO: 0.03 10*3/MM3 (ref 0–0.2)
BASOPHILS NFR BLD AUTO: 0.3 % (ref 0–1.5)
BDY SITE: ABNORMAL
BDY SITE: ABNORMAL
BUN SERPL-MCNC: 18 MG/DL (ref 8–23)
BUN SERPL-MCNC: 18 MG/DL (ref 8–23)
BUN/CREAT SERPL: 13.4 (ref 7–25)
BUN/CREAT SERPL: 13.8 (ref 7–25)
CA-I SERPL ISE-MCNC: 1.31 MMOL/L (ref 1.15–1.35)
CALCIUM SPEC-SCNC: 9 MG/DL (ref 8.6–10.5)
CALCIUM SPEC-SCNC: 9.8 MG/DL (ref 8.6–10.5)
CHLORIDE SERPL-SCNC: 109 MMOL/L (ref 98–107)
CHLORIDE SERPL-SCNC: 111 MMOL/L (ref 98–107)
CO2 BLDA-SCNC: 22 MMOL/L (ref 24–29)
CO2 BLDA-SCNC: 25 MMOL/L (ref 24–29)
CO2 BLDA-SCNC: 26 MMOL/L (ref 24–29)
CO2 BLDA-SCNC: 28 MMOL/L (ref 24–29)
CO2 BLDA-SCNC: 28 MMOL/L (ref 24–29)
CO2 BLDA-SCNC: 29 MMOL/L (ref 24–29)
CO2 SERPL-SCNC: 18.7 MMOL/L (ref 22–29)
CO2 SERPL-SCNC: 21.7 MMOL/L (ref 22–29)
CREAT SERPL-MCNC: 1.3 MG/DL (ref 0.76–1.27)
CREAT SERPL-MCNC: 1.34 MG/DL (ref 0.76–1.27)
DEPRECATED RDW RBC AUTO: 39.8 FL (ref 37–54)
DEPRECATED RDW RBC AUTO: 40.9 FL (ref 37–54)
DEVICE COMMENT: ABNORMAL
DEVICE COMMENT: ABNORMAL
EGFRCR SERPLBLD CKD-EPI 2021: 54.2 ML/MIN/1.73
EGFRCR SERPLBLD CKD-EPI 2021: 56.2 ML/MIN/1.73
EOSINOPHIL # BLD AUTO: 0.06 10*3/MM3 (ref 0–0.4)
EOSINOPHIL NFR BLD AUTO: 0.6 % (ref 0.3–6.2)
ERYTHROCYTE [DISTWIDTH] IN BLOOD BY AUTOMATED COUNT: 11.9 % (ref 12.3–15.4)
ERYTHROCYTE [DISTWIDTH] IN BLOOD BY AUTOMATED COUNT: 12.2 % (ref 12.3–15.4)
FIBRINOGEN PPP-MCNC: 268 MG/DL (ref 219–464)
FIBRINOGEN PPP-MCNC: 277 MG/DL (ref 219–464)
GLUCOSE BLDC GLUCOMTR-MCNC: 107 MG/DL (ref 70–130)
GLUCOSE BLDC GLUCOMTR-MCNC: 108 MG/DL (ref 70–130)
GLUCOSE BLDC GLUCOMTR-MCNC: 111 MG/DL (ref 70–130)
GLUCOSE BLDC GLUCOMTR-MCNC: 112 MG/DL (ref 70–130)
GLUCOSE BLDC GLUCOMTR-MCNC: 114 MG/DL (ref 70–130)
GLUCOSE BLDC GLUCOMTR-MCNC: 114 MG/DL (ref 70–130)
GLUCOSE BLDC GLUCOMTR-MCNC: 122 MG/DL (ref 70–130)
GLUCOSE BLDC GLUCOMTR-MCNC: 134 MG/DL (ref 70–130)
GLUCOSE BLDC GLUCOMTR-MCNC: 97 MG/DL (ref 70–130)
GLUCOSE BLDC GLUCOMTR-MCNC: 97 MG/DL (ref 70–130)
GLUCOSE BLDC GLUCOMTR-MCNC: 99 MG/DL (ref 70–130)
GLUCOSE SERPL-MCNC: 113 MG/DL (ref 65–99)
GLUCOSE SERPL-MCNC: 135 MG/DL (ref 65–99)
HCO3 BLDA-SCNC: 20.9 MMOL/L (ref 22–26)
HCO3 BLDA-SCNC: 21.5 MMOL/L (ref 22–28)
HCO3 BLDA-SCNC: 21.7 MMOL/L (ref 22–28)
HCO3 BLDA-SCNC: 24.3 MMOL/L (ref 22–26)
HCO3 BLDA-SCNC: 25 MMOL/L (ref 22–26)
HCO3 BLDA-SCNC: 26.3 MMOL/L (ref 22–26)
HCO3 BLDA-SCNC: 26.8 MMOL/L (ref 22–26)
HCO3 BLDA-SCNC: 27.3 MMOL/L (ref 22–26)
HCT VFR BLD AUTO: 30.2 % (ref 37.5–51)
HCT VFR BLD AUTO: 34.1 % (ref 37.5–51)
HCT VFR BLDA CALC: 27 % (ref 38–51)
HCT VFR BLDA CALC: 28 % (ref 38–51)
HCT VFR BLDA CALC: 28 % (ref 38–51)
HCT VFR BLDA CALC: 29 % (ref 38–51)
HCT VFR BLDA CALC: 31 % (ref 38–51)
HCT VFR BLDA CALC: 34 % (ref 38–51)
HEMODILUTION: NO
HEMODILUTION: NO
HGB BLD-MCNC: 10.3 G/DL (ref 13–17.7)
HGB BLD-MCNC: 11.7 G/DL (ref 13–17.7)
HGB BLDA-MCNC: 10.5 G/DL (ref 12–17)
HGB BLDA-MCNC: 11.6 G/DL (ref 12–17)
HGB BLDA-MCNC: 9.2 G/DL (ref 12–17)
HGB BLDA-MCNC: 9.5 G/DL (ref 12–17)
HGB BLDA-MCNC: 9.5 G/DL (ref 12–17)
HGB BLDA-MCNC: 9.9 G/DL (ref 12–17)
IMM GRANULOCYTES # BLD AUTO: 0.08 10*3/MM3 (ref 0–0.05)
IMM GRANULOCYTES NFR BLD AUTO: 0.7 % (ref 0–0.5)
INHALED O2 CONCENTRATION: 100 %
INHALED O2 CONCENTRATION: 40 %
INR PPP: 1.68 (ref 0.9–1.1)
LYMPHOCYTES # BLD AUTO: 0.83 10*3/MM3 (ref 0.7–3.1)
LYMPHOCYTES NFR BLD AUTO: 7.7 % (ref 19.6–45.3)
MAGNESIUM SERPL-MCNC: 2.5 MG/DL (ref 1.6–2.4)
MAGNESIUM SERPL-MCNC: 2.7 MG/DL (ref 1.6–2.4)
MCH RBC QN AUTO: 31.4 PG (ref 26.6–33)
MCH RBC QN AUTO: 31.7 PG (ref 26.6–33)
MCHC RBC AUTO-ENTMCNC: 34.1 G/DL (ref 31.5–35.7)
MCHC RBC AUTO-ENTMCNC: 34.3 G/DL (ref 31.5–35.7)
MCV RBC AUTO: 92.1 FL (ref 79–97)
MCV RBC AUTO: 92.4 FL (ref 79–97)
MODALITY: ABNORMAL
MODALITY: ABNORMAL
MONOCYTES # BLD AUTO: 0.29 10*3/MM3 (ref 0.1–0.9)
MONOCYTES NFR BLD AUTO: 2.7 % (ref 5–12)
NEUTROPHILS NFR BLD AUTO: 88 % (ref 42.7–76)
NEUTROPHILS NFR BLD AUTO: 9.52 10*3/MM3 (ref 1.7–7)
NRBC BLD AUTO-RTO: 0 /100 WBC (ref 0–0.2)
O2 A-A PPRESDIFF RESPIRATORY: 0.3 MMHG
O2 A-A PPRESDIFF RESPIRATORY: 0.4 MMHG
PCO2 BLDA: 33 MM HG (ref 35–45)
PCO2 BLDA: 39.6 MM HG (ref 35–45)
PCO2 BLDA: 40.3 MM HG (ref 35–45)
PCO2 BLDA: 40.4 MM HG (ref 35–45)
PCO2 BLDA: 41.7 MM HG (ref 35–45)
PCO2 BLDA: 42.1 MM HG (ref 35–45)
PCO2 BLDA: 44 MM HG (ref 35–45)
PCO2 BLDA: 47.7 MM HG (ref 35–45)
PEEP RESPIRATORY: 8 CM[H2O]
PEEP RESPIRATORY: 8 CM[H2O]
PH BLDA: 7.32 PH UNITS (ref 7.35–7.6)
PH BLDA: 7.35 PH UNITS (ref 7.35–7.45)
PH BLDA: 7.39 PH UNITS (ref 7.35–7.6)
PH BLDA: 7.39 PH UNITS (ref 7.35–7.6)
PH BLDA: 7.41 PH UNITS (ref 7.35–7.6)
PH BLDA: 7.41 PH UNITS (ref 7.35–7.6)
PH BLDA: 7.42 PH UNITS (ref 7.35–7.45)
PH BLDA: 7.46 PH UNITS (ref 7.35–7.6)
PHOSPHATE SERPL-MCNC: 3.3 MG/DL (ref 2.5–4.5)
PHOSPHATE SERPL-MCNC: 4.4 MG/DL (ref 2.5–4.5)
PLATELET # BLD AUTO: 100 10*3/MM3 (ref 140–450)
PLATELET # BLD AUTO: 106 10*3/MM3 (ref 140–450)
PLATELET # BLD AUTO: 81 10*3/MM3 (ref 140–450)
PMV BLD AUTO: 9 FL (ref 6–12)
PMV BLD AUTO: 9.7 FL (ref 6–12)
PO2 BLD: 246 MM[HG] (ref 0–500)
PO2 BLD: 268 MM[HG] (ref 0–500)
PO2 BLDA: 107.2 MM HG (ref 80–100)
PO2 BLDA: 245.9 MM HG (ref 80–100)
PO2 BLDA: 299 MMHG (ref 80–105)
PO2 BLDA: 434 MMHG (ref 80–105)
PO2 BLDA: 446 MMHG (ref 80–105)
PO2 BLDA: 456 MMHG (ref 80–105)
PO2 BLDA: 62 MMHG (ref 80–105)
PO2 BLDA: 73 MMHG (ref 80–105)
POTASSIUM BLDA-SCNC: 3.5 MMOL/L (ref 3.5–4.9)
POTASSIUM BLDA-SCNC: 4.2 MMOL/L (ref 3.5–4.9)
POTASSIUM BLDA-SCNC: 4.5 MMOL/L (ref 3.5–4.9)
POTASSIUM BLDA-SCNC: 4.5 MMOL/L (ref 3.5–4.9)
POTASSIUM BLDA-SCNC: 4.6 MMOL/L (ref 3.5–4.9)
POTASSIUM BLDA-SCNC: 4.7 MMOL/L (ref 3.5–4.9)
POTASSIUM SERPL-SCNC: 4.1 MMOL/L (ref 3.5–5.2)
POTASSIUM SERPL-SCNC: 4.6 MMOL/L (ref 3.5–5.2)
PROTHROMBIN TIME: 19.9 SECONDS (ref 11.7–14.2)
PSV: 8 CMH2O
QT INTERVAL: 372 MS
QTC INTERVAL: 445 MS
RBC # BLD AUTO: 3.28 10*6/MM3 (ref 4.14–5.8)
RBC # BLD AUTO: 3.69 10*6/MM3 (ref 4.14–5.8)
SAO2 % BLDA: 100 % (ref 95–98)
SAO2 % BLDA: 90 % (ref 95–98)
SAO2 % BLDA: 93 % (ref 95–98)
SAO2 % BLDCOA: 97.9 % (ref 92–98.5)
SAO2 % BLDCOA: 99.9 % (ref 92–98.5)
SET MECH RESP RATE: 14
SODIUM SERPL-SCNC: 142 MMOL/L (ref 136–145)
SODIUM SERPL-SCNC: 143 MMOL/L (ref 136–145)
TOTAL RATE: 14 BREATHS/MINUTE
TOTAL RATE: 20 BREATHS/MINUTE
VENTILATOR MODE: ABNORMAL
VENTILATOR MODE: ABNORMAL
VT ON VENT VENT: 685 ML
VT ON VENT VENT: 700 ML
WBC NRBC COR # BLD AUTO: 10.81 10*3/MM3 (ref 3.4–10.8)
WBC NRBC COR # BLD AUTO: 11.22 10*3/MM3 (ref 3.4–10.8)

## 2025-07-15 PROCEDURE — 25010000002 CALCIUM CHLORIDE 10 % SOLUTION: Performed by: ANESTHESIOLOGY

## 2025-07-15 PROCEDURE — 25010000002 PAPAVERINE PER 60 MG: Performed by: THORACIC SURGERY (CARDIOTHORACIC VASCULAR SURGERY)

## 2025-07-15 PROCEDURE — 82947 ASSAY GLUCOSE BLOOD QUANT: CPT

## 2025-07-15 PROCEDURE — C1751 CATH, INF, PER/CENT/MIDLINE: HCPCS | Performed by: ANESTHESIOLOGY

## 2025-07-15 PROCEDURE — 25010000002 CEFAZOLIN PER 500 MG

## 2025-07-15 PROCEDURE — 25010000002 ALBUMIN HUMAN 5% PER 50 ML

## 2025-07-15 PROCEDURE — 80069 RENAL FUNCTION PANEL: CPT

## 2025-07-15 PROCEDURE — 25010000002 FENTANYL CITRATE (PF) 50 MCG/ML SOLUTION: Performed by: ANESTHESIOLOGY

## 2025-07-15 PROCEDURE — 94799 UNLISTED PULMONARY SVC/PX: CPT

## 2025-07-15 PROCEDURE — 94761 N-INVAS EAR/PLS OXIMETRY MLT: CPT

## 2025-07-15 PROCEDURE — 33522 CABG ARTERY-VEIN FIVE: CPT | Performed by: SPECIALIST/TECHNOLOGIST, OTHER

## 2025-07-15 PROCEDURE — 02100Z9 BYPASS CORONARY ARTERY, ONE ARTERY FROM LEFT INTERNAL MAMMARY, OPEN APPROACH: ICD-10-PCS | Performed by: THORACIC SURGERY (CARDIOTHORACIC VASCULAR SURGERY)

## 2025-07-15 PROCEDURE — 25010000002 AMINOCAPROIC ACID PER 5 G: Performed by: ANESTHESIOLOGY

## 2025-07-15 PROCEDURE — 33533 CABG ARTERIAL SINGLE: CPT | Performed by: THORACIC SURGERY (CARDIOTHORACIC VASCULAR SURGERY)

## 2025-07-15 PROCEDURE — 85049 AUTOMATED PLATELET COUNT: CPT | Performed by: THORACIC SURGERY (CARDIOTHORACIC VASCULAR SURGERY)

## 2025-07-15 PROCEDURE — C1889 IMPLANT/INSERT DEVICE, NOC: HCPCS | Performed by: THORACIC SURGERY (CARDIOTHORACIC VASCULAR SURGERY)

## 2025-07-15 PROCEDURE — 25810000003 SODIUM CHLORIDE 0.9 % SOLUTION 250 ML FLEX CONT: Performed by: ANESTHESIOLOGY

## 2025-07-15 PROCEDURE — 85610 PROTHROMBIN TIME: CPT

## 2025-07-15 PROCEDURE — 0W9D0ZZ DRAINAGE OF PERICARDIAL CAVITY, OPEN APPROACH: ICD-10-PCS | Performed by: THORACIC SURGERY (CARDIOTHORACIC VASCULAR SURGERY)

## 2025-07-15 PROCEDURE — 33533 CABG ARTERIAL SINGLE: CPT | Performed by: SPECIALIST/TECHNOLOGIST, OTHER

## 2025-07-15 PROCEDURE — 93318 ECHO TRANSESOPHAGEAL INTRAOP: CPT | Performed by: ANESTHESIOLOGY

## 2025-07-15 PROCEDURE — 25010000002 NICARDIPINE 2.5 MG/ML SOLUTION 10 ML VIAL: Performed by: ANESTHESIOLOGY

## 2025-07-15 PROCEDURE — 33268 EXCL LAA OPN OTH PX ANY METH: CPT | Performed by: THORACIC SURGERY (CARDIOTHORACIC VASCULAR SURGERY)

## 2025-07-15 PROCEDURE — 25010000002 MAGNESIUM SULFATE IN D5W 1G/100ML (PREMIX) 1-5 GM/100ML-% SOLUTION

## 2025-07-15 PROCEDURE — 33522 CABG ARTERY-VEIN FIVE: CPT | Performed by: THORACIC SURGERY (CARDIOTHORACIC VASCULAR SURGERY)

## 2025-07-15 PROCEDURE — 85018 HEMOGLOBIN: CPT

## 2025-07-15 PROCEDURE — 5A1221Z PERFORMANCE OF CARDIAC OUTPUT, CONTINUOUS: ICD-10-PCS | Performed by: THORACIC SURGERY (CARDIOTHORACIC VASCULAR SURGERY)

## 2025-07-15 PROCEDURE — A4648 IMPLANTABLE TISSUE MARKER: HCPCS | Performed by: THORACIC SURGERY (CARDIOTHORACIC VASCULAR SURGERY)

## 2025-07-15 PROCEDURE — 85384 FIBRINOGEN ACTIVITY: CPT

## 2025-07-15 PROCEDURE — 93005 ELECTROCARDIOGRAM TRACING: CPT

## 2025-07-15 PROCEDURE — 33508 ENDOSCOPIC VEIN HARVEST: CPT | Performed by: THORACIC SURGERY (CARDIOTHORACIC VASCULAR SURGERY)

## 2025-07-15 PROCEDURE — 25010000002 HEPARIN (PORCINE) PER 1000 UNITS: Performed by: THORACIC SURGERY (CARDIOTHORACIC VASCULAR SURGERY)

## 2025-07-15 PROCEDURE — 94760 N-INVAS EAR/PLS OXIMETRY 1: CPT

## 2025-07-15 PROCEDURE — 99291 CRITICAL CARE FIRST HOUR: CPT | Performed by: ANESTHESIOLOGY

## 2025-07-15 PROCEDURE — 25010000002 PROPOFOL 10 MG/ML EMULSION: Performed by: ANESTHESIOLOGY

## 2025-07-15 PROCEDURE — 25010000002 LIDOCAINE 2% SOLUTION: Performed by: ANESTHESIOLOGY

## 2025-07-15 PROCEDURE — 82803 BLOOD GASES ANY COMBINATION: CPT

## 2025-07-15 PROCEDURE — 85027 COMPLETE CBC AUTOMATED: CPT

## 2025-07-15 PROCEDURE — 25010000002 HEPARIN (PORCINE) PER 1000 UNITS: Performed by: ANESTHESIOLOGY

## 2025-07-15 PROCEDURE — 33268 EXCL LAA OPN OTH PX ANY METH: CPT | Performed by: SPECIALIST/TECHNOLOGIST, OTHER

## 2025-07-15 PROCEDURE — 25010000002 MIDAZOLAM PER 1 MG: Performed by: ANESTHESIOLOGY

## 2025-07-15 PROCEDURE — 71045 X-RAY EXAM CHEST 1 VIEW: CPT

## 2025-07-15 PROCEDURE — 25010000002 CEFAZOLIN PER 500 MG: Performed by: NURSE PRACTITIONER

## 2025-07-15 PROCEDURE — 06BQ4ZZ EXCISION OF LEFT SAPHENOUS VEIN, PERCUTANEOUS ENDOSCOPIC APPROACH: ICD-10-PCS | Performed by: THORACIC SURGERY (CARDIOTHORACIC VASCULAR SURGERY)

## 2025-07-15 PROCEDURE — 25010000003 PROTAMINE SULFATE PER 10 MG: Performed by: THORACIC SURGERY (CARDIOTHORACIC VASCULAR SURGERY)

## 2025-07-15 PROCEDURE — C1729 CATH, DRAINAGE: HCPCS | Performed by: THORACIC SURGERY (CARDIOTHORACIC VASCULAR SURGERY)

## 2025-07-15 PROCEDURE — C1713 ANCHOR/SCREW BN/BN,TIS/BN: HCPCS | Performed by: THORACIC SURGERY (CARDIOTHORACIC VASCULAR SURGERY)

## 2025-07-15 PROCEDURE — 25010000002 NICARDIPINE 2.5 MG/ML SOLUTION: Performed by: ANESTHESIOLOGY

## 2025-07-15 PROCEDURE — 25010000002 METOCLOPRAMIDE PER 10 MG

## 2025-07-15 PROCEDURE — 82330 ASSAY OF CALCIUM: CPT

## 2025-07-15 PROCEDURE — 83735 ASSAY OF MAGNESIUM: CPT

## 2025-07-15 PROCEDURE — 82948 REAGENT STRIP/BLOOD GLUCOSE: CPT

## 2025-07-15 PROCEDURE — 25010000002 METHADONE PER 10 MG: Performed by: ANESTHESIOLOGY

## 2025-07-15 PROCEDURE — 85384 FIBRINOGEN ACTIVITY: CPT | Performed by: THORACIC SURGERY (CARDIOTHORACIC VASCULAR SURGERY)

## 2025-07-15 PROCEDURE — 25010000002 ACETAMINOPHEN 10 MG/ML SOLUTION

## 2025-07-15 PROCEDURE — 85025 COMPLETE CBC W/AUTO DIFF WBC: CPT

## 2025-07-15 PROCEDURE — 85730 THROMBOPLASTIN TIME PARTIAL: CPT

## 2025-07-15 PROCEDURE — 93010 ELECTROCARDIOGRAM REPORT: CPT | Performed by: INTERNAL MEDICINE

## 2025-07-15 PROCEDURE — 021309W BYPASS CORONARY ARTERY, FOUR OR MORE ARTERIES FROM AORTA WITH AUTOLOGOUS VENOUS TISSUE, OPEN APPROACH: ICD-10-PCS | Performed by: THORACIC SURGERY (CARDIOTHORACIC VASCULAR SURGERY)

## 2025-07-15 PROCEDURE — 94002 VENT MGMT INPAT INIT DAY: CPT

## 2025-07-15 PROCEDURE — 85347 COAGULATION TIME ACTIVATED: CPT

## 2025-07-15 PROCEDURE — 25010000002 FAMOTIDINE 10 MG/ML SOLUTION: Performed by: ANESTHESIOLOGY

## 2025-07-15 PROCEDURE — 85014 HEMATOCRIT: CPT

## 2025-07-15 PROCEDURE — 33508 ENDOSCOPIC VEIN HARVEST: CPT | Performed by: SPECIALIST/TECHNOLOGIST, OTHER

## 2025-07-15 PROCEDURE — 99221 1ST HOSP IP/OBS SF/LOW 40: CPT | Performed by: SURGERY

## 2025-07-15 PROCEDURE — P9041 ALBUMIN (HUMAN),5%, 50ML: HCPCS

## 2025-07-15 PROCEDURE — 02L70CK OCCLUSION OF LEFT ATRIAL APPENDAGE WITH EXTRALUMINAL DEVICE, OPEN APPROACH: ICD-10-PCS | Performed by: THORACIC SURGERY (CARDIOTHORACIC VASCULAR SURGERY)

## 2025-07-15 DEVICE — SS SUTURE, 3 PER SLEEVE
Type: IMPLANTABLE DEVICE | Site: STERNUM | Status: FUNCTIONAL
Brand: MYO/WIRE II

## 2025-07-15 DEVICE — SS SUTURE, 4 PER SLEEVE
Type: IMPLANTABLE DEVICE | Site: STERNUM | Status: FUNCTIONAL
Brand: MYO/WIRE II

## 2025-07-15 DEVICE — APPL CLIP LAA ATRICLIP FLXV 40MM: Type: IMPLANTABLE DEVICE | Site: HEART | Status: FUNCTIONAL

## 2025-07-15 RX ORDER — MAGNESIUM SULFATE 1 G/100ML
1 INJECTION INTRAVENOUS EVERY 8 HOURS
Status: DISPENSED | OUTPATIENT
Start: 2025-07-15 | End: 2025-07-16

## 2025-07-15 RX ORDER — ASPIRIN 81 MG/1
81 TABLET ORAL DAILY
Status: DISCONTINUED | OUTPATIENT
Start: 2025-07-16 | End: 2025-07-19

## 2025-07-15 RX ORDER — MIDAZOLAM HYDROCHLORIDE 1 MG/ML
0.5 INJECTION, SOLUTION INTRAMUSCULAR; INTRAVENOUS
Status: DISCONTINUED | OUTPATIENT
Start: 2025-07-15 | End: 2025-07-15 | Stop reason: HOSPADM

## 2025-07-15 RX ORDER — SODIUM CHLORIDE, SODIUM LACTATE, POTASSIUM CHLORIDE, CALCIUM CHLORIDE 600; 310; 30; 20 MG/100ML; MG/100ML; MG/100ML; MG/100ML
9 INJECTION, SOLUTION INTRAVENOUS CONTINUOUS
Status: ACTIVE | OUTPATIENT
Start: 2025-07-15 | End: 2025-07-16

## 2025-07-15 RX ORDER — PANTOPRAZOLE SODIUM 40 MG/10ML
40 INJECTION, POWDER, LYOPHILIZED, FOR SOLUTION INTRAVENOUS ONCE
Status: COMPLETED | OUTPATIENT
Start: 2025-07-15 | End: 2025-07-15

## 2025-07-15 RX ORDER — NALOXONE HCL 0.4 MG/ML
0.4 VIAL (ML) INJECTION
Status: DISCONTINUED | OUTPATIENT
Start: 2025-07-15 | End: 2025-07-16

## 2025-07-15 RX ORDER — MEPERIDINE HYDROCHLORIDE 25 MG/ML
25 INJECTION INTRAMUSCULAR; INTRAVENOUS; SUBCUTANEOUS EVERY 4 HOURS PRN
Status: DISCONTINUED | OUTPATIENT
Start: 2025-07-15 | End: 2025-07-16

## 2025-07-15 RX ORDER — ACETAMINOPHEN 650 MG/1
650 SUPPOSITORY RECTAL EVERY 4 HOURS PRN
Status: DISCONTINUED | OUTPATIENT
Start: 2025-07-16 | End: 2025-07-25 | Stop reason: HOSPADM

## 2025-07-15 RX ORDER — ACETAMINOPHEN 10 MG/ML
1000 INJECTION, SOLUTION INTRAVENOUS EVERY 8 HOURS
Status: COMPLETED | OUTPATIENT
Start: 2025-07-15 | End: 2025-07-16

## 2025-07-15 RX ORDER — LIDOCAINE HYDROCHLORIDE 10 MG/ML
0.5 INJECTION, SOLUTION INFILTRATION; PERINEURAL ONCE AS NEEDED
Status: DISCONTINUED | OUTPATIENT
Start: 2025-07-15 | End: 2025-07-15 | Stop reason: HOSPADM

## 2025-07-15 RX ORDER — PAPAVERINE HYDROCHLORIDE 30 MG/ML
INJECTION INTRAMUSCULAR; INTRAVENOUS AS NEEDED
Status: DISCONTINUED | OUTPATIENT
Start: 2025-07-15 | End: 2025-07-15 | Stop reason: HOSPADM

## 2025-07-15 RX ORDER — MIDAZOLAM HYDROCHLORIDE 1 MG/ML
INJECTION, SOLUTION INTRAMUSCULAR; INTRAVENOUS
Status: COMPLETED | OUTPATIENT
Start: 2025-07-15 | End: 2025-07-15

## 2025-07-15 RX ORDER — FAMOTIDINE 10 MG/ML
20 INJECTION, SOLUTION INTRAVENOUS ONCE
Status: COMPLETED | OUTPATIENT
Start: 2025-07-15 | End: 2025-07-15

## 2025-07-15 RX ORDER — DEXMEDETOMIDINE HYDROCHLORIDE 4 UG/ML
.2-1.5 INJECTION, SOLUTION INTRAVENOUS
Status: DISCONTINUED | OUTPATIENT
Start: 2025-07-15 | End: 2025-07-16

## 2025-07-15 RX ORDER — ACETAMINOPHEN 500 MG
1000 TABLET ORAL ONCE
Status: COMPLETED | OUTPATIENT
Start: 2025-07-15 | End: 2025-07-15

## 2025-07-15 RX ORDER — BISACODYL 5 MG/1
10 TABLET, DELAYED RELEASE ORAL DAILY PRN
Status: DISCONTINUED | OUTPATIENT
Start: 2025-07-15 | End: 2025-07-25 | Stop reason: HOSPADM

## 2025-07-15 RX ORDER — ENOXAPARIN SODIUM 100 MG/ML
40 INJECTION SUBCUTANEOUS DAILY
Status: DISCONTINUED | OUTPATIENT
Start: 2025-07-16 | End: 2025-07-21

## 2025-07-15 RX ORDER — SODIUM CHLORIDE 0.9 % (FLUSH) 0.9 %
3-10 SYRINGE (ML) INJECTION AS NEEDED
Status: DISCONTINUED | OUTPATIENT
Start: 2025-07-15 | End: 2025-07-15 | Stop reason: HOSPADM

## 2025-07-15 RX ORDER — MORPHINE SULFATE 2 MG/ML
1 INJECTION, SOLUTION INTRAMUSCULAR; INTRAVENOUS EVERY 4 HOURS PRN
Status: DISCONTINUED | OUTPATIENT
Start: 2025-07-15 | End: 2025-07-16

## 2025-07-15 RX ORDER — SODIUM CHLORIDE 0.9 % (FLUSH) 0.9 %
3 SYRINGE (ML) INJECTION EVERY 12 HOURS SCHEDULED
Status: DISCONTINUED | OUTPATIENT
Start: 2025-07-15 | End: 2025-07-15 | Stop reason: HOSPADM

## 2025-07-15 RX ORDER — NICARDIPINE HYDROCHLORIDE 2.5 MG/ML
INJECTION INTRAVENOUS AS NEEDED
Status: DISCONTINUED | OUTPATIENT
Start: 2025-07-15 | End: 2025-07-15 | Stop reason: SURG

## 2025-07-15 RX ORDER — HYDROCODONE BITARTRATE AND ACETAMINOPHEN 5; 325 MG/1; MG/1
2 TABLET ORAL EVERY 4 HOURS PRN
Status: DISCONTINUED | OUTPATIENT
Start: 2025-07-15 | End: 2025-07-25 | Stop reason: HOSPADM

## 2025-07-15 RX ORDER — BUPIVACAINE HCL/0.9 % NACL/PF 0.125 %
PLASTIC BAG, INJECTION (ML) EPIDURAL AS NEEDED
Status: DISCONTINUED | OUTPATIENT
Start: 2025-07-15 | End: 2025-07-15 | Stop reason: SURG

## 2025-07-15 RX ORDER — CYCLOBENZAPRINE HCL 10 MG
10 TABLET ORAL EVERY 8 HOURS PRN
Status: DISCONTINUED | OUTPATIENT
Start: 2025-07-16 | End: 2025-07-16

## 2025-07-15 RX ORDER — HEPARIN SODIUM 1000 [USP'U]/ML
INJECTION, SOLUTION INTRAVENOUS; SUBCUTANEOUS AS NEEDED
Status: DISCONTINUED | OUTPATIENT
Start: 2025-07-15 | End: 2025-07-15 | Stop reason: SURG

## 2025-07-15 RX ORDER — DOPAMINE HYDROCHLORIDE 160 MG/100ML
2-20 INJECTION, SOLUTION INTRAVENOUS CONTINUOUS PRN
Status: DISCONTINUED | OUTPATIENT
Start: 2025-07-15 | End: 2025-07-22

## 2025-07-15 RX ORDER — PANTOPRAZOLE SODIUM 40 MG/1
40 TABLET, DELAYED RELEASE ORAL
Status: COMPLETED | OUTPATIENT
Start: 2025-07-16 | End: 2025-07-18

## 2025-07-15 RX ORDER — MILRINONE LACTATE 0.2 MG/ML
.25-.75 INJECTION, SOLUTION INTRAVENOUS CONTINUOUS PRN
Status: DISCONTINUED | OUTPATIENT
Start: 2025-07-15 | End: 2025-07-16

## 2025-07-15 RX ORDER — DEXTROSE MONOHYDRATE 25 G/50ML
10-50 INJECTION, SOLUTION INTRAVENOUS
Status: DISCONTINUED | OUTPATIENT
Start: 2025-07-15 | End: 2025-07-16

## 2025-07-15 RX ORDER — CHLORHEXIDINE GLUCONATE 500 MG/1
1 CLOTH TOPICAL EVERY 12 HOURS PRN
Status: DISCONTINUED | OUTPATIENT
Start: 2025-07-15 | End: 2025-07-15

## 2025-07-15 RX ORDER — SODIUM CHLORIDE 9 MG/ML
INJECTION, SOLUTION INTRAVENOUS CONTINUOUS PRN
Status: DISCONTINUED | OUTPATIENT
Start: 2025-07-15 | End: 2025-07-15 | Stop reason: SURG

## 2025-07-15 RX ORDER — AMOXICILLIN 250 MG
2 CAPSULE ORAL 2 TIMES DAILY
Status: DISCONTINUED | OUTPATIENT
Start: 2025-07-15 | End: 2025-07-25 | Stop reason: HOSPADM

## 2025-07-15 RX ORDER — SODIUM CHLORIDE 9 MG/ML
30 INJECTION, SOLUTION INTRAVENOUS CONTINUOUS
Status: ACTIVE | OUTPATIENT
Start: 2025-07-15 | End: 2025-07-17

## 2025-07-15 RX ORDER — FAMOTIDINE 10 MG/ML
20 INJECTION, SOLUTION INTRAVENOUS ONCE
Status: DISCONTINUED | OUTPATIENT
Start: 2025-07-15 | End: 2025-07-15 | Stop reason: HOSPADM

## 2025-07-15 RX ORDER — FENTANYL CITRATE 50 UG/ML
50 INJECTION, SOLUTION INTRAMUSCULAR; INTRAVENOUS ONCE AS NEEDED
Status: DISCONTINUED | OUTPATIENT
Start: 2025-07-15 | End: 2025-07-15 | Stop reason: HOSPADM

## 2025-07-15 RX ORDER — CHLORHEXIDINE GLUCONATE ORAL RINSE 1.2 MG/ML
15 SOLUTION DENTAL ONCE
Status: DISCONTINUED | OUTPATIENT
Start: 2025-07-15 | End: 2025-07-15 | Stop reason: HOSPADM

## 2025-07-15 RX ORDER — METOCLOPRAMIDE HYDROCHLORIDE 5 MG/ML
10 INJECTION INTRAMUSCULAR; INTRAVENOUS EVERY 6 HOURS
Status: COMPLETED | OUTPATIENT
Start: 2025-07-15 | End: 2025-07-16

## 2025-07-15 RX ORDER — PROTAMINE SULFATE 10 MG/ML
INJECTION, SOLUTION INTRAVENOUS AS NEEDED
Status: DISCONTINUED | OUTPATIENT
Start: 2025-07-15 | End: 2025-07-15 | Stop reason: HOSPADM

## 2025-07-15 RX ORDER — AMINOCAPROIC ACID 250 MG/ML
INJECTION, SOLUTION INTRAVENOUS AS NEEDED
Status: DISCONTINUED | OUTPATIENT
Start: 2025-07-15 | End: 2025-07-15 | Stop reason: SURG

## 2025-07-15 RX ORDER — PHENYLEPHRINE HCL IN 0.9% NACL 0.5 MG/5ML
.2-3 SYRINGE (ML) INTRAVENOUS CONTINUOUS PRN
Status: DISCONTINUED | OUTPATIENT
Start: 2025-07-15 | End: 2025-07-22

## 2025-07-15 RX ORDER — ALPRAZOLAM 0.25 MG
0.25 TABLET ORAL EVERY 8 HOURS PRN
Status: DISCONTINUED | OUTPATIENT
Start: 2025-07-15 | End: 2025-07-25 | Stop reason: HOSPADM

## 2025-07-15 RX ORDER — EPHEDRINE SULFATE 50 MG/ML
INJECTION INTRAVENOUS AS NEEDED
Status: DISCONTINUED | OUTPATIENT
Start: 2025-07-15 | End: 2025-07-15 | Stop reason: SURG

## 2025-07-15 RX ORDER — CALCIUM CHLORIDE 100 MG/ML
INJECTION INTRAVENOUS; INTRAVENTRICULAR AS NEEDED
Status: DISCONTINUED | OUTPATIENT
Start: 2025-07-15 | End: 2025-07-15 | Stop reason: SURG

## 2025-07-15 RX ORDER — METOPROLOL TARTRATE 25 MG/1
12.5 TABLET, FILM COATED ORAL ONCE
Status: COMPLETED | OUTPATIENT
Start: 2025-07-15 | End: 2025-07-15

## 2025-07-15 RX ORDER — POLYETHYLENE GLYCOL 3350 17 G/17G
17 POWDER, FOR SOLUTION ORAL DAILY PRN
Status: DISCONTINUED | OUTPATIENT
Start: 2025-07-15 | End: 2025-07-25 | Stop reason: HOSPADM

## 2025-07-15 RX ORDER — METHADONE HYDROCHLORIDE 10 MG/ML
INJECTION, SOLUTION INTRAMUSCULAR; INTRAVENOUS; SUBCUTANEOUS AS NEEDED
Status: DISCONTINUED | OUTPATIENT
Start: 2025-07-15 | End: 2025-07-15 | Stop reason: SURG

## 2025-07-15 RX ORDER — IBUPROFEN 600 MG/1
1 TABLET ORAL
Status: DISCONTINUED | OUTPATIENT
Start: 2025-07-15 | End: 2025-07-16

## 2025-07-15 RX ORDER — LIDOCAINE HYDROCHLORIDE 20 MG/ML
INJECTION, SOLUTION INFILTRATION; PERINEURAL AS NEEDED
Status: DISCONTINUED | OUTPATIENT
Start: 2025-07-15 | End: 2025-07-15 | Stop reason: SURG

## 2025-07-15 RX ORDER — NOREPINEPHRINE BITARTRATE 1 MG/ML
INJECTION, SOLUTION INTRAVENOUS CONTINUOUS PRN
Status: DISCONTINUED | OUTPATIENT
Start: 2025-07-15 | End: 2025-07-15 | Stop reason: SURG

## 2025-07-15 RX ORDER — BISACODYL 10 MG
10 SUPPOSITORY, RECTAL RECTAL DAILY PRN
Status: DISCONTINUED | OUTPATIENT
Start: 2025-07-16 | End: 2025-07-25 | Stop reason: HOSPADM

## 2025-07-15 RX ORDER — ACETAMINOPHEN 325 MG/1
650 TABLET ORAL EVERY 4 HOURS PRN
Status: DISCONTINUED | OUTPATIENT
Start: 2025-07-16 | End: 2025-07-25 | Stop reason: HOSPADM

## 2025-07-15 RX ORDER — MIDAZOLAM HYDROCHLORIDE 1 MG/ML
2 INJECTION, SOLUTION INTRAMUSCULAR; INTRAVENOUS
Status: DISCONTINUED | OUTPATIENT
Start: 2025-07-15 | End: 2025-07-16

## 2025-07-15 RX ORDER — NOREPINEPHRINE BITARTRATE 0.03 MG/ML
.02-.3 INJECTION, SOLUTION INTRAVENOUS CONTINUOUS PRN
Status: DISCONTINUED | OUTPATIENT
Start: 2025-07-15 | End: 2025-07-22

## 2025-07-15 RX ORDER — MORPHINE SULFATE 2 MG/ML
4 INJECTION, SOLUTION INTRAMUSCULAR; INTRAVENOUS
Status: DISCONTINUED | OUTPATIENT
Start: 2025-07-15 | End: 2025-07-16

## 2025-07-15 RX ORDER — ALBUMIN HUMAN 50 G/1000ML
1500 SOLUTION INTRAVENOUS AS NEEDED
Status: DISPENSED | OUTPATIENT
Start: 2025-07-15 | End: 2025-07-16

## 2025-07-15 RX ORDER — NICOTINE POLACRILEX 4 MG
15 LOZENGE BUCCAL
Status: DISCONTINUED | OUTPATIENT
Start: 2025-07-15 | End: 2025-07-16

## 2025-07-15 RX ORDER — ACETAMINOPHEN 160 MG/5ML
650 SOLUTION ORAL EVERY 4 HOURS PRN
Status: DISCONTINUED | OUTPATIENT
Start: 2025-07-16 | End: 2025-07-25 | Stop reason: HOSPADM

## 2025-07-15 RX ORDER — ATORVASTATIN CALCIUM 20 MG/1
40 TABLET, FILM COATED ORAL NIGHTLY
Status: DISCONTINUED | OUTPATIENT
Start: 2025-07-15 | End: 2025-07-25 | Stop reason: HOSPADM

## 2025-07-15 RX ORDER — NITROGLYCERIN 0.4 MG/1
0.4 TABLET SUBLINGUAL
Status: DISCONTINUED | OUTPATIENT
Start: 2025-07-15 | End: 2025-07-25 | Stop reason: HOSPADM

## 2025-07-15 RX ORDER — FENTANYL CITRATE 50 UG/ML
INJECTION, SOLUTION INTRAMUSCULAR; INTRAVENOUS
Status: COMPLETED | OUTPATIENT
Start: 2025-07-15 | End: 2025-07-15

## 2025-07-15 RX ORDER — METOPROLOL TARTRATE 25 MG/1
12.5 TABLET, FILM COATED ORAL EVERY 12 HOURS SCHEDULED
Status: DISCONTINUED | OUTPATIENT
Start: 2025-07-16 | End: 2025-07-16

## 2025-07-15 RX ORDER — ONDANSETRON 2 MG/ML
4 INJECTION INTRAMUSCULAR; INTRAVENOUS EVERY 6 HOURS PRN
Status: DISCONTINUED | OUTPATIENT
Start: 2025-07-15 | End: 2025-07-25 | Stop reason: HOSPADM

## 2025-07-15 RX ORDER — NITROGLYCERIN 20 MG/100ML
5-200 INJECTION INTRAVENOUS
Status: DISCONTINUED | OUTPATIENT
Start: 2025-07-15 | End: 2025-07-21

## 2025-07-15 RX ORDER — METOPROLOL TARTRATE 25 MG/1
12.5 TABLET, FILM COATED ORAL
Status: DISCONTINUED | OUTPATIENT
Start: 2025-07-16 | End: 2025-07-15

## 2025-07-15 RX ORDER — PROPOFOL 10 MG/ML
VIAL (ML) INTRAVENOUS AS NEEDED
Status: DISCONTINUED | OUTPATIENT
Start: 2025-07-15 | End: 2025-07-15 | Stop reason: SURG

## 2025-07-15 RX ORDER — OXYCODONE HYDROCHLORIDE 10 MG/1
10 TABLET ORAL EVERY 4 HOURS PRN
Status: DISCONTINUED | OUTPATIENT
Start: 2025-07-15 | End: 2025-07-16

## 2025-07-15 RX ORDER — ROCURONIUM BROMIDE 10 MG/ML
INJECTION, SOLUTION INTRAVENOUS AS NEEDED
Status: DISCONTINUED | OUTPATIENT
Start: 2025-07-15 | End: 2025-07-15 | Stop reason: SURG

## 2025-07-15 RX ORDER — CHLORHEXIDINE GLUCONATE 500 MG/1
1 CLOTH TOPICAL EVERY 12 HOURS PRN
Status: DISCONTINUED | OUTPATIENT
Start: 2025-07-15 | End: 2025-07-15 | Stop reason: HOSPADM

## 2025-07-15 RX ADMIN — ROCURONIUM BROMIDE 20 MG: 10 INJECTION INTRAVENOUS at 13:46

## 2025-07-15 RX ADMIN — METOCLOPRAMIDE 10 MG: 5 INJECTION, SOLUTION INTRAMUSCULAR; INTRAVENOUS at 22:53

## 2025-07-15 RX ADMIN — PROPOFOL 100 MG: 10 INJECTION, EMULSION INTRAVENOUS at 12:02

## 2025-07-15 RX ADMIN — LIDOCAINE HYDROCHLORIDE 60 MG: 20 INJECTION, SOLUTION INFILTRATION; PERINEURAL at 12:02

## 2025-07-15 RX ADMIN — ROCURONIUM BROMIDE 20 MG: 10 INJECTION INTRAVENOUS at 14:47

## 2025-07-15 RX ADMIN — EPHEDRINE SULFATE 10 MG: 50 INJECTION INTRAVENOUS at 12:46

## 2025-07-15 RX ADMIN — ROCURONIUM BROMIDE 60 MG: 10 INJECTION INTRAVENOUS at 12:03

## 2025-07-15 RX ADMIN — MUPIROCIN 1 APPLICATION: 20 OINTMENT TOPICAL at 17:20

## 2025-07-15 RX ADMIN — Medication 100 MCG: at 12:16

## 2025-07-15 RX ADMIN — NOREPINEPHRINE BITARTRATE 0.03 MCG/KG/MIN: 1 SOLUTION INTRAVENOUS at 13:40

## 2025-07-15 RX ADMIN — MIDAZOLAM 2 MG: 1 INJECTION INTRAMUSCULAR; INTRAVENOUS at 09:40

## 2025-07-15 RX ADMIN — NICARDIPINE HYDROCHLORIDE 200 MCG: 25 INJECTION INTRAVENOUS at 15:37

## 2025-07-15 RX ADMIN — SODIUM CHLORIDE 2000 MG: 900 INJECTION INTRAVENOUS at 12:20

## 2025-07-15 RX ADMIN — FAMOTIDINE 20 MG: 10 INJECTION INTRAVENOUS at 09:46

## 2025-07-15 RX ADMIN — PANTOPRAZOLE SODIUM 40 MG: 40 INJECTION, POWDER, FOR SOLUTION INTRAVENOUS at 17:20

## 2025-07-15 RX ADMIN — SODIUM CHLORIDE 3 MG/HR: 9 INJECTION, SOLUTION INTRAVENOUS at 15:31

## 2025-07-15 RX ADMIN — ALBUMIN (HUMAN) 250 ML: 12.5 INJECTION, SOLUTION INTRAVENOUS at 18:30

## 2025-07-15 RX ADMIN — FENTANYL CITRATE 50 MCG: 50 INJECTION, SOLUTION INTRAMUSCULAR; INTRAVENOUS at 09:40

## 2025-07-15 RX ADMIN — Medication 100 MCG: at 13:13

## 2025-07-15 RX ADMIN — DEXMEDETOMIDINE 0.5 MCG/KG/HR: 200 INJECTION, SOLUTION INTRAVENOUS at 12:11

## 2025-07-15 RX ADMIN — SENNOSIDES AND DOCUSATE SODIUM 2 TABLET: 50; 8.6 TABLET ORAL at 22:53

## 2025-07-15 RX ADMIN — EPHEDRINE SULFATE 10 MG: 50 INJECTION INTRAVENOUS at 13:38

## 2025-07-15 RX ADMIN — PROPOFOL 50 MG: 10 INJECTION, EMULSION INTRAVENOUS at 15:33

## 2025-07-15 RX ADMIN — ACETAMINOPHEN 1000 MG: 500 TABLET, FILM COATED ORAL at 09:38

## 2025-07-15 RX ADMIN — PROPOFOL 35 MCG/KG/MIN: 10 INJECTION, EMULSION INTRAVENOUS at 13:26

## 2025-07-15 RX ADMIN — SODIUM CHLORIDE: 9 INJECTION, SOLUTION INTRAVENOUS at 11:54

## 2025-07-15 RX ADMIN — CALCIUM CHLORIDE 1 G: 100 INJECTION INTRAVENOUS; INTRAVENTRICULAR at 15:16

## 2025-07-15 RX ADMIN — Medication 100 MCG: at 13:28

## 2025-07-15 RX ADMIN — CEFAZOLIN 2000 MG: 2 INJECTION, POWDER, FOR SOLUTION INTRAMUSCULAR; INTRAVENOUS at 22:53

## 2025-07-15 RX ADMIN — Medication 10 MG: at 12:31

## 2025-07-15 RX ADMIN — ALBUMIN (HUMAN) 250 ML: 12.5 INJECTION, SOLUTION INTRAVENOUS at 17:19

## 2025-07-15 RX ADMIN — EPHEDRINE SULFATE 10 MG: 50 INJECTION INTRAVENOUS at 12:18

## 2025-07-15 RX ADMIN — SODIUM CHLORIDE: 9 INJECTION, SOLUTION INTRAVENOUS at 11:59

## 2025-07-15 RX ADMIN — HEPARIN SODIUM 22000 UNITS: 1000 INJECTION, SOLUTION INTRAVENOUS; SUBCUTANEOUS at 13:23

## 2025-07-15 RX ADMIN — SODIUM CHLORIDE 2000 MG: 900 INJECTION INTRAVENOUS at 15:30

## 2025-07-15 RX ADMIN — METOPROLOL TARTRATE 12.5 MG: 25 TABLET, FILM COATED ORAL at 09:38

## 2025-07-15 RX ADMIN — MAGNESIUM SULFATE HEPTAHYDRATE 1 G: 1 INJECTION, SOLUTION INTRAVENOUS at 17:21

## 2025-07-15 RX ADMIN — CYCLOBENZAPRINE HYDROCHLORIDE 10 MG: 10 TABLET, FILM COATED ORAL at 23:26

## 2025-07-15 RX ADMIN — ALBUMIN (HUMAN) 250 ML: 12.5 INJECTION, SOLUTION INTRAVENOUS at 17:59

## 2025-07-15 RX ADMIN — ACETAMINOPHEN 1000 MG: 10 INJECTION, SOLUTION INTRAVENOUS at 17:20

## 2025-07-15 RX ADMIN — ATORVASTATIN CALCIUM 40 MG: 20 TABLET, FILM COATED ORAL at 22:52

## 2025-07-15 RX ADMIN — Medication 10 MG: at 11:54

## 2025-07-15 RX ADMIN — ALBUMIN (HUMAN) 250 ML: 12.5 INJECTION, SOLUTION INTRAVENOUS at 18:13

## 2025-07-15 RX ADMIN — METOCLOPRAMIDE 10 MG: 5 INJECTION, SOLUTION INTRAMUSCULAR; INTRAVENOUS at 17:20

## 2025-07-15 RX ADMIN — AMINOCAPROIC ACID 10 G: 250 INJECTION, SOLUTION INTRAVENOUS at 15:18

## 2025-07-15 NOTE — ANESTHESIA PROCEDURE NOTES
Central Line      Patient location during procedure: holding area  Indications: central pressure monitoring  Preanesthetic Checklist  Completed: patient identified, IV checked, site marked, risks and benefits discussed, surgical consent, monitors and equipment checked, pre-op evaluation and timeout performed  Central Line Prep  Sterile Tech:cap, gloves, gown, mask and sterile barriers  Prep: chloraprep  Patient monitoring: continuous pulse oximetry, blood pressure monitoring and EKG  Central Line Procedure  Laterality:right  Location:internal jugular  Catheter Type:[place]-Karie  Guidance:Waveform Monitoring  Assessment  Assessement:blood return through all ports and free fluid flow  Complications:no  Patient Tolerance:patient tolerated the procedure well with no apparent complications

## 2025-07-15 NOTE — ANESTHESIA PROCEDURE NOTES
Diagnostic IntraOp Robetr    Procedure Performed: Diagnostic IntraOp Robert       Start Time:  7/15/2025 1:00 PM       End Time:   7/15/2025 1:10 PM      General Procedure Information  Physician Requesting Echo: Jr Oscar Patino MD  Intubated  Bite block placed  Heart visualized  Probe Insertion:  Difficult  Modalities:  Color flow mapping, continuous wave Doppler and pulse wave Doppler    Echocardiographic and Doppler Measurements    Ventricles    Left Ventricle:  Cavity size normal.  Global Function normal.  Ejection Fraction 60%.          Valves    Aortic Valve:  Annulus normal.  Stenosis not present.  Regurgitation mild.      Mitral Valve:  Annulus normal.  Regurgitation mild.      Tricuspid Valve:  Annulus normal.  Regurgitation trace.    Pulmonic Valve:  Annulus normal.        Aorta    Ascending Aorta:  Size normal.    Descending Aorta:  Size normal.        Atria      Left Atrium:  Size dilated.  Left atrial appendage normal.                  Anesthesia Information  Performed Personally      Echocardiogram Comments:       Diagnosis: nonrheumatic aortic regurgitation    Post: EF unchanged. No aortic dissection.

## 2025-07-15 NOTE — PERIOPERATIVE NURSING NOTE
Spoke with Lonny in blood bank---patient has 2 units packed cells and 2 units ptls ready in blood bank

## 2025-07-15 NOTE — PERIOPERATIVE NURSING NOTE
Spoke with Luh--patient does not need any further labs in pre op. Last dose of metoprolol was yesterday morning so he will get 12.5mg on call to OR.

## 2025-07-15 NOTE — ANESTHESIA PROCEDURE NOTES
Airway  Date/Time: 7/15/2025 12:06 PM  Airway not difficult    General Information and Staff    Patient location during procedure: OR  Anesthesiologist: Peter Booth MD    Indications and Patient Condition    Preoxygenated: yes    Mask difficulty assessment: 2 - vent by mask + OA or adjuvant +/- NMBA    Final Airway Details    Final airway type: endotracheal airway      Successful airway: ETT  Cuffed: yes   Successful intubation technique: direct laryngoscopy  Adjuncts used in placement: intubating stylet  Endotracheal tube insertion site: oral  Blade: Jackson  Blade size: 3  ETT size (mm): 8.0  Cormack-Lehane Classification: grade I - full view of glottis  Placement verified by: capnometry   Measured from: teeth  ETT/EBT  to teeth (cm): 23  Number of attempts at approach: 1  Assessment: lips, teeth, and gum same as pre-op and atraumatic intubation

## 2025-07-15 NOTE — OP NOTE
[facility] CARDIAC SURGERY OP NOTE    Preop Diagnosis: Severe coronary artery disease.  Abdominal aortic aneurysm.  Status post vein stripping on the left.  Iliac artery aneurysm.    Postop Diagnosis: Same    Chronic Comorbid Conditions relative to CABG include:  Cardiovascular: Coronary Artery Disease, Hyperlipidemia, Hypertension, Peripheral Artery Disease, and Peripheral Venous Disease  Respiratory: None  Endocrine: None   Nephrology: CKD GFR III 46 ml/min  Hematology: None   Other: None    Indications: This patient had an abdominal aortic aneurysm with expansion.  He has been followed by Dr. JESSICA Thakkar and was advisable before any repair of his AAA.  hazel.  Operation was advisable to prolong life and relieve symptoms.The  calculated STS Risk score was discussed with the patient and family. All risks and alternatives were discussed with the patient and family.  Counseling was done regarding abuse of tobacco, alcohol and drugs as needed.  A discussion about advanced directive was done with the patient. They understand and wish to proceed.    Procedure: CABG x 6.  Skeletonized LIMA to distal LAD.  Vein graft acute marginal branch of right coronary artery.  Vein graft to PDA.  Vein graft to left ventricular branch of right coronary artery.  Vein graft to OM1.  Vein graft to D1.  Closure of left atrial appendage with a 40 mm clip.  Temporary cardiopulmonary bypass.  Antegrade and retrograde cold blood cardioplegia with warm reperfusion.  Neurologic monitoring.  Transesophageal echo.  Endoscopic vein harvest of the left greater saphenous vein.    Surgeon: Oscar Patino MD    Assistant: Assistant: Yina Buck CSA was responsible for performing the following activities: Cardiac Surgery First assist, Endoscopic Vein Spurger if needed for CABG,  surgical wound closure and their skilled assistance was necessary for the success of this case.     Anesthesia: GET    Findings : There is  no height or weight on file to calculate BMI.  The right vein was not usable the left is been predicted does not usable but was excellent at 4 mm.  He had diffuse LAD disease but we are able to bypass it in the mid distal portion where it was 1.5 mm.  The acute marginal branch was 1.5 mm.  The PDA was 2 mm and the left ventricular branch was 1.5 mm.  The marginal branch was a little bit diffusely diseased was 2 mm.  The diagonal branch was 2 mm.  The DANIEL was excellent at 2.5 mm with excellent blood flow.    Operative Procedure: A primary median sternotomy was made by the left greater saphenous vein was harvested with the endoscope.  The internal mammary artery was dissected off the left chest wall with the cautery at low voltage.  Cardiopulmonary bypass was then established for 91 minutes drifting to 34 °C and appropriate flow rates.  The aorta was crossclamped for 69 minutes and we gave a liter of antegrade cold blood cardioplegia then 1.8 L of retrograde cold blood cardioplegia repeated doses every 10 to 15 minutes to good effect.  5 veins were anastomosed to the ascending aorta with 6-0 Prolene and then sewn to the RV branch, the PDA, left ventricular branch, marginal branch, and then the diagonal branch with 7-0 Prolene.  The mammary was sewn to the distal LAD with 7-0 Prolene.  The left atrial appendage was isolated with a 40 mm clip.  We made a 7 mm left posterolateral pericardial window for drainage.  A warm dose of retrograde cardioplegia was given and then with strong suction on the aortic Natalvit the cross-clamp was released.  The patient was rewarmed and cardiopulmonary bypass was weaned and discontinued.  Decannulation was effected and usual devices were placed.  Hemostasis was obtained.  Chest tubes were inserted.  We applied vancomycin enriched platelet rich plasma.  The sternum was closed with stainless steel wires.  The fascia, soft tissues and skin were closed as usual.  All the grafts lay nicely  and all the anastomoses were hemostatic.  The sponge, needle and instrument counts were noted to be correct.  He went to open-heart recovery room in good condition.    Complications: None    Tubes: 3    Epicardial Wires: 3    Blood Loss:  Minimal, 500 ml Cell Saver Reinfused    Ultrafiltration: 2600 ml     Bypass Time: 91 min    Aortic cross-clamp time: 69 min    Specimen: None    Condition: Good      Patient Care Team:  Farhad Metcalf MD as PCP - General (Family Medicine)        Oscar Patino MD  7/15/2025  16:17 EDT

## 2025-07-15 NOTE — ANESTHESIA POSTPROCEDURE EVALUATION
Patient: Erickson Gerard    Procedure Summary       Date: 07/15/25 Room / Location: [address] CARDIOVASCULAR OPERATING ROOM    Anesthesia Start: 1142 Anesthesia Stop: 1637    Procedure: STERNOTOMY; CORONARY ARTERY BYPASS GRAFTING TIMES 6 USING LEFT INTERNAL MAMMARY ARTERY AND LEFT SAPHENOUS VEIN; TRANSESOPHAGEAL ECHOCARDIOGRAM WITH ANESTHESIA;LEFT ATRIAL APPENDAGE EXCLUSION; PRP (Chest) Diagnosis:       Coronary artery disease involving native heart, unspecified vessel or lesion type, unspecified whether angina present      Abnormal findings on diagnostic imaging of heart and coronary circulation      (Coronary artery disease involving native heart, unspecified vessel or lesion type, unspecified whether angina present [I25.10])      (Abnormal findings on diagnostic imaging of heart and coronary circulation [R93.1])    Surgeons: Jr Oscar Patino MD Provider: Peter Booth MD    Anesthesia Type: general, [place], CVL, PAC ASA Status: 4            Anesthesia Type: general, [place], CVL, PAC    Vitals  Vitals Value Taken Time   BP 96/77 07/15/25 17:45   Temp 35.8 °C (96.44 °F) 07/15/25 17:50   Pulse 74 07/15/25 17:50   Resp 14 07/15/25 17:00   SpO2 97 % 07/15/25 17:50   Vitals shown include unfiled device data.        Post Anesthesia Care and Evaluation    Patient location during evaluation: bedside  Pain management: adequate    Airway patency: patent  Anesthetic complications: No anesthetic complications    Cardiovascular status: acceptable  Respiratory status: acceptable  Hydration status: acceptable

## 2025-07-15 NOTE — CONSULTS
CVICU Intensivist Consult Note    HPI/Chief Complaint: 80 yo male with CAD who presents to the CVICU immediately s/p 6V CABG + WEN clip    PMHx: HTN, HL, PAD s/p aorto-iliac fem-pop bypass with stent repair on Xarelto, h/o right DVT, PONV, GERD, 5.3 cm AAA followed by Dr. Archer w/ plans for repair in the near future    Procedure: 7/15/25 6V CABG (LIMA - LAD, SVG - acute marginal of RCA, PDA, OM1, D1) + WEN clip by Dr. Patino. He was easily intubated, but MARCOS placement was somewhat difficult. MARCOS showed LVEF 60%, nonrheumatic mild AI. He received 500ml of cellsaver only, no other products and required no shocks coming off CPB. He was hemoconcentrated 2.6L. He was brought to the CVICU on sedation and low dose cardene. Initial CI 2.1 without inotropes.    Hospital Course: n/a    Daily Assessment and Plan 7/15: Maintain sedation while awaiting stability of hemodynamics. Volume resuscitation to address low CI; biventricular function normal on MARCOS so likely just hypovolemic. Unable to place OG past 35cm, some difficulty placed MARCOS probe during surgery as well. Making adequate urine, chest tube output minimal. Watch output closely given pre-op use of Xarelto. INR slightly elevated at 1.7 but no evidence of ongoing bleeding, no need to transfuse for now.  Initial ABG with mild hypocarbia, adjust vent to decrease minute ventilation. Cr bumped to 1.37 this morning prior to surgery, VERN may be related      Neuro: propofol/precedex for sedation in immediate post-op period. Wean when hemodynamically appropriate for SBT. Pain control w/ morphine vs. Apap vs. Oxy prn    CV: CAD now s/p CABG. Maintain MAP 65-85. Avoid hypertension in setting of known AAA, cardene as needed to address   Rhythm: epicardial wires in place, set to backup rate for now    Pulm: maintain mechanical ventilation for now, adjust vent settings for appropriate oxygenation/ventilation.    Renal: appropriate UOP at this time, continue volume resuscitation as  needed. Replete electrolytes.    GI: start bowel regimen to avoid constipation/ileus. NPO for now, advance diet once appropriate. PPI for prophy and h/o GERD.    Endo: stress induced hyperglycemia, A1C 6.1. Insulin drip for goal euglycemia, transition when appropriate    ID: periop cefazolin for prophylaxis    Heme: acute blood loss anemia, received cellsaver only in OR. Follow chest tube output closely, transfuse as necessary. Start Lovenox POD#1 for prophy. On Xarelto for PAD and stents, last dose 7/11.    acetaminophen, 1,000 mg, Intravenous, Q8H  [START ON 7/16/2025] aspirin, 81 mg, Oral, Daily  atorvastatin, 40 mg, Oral, Nightly  ceFAZolin, 2,000 mg, Intravenous, Q8H  [START ON 7/16/2025] enoxaparin sodium, 40 mg, Subcutaneous, Daily  magnesium sulfate, 1 g, Intravenous, Q8H  metoclopramide, 10 mg, Intravenous, Q6H  metoprolol tartrate, 12.5 mg, Oral, Q12H  mupirocin, 1 Application, Each Nare, BID  pantoprazole, 40 mg, Intravenous, Once   Followed by  [START ON 7/16/2025] pantoprazole, 40 mg, Oral, QAM  senna-docusate sodium, 2 tablet, Oral, BID        Relevant Physical Exam:  Feet warm, 1+ pulses bilaterally.  Abdomen soft, nondistended  Lungs clear    ------------------------------------------------------------------------------------------------------------------------------------------------------  Prophy: HOB elevated + oral care + scds + buitrago stat lock + PPI + subglottic suction + no chemical DVT prophy 2/2 risk for bleeding  Code Status: full  Lines: [facility] CVL/swan (7/15)      Critical Care time excluding procedures on this date: 33 mins on 7/15/25 by Kayy Melgar MD for the assessment and treatment of: interpretation of serial cardiac output measurements, evaluation of CXR, interpretation of serial blood gases, ventilator management, coagulopathy, VERN

## 2025-07-15 NOTE — CONSULTS
Patient Name: Erickson Gerard Account #: [id number]    MRN: [id number] Admission Date: 7/15/2025      Consulting Service: Vascular Surgery Date of Evaluation: July 15, 2025    Requesting Provider: Jack Patino MD    CHIEF COMPLAINT: 5.3 cm abdominal aortic aneurysm  HPI: Erickson Gerard is a 79 y.o. male is being seen for a consultation and evaluation/management of 5.3 cm abdominal aortic aneurysm with apparent significant growth from 4.7-5.3 over a year.  This is concerning enough that we have scheduled him surgery but preoperative evaluation indicated need for CABG as a priority.  He is currently recovering from this.  He has known right common iliac aneurysm as well as an internal iliac aneurysm.  He has bilateral popliteal aneurysms with a right popliteal that was fixed and is now has a small endoleak.  It is 2.8 cm.  His internal iliac measures 2.8 on the right.  There is some growth there.  At this point there is no indication for emergent repair of these aneurysms but obviously there is always concern for aneurysm growth and rupture in the periprocedural times and watchful waiting is our best answer for now.  Will recommend avoiding bouts of hypertension and the use of fluoroquinolones.    PAST MEDICAL HISTORY:   Past Medical History:   Diagnosis Date    AAA (abdominal aortic aneurysm) without rupture 10/14/2021    Abdominal aortic aneurysm, without rupture, unspecified 03/14/2023    Acute embolism and thrombosis of unspecified deep veins of right distal lower extremity     Anesthesia complication     AFTER VEIN SURGERY STATES HE WAS HARD TO WAKE UP    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     Aneurysm of artery of lower extremity 09/09/2021    Arthritis     At risk for sleep apnea     STOP BANG 5    Coronary artery disease     Dyspnea on exertion     Essential (primary) hypertension     GERD (gastroesophageal reflux disease)     [place] (hard of hearing)     Hyperlipidemia     Hypertension     Iliac artery  aneurysm 10/14/2021    Localized edema     Low back pain     Mixed hyperlipidemia     Other specified soft tissue disorders     PAD (peripheral artery disease)     Peripheral artery aneurysm     PONV (postoperative nausea and vomiting)     Popliteal aneurysm     Thrombosis 09/09/2021    Calf Vein Thrombosis Rt tibial vein    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      PAST SURGICAL HISTORY:   Past Surgical History:   Procedure Laterality Date    ANGIOPLASTY POPLITEAL ARTERY Right 11/09/2021    Procedure: RIGHT POPITEAL ANEURYSM REPAIR VIABOHN;  Surgeon: Campos Archer MD;  Location: [facility] OR 18/19;  Service: Vascular;  Laterality: Right;    ANGIOPLASTY POPLITEAL ARTERY Right 01/25/2022    Procedure: AORTO ILIAC FEMORAL RIGHT POPLITEAL VIABOHN STENT PLACEMENT X 2;  Surgeon: Campos Archer MD;  Location: [facility] OR 18/19;  Service: Vascular;  Laterality: Right;    CARDIAC CATHETERIZATION N/A 06/19/2025    Procedure: Left Heart Cath;  Surgeon: Oneil Escobar MD;  Location: [facility] CATH INVASIVE LOCATION;  Service: Cardiovascular;  Laterality: N/A;    CARDIAC CATHETERIZATION N/A 06/19/2025    Procedure: Left ventriculography;  Surgeon: Oneil Escobar MD;  Location: [facility] CATH INVASIVE LOCATION;  Service: Cardiovascular;  Laterality: N/A;    COLONOSCOPY      EYE SURGERY Bilateral     CATARACTS    HERNIA REPAIR Bilateral     POLITEAL ARTERY ANEURYSM REPAIR Right 11/09/2021    Viabahn Stent Graft    VEIN LIGATION AND STRIPPING N/A       FAMILY HISTORY:   Family History   Problem Relation Age of Onset    No Known Problems Mother     Heart attack Father     Heart disease Father     Stroke Sister     Stroke Brother     Stroke Brother     Malig Hyperthermia Neg Hx       SOCIAL HISTORY:   Social History     Tobacco Use    Smoking status: Never     Passive exposure: Never    Smokeless tobacco: Never    Tobacco comments:     Patient does not smoke   Vaping Use    Vaping status: Never Used   Substance Use Topics     Alcohol use: Never     Comment: caffeine use- coffee, soda; Patient is non drinker.    Drug use: Never     Comment: Drug Abuse: none      MEDICATIONS:   No current facility-administered medications on file prior to encounter.     Current Outpatient Medications on File Prior to Encounter   Medication Sig Dispense Refill    amLODIPine (NORVASC) 10 MG tablet TAKE 1 TABLET BY MOUTH DAILY 90 tablet 3    ezetimibe (Zetia) 10 MG tablet Take 1 tablet by mouth Daily. 90 tablet 3    metoprolol succinate XL (TOPROL-XL) 50 MG 24 hr tablet Take 1 tablet by mouth Daily. 90 tablet 3    omeprazole (priLOSEC) 20 MG capsule TAKE 1 CAPSULE BY MOUTH DAILY 90 capsule 2    rosuvastatin (CRESTOR) 5 MG tablet Take 1 tablet by mouth 3 (Three) Times a Week. 36 tablet 3    valsartan (DIOVAN) 160 MG tablet Take 1 tablet by mouth Daily. 90 tablet 3    VITAMIN D, CHOLECALCIFEROL, PO Take 1 capsule by mouth 2 (Two) Times a Week. HOLDING FOR DOS      Xarelto 10 MG tablet TAKE 1 TABLET BY MOUTH DAILY WITH DINNER (Patient taking differently: Take 1 tablet by mouth Daily With Dinner. HOLDING FOR DOS      LAST DOSE PRIOR TO SURGERY 7-11-25) 90 tablet 3             ALLERGIES: Bee venom   COMPLETE REVIEW OF SYSTEMS:     Intubated and unresponsive    PHYSICAL EXAM:   Patient Vitals for the past 24 hrs:   BP Temp Temp src Pulse Resp SpO2   07/15/25 1700 107/85 96.3 °F (35.7 °C) Core 85 14 100 %   07/15/25 1655 -- -- -- -- 14 --   07/15/25 1654 -- 96.3 °F (35.7 °C) Core 78 -- 100 %   07/15/25 1640 -- 95.9 °F (35.5 °C) Core 78 14 99 %   07/15/25 1630 (!) 89/72 96.8 °F (36 °C) -- 85 14 98 %   07/15/25 1624 92/72 96.8 °F (36 °C) Core 84 14 98 %   07/15/25 1130 -- -- -- 61 -- 96 %   07/15/25 1115 -- -- -- 59 -- 94 %   07/15/25 1100 -- -- -- 58 -- 97 %   07/15/25 1045 -- -- -- 59 -- 95 %   07/15/25 1030 -- -- -- 60 -- 95 %   07/15/25 1027 -- -- -- 64 -- 96 %   07/15/25 1024 -- -- -- 63 -- 97 %   07/15/25 1021 -- -- -- 64 -- 96 %   07/15/25 1020 -- -- -- 60  -- 95 %   07/15/25 1018 -- -- -- 62 -- 96 %   07/15/25 1015 -- -- -- 62 -- 94 %   07/15/25 1012 -- -- -- 63 -- 95 %   07/15/25 1010 -- -- -- 63 -- 96 %   07/15/25 1009 -- -- -- 64 -- 96 %   07/15/25 1006 -- -- -- 56 -- 96 %   07/15/25 1005 -- -- -- 58 -- 96 %   07/15/25 1003 -- -- -- 64 -- 90 %   07/15/25 1001 -- -- -- 59 -- 92 %   07/15/25 1000 -- -- -- 58 -- 92 %   07/15/25 0958 -- -- -- 63 -- --   07/15/25 0957 -- -- -- 62 -- 96 %   07/15/25 0956 -- -- -- 67 -- 97 %   07/15/25 0955 -- -- -- 62 -- 92 %   07/15/25 0954 -- -- -- 59 -- 92 %   07/15/25 0950 -- -- -- 64 -- 93 %   07/15/25 0948 -- -- -- 64 -- 92 %   07/15/25 0945 -- -- -- 59 -- 95 %   07/15/25 0942 -- -- -- 66 -- 94 %   07/15/25 0939 -- -- -- 63 -- 94 %   07/15/25 0938 -- -- -- 64 -- --   07/15/25 0936 -- -- -- 65 -- 90 %   07/15/25 0900 126/94 -- -- 67 -- (!) 87 %   07/15/25 0828 144/87 -- -- -- -- --   07/15/25 0827 135/92 98.3 °F (36.8 °C) Oral 66 16 93 %        General appearance: Debated and ill.  Neurological exam reveals intubated and unresponsive.  ENT exam reveals -intubated.  CVS exam: normal rate, regular rhythm,   Chest: clear to auscultation, no wheezes, rales or rhonchi, symmetric air entry.  Abdominal exam: soft, nontender, nondistended, no masses or organomegaly.  Examination of the feet reveals warm, good capillary refill.        LABS:      Results Review:       I reviewed the patient's new clinical results.  Results from last 7 days   Lab Units 07/15/25  1625 07/15/25  1524 07/15/25  1448 07/15/25  1446 07/15/25  1419 07/15/25  1403 07/15/25  1400 07/15/25  1229 07/14/25  0716   WBC 10*3/mm3 10.81*  --   --   --   --   --   --   --  6.54   HEMOGLOBIN g/dL 11.7*  --   --   --   --   --   --   --  13.7   HEMOGLOBIN, POC g/dL  --  9.9*  --  10.5* 9.5* 9.5* 9.2* 11.6*  --    PLATELETS 10*3/mm3 100*  --  106*  --   --   --   --   --  168     Results from last 7 days   Lab Units 07/15/25  1625 07/14/25  0716   SODIUM mmol/L 142 141    POTASSIUM mmol/L 4.1 3.6   CHLORIDE mmol/L 109* 106   CO2 mmol/L 21.7* 22.2   BUN mg/dL 18.0 26.0*   CREATININE mg/dL 1.30* 1.37*   GLUCOSE mg/dL 113* 131*   Estimated Creatinine Clearance: 48.3 mL/min (A) (by C-G formula based on SCr of 1.3 mg/dL (H)).  Results from last 7 days   Lab Units 07/15/25  1625 07/14/25  0716   CALCIUM mg/dL 9.8 8.8   ALBUMIN g/dL 4.1 3.8   MAGNESIUM mg/dL 2.5* 2.1   PHOSPHORUS mg/dL 3.3  --      Results from last 7 days   Lab Units 07/15/25  1625 07/14/25  0716   PROTIME Seconds 19.9* 15.9*   INR  1.68* 1.27*       The following radiologic or non-invasive studies have been reviewed by me: Chest x-ray as noted.  Carotid duplex also reviewed which was mild disease on the left only.    Active Hospital Problems    Diagnosis  POA    **Hx of CABG [Z95.1]  Not Applicable    Coronary artery disease involving native heart [I25.10]  Unknown    Abnormal findings on diagnostic imaging of heart and coronary circulation [R93.1]  Unknown      Resolved Hospital Problems   No resolved problems to display.         ASSESSMENT/PLAN: 79 y.o. male with known abdominal aortic aneurysm is actually preoperative next week but has now been canceled obviously and need of this surgery and recovery first.  He has not bilateral known popliteal aneurysms of the right fix in the past with a small endoleak as well.  All this will be watched.  There is currently no evidence of lower extremity occlusion or aneurysm change that would push towards studies or intervention.  We will follow along peripherally.  Will get him back on the schedule for treatment when he has recovered      I discussed the plan with the nursing staff who is agreeable to the plan of care at this point. Thank you for this consult.     Campos Archer MD   07/15/25

## 2025-07-15 NOTE — ANESTHESIA PROCEDURE NOTES
Central Line      Patient location during procedure: holding area  Indications: central pressure monitoring and vascular access  Staff  Anesthesiologist: Arnulfo Arellano MD  Preanesthetic Checklist  Completed: patient identified, IV checked, site marked, risks and benefits discussed, surgical consent, monitors and equipment checked, pre-op evaluation and timeout performed  Central Line Prep  Sterile Tech:cap, gloves, gown, mask and sterile barriers  Prep: chloraprep  Patient monitoring: blood pressure monitoring, continuous pulse oximetry and EKG  Central Line Procedure  Laterality:right  Location:internal jugular  Catheter Type:Cordis, MAC and double lumen  Catheter Size:9 Fr  Guidance:ultrasound guided  PROCEDURE NOTE/ULTRASOUND INTERPRETATION.  Using ultrasound guidance the potential vascular sites for insertion of the catheter were visualized to determine the patency of the vessel to be used for vascular access.  After selecting the appropriate site for insertion, the needle was visualized under ultrasound being inserted into the internal jugular vein, followed by ultrasound confirmation of wire and catheter placement. There were no abnormalities seen on ultrasound; an image was taken; and the patient tolerated the procedure with no complications.   Assessment  Post procedure:biopatch applied, line sutured, occlusive dressing applied and secured with tape  Assessement:blood return through all ports, free fluid flow and chest x-ray ordered  Complications:no  Patient Tolerance:patient tolerated the procedure well with no apparent complications  Additional Notes  Ultrasound interpretation note:  Under ultrasound guidance, the available vessels were evaluated and the selected vessel deemed patent.  The needle, wire and catheter were seen in the vessel.  An image was taken.  No abnormalities noted.

## 2025-07-15 NOTE — ANESTHESIA PROCEDURE NOTES
Arterial Line      Patient location during procedure: holding area   Line placed for hemodynamic monitoring.  Performed By   Anesthesiologist: Arnulfo Arellano MD   Preanesthetic Checklist  Completed: patient identified, IV checked, site marked, risks and benefits discussed, surgical consent, monitors and equipment checked, pre-op evaluation and timeout performed  Arterial Line Prep    Sterile Tech: gloves  Prep: ChloraPrep  Patient monitoring: blood pressure monitoring, continuous pulse oximetry and EKG  Arterial Line Procedure   Laterality:left  Location:  radial artery  Catheter size: 20 G   Guidance: ultrasound guided  PROCEDURE NOTE/ULTRASOUND INTERPRETATION.  Using ultrasound guidance the potential vascular sites for insertion of the catheter were visualized to determine the patency of the vessel to be used for vascular access.  After selecting the appropriate site for insertion, the needle was visualized under ultrasound being inserted into the radial artery, followed by ultrasound confirmation of wire and catheter placement. There were no abnormalities seen on ultrasound; an image was taken; and the patient tolerated the procedure with no complications.   Number of attempts: 2  Successful placement: yes   Post Assessment   Dressing Type: occlusive dressing applied, secured with tape and wrist guard applied.   Complications no  Circ/Move/Sens Assessment: normal and unchanged.   Patient Tolerance: patient tolerated the procedure well with no apparent complications  Additional Notes  Ultrasound interpretation note:  Permanent image recorded.  Under U/S guidance, vessels analyzed, vessel seen patent.  Needle, wire and catheter seen entering vessel.  No abnormalities noted.

## 2025-07-16 ENCOUNTER — APPOINTMENT (OUTPATIENT)
Dept: GENERAL RADIOLOGY | Facility: HOSPITAL | Age: 80
DRG: 235 | End: 2025-07-16
Payer: MEDICARE

## 2025-07-16 LAB
ALBUMIN SERPL-MCNC: 4.5 G/DL (ref 3.5–5.2)
ALBUMIN/GLOB SERPL: 2.3 G/DL
ALP SERPL-CCNC: 48 U/L (ref 39–117)
ALT SERPL W P-5'-P-CCNC: 10 U/L (ref 1–41)
ANION GAP SERPL CALCULATED.3IONS-SCNC: 11.5 MMOL/L (ref 5–15)
ANION GAP SERPL CALCULATED.3IONS-SCNC: 12.8 MMOL/L (ref 5–15)
ARTERIAL PATENCY WRIST A: ABNORMAL
ARTERIAL PATENCY WRIST A: ABNORMAL
AST SERPL-CCNC: 37 U/L (ref 1–40)
ATMOSPHERIC PRESS: 745 MMHG
ATMOSPHERIC PRESS: 746.3 MMHG
BASE EXCESS BLDA CALC-SCNC: -1.3 MMOL/L (ref 0–2)
BASE EXCESS BLDA CALC-SCNC: -3.2 MMOL/L (ref 0–2)
BASOPHILS # BLD AUTO: 0.02 10*3/MM3 (ref 0–0.2)
BASOPHILS NFR BLD AUTO: 0.2 % (ref 0–1.5)
BDY SITE: ABNORMAL
BDY SITE: ABNORMAL
BILIRUB SERPL-MCNC: 0.8 MG/DL (ref 0–1.2)
BUN SERPL-MCNC: 19 MG/DL (ref 8–23)
BUN SERPL-MCNC: 22 MG/DL (ref 8–23)
BUN/CREAT SERPL: 12.2 (ref 7–25)
BUN/CREAT SERPL: 12.9 (ref 7–25)
CA-I SERPL ISE-MCNC: 1.15 MMOL/L (ref 1.15–1.35)
CA-I SERPL ISE-MCNC: 1.18 MMOL/L (ref 1.15–1.35)
CALCIUM SPEC-SCNC: 8.5 MG/DL (ref 8.6–10.5)
CALCIUM SPEC-SCNC: 8.8 MG/DL (ref 8.6–10.5)
CHLORIDE SERPL-SCNC: 109 MMOL/L (ref 98–107)
CHLORIDE SERPL-SCNC: 111 MMOL/L (ref 98–107)
CO2 SERPL-SCNC: 19.5 MMOL/L (ref 22–29)
CO2 SERPL-SCNC: 20.2 MMOL/L (ref 22–29)
CREAT SERPL-MCNC: 1.56 MG/DL (ref 0.76–1.27)
CREAT SERPL-MCNC: 1.7 MG/DL (ref 0.76–1.27)
DEPRECATED RDW RBC AUTO: 40.9 FL (ref 37–54)
DEVICE COMMENT: ABNORMAL
EGFRCR SERPLBLD CKD-EPI 2021: 40.8 ML/MIN/1.73
EGFRCR SERPLBLD CKD-EPI 2021: 45.2 ML/MIN/1.73
EOSINOPHIL # BLD AUTO: 0 10*3/MM3 (ref 0–0.4)
EOSINOPHIL NFR BLD AUTO: 0 % (ref 0.3–6.2)
ERYTHROCYTE [DISTWIDTH] IN BLOOD BY AUTOMATED COUNT: 12 % (ref 12.3–15.4)
GAS FLOW AIRWAY: 8 LPM
GLOBULIN UR ELPH-MCNC: 2 GM/DL
GLUCOSE BLDC GLUCOMTR-MCNC: 118 MG/DL (ref 70–130)
GLUCOSE BLDC GLUCOMTR-MCNC: 132 MG/DL (ref 70–130)
GLUCOSE BLDC GLUCOMTR-MCNC: 148 MG/DL (ref 70–130)
GLUCOSE BLDC GLUCOMTR-MCNC: 155 MG/DL (ref 70–130)
GLUCOSE BLDC GLUCOMTR-MCNC: 164 MG/DL (ref 70–130)
GLUCOSE BLDC GLUCOMTR-MCNC: 178 MG/DL (ref 70–130)
GLUCOSE BLDC GLUCOMTR-MCNC: 181 MG/DL (ref 70–130)
GLUCOSE BLDC GLUCOMTR-MCNC: 187 MG/DL (ref 70–130)
GLUCOSE SERPL-MCNC: 136 MG/DL (ref 65–99)
GLUCOSE SERPL-MCNC: 163 MG/DL (ref 65–99)
HCO3 BLDA-SCNC: 21.5 MMOL/L (ref 22–28)
HCO3 BLDA-SCNC: 23.1 MMOL/L (ref 22–28)
HCT VFR BLD AUTO: 29.4 % (ref 37.5–51)
HEMODILUTION: NO
HEMODILUTION: NO
HGB BLD-MCNC: 9.8 G/DL (ref 13–17.7)
IMM GRANULOCYTES # BLD AUTO: 0.05 10*3/MM3 (ref 0–0.05)
IMM GRANULOCYTES NFR BLD AUTO: 0.5 % (ref 0–0.5)
INHALED O2 CONCENTRATION: 75 %
INR PPP: 1.65 (ref 0.9–1.1)
LYMPHOCYTES # BLD AUTO: 0.41 10*3/MM3 (ref 0.7–3.1)
LYMPHOCYTES NFR BLD AUTO: 4 % (ref 19.6–45.3)
MAGNESIUM SERPL-MCNC: 2.7 MG/DL (ref 1.6–2.4)
MCH RBC QN AUTO: 31.2 PG (ref 26.6–33)
MCHC RBC AUTO-ENTMCNC: 33.3 G/DL (ref 31.5–35.7)
MCV RBC AUTO: 93.6 FL (ref 79–97)
MODALITY: ABNORMAL
MODALITY: ABNORMAL
MONOCYTES # BLD AUTO: 0.57 10*3/MM3 (ref 0.1–0.9)
MONOCYTES NFR BLD AUTO: 5.6 % (ref 5–12)
NEUTROPHILS NFR BLD AUTO: 89.7 % (ref 42.7–76)
NEUTROPHILS NFR BLD AUTO: 9.11 10*3/MM3 (ref 1.7–7)
NRBC BLD AUTO-RTO: 0 /100 WBC (ref 0–0.2)
O2 A-A PPRESDIFF RESPIRATORY: 0.1 MMHG
PCO2 BLDA: 36.5 MM HG (ref 35–45)
PCO2 BLDA: 36.7 MM HG (ref 35–45)
PH BLDA: 7.38 PH UNITS (ref 7.35–7.45)
PH BLDA: 7.41 PH UNITS (ref 7.35–7.45)
PLATELET # BLD AUTO: 82 10*3/MM3 (ref 140–450)
PMV BLD AUTO: 10 FL (ref 6–12)
PO2 BLD: 78 MM[HG] (ref 0–500)
PO2 BLDA: 58.3 MM HG (ref 80–100)
PO2 BLDA: 58.4 MM HG (ref 80–100)
POTASSIUM SERPL-SCNC: 4.3 MMOL/L (ref 3.5–5.2)
POTASSIUM SERPL-SCNC: 4.3 MMOL/L (ref 3.5–5.2)
PROT SERPL-MCNC: 6.5 G/DL (ref 6–8.5)
PROTHROMBIN TIME: 19.6 SECONDS (ref 11.7–14.2)
QT INTERVAL: 387 MS
QTC INTERVAL: 431 MS
RBC # BLD AUTO: 3.14 10*6/MM3 (ref 4.14–5.8)
SAO2 % BLDCOA: 89.4 % (ref 92–98.5)
SAO2 % BLDCOA: 90.3 % (ref 92–98.5)
SODIUM SERPL-SCNC: 140 MMOL/L (ref 136–145)
SODIUM SERPL-SCNC: 144 MMOL/L (ref 136–145)
TOTAL RATE: 18 BREATHS/MINUTE
WBC NRBC COR # BLD AUTO: 10.16 10*3/MM3 (ref 3.4–10.8)

## 2025-07-16 PROCEDURE — 94003 VENT MGMT INPAT SUBQ DAY: CPT

## 2025-07-16 PROCEDURE — 99222 1ST HOSP IP/OBS MODERATE 55: CPT | Performed by: INTERNAL MEDICINE

## 2025-07-16 PROCEDURE — 97530 THERAPEUTIC ACTIVITIES: CPT

## 2025-07-16 PROCEDURE — 97535 SELF CARE MNGMENT TRAINING: CPT

## 2025-07-16 PROCEDURE — 85025 COMPLETE CBC W/AUTO DIFF WBC: CPT

## 2025-07-16 PROCEDURE — 71045 X-RAY EXAM CHEST 1 VIEW: CPT

## 2025-07-16 PROCEDURE — 25010000002 CEFAZOLIN PER 500 MG

## 2025-07-16 PROCEDURE — 74018 RADEX ABDOMEN 1 VIEW: CPT

## 2025-07-16 PROCEDURE — 94664 DEMO&/EVAL PT USE INHALER: CPT

## 2025-07-16 PROCEDURE — 83735 ASSAY OF MAGNESIUM: CPT | Performed by: ANESTHESIOLOGY

## 2025-07-16 PROCEDURE — 82330 ASSAY OF CALCIUM: CPT

## 2025-07-16 PROCEDURE — 25010000002 METOCLOPRAMIDE PER 10 MG

## 2025-07-16 PROCEDURE — 99291 CRITICAL CARE FIRST HOUR: CPT | Performed by: ANESTHESIOLOGY

## 2025-07-16 PROCEDURE — 97162 PT EVAL MOD COMPLEX 30 MIN: CPT

## 2025-07-16 PROCEDURE — 25010000002 ONDANSETRON PER 1 MG

## 2025-07-16 PROCEDURE — 97166 OT EVAL MOD COMPLEX 45 MIN: CPT

## 2025-07-16 PROCEDURE — 63710000001 INSULIN GLARGINE PER 5 UNITS: Performed by: ANESTHESIOLOGY

## 2025-07-16 PROCEDURE — 94799 UNLISTED PULMONARY SVC/PX: CPT

## 2025-07-16 PROCEDURE — 25010000002 BUMETANIDE PER 0.5 MG: Performed by: ANESTHESIOLOGY

## 2025-07-16 PROCEDURE — 93010 ELECTROCARDIOGRAM REPORT: CPT | Performed by: INTERNAL MEDICINE

## 2025-07-16 PROCEDURE — 99231 SBSQ HOSP IP/OBS SF/LOW 25: CPT | Performed by: SURGERY

## 2025-07-16 PROCEDURE — 25010000002 MAGNESIUM SULFATE IN D5W 1G/100ML (PREMIX) 1-5 GM/100ML-% SOLUTION

## 2025-07-16 PROCEDURE — 93005 ELECTROCARDIOGRAM TRACING: CPT

## 2025-07-16 PROCEDURE — 82803 BLOOD GASES ANY COMBINATION: CPT | Performed by: ANESTHESIOLOGY

## 2025-07-16 PROCEDURE — 25010000002 ACETAMINOPHEN 10 MG/ML SOLUTION

## 2025-07-16 PROCEDURE — 85610 PROTHROMBIN TIME: CPT

## 2025-07-16 PROCEDURE — 25010000002 FUROSEMIDE PER 20 MG: Performed by: ANESTHESIOLOGY

## 2025-07-16 PROCEDURE — 82330 ASSAY OF CALCIUM: CPT | Performed by: ANESTHESIOLOGY

## 2025-07-16 PROCEDURE — 82948 REAGENT STRIP/BLOOD GLUCOSE: CPT

## 2025-07-16 PROCEDURE — 80053 COMPREHEN METABOLIC PANEL: CPT

## 2025-07-16 RX ORDER — METOPROLOL TARTRATE 25 MG/1
25 TABLET, FILM COATED ORAL EVERY 12 HOURS SCHEDULED
Status: DISCONTINUED | OUTPATIENT
Start: 2025-07-16 | End: 2025-07-17

## 2025-07-16 RX ORDER — FUROSEMIDE 10 MG/ML
40 INJECTION INTRAMUSCULAR; INTRAVENOUS ONCE
Status: COMPLETED | OUTPATIENT
Start: 2025-07-16 | End: 2025-07-16

## 2025-07-16 RX ORDER — INSULIN LISPRO 100 [IU]/ML
2-7 INJECTION, SOLUTION INTRAVENOUS; SUBCUTANEOUS
Status: DISCONTINUED | OUTPATIENT
Start: 2025-07-16 | End: 2025-07-24

## 2025-07-16 RX ORDER — SODIUM CHLORIDE FOR INHALATION 7 %
4 VIAL, NEBULIZER (ML) INHALATION
Status: COMPLETED | OUTPATIENT
Start: 2025-07-16 | End: 2025-07-20

## 2025-07-16 RX ORDER — IPRATROPIUM BROMIDE AND ALBUTEROL SULFATE 2.5; .5 MG/3ML; MG/3ML
3 SOLUTION RESPIRATORY (INHALATION)
Status: DISCONTINUED | OUTPATIENT
Start: 2025-07-16 | End: 2025-07-24

## 2025-07-16 RX ORDER — NICOTINE POLACRILEX 4 MG
15 LOZENGE BUCCAL
Status: DISCONTINUED | OUTPATIENT
Start: 2025-07-16 | End: 2025-07-24

## 2025-07-16 RX ORDER — QUETIAPINE FUMARATE 25 MG/1
25 TABLET, FILM COATED ORAL NIGHTLY PRN
Status: DISCONTINUED | OUTPATIENT
Start: 2025-07-16 | End: 2025-07-20

## 2025-07-16 RX ORDER — BUMETANIDE 0.25 MG/ML
2 INJECTION, SOLUTION INTRAMUSCULAR; INTRAVENOUS ONCE
Status: COMPLETED | OUTPATIENT
Start: 2025-07-16 | End: 2025-07-16

## 2025-07-16 RX ORDER — CYCLOBENZAPRINE HCL 10 MG
5 TABLET ORAL EVERY 8 HOURS PRN
Status: DISCONTINUED | OUTPATIENT
Start: 2025-07-16 | End: 2025-07-25 | Stop reason: HOSPADM

## 2025-07-16 RX ORDER — IBUPROFEN 600 MG/1
1 TABLET ORAL
Status: DISCONTINUED | OUTPATIENT
Start: 2025-07-16 | End: 2025-07-24

## 2025-07-16 RX ORDER — DEXTROSE MONOHYDRATE 25 G/50ML
25 INJECTION, SOLUTION INTRAVENOUS
Status: DISCONTINUED | OUTPATIENT
Start: 2025-07-16 | End: 2025-07-24

## 2025-07-16 RX ORDER — OXYMETAZOLINE HYDROCHLORIDE 0.05 G/100ML
2 SPRAY NASAL ONCE
Status: COMPLETED | OUTPATIENT
Start: 2025-07-16 | End: 2025-07-16

## 2025-07-16 RX ORDER — HYDRALAZINE HYDROCHLORIDE 20 MG/ML
10 INJECTION INTRAMUSCULAR; INTRAVENOUS EVERY 4 HOURS PRN
Status: DISCONTINUED | OUTPATIENT
Start: 2025-07-16 | End: 2025-07-21

## 2025-07-16 RX ORDER — GUAIFENESIN 600 MG/1
1200 TABLET, EXTENDED RELEASE ORAL EVERY 12 HOURS SCHEDULED
Status: DISCONTINUED | OUTPATIENT
Start: 2025-07-16 | End: 2025-07-25 | Stop reason: HOSPADM

## 2025-07-16 RX ORDER — POLYMYXIN B SULFATE AND TRIMETHOPRIM 1; 10000 MG/ML; [USP'U]/ML
1 SOLUTION OPHTHALMIC
Status: DISCONTINUED | OUTPATIENT
Start: 2025-07-16 | End: 2025-07-21

## 2025-07-16 RX ADMIN — GUAIFENESIN 1200 MG: 600 TABLET, EXTENDED RELEASE ORAL at 21:39

## 2025-07-16 RX ADMIN — METOPROLOL TARTRATE 25 MG: 25 TABLET, FILM COATED ORAL at 21:43

## 2025-07-16 RX ADMIN — IPRATROPIUM BROMIDE AND ALBUTEROL SULFATE 3 ML: .5; 3 SOLUTION RESPIRATORY (INHALATION) at 21:09

## 2025-07-16 RX ADMIN — METOCLOPRAMIDE 10 MG: 5 INJECTION, SOLUTION INTRAMUSCULAR; INTRAVENOUS at 11:34

## 2025-07-16 RX ADMIN — MUPIROCIN 1 APPLICATION: 20 OINTMENT TOPICAL at 08:10

## 2025-07-16 RX ADMIN — POLYMYXIN B SULFATE AND TRIMETHOPRIM 1 DROP: 10000; 1 SOLUTION OPHTHALMIC at 16:01

## 2025-07-16 RX ADMIN — OXYCODONE HYDROCHLORIDE 10 MG: 10 TABLET ORAL at 07:13

## 2025-07-16 RX ADMIN — POLYMYXIN B SULFATE AND TRIMETHOPRIM 1 DROP: 10000; 1 SOLUTION OPHTHALMIC at 13:01

## 2025-07-16 RX ADMIN — CEFAZOLIN 2000 MG: 2 INJECTION, POWDER, FOR SOLUTION INTRAMUSCULAR; INTRAVENOUS at 23:35

## 2025-07-16 RX ADMIN — METOCLOPRAMIDE 10 MG: 5 INJECTION, SOLUTION INTRAMUSCULAR; INTRAVENOUS at 07:14

## 2025-07-16 RX ADMIN — BUMETANIDE 2 MG: 0.25 INJECTION INTRAMUSCULAR; INTRAVENOUS at 17:40

## 2025-07-16 RX ADMIN — IPRATROPIUM BROMIDE AND ALBUTEROL SULFATE 3 ML: .5; 3 SOLUTION RESPIRATORY (INHALATION) at 14:31

## 2025-07-16 RX ADMIN — POLYMYXIN B SULFATE AND TRIMETHOPRIM 1 DROP: 10000; 1 SOLUTION OPHTHALMIC at 19:00

## 2025-07-16 RX ADMIN — METOPROLOL TARTRATE 12.5 MG: 25 TABLET, FILM COATED ORAL at 08:35

## 2025-07-16 RX ADMIN — INSULIN GLARGINE 10 UNITS: 100 INJECTION, SOLUTION SUBCUTANEOUS at 11:34

## 2025-07-16 RX ADMIN — ATORVASTATIN CALCIUM 40 MG: 20 TABLET, FILM COATED ORAL at 21:39

## 2025-07-16 RX ADMIN — SENNOSIDES AND DOCUSATE SODIUM 2 TABLET: 50; 8.6 TABLET ORAL at 21:38

## 2025-07-16 RX ADMIN — MAGNESIUM HYDROXIDE 10 ML: 2400 SUSPENSION ORAL at 17:06

## 2025-07-16 RX ADMIN — OXYMETAZOLINE HYDROCHLORIDE 2 SPRAY: 0.05 SPRAY NASAL at 17:59

## 2025-07-16 RX ADMIN — FUROSEMIDE 40 MG: 10 INJECTION, SOLUTION INTRAMUSCULAR; INTRAVENOUS at 11:34

## 2025-07-16 RX ADMIN — INSULIN HUMAN 2.5 UNITS/HR: 1 INJECTION, SOLUTION INTRAVENOUS at 03:59

## 2025-07-16 RX ADMIN — Medication 4 ML: at 14:31

## 2025-07-16 RX ADMIN — GUAIFENESIN 1200 MG: 600 TABLET, EXTENDED RELEASE ORAL at 10:33

## 2025-07-16 RX ADMIN — CYCLOBENZAPRINE HYDROCHLORIDE 10 MG: 10 TABLET, FILM COATED ORAL at 07:13

## 2025-07-16 RX ADMIN — CEFAZOLIN 2000 MG: 2 INJECTION, POWDER, FOR SOLUTION INTRAMUSCULAR; INTRAVENOUS at 07:32

## 2025-07-16 RX ADMIN — Medication 5 MG: at 21:41

## 2025-07-16 RX ADMIN — PANTOPRAZOLE SODIUM 40 MG: 40 TABLET, DELAYED RELEASE ORAL at 07:13

## 2025-07-16 RX ADMIN — ASPIRIN 81 MG: 81 TABLET, COATED ORAL at 08:10

## 2025-07-16 RX ADMIN — MAGNESIUM SULFATE HEPTAHYDRATE 1 G: 1 INJECTION, SOLUTION INTRAVENOUS at 08:24

## 2025-07-16 RX ADMIN — MUPIROCIN 1 APPLICATION: 20 OINTMENT TOPICAL at 21:41

## 2025-07-16 RX ADMIN — ACETAMINOPHEN 1000 MG: 10 INJECTION, SOLUTION INTRAVENOUS at 01:50

## 2025-07-16 RX ADMIN — ONDANSETRON 4 MG: 2 INJECTION, SOLUTION INTRAMUSCULAR; INTRAVENOUS at 08:10

## 2025-07-16 RX ADMIN — CEFAZOLIN 2000 MG: 2 INJECTION, POWDER, FOR SOLUTION INTRAMUSCULAR; INTRAVENOUS at 15:25

## 2025-07-16 RX ADMIN — SENNOSIDES AND DOCUSATE SODIUM 2 TABLET: 50; 8.6 TABLET ORAL at 08:10

## 2025-07-16 RX ADMIN — ACETAMINOPHEN 650 MG: 325 TABLET, FILM COATED ORAL at 21:38

## 2025-07-16 RX ADMIN — POLYMYXIN B SULFATE AND TRIMETHOPRIM 1 DROP: 10000; 1 SOLUTION OPHTHALMIC at 21:45

## 2025-07-16 NOTE — PLAN OF CARE
Goal Outcome Evaluation:  Plan of Care Reviewed With: patient           Outcome Evaluation: Pt is 80 yo male admitted to [facility] for CABGx6 on 7/15/25. Patient lives with spouse, independent with mobility prior to admission. Patient up in chair, awake and alert and agreeable to therapy. patient performed 5 reps per cardiac protocol, sit to stand with CGA, ambulated short distance with minAx1-2, c/o lightheaded limiting ambulation distance. Patient has impairments consisting of generalized post op weakness and pain, decreased activity tolerance and would benefit from skilled PT. dc plan is home with family assist.    Anticipated Discharge Disposition (PT): home with assist

## 2025-07-16 NOTE — CONSULTS
Date of Consultation: 25    Referral Provider: Jr Oscar Patino MD     Reason for Consultation: Coronary artery disease status post CABG.    Encounter Provider: Vega Mayfield MD    Group of Service: [facility]     Patient Name: Erickson Gerard    :1945    Chief complaint: Coronary artery disease.  Elective CABG.    History of Present Illness:     This is a very pleasant 79 he has a history ofyear-old male who is followed by Dr. Cabrales in our practice.  He has a history of an abdominal aortic aneurysm and bilateral popliteal aneurysms.  He was planning on having these repaired by Dr. Archer.  As part of his cardiac clearance, he underwent a nuclear PET stress test on 2025 which showed lateral ischemia.  He underwent a left heart catheterization on 2025 which showed severe multivessel coronary artery disease.  An echocardiogram showed a normal ejection fraction of 56%    He presented for an elective CABG with Dr. Patino on 7/15/2025.  He underwent a 6 vessel CABG at that time (LIMA to LAD, SVG to acute marginal of the RCA, SVG to PDA, SVG to LV branch of RCA, SVG to OM1, and SVG to D1).    He was sitting up in the chair this morning when I saw him.  He was more hypoxic than normal and was on 8 L of high flow oxygen.  However, he did not feel short of breath.  His underlying rhythm was sinus rhythm in the 80s.  He was not on any pressor support.    CATH 25         Conclusions:  Severe multivessel CAD.  Mildly elevated left-sided filling pressures.        Recommendations:   CT surgery evaluation for possible CABG  Continue aggressive risk factor modification, start Toprol 50 mg daily           ECHO 25       Left ventricular systolic function is normal. Calculated left ventricular EF = 56.1%    Left ventricular wall thickness is consistent with mild concentric hypertrophy.    The following left ventricular wall segments are hypokinetic: apical anterior, mid  anterolateral and apical lateral.    Left ventricular diastolic function is consistent with (grade I) impaired relaxation.    Estimated right ventricular systolic pressure from tricuspid regurgitation is normal (<35 mmHg).    Mild aortic and mitral regurgitation.    Borderline left atrial enlargement, normal RA and IVC size.     Please also reference same-day stress PET study result.            Stress test 6/11/25    Lexiscan protocol completed without low-level exercise.  No angina or ischemic ECG changes.    Left ventricular ejection fraction is mildly reduced (Calculated EF = 46% which augments appropriately to 60% with stress).    Myocardial perfusion imaging indicates a moderate-sized, moderately severe area of ischemia located in the lateral wall.    Impressions are consistent with a high risk study.  Past Medical History:   Diagnosis Date    AAA (abdominal aortic aneurysm) without rupture 10/14/2021    Abdominal aortic aneurysm, without rupture, unspecified 03/14/2023    Acute embolism and thrombosis of unspecified deep veins of right distal lower extremity     Anesthesia complication     AFTER VEIN SURGERY STATES HE WAS HARD TO WAKE UP    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     Aneurysm of artery of lower extremity 09/09/2021    Arthritis     At risk for sleep apnea     STOP BANG 5    Coronary artery disease     Dyspnea on exertion     Essential (primary) hypertension     GERD (gastroesophageal reflux disease)     [place] (hard of hearing)     Hyperlipidemia     Hypertension     Iliac artery aneurysm 10/14/2021    Localized edema     Low back pain     Mixed hyperlipidemia     Other specified soft tissue disorders     PAD (peripheral artery disease)     Peripheral artery aneurysm     PONV (postoperative nausea and vomiting)     Popliteal aneurysm     Thrombosis 09/09/2021    Calf Vein Thrombosis Rt tibial vein    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         Past Surgical History:   Procedure  Laterality Date    ANGIOPLASTY POPLITEAL ARTERY Right 11/09/2021    Procedure: RIGHT POPITEAL ANEURYSM REPAIR VIABOHN;  Surgeon: Campos Archer MD;  Location: [facility] OR 18/19;  Service: Vascular;  Laterality: Right;    ANGIOPLASTY POPLITEAL ARTERY Right 01/25/2022    Procedure: AORTO ILIAC FEMORAL RIGHT POPLITEAL VIABOHN STENT PLACEMENT X 2;  Surgeon: Campos Archer MD;  Location: [facility] OR 18/19;  Service: Vascular;  Laterality: Right;    CARDIAC CATHETERIZATION N/A 06/19/2025    Procedure: Left Heart Cath;  Surgeon: Oneil Escobar MD;  Location: [facility] CATH INVASIVE LOCATION;  Service: Cardiovascular;  Laterality: N/A;    CARDIAC CATHETERIZATION N/A 06/19/2025    Procedure: Left ventriculography;  Surgeon: Oneil Escobar MD;  Location: [facility] CATH INVASIVE LOCATION;  Service: Cardiovascular;  Laterality: N/A;    COLONOSCOPY      CORONARY ARTERY BYPASS GRAFT N/A 7/15/2025    Procedure: STERNOTOMY; CORONARY ARTERY BYPASS GRAFTING TIMES 6 USING LEFT INTERNAL MAMMARY ARTERY AND LEFT SAPHENOUS VEIN; TRANSESOPHAGEAL ECHOCARDIOGRAM WITH ANESTHESIA;LEFT ATRIAL APPENDAGE EXCLUSION; PRP;  Surgeon: Jr Oscar Patino MD;  Location: [facility] CVOR;  Service: Cardiothoracic;  Laterality: N/A;    EYE SURGERY Bilateral     CATARACTS    HERNIA REPAIR Bilateral     POLITEAL ARTERY ANEURYSM REPAIR Right 11/09/2021    Viabahn Stent Graft    VEIN LIGATION AND STRIPPING N/A          Allergies   Allergen Reactions    Bee Venom Anaphylaxis         No current facility-administered medications on file prior to encounter.     Current Outpatient Medications on File Prior to Encounter   Medication Sig Dispense Refill    amLODIPine (NORVASC) 10 MG tablet TAKE 1 TABLET BY MOUTH DAILY 90 tablet 3    ezetimibe (Zetia) 10 MG tablet Take 1 tablet by mouth Daily. 90 tablet 3    metoprolol succinate XL (TOPROL-XL) 50 MG 24 hr tablet Take 1 tablet by mouth Daily. 90 tablet 3    omeprazole (priLOSEC) 20 MG capsule TAKE 1 CAPSULE BY MOUTH  DAILY 90 capsule 2    rosuvastatin (CRESTOR) 5 MG tablet Take 1 tablet by mouth 3 (Three) Times a Week. 36 tablet 3    valsartan (DIOVAN) 160 MG tablet Take 1 tablet by mouth Daily. 90 tablet 3    VITAMIN D, CHOLECALCIFEROL, PO Take 1 capsule by mouth 2 (Two) Times a Week. HOLDING FOR DOS      Xarelto 10 MG tablet TAKE 1 TABLET BY MOUTH DAILY WITH DINNER (Patient taking differently: Take 1 tablet by mouth Daily With Dinner. HOLDING FOR DOS      LAST DOSE PRIOR TO SURGERY 7-11-25) 90 tablet 3         Social History     Socioeconomic History    Marital status:    Tobacco Use    Smoking status: Never     Passive exposure: Never    Smokeless tobacco: Never    Tobacco comments:     Patient does not smoke   Vaping Use    Vaping status: Never Used   Substance and Sexual Activity    Alcohol use: Never     Comment: caffeine use- coffee, soda; Patient is non drinker.    Drug use: Never     Comment: Drug Abuse: none    Sexual activity: Defer         Family History   Problem Relation Age of Onset    No Known Problems Mother     Heart attack Father     Heart disease Father     Stroke Sister     Stroke Brother     Stroke Brother     Malig Hyperthermia Neg Hx        REVIEW OF SYSTEMS:   Pertinent positives are noted in the HPI above.  Otherwise, all other systems were reviewed, and are negative.     Objective:     Vitals:    07/16/25 1000 07/16/25 1100 07/16/25 1200 07/16/25 1300   BP: 96/76  96/74    Pulse: 82 76 77 78   Resp:       Temp:   99.1 °F (37.3 °C) 99 °F (37.2 °C)   TempSrc:       SpO2: (!) 88% 94% 93% 91%   Weight:         Body mass index is 27.81 kg/m².       General:    No acute distress, alert and oriented x4, pleasant, chronically ill-appearing.                   Head:    Normocephalic, atraumatic.   Eyes:          Conjunctivae and sclerae normal, no icterus.   Throat:   No oral lesions, no thrush, oral mucosa moist.    Neck:   Supple, trachea midline.   Lungs:     Bilateral rhonchi.    Heart:    Regular  rhythm and normal rate.  No murmurs, gallops, or rubs noted.   Abdomen:     Soft, non-tender, non-distended, positive bowel sounds.    Extremities:   No clubbing, cyanosis, or edema.     Pulses:   Pulses palpable and equal bilaterally.    Skin:   No bleeding or rash.   Neuro:   Non-focal.  Moves all extremities well.    Psychiatric:   Normal mood and affect.                 Lab Review:                Results from last 7 days   Lab Units 07/16/25  1517   SODIUM mmol/L 140   POTASSIUM mmol/L 4.3   CHLORIDE mmol/L 109*   CO2 mmol/L 19.5*   BUN mg/dL 22.0   CREATININE mg/dL 1.70*   GLUCOSE mg/dL 136*   CALCIUM mg/dL 8.5*         Results from last 7 days   Lab Units 07/16/25  0333   WBC 10*3/mm3 10.16   HEMOGLOBIN g/dL 9.8*   HEMATOCRIT % 29.4*   PLATELETS 10*3/mm3 82*     Results from last 7 days   Lab Units 07/16/25  0333 07/15/25  1625 07/14/25  0716   INR  1.65* 1.68* 1.27*   APTT seconds  --  36.7* 33.2     Results from last 7 days   Lab Units 07/14/25  0716   CHOLESTEROL mg/dL 141     Results from last 7 days   Lab Units 07/16/25  0904   MAGNESIUM mg/dL 2.7*     Results from last 7 days   Lab Units 07/14/25  0716   CHOLESTEROL mg/dL 141   TRIGLYCERIDES mg/dL 139   HDL CHOL mg/dL 42   LDL CHOL mg/dL 75       EKG (reviewed by me personally):                          Assessment:   1.  Severe multivessel coronary artery disease, status post 6 vessel CABG and left atrial appendage occlusion with an atrial clip device on 7/15/2025 by Dr. Patino.  2.  Peripheral arterial disease, including current AAA measuring 5.3 cm  3.  Postoperative hypoxia  4.  Postoperative acute kidney injury  5.  History of prior DVT  6.  Expected postoperative anemia  7.  Postoperative thrombocytopenia  8.  Hypertension    Plan:       For the most part, he seems to be doing well.  He was requiring a little bit more oxygen than normal at 8 L high flow.  Agree with diuresis.  He is getting 40 mg of IV Lasix today along with 2 mg of IV Bumex this  afternoon.  Continue to wean oxygen as tolerated.    Otherwise, he was sitting up in the chair and was in sinus rhythm in the 80s.  Aspirin, Lipitor, and metoprolol started today.  Continue to tailor therapy postoperatively.  He is on Xarelto as an outpatient for his peripheral arterial disease and previous PAD stenting.    Continue routine postoperative care and increase ambulation as tolerated.  Cardiology will continue to follow.    Thank you very much for this consult.    Benito Mayfield MD

## 2025-07-16 NOTE — PLAN OF CARE
Goal Outcome Evaluation:  Plan of Care Reviewed With: patient           Outcome Evaluation: Patient is 79 year old male presenting to [facility] after being recently seen by cardiology 6/9 for worsening SOA. Patient found to have severe multi-vessel CAD, 5.3 cm AAA (non urgent per ). Patient is POD 1 6V CABG and WEN clip. At baseline, patient is independent with ADLs, no device for mobility, and lives with his spouse. Following a comprehensive Occupational Therapy assessment conducted today, the patient demonstrates a decline from their baseline functional status, exhibiting deficits related to balance, tolerance, transfers and mobility that significantly impact independence in activities of daily living. The patient would benefit from skilled Occupational Therapy services to enhance safety and facilitate a return to prior level of independence. Home with assist is recommended upon discharge from the hospital setting.    Anticipated Discharge Disposition (OT): home with assist

## 2025-07-16 NOTE — THERAPY EVALUATION
Patient Name: Erickson Gerard  : 1945    MRN: [id number]                              Today's Date: 2025       Admit Date: 7/15/2025    Visit Dx:     ICD-10-CM ICD-9-CM   1. Coronary artery disease involving native heart, unspecified vessel or lesion type, unspecified whether angina present  I25.10 414.01   2. Abnormal findings on diagnostic imaging of heart and coronary circulation  R93.1 794.39     Patient Active Problem List   Diagnosis    Essential hypertension    GERD (gastroesophageal reflux disease)    Medicare annual wellness visit, subsequent    Benign prostatic hyperplasia with urinary frequency    Umbilical hernia without obstruction and without gangrene    Colon cancer screening    Mixed hyperlipidemia    Localized edema    Right leg swelling    Aneurysm of right popliteal artery    Acute deep vein thrombosis (DVT) of distal vein of right lower extremity    DDD (degenerative disc disease), lumbar    Actinic keratosis of forehead    Aneurysm of right popliteal artery    Abdominal aortic aneurysm (AAA) 3.0 cm to 5.5 cm in diameter in male    PVD (peripheral vascular disease)    High risk medication use    Statin intolerance    Immunization deficiency    Chronic deep vein thrombosis (DVT)    Encounter for hepatitis C screening test for low risk patient    Peripheral artery aneurysm    Right elbow pain    Blurred vision    Nausea    Dizziness    Visual disturbance    Preoperative cardiovascular examination    Coronary artery disease of native artery of native heart with stable angina pectoris    Abnormal stress test    Coronary artery disease involving native heart    Abnormal findings on diagnostic imaging of heart and coronary circulation    Hx of CABG     Past Medical History:   Diagnosis Date    AAA (abdominal aortic aneurysm) without rupture 10/14/2021    Abdominal aortic aneurysm, without rupture, unspecified 2023    Acute embolism and thrombosis of unspecified deep veins of right  distal lower extremity     Anesthesia complication     AFTER VEIN SURGERY STATES HE WAS HARD TO WAKE UP    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     Aneurysm of artery of lower extremity 09/09/2021    Arthritis     At risk for sleep apnea     STOP BANG 5    Coronary artery disease     Dyspnea on exertion     Essential (primary) hypertension     GERD (gastroesophageal reflux disease)     [place] (hard of hearing)     Hyperlipidemia     Hypertension     Iliac artery aneurysm 10/14/2021    Localized edema     Low back pain     Mixed hyperlipidemia     Other specified soft tissue disorders     PAD (peripheral artery disease)     Peripheral artery aneurysm     PONV (postoperative nausea and vomiting)     Popliteal aneurysm     Thrombosis 09/09/2021    Calf Vein Thrombosis Rt tibial vein    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     Past Surgical History:   Procedure Laterality Date    ANGIOPLASTY POPLITEAL ARTERY Right 11/09/2021    Procedure: RIGHT POPITEAL ANEURYSM REPAIR VIABOHN;  Surgeon: Campos Archer MD;  Location: [facility] OR 18/19;  Service: Vascular;  Laterality: Right;    ANGIOPLASTY POPLITEAL ARTERY Right 01/25/2022    Procedure: AORTO ILIAC FEMORAL RIGHT POPLITEAL VIABOHN STENT PLACEMENT X 2;  Surgeon: Campos Archer MD;  Location: [facility] OR 18/19;  Service: Vascular;  Laterality: Right;    CARDIAC CATHETERIZATION N/A 06/19/2025    Procedure: Left Heart Cath;  Surgeon: Oneil Escobar MD;  Location: [facility] INVASIVE LOCATION;  Service: Cardiovascular;  Laterality: N/A;    CARDIAC CATHETERIZATION N/A 06/19/2025    Procedure: Left ventriculography;  Surgeon: Oneil Escobar MD;  Location: [facility] CATH INVASIVE LOCATION;  Service: Cardiovascular;  Laterality: N/A;    COLONOSCOPY      CORONARY ARTERY BYPASS GRAFT N/A 7/15/2025    Procedure: STERNOTOMY; CORONARY ARTERY BYPASS GRAFTING TIMES 6 USING LEFT INTERNAL MAMMARY ARTERY AND LEFT SAPHENOUS VEIN; TRANSESOPHAGEAL ECHOCARDIOGRAM WITH  ANESTHESIA;LEFT ATRIAL APPENDAGE EXCLUSION; PRP;  Surgeon: Jr Oscar Patino MD;  Location: [facility];  Service: Cardiothoracic;  Laterality: N/A;    EYE SURGERY Bilateral     CATARACTS    HERNIA REPAIR Bilateral     POLITEAL ARTERY ANEURYSM REPAIR Right 11/09/2021    Viabahn Stent Graft    VEIN LIGATION AND STRIPPING N/A       General Information       Row Name 07/16/25 1016          Physical Therapy Time and Intention    Document Type evaluation  -EM     Mode of Treatment co-treatment;physical therapy;occupational therapy;other (see comments)  -EM       Row Name 07/16/25 1016          General Information    Patient Profile Reviewed yes  -EM     Prior Level of Function independent:  -EM     Existing Precautions/Restrictions fall;cardiac;sternal  -EM       Row Name 07/16/25 1016          Living Environment    Current Living Arrangements home  -EM     People in Home spouse  -EM       Row Name 07/16/25 1016          Home Main Entrance    Number of Stairs, Main Entrance one  -EM       Row Name 07/16/25 1016          Cognition    Orientation Status (Cognition) oriented x 3  -EM       Row Name 07/16/25 1016          Safety Issues/Impairments Affecting Functional Mobility    Impairments Affecting Function (Mobility) balance;endurance/activity tolerance;pain  -EM     Comment, Safety Issues/Impairments (Mobility) Co treatment medically appropriate and necessary due to patient acuity level, activity tolerance and safety of patient and staff. Evaluation established to achieve all goals in POC.  -EM               User Key  (r) = Recorded By, (t) = Taken By, (c) = Cosigned By      Initials Name Provider Type    EM Kayy Adams PT Physical Therapist                   Mobility       Row Name 07/16/25 1017          Bed Mobility    Comment, (Bed Mobility) not tested, up in chair  -EM       Row Name 07/16/25 1017          Sit-Stand Transfer    Sit-Stand [place] (Transfers) contact guard;verbal cues  -EM       Row  "Name 07/16/25 1017          Gait/Stairs (Locomotion)    [place] Level (Gait) minimum assist (75% patient effort);1 person to manage equipment  -EM     Distance in Feet (Gait) 20  -EM     Deviations/Abnormal Patterns (Gait) gait speed decreased;stride length decreased  -EM     Comment, (Gait/Stairs) pt c/o feeling \"lightheaded\"  -EM               User Key  (r) = Recorded By, (t) = Taken By, (c) = Cosigned By      Initials Name Provider Type    EM Kayy Adams PT Physical Therapist                   Obj/Interventions       Row Name 07/16/25 1018          Range of Motion Comprehensive    General Range of Motion no range of motion deficits identified  -EM       Row Name 07/16/25 1018          Strength Comprehensive (MMT)    General Manual Muscle Testing (MMT) Assessment other (see comments)  -EM     Comment, General Manual Muscle Testing (MMT) Assessment no focal deficits identified, generalized weakness noted  -EM       Row Name 07/16/25 1018          Motor Skills    Therapeutic Exercise other (see comments)  5 reps per cardiac protocol, level 2  -EM       Row Name 07/16/25 1018          Balance    Balance Assessment standing static balance;standing dynamic balance  -EM     Static Standing Balance minimal assist  -EM     Dynamic Standing Balance minimal assist;2-person assist  -EM       Row Name 07/16/25 1018          Sensory Assessment (Somatosensory)    Sensory Assessment (Somatosensory) sensation intact  -EM               User Key  (r) = Recorded By, (t) = Taken By, (c) = Cosigned By      Initials Name Provider Type    EM Kayy Adams PT Physical Therapist                   Goals/Plan       Row Name 07/16/25 1021          Problem Specific Goal 1 (PT)    Problem Specific Goal 1 (PT) cardiac level 3  -EM     Time Frame (Problem Specific Goal 1, PT) 2 weeks  -EM       Row Name 07/16/25 1021          Therapy Assessment/Plan (PT)    Planned Therapy Interventions (PT) gait training;home exercise " program;patient/family education;transfer training;strengthening;bed mobility training  -EM               User Key  (r) = Recorded By, (t) = Taken By, (c) = Cosigned By      Initials Name Provider Type    EM Kayy Adams, PT Physical Therapist                   Clinical Impression       Row Name 07/16/25 1018          Pain    Pretreatment Pain Rating 3/10  -EM     Posttreatment Pain Rating 3/10  -EM     Pain Location chest  -EM     Response to Pain Interventions activity participation with tolerable pain  -EM       Row Name 07/16/25 1018          Plan of Care Review    Plan of Care Reviewed With patient  -EM     Outcome Evaluation Pt is 78 yo male admitted to [facility] for CABGx6 on 7/15/25. Patient lives with spouse, independent with mobility prior to admission. Patient up in chair, awake and alert and agreeable to therapy. patient performed 5 reps per cardiac protocol, sit to stand with CGA, ambulated short distance with minAx1-2, c/o lightheaded limiting ambulation distance. Patient has impairments consisting of generalized post op weakness and pain, decreased activity tolerance and would benefit from skilled PT. dc plan is home with family assist.  -EM       Row Name 07/16/25 1018          Therapy Assessment/Plan (PT)    Patient/Family Therapy Goals Statement (PT) get better  -EM     Rehab Potential (PT) good  -EM     Criteria for Skilled Interventions Met (PT) yes;skilled treatment is necessary  -EM     Therapy Frequency (PT) daily  -EM       Row Name 07/16/25 1018          Vital Signs    Pre Systolic BP Rehab 99  -EM     Pre Treatment Diastolic BP 75  -EM     Post Systolic BP Rehab 103  -EM     Post Treatment Diastolic BP 75  -EM     Pretreatment Heart Rate (beats/min) 87  -EM     Posttreatment Heart Rate (beats/min) 85  -EM     Pre SpO2 (%) 90  -EM     O2 Delivery Pre Treatment hi-flow  7L  -EM     Intra SpO2 (%) 87  -EM     O2 Delivery Intra Treatment hi-flow  8L  -EM     Post SpO2 (%) 90  -EM     O2  Delivery Post Treatment hi-flow  8L  -EM       Row Name 07/16/25 1018          Positioning and Restraints    Pre-Treatment Position sitting in chair/recliner  -EM     Post Treatment Position chair  -EM     In Chair reclined;call light within reach;notified nsg  -EM               User Key  (r) = Recorded By, (t) = Taken By, (c) = Cosigned By      Initials Name Provider Type    Kayy Steen PT Physical Therapist                   Outcome Measures       Row Name 07/16/25 1021          How much help from another person do you currently need...    Turning from your back to your side while in flat bed without using bedrails? 3  -EM     Moving from lying on back to sitting on the side of a flat bed without bedrails? 2  -EM     Moving to and from a bed to a chair (including a wheelchair)? 3  -EM     Standing up from a chair using your arms (e.g., wheelchair, bedside chair)? 3  -EM     Climbing 3-5 steps with a railing? 2  -EM     To walk in hospital room? 3  -EM     AM-PAC 6 Clicks Score (PT) 16  -EM               User Key  (r) = Recorded By, (t) = Taken By, (c) = Cosigned By      Initials Name Provider Type    Kayy Steen PT Physical Therapist                                 Physical Therapy Education       Title: PT OT SLP Therapies (In Progress)       Topic: Physical Therapy (In Progress)       Point: Mobility training (In Progress)       Learning Progress Summary            Patient Acceptance, E, NR by EM at 7/16/2025 1022                      Point: Home exercise program (In Progress)       Learning Progress Summary            Patient Acceptance, E, NR by EM at 7/16/2025 1022                      Point: Body mechanics (Not Started)       Learner Progress:  Not documented in this visit.              Point: Precautions (Not Started)       Learner Progress:  Not documented in this visit.                              User Key       Initials Effective Dates Name Provider Type Discipline    EM  06/16/21 -  Kayy Adams PT Physical Therapist PT                  PT Recommendation and Plan  Planned Therapy Interventions (PT): gait training, home exercise program, patient/family education, transfer training, strengthening, bed mobility training  Outcome Evaluation: Pt is 80 yo male admitted to [facility] for CABGx6 on 7/15/25. Patient lives with spouse, independent with mobility prior to admission. Patient up in chair, awake and alert and agreeable to therapy. patient performed 5 reps per cardiac protocol, sit to stand with CGA, ambulated short distance with minAx1-2, c/o lightheaded limiting ambulation distance. Patient has impairments consisting of generalized post op weakness and pain, decreased activity tolerance and would benefit from skilled PT. dc plan is home with family assist.     Time Calculation:         PT Charges       Row Name 07/16/25 1022             Time Calculation    Start Time 0909  -EM      Stop Time 0931  -EM      Time Calculation (min) 22 min  -EM      PT Received On 07/16/25  -EM      PT - Next Appointment 07/17/25  -EM      PT Goal Re-Cert Due Date 07/30/25  -EM         Time Calculation- PT    Total Timed Code Minutes- PT 15 minute(s)  -EM         Timed Charges    40277 - PT Therapeutic Exercise Minutes 5  -EM      83262 - PT Therapeutic Activity Minutes 10  -EM         Total Minutes    Timed Charges Total Minutes 15  -EM       Total Minutes 15  -EM                User Key  (r) = Recorded By, (t) = Taken By, (c) = Cosigned By      Initials Name Provider Type    EM Kayy Adams PT Physical Therapist                  Therapy Charges for Today       Code Description Service Date Service Provider Modifiers Qty    15918288528  PT THERAPEUTIC ACT EA 15 MIN 7/16/2025 Kayy Adams, PT GP 1    [id number]  PT EVAL MOD COMPLEXITY 3 7/16/2025 Kayy Adams, PT GP 1            PT G-Codes  AM-PAC 6 Clicks Score (PT): 16  PT Discharge Summary  Anticipated Discharge  Disposition (PT): home with assist    Kayy Adams, PT  7/16/2025

## 2025-07-16 NOTE — PROGRESS NOTES
LOS: 1 day   Patient Care Team:  Farhad Metcalf MD as PCP - General (Family Medicine)    Chief Complaint: Post-op CABG    Subjective     Subjective    Patient looks good this morning, sitting in bedside chair, no new complaints     Objective     Vital Signs  Temp:  [95.9 °F (35.5 °C)-98.3 °F (36.8 °C)] 97.3 °F (36.3 °C)  Heart Rate:  [56-85] 82  Resp:  [14-16] 14  BP: ()/(67-94) 102/78  Arterial Line BP: ()/(48-84) 104/61  FiO2 (%):  [53 %-100 %] 53 %  Body mass index is 27.81 kg/m².    Intake/Output Summary (Last 24 hours) at 7/16/2025 0808  Last data filed at 7/16/2025 0700  Gross per 24 hour   Intake 3843.33 ml   Output 5700 ml   Net -1856.67 ml     No intake/output data recorded.    Chest tube drainage last 8 hours: 80/90ml    Wt Readings from Last 3 Encounters:   07/16/25 75.8 kg (167 lb 1.7 oz)   07/14/25 72.9 kg (160 lb 12.8 oz)   06/19/25 72.6 kg (160 lb)       Flowsheet Rows      Flowsheet Row First Filed Value   Admission Height --   Admission Weight 75.8 kg (167 lb 1.7 oz) Documented at 07/16/2025 0500            Objective:  General Appearance:  Comfortable and in no acute distress.    Vital signs: (most recent): Blood pressure 96/76, pulse 76, temperature 97.3 °F (36.3 °C), resp. rate 14, weight 75.8 kg (167 lb 1.7 oz), SpO2 94%.  Vital signs are normal.  No fever.    Output: Producing urine and no stool output.    Lungs:  Normal effort and normal respiratory rate.  There are decreased breath sounds.    Heart: Normal rate.  Regular rhythm.    Abdomen: Hypoactive bowel sounds.     Extremities: Normal range of motion.  There is dependent edema.    Neurological: Patient is alert and oriented to person, place and time.    Skin:  Warm and dry.  (Sternal dressing c/d/i)              Results Review:        Results from last 7 days   Lab Units 07/16/25  0333 07/15/25  2140 07/15/25  1625   WBC 10*3/mm3 10.16 11.22* 10.81*   HEMOGLOBIN g/dL 9.8* 10.3* 11.7*   HEMATOCRIT % 29.4* 30.2* 34.1*    PLATELETS 10*3/mm3 82* 81* 100*         PT/INR:    Protime   Date Value Ref Range Status   07/16/2025 19.6 (H) 11.7 - 14.2 Seconds Final   07/15/2025 19.9 (H) 11.7 - 14.2 Seconds Final   07/14/2025 15.9 (H) 11.7 - 14.2 Seconds Final   /  INR   Date Value Ref Range Status   07/16/2025 1.65 (H) 0.90 - 1.10 Final   07/15/2025 1.68 (H) 0.90 - 1.10 Final   07/14/2025 1.27 (H) 0.90 - 1.10 Final       Results from last 7 days   Lab Units 07/15/25  2133 07/15/25  1625 07/14/25  0716   SODIUM mmol/L 143 142 141   POTASSIUM mmol/L 4.6 4.1 3.6   CHLORIDE mmol/L 111* 109* 106   CO2 mmol/L 18.7* 21.7* 22.2   BUN mg/dL 18.0 18.0 26.0*   CREATININE mg/dL 1.34* 1.30* 1.37*   GLUCOSE mg/dL 135* 113* 131*   CALCIUM mg/dL 9.0 9.8 8.8         Scheduled Meds:  aspirin, 81 mg, Oral, Daily  atorvastatin, 40 mg, Oral, Nightly  ceFAZolin, 2,000 mg, Intravenous, Q8H  enoxaparin sodium, 40 mg, Subcutaneous, Daily  magnesium sulfate, 1 g, Intravenous, Q8H  metoclopramide, 10 mg, Intravenous, Q6H  metoprolol tartrate, 12.5 mg, Oral, Q12H  mupirocin, 1 Application, Each Nare, BID  pantoprazole, 40 mg, Oral, Q AM  senna-docusate sodium, 2 tablet, Oral, BID        Infusions:  clevidipine, 2-32 mg/hr  dexmedetomidine, 0.2-1.5 mcg/kg/hr, Last Rate: Stopped (07/15/25 1930)  DOPamine, 2-20 mcg/kg/min  EPINEPHrine, 0.02-0.3 mcg/kg/min  insulin, 0-100 Units/hr, Last Rate: 2.5 Units/hr (07/16/25 0359)  lactated ringers, 9 mL/hr  lactated ringers, 9 mL/hr  milrinone, 0.25-0.75 mcg/kg/min  niCARdipine, 5-15 mg/hr, Last Rate: Stopped (07/15/25 1625)  nitroglycerin, 5-200 mcg/min  norepinephrine, 0.02-0.3 mcg/kg/min  phenylephrine, 0.2-3 mcg/kg/min  propofol, 5-50 mcg/kg/min, Last Rate: Stopped (07/15/25 1830)  sodium chloride, 30 mL/hr, Last Rate: 30 mL/hr (07/15/25 1636)          Assessment & Plan         Hx of CABG    Coronary artery disease involving native heart    Abnormal findings on diagnostic imaging of heart and coronary  circulation      Assessment & Plan    - Severe MV-CAD -- s/p CABG, LEVH, WEN occlusion via atriclip with Dr. Patino 7/15/25  - AAA (hx aorto iliac femoral right popliteal stent repair x2 with Dr. Archer) -- AAA measuring 5.3cm  - HTN  - HLD  - PAD  - Hx DVT  - Right leg vein stripping    POD1:     Patient sitting in bedside chair, vitals stable and in SR, keep wires secured  Patient on insulin gtt, transitioning to SSI with lantus   Still on quite a bit of oxygen, nebs added, encourage IS/flutter  Edematous on exam and vascular congestion on CXR, IV diuresis today   Creatinine of 1.58 this morning, continue to monitor urine output  Keep chest tubes, central line, and buitrago another day   Beta blocker started, increase as needed  Platelet ct 82, will hold lovenox   Continue supportive care, mobilize, keeping in CVR for now      Luma Austin PA-C  07/16/25  08:08 EDT

## 2025-07-16 NOTE — PROGRESS NOTES
CVICU Intensivist Progress Note    HPI/Chief Complaint: 80 yo male with CAD who presents to the CVICU immediately s/p 6V CABG + WEN clip    PMHx: HTN, HL, PAD s/p aorto-iliac fem-pop bypass with stent repair on Xarelto, h/o right DVT, PONV, GERD, 5.3 cm AAA followed by Dr. Archer w/ plans for repair in the near future    Procedure: 7/15/25 6V CABG (LIMA - LAD, SVG - acute marginal of RCA, PDA, OM1, D1) + WEN clip by Dr. Patino. He was easily intubated, but MARCOS placement was somewhat difficult. MARCOS showed LVEF 60%, nonrheumatic mild AI. He received 500ml of cellsaver only, no other products and required no shocks coming off CPB. He was hemoconcentrated 2.6L. He was brought to the CVICU on sedation and low dose cardene. Initial CI 2.1 without inotropes.    Hospital Course:   7/15 operative course    Daily Assessment and Plan 7/16: Extubated overnight without issue, now with acute hypoxic respiratory failure requiring escalating supplemental O2. Currently on high flow 8L. Start duonebs + hypertonic nebs + guaifenesin in addition to aggressive pulmonary toilet to address atelectasis. His pain is well controlled at this point, so unlikely that further pain meds will help. Will give a dose of lasix as well to address pulmonary edema. Mild VERN with Cr up to 1.5 today. Follow closely, however I do think he requires diuresis at this point to address his hypoxia. Hemodynamics appropriate, tolerating ASA + statin + metoprolol. Sinus rhythm in the 70s, no longer requiring epicardial pacing. Advancing diet slowly, bowel regimen to address constipation. Will give lantus 10 units x1 and start sliding scale to wean off glucommander and address hyperglycemia. Periop cefazolin. Thrombocytopenia and elevated INR, but no evidence of ongoing bleeding; hold lovenox for now 2/2 low platelets. Mobilize as much as possible throughout the day to assist with respiratory status.    Historical Information of Importance:  - A1C 6.1  - Xarelto for  PAD and stents, last dose 7/11    aspirin, 81 mg, Oral, Daily  atorvastatin, 40 mg, Oral, Nightly  ceFAZolin, 2,000 mg, Intravenous, Q8H  [Held by provider] enoxaparin sodium, 40 mg, Subcutaneous, Daily  guaiFENesin, 1,200 mg, Oral, Q12H  insulin lispro, 2-7 Units, Subcutaneous, 4x Daily AC & at Bedtime  ipratropium-albuterol, 3 mL, Nebulization, 4x Daily - RT  magnesium sulfate, 1 g, Intravenous, Q8H  metoprolol tartrate, 12.5 mg, Oral, Q12H  mupirocin, 1 Application, Each Nare, BID  pantoprazole, 40 mg, Oral, Q AM  senna-docusate sodium, 2 tablet, Oral, BID  sodium chloride, 4 mL, Nebulization, BID - RT  trimethoprim-polymyxin b, 1 drop, Right Eye, 6x Daily        Relevant Physical Exam:  Feet warm, 1+ pulses bilaterally.  Abdomen soft, nondistended  Lungs clear but distant breath sounds    ------------------------------------------------------------------------------------------------------------------------------------------------------  Prophy: HOB elevated + oral care + scds + buitrago stat lock + PPI + holding lovenox 2/2 thrombocytopenia  Code Status: full  Lines: JESS MCARTHUR(7/15)      Critical Care time excluding procedures on this date: 51 mins on 7/16/25 by Kayy Melgar MD for the assessment and treatment of: interpretation of serial cardiac output measurements, evaluation of CXR, interpretation of serial blood gases,  coagulopathy, VERN, thrombocytopenia, acute hypoxic respiratory failure

## 2025-07-16 NOTE — PROGRESS NOTES
[facility]   Surgical Progress Note    Patient Name: Erickson Gerard  : 1945  MRN: [id number]  Date of admission: 7/15/2025  Surgical Procedures Since Admission:  Procedure(s):  STERNOTOMY; CORONARY ARTERY BYPASS GRAFTING TIMES 6 USING LEFT INTERNAL MAMMARY ARTERY AND LEFT SAPHENOUS VEIN; TRANSESOPHAGEAL ECHOCARDIOGRAM WITH ANESTHESIA;LEFT ATRIAL APPENDAGE EXCLUSION; PRP  Surgeon:  Jr Oscar Patino MD  Status:  1 Day Post-Op  -------------------    Subjective   Subjective     Chief Complaint: Abdominal aortic aneurysm and bilateral popliteal aneurysms    History of Present Illness   79-year-old gentleman with known abdominal aortic aneurysms up to 5.3 cm expanded from 4.7.  At this point in time he will need it repaired but he is recovering from his CABG and we will plan on doing this in around 6 weeks if cleared by cardiology and cardiothoracic.    Review of Systems    Objective   Objective     Vitals:   Temp:  [95.9 °F (35.5 °C)-97.7 °F (36.5 °C)] 97.3 °F (36.3 °C)  Heart Rate:  [73-85] 76  Resp:  [14] 14  BP: ()/(67-85) 96/76  Arterial Line BP: ()/(57-84) 114/68  Flow (L/min) (Oxygen Therapy):  [4-5] 5  FiO2 (%):  [53 %-100 %] 53 %  Output by Drain (mL) 07/15/25 0701 - 07/15/25 1900 07/15/25 1901 - 25 0700 25 0701 - 25 1245 Range Total   Chest Tube 3 Left Pleural 45 150 50 245   Urethral Catheter Straight-tip;Temperature probe;Silicone;Non-latex 16 Fr. 965    Y Chest Tube 1 and 2 1 Mediastinal 28 Fr. 2 Mediastinal 28 Fr. 65 145 45 255       Physical Exam    Legs with mild edema and no evidence of ischemia.  Lungs clear  Abdomen soft    Result Review    Result Review:  I have personally reviewed the results from the time of this admission to 2025 12:45 EDT and agree with these findings:  [x]  Laboratory list / accordion  []  Microbiology  []  Radiology  []  EKG/Telemetry   []  Cardiology/Vascular   []  Pathology  []  Old records  []  Other:  Most  notable findings include: Platelets of 82.  Creatinine of 1.56.  White count stable and hemoglobin stable      Results from last 7 days   Lab Units 07/16/25  0333 07/15/25  2140 07/15/25  1625 07/15/25  1524 07/15/25  1448 07/15/25  1446 07/15/25  1419 07/15/25  1403 07/15/25  1229 07/14/25  0716   WBC 10*3/mm3 10.16 11.22* 10.81*  --   --   --   --   --   --  6.54   HEMOGLOBIN g/dL 9.8* 10.3* 11.7*  --   --   --   --   --   --  13.7   HEMOGLOBIN, POC g/dL  --   --   --  9.9*  --  10.5* 9.5* 9.5*   < >  --    PLATELETS 10*3/mm3 82* 81* 100*  --  106*  --   --   --   --  168    < > = values in this interval not displayed.     Results from last 7 days   Lab Units 07/16/25  0904 07/15/25  2133 07/15/25  1625 07/14/25  0716   SODIUM mmol/L 144 143 142 141   POTASSIUM mmol/L 4.3 4.6 4.1 3.6   CHLORIDE mmol/L 111* 111* 109* 106   CO2 mmol/L 20.2* 18.7* 21.7* 22.2   BUN mg/dL 19.0 18.0 18.0 26.0*   CREATININE mg/dL 1.56* 1.34* 1.30* 1.37*   GLUCOSE mg/dL 163* 135* 113* 131*   MAGNESIUM mg/dL 2.7* 2.7* 2.5* 2.1   PHOSPHORUS mg/dL  --  4.4 3.3  --    Estimated Creatinine Clearance: 37.1 mL/min (A) (by C-G formula based on SCr of 1.56 mg/dL (H)).  Results from last 7 days   Lab Units 07/16/25  0333 07/15/25  1625 07/14/25  0716   PROTIME Seconds 19.6* 19.9* 15.9*   INR  1.65* 1.68* 1.27*     Lab Results   Component Value Date    HGBA1C 6.10 (H) 07/14/2025     Glucose   Date/Time Value Ref Range Status   07/16/2025 1036 155 (H) 70 - 130 mg/dL Final   07/16/2025 0801 148 (H) 70 - 130 mg/dL Final   07/16/2025 0533 164 (H) 70 - 130 mg/dL Final   07/16/2025 0436 178 (H) 70 - 130 mg/dL Final   07/16/2025 0342 187 (H) 70 - 130 mg/dL Final   07/16/2025 0338 181 (H) 70 - 130 mg/dL Final   07/15/2025 2136 134 (H) 70 - 130 mg/dL Final   07/15/2025 1854 112 70 - 130 mg/dL Final       Assessment & Plan   Assessment / Plan     Brief Patient Summary:  Erickson Gerard is a 79 y.o. male who was already extubated after CABG.  He is doing  fairly well and looks good.  We will of course be waiting at least 6 weeks to fix his aneurysm at this point we will need clearance from cardiothoracic when that time comes.  Otherwise am very pleased with his course in his extremities worse than his known popliteals look stable as well.  He is aware and agreeable plan        Active Hospital Problems:   Active Hospital Problems    Diagnosis     **Hx of CABG     Coronary artery disease involving native heart     Abnormal findings on diagnostic imaging of heart and coronary circulation      Plan:   Abdominal aortic will plan to repair his abdominal aortic aneurysm when he is cleared after his CABG likely around 6 weeks.  Will discuss with Dr. Guardado before proceeding.    VTE Prophylaxis:  Pharmacologic & mechanical VTE prophylaxis orders are present.        Campos Archer MD

## 2025-07-16 NOTE — THERAPY EVALUATION
Patient Name: Erickson Gerard  : 1945    MRN: [id number]                              Today's Date: 2025       Admit Date: 7/15/2025    Visit Dx:     ICD-10-CM ICD-9-CM   1. Decreased activities of daily living (ADL)  Z78.9 V49.89   2. Coronary artery disease involving native heart, unspecified vessel or lesion type, unspecified whether angina present  I25.10 414.01   3. Abnormal findings on diagnostic imaging of heart and coronary circulation  R93.1 794.39     Patient Active Problem List   Diagnosis    Essential hypertension    GERD (gastroesophageal reflux disease)    Medicare annual wellness visit, subsequent    Benign prostatic hyperplasia with urinary frequency    Umbilical hernia without obstruction and without gangrene    Colon cancer screening    Mixed hyperlipidemia    Localized edema    Right leg swelling    Aneurysm of right popliteal artery    Acute deep vein thrombosis (DVT) of distal vein of right lower extremity    DDD (degenerative disc disease), lumbar    Actinic keratosis of forehead    Aneurysm of right popliteal artery    Abdominal aortic aneurysm (AAA) 3.0 cm to 5.5 cm in diameter in male    PVD (peripheral vascular disease)    High risk medication use    Statin intolerance    Immunization deficiency    Chronic deep vein thrombosis (DVT)    Encounter for hepatitis C screening test for low risk patient    Peripheral artery aneurysm    Right elbow pain    Blurred vision    Nausea    Dizziness    Visual disturbance    Preoperative cardiovascular examination    Coronary artery disease of native artery of native heart with stable angina pectoris    Abnormal stress test    Coronary artery disease involving native heart    Abnormal findings on diagnostic imaging of heart and coronary circulation    Hx of CABG     Past Medical History:   Diagnosis Date    AAA (abdominal aortic aneurysm) without rupture 10/14/2021    Abdominal aortic aneurysm, without rupture, unspecified 2023    Acute  embolism and thrombosis of unspecified deep veins of right distal lower extremity     Anesthesia complication     AFTER VEIN SURGERY STATES HE WAS HARD TO WAKE UP    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     Aneurysm of artery of lower extremity 09/09/2021    Arthritis     At risk for sleep apnea     STOP BANG 5    Coronary artery disease     Dyspnea on exertion     Essential (primary) hypertension     GERD (gastroesophageal reflux disease)     [place] (hard of hearing)     Hyperlipidemia     Hypertension     Iliac artery aneurysm 10/14/2021    Localized edema     Low back pain     Mixed hyperlipidemia     Other specified soft tissue disorders     PAD (peripheral artery disease)     Peripheral artery aneurysm     PONV (postoperative nausea and vomiting)     Popliteal aneurysm     Thrombosis 09/09/2021    Calf Vein Thrombosis Rt tibial vein    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     Past Surgical History:   Procedure Laterality Date    ANGIOPLASTY POPLITEAL ARTERY Right 11/09/2021    Procedure: RIGHT POPITEAL ANEURYSM REPAIR VIABOHN;  Surgeon: Campos Archer MD;  Location: [facility] OR 18/19;  Service: Vascular;  Laterality: Right;    ANGIOPLASTY POPLITEAL ARTERY Right 01/25/2022    Procedure: AORTO ILIAC FEMORAL RIGHT POPLITEAL VIABOHN STENT PLACEMENT X 2;  Surgeon: Campos Archer MD;  Location: [facility] OR 18/19;  Service: Vascular;  Laterality: Right;    CARDIAC CATHETERIZATION N/A 06/19/2025    Procedure: Left Heart Cath;  Surgeon: Oneil Escobar MD;  Location: [facility] INVASIVE LOCATION;  Service: Cardiovascular;  Laterality: N/A;    CARDIAC CATHETERIZATION N/A 06/19/2025    Procedure: Left ventriculography;  Surgeon: Oneil Escobar MD;  Location: [facility] CATH INVASIVE LOCATION;  Service: Cardiovascular;  Laterality: N/A;    COLONOSCOPY      CORONARY ARTERY BYPASS GRAFT N/A 7/15/2025    Procedure: STERNOTOMY; CORONARY ARTERY BYPASS GRAFTING TIMES 6 USING LEFT INTERNAL MAMMARY ARTERY AND LEFT  SAPHENOUS VEIN; TRANSESOPHAGEAL ECHOCARDIOGRAM WITH ANESTHESIA;LEFT ATRIAL APPENDAGE EXCLUSION; PRP;  Surgeon: Jr Oscar Patino MD;  Location: [facility];  Service: Cardiothoracic;  Laterality: N/A;    EYE SURGERY Bilateral     CATARACTS    HERNIA REPAIR Bilateral     POLITEAL ARTERY ANEURYSM REPAIR Right 11/09/2021    Viabahn Stent Graft    VEIN LIGATION AND STRIPPING N/A       General Information       Row Name 07/16/25 1121          OT Time and Intention    Document Type evaluation  -ES     Mode of Treatment co-treatment;physical therapy;occupational therapy;other (see comments)  Patient presents with deficits impacting both mobility and functional independence in ADLs. The patient has multifaceted rehabilitation needs that require the expertise and skilled hands of both physical and occupational therapy.  -ES     Patient Effort good  -ES       Row Name 07/16/25 1121          General Information    Patient Profile Reviewed yes  -ES     Prior Level of Function independent:;ADL's;all household mobility  Patient independent with ADLs at baseline. No device for mobility. Standing ADL engagement. No home O2 use.  -ES     Existing Precautions/Restrictions fall;cardiac;sternal  -ES       Row Name 07/16/25 1121          Living Environment    Current Living Arrangements home  -ES     People in Home spouse  -ES       Row Name 07/16/25 1121          Home Main Entrance    Number of Stairs, Main Entrance none  -ES       Row Name 07/16/25 1121          Stairs Within Home, Primary    Stairs, Within Home, Primary flight of stairs to second floor where primary bed and bath are located  -ES       Row Name 07/16/25 1121          Cognition    Orientation Status (Cognition) oriented x 3  -ES       Row Name 07/16/25 1121          Safety Issues/Impairments Affecting Functional Mobility    Impairments Affecting Function (Mobility) balance;endurance/activity tolerance;pain  -ES               User Key  (r) = Recorded By, (t) = Taken  By, (c) = Cosigned By      Initials Name Provider Type    ES Leydi Carballo, OTR/L, CSRS Occupational Therapist                     Mobility/ADL's       Row Name 07/16/25 1129          Bed Mobility    Bed Mobility bed mobility (all) activities  -ES     All Activities, [place] (Bed Mobility) not tested  -ES     Comment, (Bed Mobility) not assessed. Patient met seated in recliner at therapy arrival to room  -ES       Row Name 07/16/25 1129          Transfers    Transfers sit-stand transfer  -ES     Comment, (Transfers) cues provided for hand placement with functional transfers to ensure complaince with cardiac precautions  -ES       Row Name 07/16/25 1129          Sit-Stand Transfer    Sit-Stand [place] (Transfers) contact guard;verbal cues  -ES     Assistive Device (Sit-Stand Transfers) other (see comments)  HHA x2  -ES       Row Name 07/16/25 1129          Functional Mobility    Functional Mobility- Ind. Level minimum assist (75% patient effort);2 person assist required  -ES     Functional Mobility- Device other (see comments)  HHA x2  -ES       Row Name 07/16/25 1129          Activities of Daily Living    BADL Assessment/Intervention lower body dressing  patient provided education and training on ADL management at time of discharge to ensure patient compliance with strernal and cardiac precautions. Educated on adaptive dressing techniques, toileting compensation to prevent reaching behind back.  -ES       Row Name 07/16/25 1129          Lower Body Dressing Assessment/Training    [place] Level (Lower Body Dressing) lower body dressing skills;don;socks;maximum assist (25% patient effort)  -ES     Position (Lower Body Dressing) unsupported sitting  -ES               User Key  (r) = Recorded By, (t) = Taken By, (c) = Cosigned By      Initials Name Provider Type    Leydi Herr, OTR/L, CSRS Occupational Therapist                   Obj/Interventions       Row Name 07/16/25 1134          Sensory  Assessment (Somatosensory)    Sensory Assessment (Somatosensory) sensation intact  -ES       Row Name 07/16/25 1134          Vision Assessment/Intervention    Visual Impairment/Limitations WFL  -ES       Row Name 07/16/25 1134          Range of Motion Comprehensive    General Range of Motion no range of motion deficits identified  -ES       Row Name 07/16/25 1134          Strength Comprehensive (MMT)    Comment, General Manual Muscle Testing (MMT) Assessment MMT tested in compliance with sternal precautions. BUEs 4-/5. generalized weakness noted  -ES       Row Name 07/16/25 1134          Motor Skills    Motor Skills functional endurance  -ES     Therapeutic Exercise --  completed Cardiac protocol level 2 exercises, provided education and training for completion with SPV  -ES       Row Name 07/16/25 1134          Balance    Balance Assessment sitting dynamic balance;standing dynamic balance  -ES     Dynamic Sitting Balance contact guard  -ES     Position, Sitting Balance unsupported;sitting in chair  -ES     Dynamic Standing Balance minimal assist;2-person assist  -ES     Position/Device Used, Standing Balance supported;other (see comments)  HHA x2  -ES     Comment, Balance patient with generalized unsteadiness noted during evaluation  -ES               User Key  (r) = Recorded By, (t) = Taken By, (c) = Cosigned By      Initials Name Provider Type    ES Leydi Carballo, OTR/L, CSRS Occupational Therapist                   Goals/Plan       [facility] Name 07/16/25 1144          Bed Mobility Goal 1 (OT)    Activity/Assistive Device (Bed Mobility Goal 1, OT) bed mobility activities, all  -ES     [place] Level/Cues Needed (Bed Mobility Goal 1, OT) modified independence  -ES     Time Frame (Bed Mobility Goal 1, OT) short term goal (STG);2 weeks  -ES     Progress/Outcomes (Bed Mobility Goal 1, OT) new goal  -ES       Row Name 07/16/25 1144          Transfer Goal 1 (OT)    Activity/Assistive Device (Transfer Goal 1, OT)  transfers, all  -ES     [place] Level/Cues Needed (Transfer Goal 1, OT) modified independence  -ES     Time Frame (Transfer Goal 1, OT) short term goal (STG);2 weeks  -ES     Progress/Outcome (Transfer Goal 1, OT) new goal  -ES       Row Name 07/16/25 1144          Dressing Goal 1 (OT)    Activity/Device (Dressing Goal 1, OT) dressing skills, all  -ES     [place]/Cues Needed (Dressing Goal 1, OT) modified independence  -ES     Time Frame (Dressing Goal 1, OT) short term goal (STG);2 weeks  -ES     Progress/Outcome (Dressing Goal 1, OT) new goal  -ES       Row Name 07/16/25 1144          Toileting Goal 1 (OT)    Activity/Device (Toileting Goal 1, OT) toileting skills, all  -ES     [place] Level/Cues Needed (Toileting Goal 1, OT) modified independence  -ES     Time Frame (Toileting Goal 1, OT) short term goal (STG);2 weeks  -ES     Progress/Outcome (Toileting Goal 1, OT) new goal  -ES       Row Name 07/16/25 1144          Grooming Goal 1 (OT)    Activity/Device (Grooming Goal 1, OT) grooming skills, all  -ES     [place] (Grooming Goal 1, OT) modified independence  -ES     Time Frame (Grooming Goal 1, OT) short term goal (STG);2 weeks  -ES     Progress/Outcome (Grooming Goal 1, OT) new goal  -ES       Row Name 07/16/25 1144          Therapy Assessment/Plan (OT)    Planned Therapy Interventions (OT) activity tolerance training;BADL retraining;functional balance retraining;occupation/activity based interventions;patient/caregiver education/training;ROM/therapeutic exercise;strengthening exercise;transfer/mobility retraining  -ES               User Key  (r) = Recorded By, (t) = Taken By, (c) = Cosigned By      Initials Name Provider Type    ES Leydi Carballo, OTR/L, CSRS Occupational Therapist                   Clinical Impression       Row Name 07/16/25 1138          Pain Assessment    Pretreatment Pain Rating 3/10  -ES     Posttreatment Pain Rating 3/10  -ES     Pain Side/Orientation generalized  -ES      Pain Management Interventions nursing notified  -ES       Row Name 07/16/25 1138          Plan of Care Review    Plan of Care Reviewed With patient  -ES     Outcome Evaluation Patient is 79 year old male presenting to [facility] after being recently seen by cardiology 6/9 for worsening SOA. Patient found to have severe multi-vessel CAD, 5.3 cm AAA (non urgent per ). Patient is POD 1 6V CABG and WEN clip. At baseline, patient is independent with ADLs, no device for mobility, and lives with his spouse. Following a comprehensive Occupational Therapy assessment conducted today, the patient demonstrates a decline from their baseline functional status, exhibiting deficits related to balance, tolerance, transfers and mobility that significantly impact independence in activities of daily living. The patient would benefit from skilled Occupational Therapy services to enhance safety and facilitate a return to prior level of independence. Home with assist is recommended upon discharge from the hospital setting.  -ES       Row Name 07/16/25 1138          Therapy Assessment/Plan (OT)    Rehab Potential (OT) good  -ES     Criteria for Skilled Therapeutic Interventions Met (OT) yes;meets criteria;skilled treatment is necessary  -ES     Therapy Frequency (OT) 5 times/wk  -ES       Row Name 07/16/25 1138          Therapy Plan Review/Discharge Plan (OT)    Anticipated Discharge Disposition (OT) home with assist  -ES       Row Name 07/16/25 1138          Vital Signs    Pretreatment Heart Rate (beats/min) 88  -ES     Posttreatment Heart Rate (beats/min) 85  -ES     Pretreatment Resp Rate (breaths/min) 19  -ES     Pre SpO2 (%) 90  -ES     O2 Delivery Pre Treatment hi-flow  7L  -ES     Intra SpO2 (%) 87  -ES     O2 Delivery Intra Treatment hi-flow  7L  -ES     Post SpO2 (%) 89  -ES     O2 Delivery Post Treatment hi-flow  7L  -ES       Row Name 07/16/25 1138          Positioning and Restraints    Pre-Treatment  Position sitting in chair/recliner  -ES     Post Treatment Position chair  -ES     In Chair reclined;call light within reach;notified nsg  -ES               User Key  (r) = Recorded By, (t) = Taken By, (c) = Cosigned By      Initials Name Provider Type    Leydi Herr, OTR/L, CSRS Occupational Therapist                   Outcome Measures       Row Name 07/16/25 1145          How much help from another is currently needed...    Putting on and taking off regular lower body clothing? 2  -ES     Bathing (including washing, rinsing, and drying) 2  -ES     Toileting (which includes using toilet bed pan or urinal) 2  -ES     Putting on and taking off regular upper body clothing 2  -ES     Taking care of personal grooming (such as brushing teeth) 3  -ES     Eating meals 4  -ES     AM-PAC 6 Clicks Score (OT) 15  -ES       Row Name 07/16/25 1021          How much help from another person do you currently need...    Turning from your back to your side while in flat bed without using bedrails? 3  -EM     Moving from lying on back to sitting on the side of a flat bed without bedrails? 2  -EM     Moving to and from a bed to a chair (including a wheelchair)? 3  -EM     Standing up from a chair using your arms (e.g., wheelchair, bedside chair)? 3  -EM     Climbing 3-5 steps with a railing? 2  -EM     To walk in hospital room? 3  -EM     AM-PAC 6 Clicks Score (PT) 16  -EM       Row Name 07/16/25 1145          Modified [place] Scale    Modified [place] Scale 0 - No Symptoms at all.  -ES       Row Name 07/16/25 1145          Functional Assessment    Outcome Measure Options AM-PAC 6 Clicks Daily Activity (OT);Modified [place]  -ES               User Key  (r) = Recorded By, (t) = Taken By, (c) = Cosigned By      Initials Name Provider Type    Kayy Steen, PT Physical Therapist    Leydi Herr, OTR/L, CSRS Occupational Therapist                      OT Recommendation and Plan  Planned Therapy Interventions (OT): activity  tolerance training, BADL retraining, functional balance retraining, occupation/activity based interventions, patient/caregiver education/training, ROM/therapeutic exercise, strengthening exercise, transfer/mobility retraining  Therapy Frequency (OT): 5 times/wk  Plan of Care Review  Plan of Care Reviewed With: patient  Outcome Evaluation: Patient is 79 year old male presenting to [facility] after being recently seen by cardiology 6/9 for worsening SOA. Patient found to have severe multi-vessel CAD, 5.3 cm AAA (non urgent per ). Patient is POD 1 6V CABG and WEN clip. At baseline, patient is independent with ADLs, no device for mobility, and lives with his spouse. Following a comprehensive Occupational Therapy assessment conducted today, the patient demonstrates a decline from their baseline functional status, exhibiting deficits related to balance, tolerance, transfers and mobility that significantly impact independence in activities of daily living. The patient would benefit from skilled Occupational Therapy services to enhance safety and facilitate a return to prior level of independence. Home with assist is recommended upon discharge from the hospital setting.     Time Calculation:   Evaluation Complexity (OT)  Review Occupational Profile/Medical/Therapy History Complexity: expanded/moderate complexity  Assessment, Occupational Performance/Identification of Deficit Complexity: 3-5 performance deficits  Clinical Decision Making Complexity (OT): detailed assessment/moderate complexity  Overall Complexity of Evaluation (OT): moderate complexity     Time Calculation- OT       Row Name 07/16/25 1146             Time Calculation- OT    OT Start Time 0909  -ES      OT Stop Time 0931  -ES      OT Time Calculation (min) 22 min  -ES      Total Timed Code Minutes- OT 15 minute(s)  -ES      OT Received On 07/16/25  -ES      OT - Next Appointment 07/17/25  -ES      OT Goal Re-Cert Due Date 07/30/25  -ES          Timed Charges    46909 - OT Self Care/Mgmt Minutes 15  -ES         Untimed Charges    OT Eval/Re-eval Minutes 7  -ES         Total Minutes    Timed Charges Total Minutes 15  -ES      Untimed Charges Total Minutes 7  -ES       Total Minutes 22  -ES                User Key  (r) = Recorded By, (t) = Taken By, (c) = Cosigned By      Initials Name Provider Type    ES Leydi Carballo, OTR/L, CSRS Occupational Therapist                  Therapy Charges for Today       Code Description Service Date Service Provider Modifiers Qty    70604845843 HC OT SELF CARE/MGMT/TRAIN EA 15 MIN 7/16/2025 Leydi Carballo, OTR/L, CSRS GO 1    [id number]  OT EVAL MOD COMPLEXITY 3 7/16/2025 Leydi Carballo, OTR/L, CSRS GO 1                 Leydi Carballo OTR/L, CSRS  7/16/2025

## 2025-07-17 ENCOUNTER — APPOINTMENT (OUTPATIENT)
Dept: GENERAL RADIOLOGY | Facility: HOSPITAL | Age: 80
DRG: 235 | End: 2025-07-17
Payer: MEDICARE

## 2025-07-17 LAB
ALBUMIN SERPL-MCNC: 4 G/DL (ref 3.5–5.2)
ALP SERPL-CCNC: 49 U/L (ref 39–117)
ALT SERPL W P-5'-P-CCNC: 7 U/L (ref 1–41)
ANION GAP SERPL CALCULATED.3IONS-SCNC: 10.3 MMOL/L (ref 5–15)
ANION GAP SERPL CALCULATED.3IONS-SCNC: 14 MMOL/L (ref 5–15)
ARTERIAL PATENCY WRIST A: ABNORMAL
AST SERPL-CCNC: 36 U/L (ref 1–40)
ATMOSPHERIC PRESS: 747.2 MMHG
ATMOSPHERIC PRESS: 748.1 MMHG
ATMOSPHERIC PRESS: 748.3 MMHG
ATMOSPHERIC PRESS: 749 MMHG
BACTERIA UR QL AUTO: ABNORMAL /HPF
BASE EXCESS BLDA CALC-SCNC: -0.1 MMOL/L (ref 0–2)
BASE EXCESS BLDA CALC-SCNC: 0.1 MMOL/L (ref 0–2)
BASE EXCESS BLDA CALC-SCNC: 0.1 MMOL/L (ref 0–2)
BASE EXCESS BLDA CALC-SCNC: 0.9 MMOL/L (ref 0–2)
BDY SITE: ABNORMAL
BILIRUB CONJ SERPL-MCNC: 0.2 MG/DL (ref 0–0.3)
BILIRUB INDIRECT SERPL-MCNC: 0.3 MG/DL
BILIRUB SERPL-MCNC: 0.5 MG/DL (ref 0–1.2)
BILIRUB UR QL STRIP: NEGATIVE
BUN SERPL-MCNC: 23 MG/DL (ref 8–23)
BUN SERPL-MCNC: 30 MG/DL (ref 8–23)
BUN/CREAT SERPL: 13.8 (ref 7–25)
BUN/CREAT SERPL: 15.1 (ref 7–25)
CA-I SERPL ISE-MCNC: 1.09 MMOL/L (ref 1.15–1.35)
CALCIUM SPEC-SCNC: 8 MG/DL (ref 8.6–10.5)
CALCIUM SPEC-SCNC: 8.2 MG/DL (ref 8.6–10.5)
CHLORIDE SERPL-SCNC: 104 MMOL/L (ref 98–107)
CHLORIDE SERPL-SCNC: 107 MMOL/L (ref 98–107)
CLARITY UR: CLEAR
CO2 SERPL-SCNC: 20 MMOL/L (ref 22–29)
CO2 SERPL-SCNC: 22.7 MMOL/L (ref 22–29)
COLOR UR: YELLOW
CREAT SERPL-MCNC: 1.67 MG/DL (ref 0.76–1.27)
CREAT SERPL-MCNC: 1.99 MG/DL (ref 0.76–1.27)
DEPRECATED RDW RBC AUTO: 40.1 FL (ref 37–54)
DEPRECATED RDW RBC AUTO: 41.1 FL (ref 37–54)
DEVICE COMMENT: ABNORMAL
DEVICE COMMENT: ABNORMAL
EGFRCR SERPLBLD CKD-EPI 2021: 33.7 ML/MIN/1.73
EGFRCR SERPLBLD CKD-EPI 2021: 41.6 ML/MIN/1.73
ERYTHROCYTE [DISTWIDTH] IN BLOOD BY AUTOMATED COUNT: 12.1 % (ref 12.3–15.4)
ERYTHROCYTE [DISTWIDTH] IN BLOOD BY AUTOMATED COUNT: 12.2 % (ref 12.3–15.4)
GLUCOSE BLDC GLUCOMTR-MCNC: 119 MG/DL (ref 70–130)
GLUCOSE BLDC GLUCOMTR-MCNC: 119 MG/DL (ref 70–130)
GLUCOSE BLDC GLUCOMTR-MCNC: 125 MG/DL (ref 70–130)
GLUCOSE BLDC GLUCOMTR-MCNC: 153 MG/DL (ref 70–130)
GLUCOSE SERPL-MCNC: 116 MG/DL (ref 65–99)
GLUCOSE SERPL-MCNC: 135 MG/DL (ref 65–99)
GLUCOSE UR STRIP-MCNC: NEGATIVE MG/DL
GRAN CASTS URNS QL MICRO: ABNORMAL /LPF
HCO3 BLDA-SCNC: 23.7 MMOL/L (ref 22–28)
HCO3 BLDA-SCNC: 24.1 MMOL/L (ref 22–28)
HCO3 BLDA-SCNC: 24.3 MMOL/L (ref 22–28)
HCO3 BLDA-SCNC: 25 MMOL/L (ref 22–28)
HCT VFR BLD AUTO: 29.5 % (ref 37.5–51)
HCT VFR BLD AUTO: 30.8 % (ref 37.5–51)
HEMODILUTION: NO
HGB BLD-MCNC: 10.4 G/DL (ref 13–17.7)
HGB BLD-MCNC: 9.8 G/DL (ref 13–17.7)
HGB UR QL STRIP.AUTO: ABNORMAL
HYALINE CASTS UR QL AUTO: ABNORMAL /LPF
INHALED O2 CONCENTRATION: 100 %
INHALED O2 CONCENTRATION: 60 %
INHALED O2 CONCENTRATION: 70 %
INHALED O2 CONCENTRATION: 75 %
KETONES UR QL STRIP: NEGATIVE
LEUKOCYTE ESTERASE UR QL STRIP.AUTO: NEGATIVE
MAGNESIUM SERPL-MCNC: 2.9 MG/DL (ref 1.6–2.4)
MCH RBC QN AUTO: 30.7 PG (ref 26.6–33)
MCH RBC QN AUTO: 31 PG (ref 26.6–33)
MCHC RBC AUTO-ENTMCNC: 33.2 G/DL (ref 31.5–35.7)
MCHC RBC AUTO-ENTMCNC: 33.8 G/DL (ref 31.5–35.7)
MCV RBC AUTO: 91.9 FL (ref 79–97)
MCV RBC AUTO: 92.5 FL (ref 79–97)
MODALITY: ABNORMAL
NITRITE UR QL STRIP: NEGATIVE
O2 A-A PPRESDIFF RESPIRATORY: 0.1 MMHG
O2 A-A PPRESDIFF RESPIRATORY: 0.2 MMHG
PCO2 BLDA: 34.3 MM HG (ref 35–45)
PCO2 BLDA: 35.6 MM HG (ref 35–45)
PCO2 BLDA: 36.8 MM HG (ref 35–45)
PCO2 BLDA: 37.5 MM HG (ref 35–45)
PH BLDA: 7.42 PH UNITS (ref 7.35–7.45)
PH BLDA: 7.44 PH UNITS (ref 7.35–7.45)
PH BLDA: 7.44 PH UNITS (ref 7.35–7.45)
PH BLDA: 7.45 PH UNITS (ref 7.35–7.45)
PH UR STRIP.AUTO: <=5 [PH] (ref 5–8)
PLATELET # BLD AUTO: 101 10*3/MM3 (ref 140–450)
PLATELET # BLD AUTO: 128 10*3/MM3 (ref 140–450)
PMV BLD AUTO: 10 FL (ref 6–12)
PMV BLD AUTO: 9.7 FL (ref 6–12)
PO2 BLD: 113 MM[HG] (ref 0–500)
PO2 BLD: 58 MM[HG] (ref 0–500)
PO2 BLD: 90 MM[HG] (ref 0–500)
PO2 BLD: 94 MM[HG] (ref 0–500)
PO2 BLDA: 58 MM HG (ref 80–100)
PO2 BLDA: 65.8 MM HG (ref 80–100)
PO2 BLDA: 67.7 MM HG (ref 80–100)
PO2 BLDA: 67.9 MM HG (ref 80–100)
POTASSIUM SERPL-SCNC: 3.8 MMOL/L (ref 3.5–5.2)
POTASSIUM SERPL-SCNC: 4.2 MMOL/L (ref 3.5–5.2)
POTASSIUM SERPL-SCNC: 4.3 MMOL/L (ref 3.5–5.2)
PROT SERPL-MCNC: 5.9 G/DL (ref 6–8.5)
PROT UR QL STRIP: ABNORMAL
QT INTERVAL: 391 MS
QTC INTERVAL: 442 MS
RBC # BLD AUTO: 3.19 10*6/MM3 (ref 4.14–5.8)
RBC # BLD AUTO: 3.35 10*6/MM3 (ref 4.14–5.8)
RBC # UR STRIP: ABNORMAL /HPF
REF LAB TEST METHOD: ABNORMAL
SAO2 % BLDCOA: 90.9 % (ref 92–98.5)
SAO2 % BLDCOA: 93.5 % (ref 92–98.5)
SAO2 % BLDCOA: 93.6 % (ref 92–98.5)
SAO2 % BLDCOA: 94.2 % (ref 92–98.5)
SODIUM SERPL-SCNC: 138 MMOL/L (ref 136–145)
SODIUM SERPL-SCNC: 140 MMOL/L (ref 136–145)
SODIUM UR-SCNC: 32 MMOL/L
SP GR UR STRIP: 1.02 (ref 1–1.03)
SQUAMOUS #/AREA URNS HPF: ABNORMAL /HPF
TOTAL RATE: 14 BREATHS/MINUTE
TOTAL RATE: 18 BREATHS/MINUTE
TOTAL RATE: 18 BREATHS/MINUTE
TOTAL RATE: 20 BREATHS/MINUTE
UROBILINOGEN UR QL STRIP: ABNORMAL
WBC # UR STRIP: ABNORMAL /HPF
WBC NRBC COR # BLD AUTO: 12.77 10*3/MM3 (ref 3.4–10.8)
WBC NRBC COR # BLD AUTO: 15.05 10*3/MM3 (ref 3.4–10.8)

## 2025-07-17 PROCEDURE — 71045 X-RAY EXAM CHEST 1 VIEW: CPT

## 2025-07-17 PROCEDURE — 94761 N-INVAS EAR/PLS OXIMETRY MLT: CPT

## 2025-07-17 PROCEDURE — 25010000002 AMIODARONE IN DEXTROSE 5% 360-4.14 MG/200ML-% SOLUTION

## 2025-07-17 PROCEDURE — 84300 ASSAY OF URINE SODIUM: CPT | Performed by: INTERNAL MEDICINE

## 2025-07-17 PROCEDURE — 85027 COMPLETE CBC AUTOMATED: CPT

## 2025-07-17 PROCEDURE — 81001 URINALYSIS AUTO W/SCOPE: CPT | Performed by: INTERNAL MEDICINE

## 2025-07-17 PROCEDURE — 83735 ASSAY OF MAGNESIUM: CPT | Performed by: ANESTHESIOLOGY

## 2025-07-17 PROCEDURE — 25010000002 BUMETANIDE PER 0.5 MG: Performed by: THORACIC SURGERY (CARDIOTHORACIC VASCULAR SURGERY)

## 2025-07-17 PROCEDURE — 94660 CPAP INITIATION&MGMT: CPT

## 2025-07-17 PROCEDURE — 99232 SBSQ HOSP IP/OBS MODERATE 35: CPT | Performed by: STUDENT IN AN ORGANIZED HEALTH CARE EDUCATION/TRAINING PROGRAM

## 2025-07-17 PROCEDURE — 82948 REAGENT STRIP/BLOOD GLUCOSE: CPT

## 2025-07-17 PROCEDURE — 82803 BLOOD GASES ANY COMBINATION: CPT

## 2025-07-17 PROCEDURE — 94799 UNLISTED PULMONARY SVC/PX: CPT

## 2025-07-17 PROCEDURE — 97110 THERAPEUTIC EXERCISES: CPT

## 2025-07-17 PROCEDURE — 82330 ASSAY OF CALCIUM: CPT | Performed by: ANESTHESIOLOGY

## 2025-07-17 PROCEDURE — 80076 HEPATIC FUNCTION PANEL: CPT | Performed by: ANESTHESIOLOGY

## 2025-07-17 PROCEDURE — 85027 COMPLETE CBC AUTOMATED: CPT | Performed by: ANESTHESIOLOGY

## 2025-07-17 PROCEDURE — 93005 ELECTROCARDIOGRAM TRACING: CPT

## 2025-07-17 PROCEDURE — 25010000002 ALBUMIN HUMAN 5% PER 50 ML: Performed by: ANESTHESIOLOGY

## 2025-07-17 PROCEDURE — 93010 ELECTROCARDIOGRAM REPORT: CPT | Performed by: INTERNAL MEDICINE

## 2025-07-17 PROCEDURE — 82803 BLOOD GASES ANY COMBINATION: CPT | Performed by: ANESTHESIOLOGY

## 2025-07-17 PROCEDURE — P9041 ALBUMIN (HUMAN),5%, 50ML: HCPCS | Performed by: ANESTHESIOLOGY

## 2025-07-17 PROCEDURE — 25010000002 AMIODARONE IN DEXTROSE 5% 150-4.21 MG/100ML-% SOLUTION

## 2025-07-17 PROCEDURE — 80048 BASIC METABOLIC PNL TOTAL CA: CPT

## 2025-07-17 PROCEDURE — 97530 THERAPEUTIC ACTIVITIES: CPT

## 2025-07-17 PROCEDURE — 80048 BASIC METABOLIC PNL TOTAL CA: CPT | Performed by: ANESTHESIOLOGY

## 2025-07-17 PROCEDURE — 25010000002 BUMETANIDE PER 0.5 MG: Performed by: ANESTHESIOLOGY

## 2025-07-17 PROCEDURE — 25010000002 POTASSIUM CHLORIDE PER 2 MEQ: Performed by: THORACIC SURGERY (CARDIOTHORACIC VASCULAR SURGERY)

## 2025-07-17 PROCEDURE — 84132 ASSAY OF SERUM POTASSIUM: CPT | Performed by: THORACIC SURGERY (CARDIOTHORACIC VASCULAR SURGERY)

## 2025-07-17 PROCEDURE — 93005 ELECTROCARDIOGRAM TRACING: CPT | Performed by: ANESTHESIOLOGY

## 2025-07-17 PROCEDURE — 63710000001 INSULIN LISPRO (HUMAN) PER 5 UNITS: Performed by: ANESTHESIOLOGY

## 2025-07-17 PROCEDURE — 99232 SBSQ HOSP IP/OBS MODERATE 35: CPT | Performed by: SURGERY

## 2025-07-17 PROCEDURE — 25010000002 CEFAZOLIN PER 500 MG

## 2025-07-17 PROCEDURE — 99291 CRITICAL CARE FIRST HOUR: CPT | Performed by: ANESTHESIOLOGY

## 2025-07-17 PROCEDURE — 74018 RADEX ABDOMEN 1 VIEW: CPT

## 2025-07-17 PROCEDURE — 25010000002 CALCIUM GLUCONATE 2-0.675 GM/100ML-% SOLUTION: Performed by: ANESTHESIOLOGY

## 2025-07-17 RX ORDER — BISACODYL 10 MG
10 SUPPOSITORY, RECTAL RECTAL DAILY
Status: DISCONTINUED | OUTPATIENT
Start: 2025-07-17 | End: 2025-07-25 | Stop reason: HOSPADM

## 2025-07-17 RX ORDER — METOPROLOL TARTRATE 25 MG/1
12.5 TABLET, FILM COATED ORAL EVERY 12 HOURS SCHEDULED
Status: DISCONTINUED | OUTPATIENT
Start: 2025-07-17 | End: 2025-07-24

## 2025-07-17 RX ORDER — BUMETANIDE 0.25 MG/ML
2 INJECTION, SOLUTION INTRAMUSCULAR; INTRAVENOUS EVERY 8 HOURS
Status: DISCONTINUED | OUTPATIENT
Start: 2025-07-17 | End: 2025-07-17

## 2025-07-17 RX ORDER — POTASSIUM CHLORIDE 29.8 MG/ML
20 INJECTION INTRAVENOUS ONCE
Status: COMPLETED | OUTPATIENT
Start: 2025-07-17 | End: 2025-07-17

## 2025-07-17 RX ORDER — CALCIUM GLUCONATE 20 MG/ML
2000 INJECTION, SOLUTION INTRAVENOUS ONCE
Status: COMPLETED | OUTPATIENT
Start: 2025-07-17 | End: 2025-07-17

## 2025-07-17 RX ORDER — BUMETANIDE 0.25 MG/ML
2 INJECTION, SOLUTION INTRAMUSCULAR; INTRAVENOUS EVERY 6 HOURS
Status: DISCONTINUED | OUTPATIENT
Start: 2025-07-17 | End: 2025-07-17

## 2025-07-17 RX ORDER — BUMETANIDE 0.25 MG/ML
2 INJECTION, SOLUTION INTRAMUSCULAR; INTRAVENOUS EVERY 12 HOURS
Status: DISCONTINUED | OUTPATIENT
Start: 2025-07-17 | End: 2025-07-17

## 2025-07-17 RX ORDER — ALBUMIN HUMAN 50 G/1000ML
250 SOLUTION INTRAVENOUS ONCE
Status: COMPLETED | OUTPATIENT
Start: 2025-07-17 | End: 2025-07-17

## 2025-07-17 RX ORDER — POLYETHYLENE GLYCOL 3350 17 G/17G
17 POWDER, FOR SOLUTION ORAL DAILY
Status: DISCONTINUED | OUTPATIENT
Start: 2025-07-17 | End: 2025-07-22

## 2025-07-17 RX ADMIN — PANTOPRAZOLE SODIUM 40 MG: 40 TABLET, DELAYED RELEASE ORAL at 06:35

## 2025-07-17 RX ADMIN — MUPIROCIN 1 APPLICATION: 20 OINTMENT TOPICAL at 20:41

## 2025-07-17 RX ADMIN — AMIODARONE HYDROCHLORIDE 150 MG: 1.5 INJECTION, SOLUTION INTRAVENOUS at 18:30

## 2025-07-17 RX ADMIN — BUMETANIDE 2 MG: 0.25 INJECTION INTRAMUSCULAR; INTRAVENOUS at 07:52

## 2025-07-17 RX ADMIN — ALBUMIN (HUMAN) 250 ML: 12.5 INJECTION, SOLUTION INTRAVENOUS at 16:38

## 2025-07-17 RX ADMIN — Medication 5 MG: at 20:41

## 2025-07-17 RX ADMIN — IPRATROPIUM BROMIDE AND ALBUTEROL SULFATE 3 ML: .5; 3 SOLUTION RESPIRATORY (INHALATION) at 10:50

## 2025-07-17 RX ADMIN — NOREPINEPHRINE BITARTRATE 0.01 MCG/KG/MIN: 32 SOLUTION INTRAVENOUS at 00:04

## 2025-07-17 RX ADMIN — POTASSIUM CHLORIDE 20 MEQ: 29.8 INJECTION, SOLUTION INTRAVENOUS at 06:35

## 2025-07-17 RX ADMIN — INSULIN LISPRO 2 UNITS: 100 INJECTION, SOLUTION INTRAVENOUS; SUBCUTANEOUS at 21:30

## 2025-07-17 RX ADMIN — ASPIRIN 81 MG: 81 TABLET, COATED ORAL at 08:15

## 2025-07-17 RX ADMIN — GUAIFENESIN 1200 MG: 600 TABLET, EXTENDED RELEASE ORAL at 20:42

## 2025-07-17 RX ADMIN — POLYMYXIN B SULFATE AND TRIMETHOPRIM 1 DROP: 10000; 1 SOLUTION OPHTHALMIC at 09:30

## 2025-07-17 RX ADMIN — METOPROLOL TARTRATE 25 MG: 25 TABLET, FILM COATED ORAL at 08:16

## 2025-07-17 RX ADMIN — SENNOSIDES AND DOCUSATE SODIUM 2 TABLET: 50; 8.6 TABLET ORAL at 08:15

## 2025-07-17 RX ADMIN — IPRATROPIUM BROMIDE AND ALBUTEROL SULFATE 3 ML: .5; 3 SOLUTION RESPIRATORY (INHALATION) at 19:52

## 2025-07-17 RX ADMIN — HYDROCODONE BITARTRATE AND ACETAMINOPHEN 2 TABLET: 5; 325 TABLET ORAL at 09:29

## 2025-07-17 RX ADMIN — POLYMYXIN B SULFATE AND TRIMETHOPRIM 1 DROP: 10000; 1 SOLUTION OPHTHALMIC at 06:48

## 2025-07-17 RX ADMIN — AMIODARONE HYDROCHLORIDE 150 MG: 1.5 INJECTION, SOLUTION INTRAVENOUS at 19:28

## 2025-07-17 RX ADMIN — Medication 4 ML: at 14:29

## 2025-07-17 RX ADMIN — POLYMYXIN B SULFATE AND TRIMETHOPRIM 1 DROP: 10000; 1 SOLUTION OPHTHALMIC at 22:28

## 2025-07-17 RX ADMIN — POLYMYXIN B SULFATE AND TRIMETHOPRIM 1 DROP: 10000; 1 SOLUTION OPHTHALMIC at 13:06

## 2025-07-17 RX ADMIN — IPRATROPIUM BROMIDE AND ALBUTEROL SULFATE 3 ML: .5; 3 SOLUTION RESPIRATORY (INHALATION) at 14:28

## 2025-07-17 RX ADMIN — IPRATROPIUM BROMIDE AND ALBUTEROL SULFATE 3 ML: .5; 3 SOLUTION RESPIRATORY (INHALATION) at 06:54

## 2025-07-17 RX ADMIN — CEFAZOLIN 2000 MG: 2 INJECTION, POWDER, FOR SOLUTION INTRAMUSCULAR; INTRAVENOUS at 06:35

## 2025-07-17 RX ADMIN — CYCLOBENZAPRINE HYDROCHLORIDE 5 MG: 10 TABLET, FILM COATED ORAL at 20:48

## 2025-07-17 RX ADMIN — GUAIFENESIN 1200 MG: 600 TABLET, EXTENDED RELEASE ORAL at 08:15

## 2025-07-17 RX ADMIN — AMIODARONE HYDROCHLORIDE 0.5 MG/MIN: 1.8 INJECTION, SOLUTION INTRAVENOUS at 23:42

## 2025-07-17 RX ADMIN — ACETAMINOPHEN 650 MG: 325 TABLET, FILM COATED ORAL at 08:14

## 2025-07-17 RX ADMIN — Medication 4 ML: at 06:55

## 2025-07-17 RX ADMIN — MUPIROCIN 1 APPLICATION: 20 OINTMENT TOPICAL at 08:16

## 2025-07-17 RX ADMIN — SENNOSIDES AND DOCUSATE SODIUM 2 TABLET: 50; 8.6 TABLET ORAL at 20:41

## 2025-07-17 RX ADMIN — BISACODYL 10 MG: 10 SUPPOSITORY RECTAL at 11:22

## 2025-07-17 RX ADMIN — POLYETHYLENE GLYCOL 3350 17 G: 17 POWDER, FOR SOLUTION ORAL at 11:21

## 2025-07-17 RX ADMIN — BUMETANIDE 2 MG: 0.25 INJECTION INTRAMUSCULAR; INTRAVENOUS at 14:13

## 2025-07-17 RX ADMIN — AMIODARONE HYDROCHLORIDE 1 MG/MIN: 1.8 INJECTION, SOLUTION INTRAVENOUS at 18:40

## 2025-07-17 RX ADMIN — CALCIUM GLUCONATE 2000 MG: 20 INJECTION, SOLUTION INTRAVENOUS at 16:37

## 2025-07-17 RX ADMIN — ACETAMINOPHEN 650 MG: 325 TABLET, FILM COATED ORAL at 20:48

## 2025-07-17 RX ADMIN — POLYMYXIN B SULFATE AND TRIMETHOPRIM 1 DROP: 10000; 1 SOLUTION OPHTHALMIC at 15:14

## 2025-07-17 RX ADMIN — ATORVASTATIN CALCIUM 40 MG: 20 TABLET, FILM COATED ORAL at 20:41

## 2025-07-17 NOTE — PROGRESS NOTES
[facility]   Surgical Progress Note    Patient Name: Erickson Gerard  : 1945  MRN: [id number]  Date of admission: 7/15/2025  Surgical Procedures Since Admission:  Procedure(s):  STERNOTOMY; CORONARY ARTERY BYPASS GRAFTING TIMES 6 USING LEFT INTERNAL MAMMARY ARTERY AND LEFT SAPHENOUS VEIN; TRANSESOPHAGEAL ECHOCARDIOGRAM WITH ANESTHESIA;LEFT ATRIAL APPENDAGE EXCLUSION; PRP  Surgeon:  Jr Oscar Patino MD  Status:  2 Days Post-Op  -------------------    Subjective   Subjective     Chief Complaint: Abdominal aortic aneurysm and bilateral popliteal aneurysms    History of Present Illness   79-year-old gentleman with known abdominal aortic aneurysms up to 5.3 cm expanded from 4.7.  Now postop day 2 from CABG.  At this point in time he will need it repaired but he is recovering from his CABG and we will plan on doing this in around 6 weeks if cleared by cardiology and cardiothoracic.    Review of Systems on BiPAP feeling okay    Objective   Objective     Vitals:   Temp:  [98.7 °F (37.1 °C)-100.9 °F (38.3 °C)] 99.5 °F (37.5 °C)  Heart Rate:  [67-93] 72  Resp:  [18] 18  BP: ()/(63-83) 93/68  Arterial Line BP: ()/(51-86) 104/58  Flow (L/min) (Oxygen Therapy):  [5-60] 60  Output by Drain (mL) 25 0701 - 25 1900 25 1901 - 25 0700 25 0701 - 25 0826 Range Total   Chest Tube 3 Left Pleural 110 70  180   NG/OG Tube Nasogastric Left nostril 450 150  600   Urethral Catheter Straight-tip;Temperature probe;Silicone;Non-latex 16 Fr. 950 1385  2335   Y Chest Tube 1 and 2 1 Mediastinal 28 Fr. 2 Mediastinal 28 Fr. 130 130  260       Physical Exam    Legs with mild edema and no evidence of ischemia.  Lungs clear  Abdomen soft    Result Review    Result Review:  I have personally reviewed the results from the time of this admission to 2025 08:26 EDT and agree with these findings:  [x]  Laboratory list / accordion  []  Microbiology  []  Radiology  []  EKG/Telemetry   []   Cardiology/Vascular   []  Pathology  []  Old records  []  Other:  Most notable findings include: Platelets of 101 with improvement.  Creatinine of 1.67.  White count slightly elevated and hemoglobin stable      Results from last 7 days   Lab Units 07/17/25  0300 07/16/25  0333 07/15/25  2140 07/15/25  1625 07/15/25  1524 07/15/25  1448 07/15/25  1446 07/15/25  1419 07/15/25  1229 07/14/25  0716   WBC 10*3/mm3 12.77* 10.16 11.22* 10.81*  --   --   --   --   --  6.54   HEMOGLOBIN g/dL 9.8* 9.8* 10.3* 11.7*  --   --   --   --   --  13.7   HEMOGLOBIN, POC g/dL  --   --   --   --  9.9*  --  10.5* 9.5*   < >  --    PLATELETS 10*3/mm3 101* 82* 81* 100*  --  106*  --   --   --  168    < > = values in this interval not displayed.     Results from last 7 days   Lab Units 07/17/25  0300 07/16/25  1517 07/16/25  0904 07/15/25  2133 07/15/25  1625 07/14/25  0716   SODIUM mmol/L 138 140 144 143 142 141   POTASSIUM mmol/L 3.8 4.3 4.3 4.6 4.1 3.6   CHLORIDE mmol/L 104 109* 111* 111* 109* 106   CO2 mmol/L 20.0* 19.5* 20.2* 18.7* 21.7* 22.2   BUN mg/dL 23.0 22.0 19.0 18.0 18.0 26.0*   CREATININE mg/dL 1.67* 1.70* 1.56* 1.34* 1.30* 1.37*   GLUCOSE mg/dL 135* 136* 163* 135* 113* 131*   MAGNESIUM mg/dL  --   --  2.7* 2.7* 2.5* 2.1   PHOSPHORUS mg/dL  --   --   --  4.4 3.3  --    Estimated Creatinine Clearance: 36.8 mL/min (A) (by C-G formula based on SCr of 1.67 mg/dL (H)).  Results from last 7 days   Lab Units 07/16/25  0333 07/15/25  1625 07/14/25  0716   PROTIME Seconds 19.6* 19.9* 15.9*   INR  1.65* 1.68* 1.27*     Lab Results   Component Value Date    HGBA1C 6.10 (H) 07/14/2025     Glucose   Date/Time Value Ref Range Status   07/17/2025 0757 119 70 - 130 mg/dL Final   07/16/2025 2158 118 70 - 130 mg/dL Final   07/16/2025 1521 132 (H) 70 - 130 mg/dL Final   07/16/2025 1036 155 (H) 70 - 130 mg/dL Final   07/16/2025 0801 148 (H) 70 - 130 mg/dL Final   07/16/2025 0533 164 (H) 70 - 130 mg/dL Final   07/16/2025 0436 178 (H) 70 - 130  mg/dL Final   07/16/2025 0342 187 (H) 70 - 130 mg/dL Final       Assessment & Plan   Assessment / Plan     Brief Patient Summary:  Erickson Gerard is a 79 y.o. male who is covering face after CABG.  He is doing fairly well and looks good.  We will of course be waiting at least 6 weeks to fix his aneurysm at this point we will need clearance from cardiothoracic when that time comes.  The meantime avoiding blood pressure spikes and avoiding fluoroquinolones will be our recommendations.  I have asked the nurses to make Cipro and Levaquin part of his allergy profile.  Technically he is not allergic to these 2 antibiotics have data pointing towards increasing aneurysm size and rupture in some of the white papers.      Active Hospital Problems:   Active Hospital Problems    Diagnosis     **Hx of CABG     Coronary artery disease involving native heart     Abnormal findings on diagnostic imaging of heart and coronary circulation      Plan:   Abdominal aortic will plan to repair his abdominal aortic aneurysm when he is cleared after his CABG likely around 6 weeks.  Will discuss with Dr. Guardado before proceeding.  For the meantime strict blood pressure control and avoid fluoroquinolones will be recommendations.  Will follow-up    VTE Prophylaxis:  Pharmacologic & mechanical VTE prophylaxis orders are present.        Campos Archer MD

## 2025-07-17 NOTE — PLAN OF CARE
Goal Outcome Evaluation:              Outcome Evaluation: Improving activity tolerance despite increased 02 support, on airvo.   Ambulated 50' w/ CGA using RW, back and forth in room.  Performed standing exercises w/ few sitting and standing exercises, cues for deep breathing and coughing.  Anticipate progress for DC to home with assist.    Anticipated Discharge Disposition (PT): home with assist

## 2025-07-17 NOTE — PROGRESS NOTES
LOS: 2 days   Patient Care Team:  Farhad Metcalf MD as PCP - General (Family Medicine)    Chief Complaint:   Post-op follow-up, s/p CABG    Subjective  Sitting up in chair. Feels ok.     Vital Signs  Temp:  [98.7 °F (37.1 °C)-100.9 °F (38.3 °C)] 99.1 °F (37.3 °C)  Heart Rate:  [67-93] 71  Resp:  [18-20] 18  BP: ()/(63-83) 104/80  Arterial Line BP: ()/(51-86) 120/65      07/16/25  0500 07/17/25  0500   Weight: 75.8 kg (167 lb 1.7 oz) 71.4 kg (157 lb 6.5 oz)     Body mass index is 26.19 kg/m².    Intake/Output Summary (Last 24 hours) at 7/17/2025 1137  Last data filed at 7/17/2025 1100  Gross per 24 hour   Intake 1187 ml   Output 4010 ml   Net -2823 ml     I/O this shift:  In: 35 [I.V.:35]  Out: 905 [Urine:795; Chest Tube:110]    Chest tube drainage last 8 hours: 40/90    Objective:  Vital signs: (most recent): Blood pressure 104/80, pulse 85, temperature 99.1 °F (37.3 °C), temperature source Bladder, resp. rate 18, weight 71.4 kg (157 lb 6.5 oz), SpO2 91%.                Physical Exam:   General Appearance: awake and alert, no acute distress   Lungs: respirations regular, respirations unlabored, and coarse throughout, on HF   Heart: regular rhythm & normal rate and normal S1, S2   Abdomen: soft or nontender, + bowel sounds    Skin: sternal incision clean, dry, intact   Neuro: alert and oriented, no focal deficits.     Results Review:      WBC WBC   Date Value Ref Range Status   07/17/2025 12.77 (H) 3.40 - 10.80 10*3/mm3 Final   07/16/2025 10.16 3.40 - 10.80 10*3/mm3 Final   07/15/2025 11.22 (H) 3.40 - 10.80 10*3/mm3 Final   07/15/2025 10.81 (H) 3.40 - 10.80 10*3/mm3 Final      HGB Hemoglobin   Date Value Ref Range Status   07/17/2025 9.8 (L) 13.0 - 17.7 g/dL Final   07/16/2025 9.8 (L) 13.0 - 17.7 g/dL Final   07/15/2025 10.3 (L) 13.0 - 17.7 g/dL Final   07/15/2025 11.7 (L) 13.0 - 17.7 g/dL Final   07/15/2025 9.9 (L) 12.0 - 17.0 g/dL Final   07/15/2025 10.5 (L) 12.0 - 17.0 g/dL Final   07/15/2025 9.5  (L) 12.0 - 17.0 g/dL Final   07/15/2025 9.5 (L) 12.0 - 17.0 g/dL Final   07/15/2025 9.2 (L) 12.0 - 17.0 g/dL Final   07/15/2025 11.6 (L) 12.0 - 17.0 g/dL Final      HCT Hematocrit   Date Value Ref Range Status   07/17/2025 29.5 (L) 37.5 - 51.0 % Final   07/16/2025 29.4 (L) 37.5 - 51.0 % Final   07/15/2025 30.2 (L) 37.5 - 51.0 % Final   07/15/2025 34.1 (L) 37.5 - 51.0 % Final   07/15/2025 29 (L) 38 - 51 % Final   07/15/2025 31 (L) 38 - 51 % Final   07/15/2025 28 (L) 38 - 51 % Final   07/15/2025 28 (L) 38 - 51 % Final   07/15/2025 27 (L) 38 - 51 % Final   07/15/2025 34 (L) 38 - 51 % Final      Platelets Platelets   Date Value Ref Range Status   07/17/2025 101 (L) 140 - 450 10*3/mm3 Final   07/16/2025 82 (L) 140 - 450 10*3/mm3 Final   07/15/2025 81 (L) 140 - 450 10*3/mm3 Final   07/15/2025 100 (L) 140 - 450 10*3/mm3 Final   07/15/2025 106 (L) 140 - 450 10*3/mm3 Final        PT/INR:    Protime   Date Value Ref Range Status   07/16/2025 19.6 (H) 11.7 - 14.2 Seconds Final   07/15/2025 19.9 (H) 11.7 - 14.2 Seconds Final   /  INR   Date Value Ref Range Status   07/16/2025 1.65 (H) 0.90 - 1.10 Final   07/15/2025 1.68 (H) 0.90 - 1.10 Final       Sodium Sodium   Date Value Ref Range Status   07/17/2025 138 136 - 145 mmol/L Final   07/16/2025 140 136 - 145 mmol/L Final   07/16/2025 144 136 - 145 mmol/L Final   07/15/2025 143 136 - 145 mmol/L Final   07/15/2025 142 136 - 145 mmol/L Final      Potassium Potassium   Date Value Ref Range Status   07/17/2025 3.8 3.5 - 5.2 mmol/L Final   07/16/2025 4.3 3.5 - 5.2 mmol/L Final   07/16/2025 4.3 3.5 - 5.2 mmol/L Final   07/15/2025 4.6 3.5 - 5.2 mmol/L Final   07/15/2025 4.1 3.5 - 5.2 mmol/L Final     Comment:     Slight hemolysis detected by analyzer. Result may be falsely elevated.      Chloride Chloride   Date Value Ref Range Status   07/17/2025 104 98 - 107 mmol/L Final   07/16/2025 109 (H) 98 - 107 mmol/L Final   07/16/2025 111 (H) 98 - 107 mmol/L Final   07/15/2025 111 (H) 98 - 107  mmol/L Final   07/15/2025 109 (H) 98 - 107 mmol/L Final      Bicarbonate CO2   Date Value Ref Range Status   07/17/2025 20.0 (L) 22.0 - 29.0 mmol/L Final   07/16/2025 19.5 (L) 22.0 - 29.0 mmol/L Final   07/16/2025 20.2 (L) 22.0 - 29.0 mmol/L Final   07/15/2025 18.7 (L) 22.0 - 29.0 mmol/L Final   07/15/2025 21.7 (L) 22.0 - 29.0 mmol/L Final      BUN BUN   Date Value Ref Range Status   07/17/2025 23.0 8.0 - 23.0 mg/dL Final   07/16/2025 22.0 8.0 - 23.0 mg/dL Final   07/16/2025 19.0 8.0 - 23.0 mg/dL Final   07/15/2025 18.0 8.0 - 23.0 mg/dL Final   07/15/2025 18.0 8.0 - 23.0 mg/dL Final      Creatinine Creatinine   Date Value Ref Range Status   07/17/2025 1.67 (H) 0.76 - 1.27 mg/dL Final   07/16/2025 1.70 (H) 0.76 - 1.27 mg/dL Final   07/16/2025 1.56 (H) 0.76 - 1.27 mg/dL Final   07/15/2025 1.34 (H) 0.76 - 1.27 mg/dL Final   07/15/2025 1.30 (H) 0.76 - 1.27 mg/dL Final      Calcium Calcium   Date Value Ref Range Status   07/17/2025 8.0 (L) 8.6 - 10.5 mg/dL Final   07/16/2025 8.5 (L) 8.6 - 10.5 mg/dL Final   07/16/2025 8.8 8.6 - 10.5 mg/dL Final   07/15/2025 9.0 8.6 - 10.5 mg/dL Final   07/15/2025 9.8 8.6 - 10.5 mg/dL Final      Magnesium Magnesium   Date Value Ref Range Status   07/16/2025 2.7 (H) 1.6 - 2.4 mg/dL Final   07/15/2025 2.7 (H) 1.6 - 2.4 mg/dL Final   07/15/2025 2.5 (H) 1.6 - 2.4 mg/dL Final        aspirin, 81 mg, Oral, Daily  atorvastatin, 40 mg, Oral, Nightly  bisacodyl, 10 mg, Rectal, Daily  bumetanide, 2 mg, Intravenous, Q6H  [Held by provider] enoxaparin sodium, 40 mg, Subcutaneous, Daily  guaiFENesin, 1,200 mg, Oral, Q12H  insulin lispro, 2-7 Units, Subcutaneous, 4x Daily AC & at Bedtime  ipratropium-albuterol, 3 mL, Nebulization, 4x Daily - RT  melatonin, 5 mg, Oral, Nightly  metoprolol tartrate, 25 mg, Oral, Q12H  mupirocin, 1 Application, Each Nare, BID  pantoprazole, 40 mg, Oral, Q AM  polyethylene glycol, 17 g, Oral, Daily  senna-docusate sodium, 2 tablet, Oral, BID  sodium chloride, 4 mL,  Nebulization, BID - RT  trimethoprim-polymyxin b, 1 drop, Right Eye, 6x Daily      clevidipine, 2-32 mg/hr  DOPamine, 2-20 mcg/kg/min  EPINEPHrine, 0.02-0.3 mcg/kg/min  niCARdipine, 5-15 mg/hr, Last Rate: Stopped (07/15/25 1625)  nitroglycerin, 5-200 mcg/min  norepinephrine, 0.02-0.3 mcg/kg/min, Last Rate: 0.01 mcg/kg/min (07/17/25 0004)  phenylephrine, 0.2-3 mcg/kg/min  sodium chloride, 30 mL/hr, Last Rate: 30 mL/hr (07/15/25 1636)          Hx of CABG    Coronary artery disease involving native heart    Abnormal findings on diagnostic imaging of heart and coronary circulation      Assessment & Plan    - Severe MV-CAD -- s/p CABG, LEVH, WEN occlusion via atriclip with Dr. Patino 7/15/25  - AAA (hx aorto iliac femoral right popliteal stent repair x2 with Dr. Archer) -- AAA measuring 5.3cm  - HTN  - HLD  - PAD  - Hx DVT  - Right leg vein stripping     POD 2:   Doing ok.  On ASA/statin/BB.  Still requiring significant amount of supplemental oxygen.   Mobilize and encourage aggressive pulm toilet. Nebs, mucinex, flutter.  VERN, Cr 1.67, still probably needs diuresis for volume overload.  TCP improved, plt 101, ok to resume lovenox from surgical standpoint.  Abd much improved with NG placement/decompression yesterday.  D/w Dr. Patino and will dc chest tubes.  Continue routine care.       Dwayne Modi PA-C  07/17/25  11:37 EDT

## 2025-07-17 NOTE — PROGRESS NOTES
LOS: 2 days   Patient Care Team:  Farhad Metcalf MD as PCP - General (Family Medicine)    Chief Complaint:  Following post-CABG    Interval History:   Ambulating a bit today.  Working with physical therapy.  A bit sore but otherwise recovering.    Objective   Vital Signs  Temp:  [98.7 °F (37.1 °C)-100.9 °F (38.3 °C)] 99.5 °F (37.5 °C)  Heart Rate:  [67-93] 72  Resp:  [18] 18  BP: ()/(63-83) 93/68  Arterial Line BP: ()/(51-86) 104/58    Intake/Output Summary (Last 24 hours) at 7/17/2025 0900  Last data filed at 7/17/2025 0700  Gross per 24 hour   Intake 1152 ml   Output 3300 ml   Net -2148 ml       Comfortable NAD  PERRL, conjunctivae clear  Neck supple, no JVD or thyromegaly appreciated  S1/S2 RRR, no m/r/g   normal effort.  Chest tubes in place, coarse breath sounds.  Abdomen S/NT/ND (+) BS, no HSM appreciated  Extremities warm, no clubbing, cyanosis, no significant edema   No visible or palpable skin lesions  A/O x 3, mood and affect appropriate    Results Review:      Results from last 7 days   Lab Units 07/17/25  0300 07/16/25  1517 07/16/25  0904   SODIUM mmol/L 138 140 144   POTASSIUM mmol/L 3.8 4.3 4.3   CHLORIDE mmol/L 104 109* 111*   CO2 mmol/L 20.0* 19.5* 20.2*   BUN mg/dL 23.0 22.0 19.0   CREATININE mg/dL 1.67* 1.70* 1.56*   GLUCOSE mg/dL 135* 136* 163*   CALCIUM mg/dL 8.0* 8.5* 8.8         Results from last 7 days   Lab Units 07/17/25  0300 07/16/25  0333 07/15/25  2140   WBC 10*3/mm3 12.77* 10.16 11.22*   HEMOGLOBIN g/dL 9.8* 9.8* 10.3*   HEMATOCRIT % 29.5* 29.4* 30.2*   PLATELETS 10*3/mm3 101* 82* 81*     Results from last 7 days   Lab Units 07/16/25  0333 07/15/25  1625 07/14/25  0716   INR  1.65* 1.68* 1.27*   APTT seconds  --  36.7* 33.2     Results from last 7 days   Lab Units 07/14/25  0716   CHOLESTEROL mg/dL 141     Results from last 7 days   Lab Units 07/16/25  0904   MAGNESIUM mg/dL 2.7*     Results from last 7 days   Lab Units 07/14/25  0716   CHOLESTEROL mg/dL 141    TRIGLYCERIDES mg/dL 139   HDL CHOL mg/dL 42   LDL CHOL mg/dL 75       I reviewed the patient's new clinical results.  I personally viewed and interpreted the patient's EKG/Telemetry data        Medication Review:   aspirin, 81 mg, Oral, Daily  atorvastatin, 40 mg, Oral, Nightly  bisacodyl, 10 mg, Rectal, Daily  bumetanide, 2 mg, Intravenous, Q6H  [Held by provider] enoxaparin sodium, 40 mg, Subcutaneous, Daily  guaiFENesin, 1,200 mg, Oral, Q12H  insulin lispro, 2-7 Units, Subcutaneous, 4x Daily AC & at Bedtime  ipratropium-albuterol, 3 mL, Nebulization, 4x Daily - RT  melatonin, 5 mg, Oral, Nightly  metoprolol tartrate, 25 mg, Oral, Q12H  mupirocin, 1 Application, Each Nare, BID  pantoprazole, 40 mg, Oral, Q AM  polyethylene glycol, 17 g, Oral, Daily  senna-docusate sodium, 2 tablet, Oral, BID  sodium chloride, 4 mL, Nebulization, BID - RT  trimethoprim-polymyxin b, 1 drop, Right Eye, 6x Daily        clevidipine, 2-32 mg/hr  DOPamine, 2-20 mcg/kg/min  EPINEPHrine, 0.02-0.3 mcg/kg/min  niCARdipine, 5-15 mg/hr, Last Rate: Stopped (07/15/25 1625)  nitroglycerin, 5-200 mcg/min  norepinephrine, 0.02-0.3 mcg/kg/min, Last Rate: 0.01 mcg/kg/min (07/17/25 0004)  phenylephrine, 0.2-3 mcg/kg/min  sodium chloride, 30 mL/hr, Last Rate: 30 mL/hr (07/15/25 1636)        Assessment & Plan       Hx of CABG    Coronary artery disease involving native heart    Abnormal findings on diagnostic imaging of heart and coronary circulation    Severe multivessel CAD, status post CABG X6, left atrial appendage occlusion with atrial clip device July 15, 2025 with Dr. Patino, indication stable coronary artery disease  AAA, 5.3 cm.  Postoperative hypoxic respiratory failure, on HFNC.  Postoperative VERN, stable if not improving  History of prior DVT  History of PAD with stenting, on Xarelto maintenance therapy for vascular surgery  Postoperative anemia, suspected acute blood loss, expected.  Thrombocytopenia,  improving  Hypertension  Postoperative ileus requiring NG tube decompression.    Continuing with diuretics to improve postop hypoxia.  NG tube output improving.    Jorge A Sandoval MD  07/17/25  09:00 EDT      Part of this note may be an electronic transcription/translation of spoken language to printed text using the Dragon Dictation System.

## 2025-07-17 NOTE — THERAPY TREATMENT NOTE
Patient Name: Erickson Gerard  : 1945    MRN: [id number]                              Today's Date: 2025       Admit Date: 7/15/2025    Visit Dx:     ICD-10-CM ICD-9-CM   1. Decreased activities of daily living (ADL)  Z78.9 V49.89   2. Coronary artery disease involving native heart, unspecified vessel or lesion type, unspecified whether angina present  I25.10 414.01   3. Abnormal findings on diagnostic imaging of heart and coronary circulation  R93.1 794.39     Patient Active Problem List   Diagnosis    Essential hypertension    GERD (gastroesophageal reflux disease)    Medicare annual wellness visit, subsequent    Benign prostatic hyperplasia with urinary frequency    Umbilical hernia without obstruction and without gangrene    Colon cancer screening    Mixed hyperlipidemia    Localized edema    Right leg swelling    Aneurysm of right popliteal artery    Acute deep vein thrombosis (DVT) of distal vein of right lower extremity    DDD (degenerative disc disease), lumbar    Actinic keratosis of forehead    Aneurysm of right popliteal artery    Abdominal aortic aneurysm (AAA) 3.0 cm to 5.5 cm in diameter in male    PVD (peripheral vascular disease)    High risk medication use    Statin intolerance    Immunization deficiency    Chronic deep vein thrombosis (DVT)    Encounter for hepatitis C screening test for low risk patient    Peripheral artery aneurysm    Right elbow pain    Blurred vision    Nausea    Dizziness    Visual disturbance    Preoperative cardiovascular examination    Coronary artery disease of native artery of native heart with stable angina pectoris    Abnormal stress test    Coronary artery disease involving native heart    Abnormal findings on diagnostic imaging of heart and coronary circulation    Hx of CABG     Past Medical History:   Diagnosis Date    AAA (abdominal aortic aneurysm) without rupture 10/14/2021    Abdominal aortic aneurysm, without rupture, unspecified 2023    Acute  embolism and thrombosis of unspecified deep veins of right distal lower extremity     Anesthesia complication     AFTER VEIN SURGERY STATES HE WAS HARD TO WAKE UP    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     Aneurysm of artery of lower extremity 09/09/2021    Arthritis     At risk for sleep apnea     STOP BANG 5    Coronary artery disease     Dyspnea on exertion     Essential (primary) hypertension     GERD (gastroesophageal reflux disease)     [place] (hard of hearing)     Hyperlipidemia     Hypertension     Iliac artery aneurysm 10/14/2021    Localized edema     Low back pain     Mixed hyperlipidemia     Other specified soft tissue disorders     PAD (peripheral artery disease)     Peripheral artery aneurysm     PONV (postoperative nausea and vomiting)     Popliteal aneurysm     Thrombosis 09/09/2021    Calf Vein Thrombosis Rt tibial vein    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     Past Surgical History:   Procedure Laterality Date    ANGIOPLASTY POPLITEAL ARTERY Right 11/09/2021    Procedure: RIGHT POPITEAL ANEURYSM REPAIR VIABOHN;  Surgeon: Campos Archer MD;  Location: [facility] OR 18/19;  Service: Vascular;  Laterality: Right;    ANGIOPLASTY POPLITEAL ARTERY Right 01/25/2022    Procedure: AORTO ILIAC FEMORAL RIGHT POPLITEAL VIABOHN STENT PLACEMENT X 2;  Surgeon: Campos Archer MD;  Location: [facility] OR 18/19;  Service: Vascular;  Laterality: Right;    CARDIAC CATHETERIZATION N/A 06/19/2025    Procedure: Left Heart Cath;  Surgeon: Oneil Escobar MD;  Location: [facility] INVASIVE LOCATION;  Service: Cardiovascular;  Laterality: N/A;    CARDIAC CATHETERIZATION N/A 06/19/2025    Procedure: Left ventriculography;  Surgeon: Oneil Escobar MD;  Location: [facility] CATH INVASIVE LOCATION;  Service: Cardiovascular;  Laterality: N/A;    COLONOSCOPY      CORONARY ARTERY BYPASS GRAFT N/A 7/15/2025    Procedure: STERNOTOMY; CORONARY ARTERY BYPASS GRAFTING TIMES 6 USING LEFT INTERNAL MAMMARY ARTERY AND LEFT  SAPHENOUS VEIN; TRANSESOPHAGEAL ECHOCARDIOGRAM WITH ANESTHESIA;LEFT ATRIAL APPENDAGE EXCLUSION; PRP;  Surgeon: Jr Oscar Patino MD;  Location: [facility];  Service: Cardiothoracic;  Laterality: N/A;    EYE SURGERY Bilateral     CATARACTS    HERNIA REPAIR Bilateral     POLITEAL ARTERY ANEURYSM REPAIR Right 11/09/2021    Viabahn Stent Graft    VEIN LIGATION AND STRIPPING N/A       General Information       Row Name 07/17/25 1547          OT Time and Intention    Document Type therapy note (daily note)  -JR     Mode of Treatment occupational therapy  -JR       Row Name 07/17/25 1547          General Information    Patient Profile Reviewed yes  -JR     Existing Precautions/Restrictions fall;cardiac;sternal  -JR       Row Name 07/17/25 1547          Cognition    Orientation Status (Cognition) oriented x 3  -JR       Row Name 07/17/25 1547          Safety Issues/Impairments Affecting Functional Mobility    Impairments Affecting Function (Mobility) balance;endurance/activity tolerance;pain  -JR               User Key  (r) = Recorded By, (t) = Taken By, (c) = Cosigned By      Initials Name Provider Type    Hitesh Carrizales OT Occupational Therapist                     Mobility/ADL's       Row Name 07/17/25 1547          Transfers    Transfers sit-stand transfer  -       Row Name 07/17/25 1547          Sit-Stand Transfer    Sit-Stand [place] (Transfers) contact guard  -       Row Name 07/17/25 1547          Functional Mobility    Patient was able to Ambulate yes  -JR               User Key  (r) = Recorded By, (t) = Taken By, (c) = Cosigned By      Initials Name Provider Type    Hitesh Carrizales OT Occupational Therapist                   Obj/Interventions       Row Name 07/17/25 1548          Sensory Assessment (Somatosensory)    Sensory Assessment (Somatosensory) sensation intact  -       Row Name 07/17/25 1548          Vision Assessment/Intervention    Visual Impairment/Limitations WFL  -JR               User  Key  (r) = Recorded By, (t) = Taken By, (c) = Cosigned By      Initials Name Provider Type    JR Hitesh Mcknight, JASON Occupational Therapist                   Goals/Plan    No documentation.                  Clinical Impression       Row Name 07/17/25 1548          Pain Assessment    Pretreatment Pain Rating 3/10  -JR     Posttreatment Pain Rating 3/10  -JR     Pain Location chest  -JR     Pain Side/Orientation anterior;generalized  -JR     Pain Management Interventions exercise or physical activity utilized  -JR     Response to Pain Interventions activity participation with tolerable pain  -       Row Name 07/17/25 1548          Plan of Care Review    Plan of Care Reviewed With patient  -JR     Progress improving  -JR     Outcome Evaluation Patient seen for OT this pm.  Upon arrival, patient was transferring from recliner chair to bed with CGA/HHA per Nursing staff..  Patient assisted back to bed and positioned in supine.  Patient agreeable to BUE cardiac protocol exercises.  Patient completed exercises with mod rest breaks and O2 sat never dropping below 90%. Patient continues on Airvo for O2 support.  Patient continues to plan to d/c to home with assist and HH as needed.  -       Row Name 07/17/25 1548          Therapy Assessment/Plan (OT)    Rehab Potential (OT) good  -     Criteria for Skilled Therapeutic Interventions Met (OT) yes;meets criteria;skilled treatment is necessary  -     Therapy Frequency (OT) 5 times/wk  -       Row Name 07/17/25 1548          Therapy Plan Review/Discharge Plan (OT)    Anticipated Discharge Disposition (OT) home with assist;home with home health  -       Row Name 07/17/25 1548          Vital Signs    Pre Patient Position Sitting  -JR     Intra Patient Position Standing  -JR     Post Patient Position Supine  -       Row Name 07/17/25 1548          Positioning and Restraints    Pre-Treatment Position sitting in chair/recliner  -JR     Post Treatment Position bed  -JR     In  Bed notified nsg;call light within reach;encouraged to call for assist;exit alarm on  -JR               User Key  (r) = Recorded By, (t) = Taken By, (c) = Cosigned By      Initials Name Provider Type    Hitesh Carrizales, OT Occupational Therapist                   Outcome Measures       Row Name 07/17/25 1553          How much help from another is currently needed...    Putting on and taking off regular lower body clothing? 2  -JR     Bathing (including washing, rinsing, and drying) 2  -JR     Toileting (which includes using toilet bed pan or urinal) 2  -JR     Putting on and taking off regular upper body clothing 3  -JR     Taking care of personal grooming (such as brushing teeth) 3  -JR     Eating meals 4  -JR     AM-PAC 6 Clicks Score (OT) 16  -       Row Name 07/17/25 1254 07/17/25 0800       How much help from another person do you currently need...    Turning from your back to your side while in flat bed without using bedrails? 3  -AR 2  -CE    Moving from lying on back to sitting on the side of a flat bed without bedrails? 2  -AR 2  -CE    Moving to and from a bed to a chair (including a wheelchair)? 3  -AR 2  -CE    Standing up from a chair using your arms (e.g., wheelchair, bedside chair)? 3  -AR 2  -CE    Climbing 3-5 steps with a railing? 2  -AR 2  -CE    To walk in hospital room? 3  -AR 2  -CE    AM-PAC 6 Clicks Score (PT) 16  -AR 12  -CE    Highest Level of Mobility Goal Stand (1 or More Minutes)-5  -AR Move to Chair/Commode-4  -CE      Row Name 07/17/25 1553          Modified Dickens Scale    Modified Dickens Scale 3 - Moderate disability.  Requiring some help, but able to walk without assistance.  -       Row Name 07/17/25 1553 07/17/25 1254       Functional Assessment    Outcome Measure Options AM-PAC 6 Clicks Daily Activity (OT);Modified Kathy  -JR AM-PAC 6 Clicks Basic Mobility (PT)  -AR              User Key  (r) = Recorded By, (t) = Taken By, (c) = Cosigned By      Initials Name Provider Type     Mary Boucher, PT Physical Therapist    Hitesh Carrizales OT Occupational Therapist    Suzanne Gimenez RN Registered Nurse                      OT Recommendation and Plan  Therapy Frequency (OT): 5 times/wk  Plan of Care Review  Plan of Care Reviewed With: patient  Progress: improving  Outcome Evaluation: Patient seen for OT this pm.  Upon arrival, patient was transferring from recliner chair to bed with CGA/HHA per Nursing staff..  Patient assisted back to bed and positioned in supine.  Patient agreeable to BUE cardiac protocol exercises.  Patient completed exercises with mod rest breaks and O2 sat never dropping below 90%. Patient continues on Airvo for O2 support.  Patient continues to plan to d/c to home with assist and HH as needed.     Time Calculation:         Time Calculation- OT       Row Name 07/17/25 1553             Time Calculation- OT    OT Start Time 1425  -JR      OT Stop Time 1449  -JR      OT Time Calculation (min) 24 min  -JR      Total Timed Code Minutes- OT 24 minute(s)  -JR      OT Received On 07/17/25  -JR      OT - Next Appointment 07/18/25  -JR         Timed Charges    71254 - OT Therapeutic Exercise Minutes 12  -JR      13610 - OT Therapeutic Activity Minutes 12  -JR         Total Minutes    Timed Charges Total Minutes 24  -JR       Total Minutes 24  -JR                User Key  (r) = Recorded By, (t) = Taken By, (c) = Cosigned By      Initials Name Provider Type    Hitesh Carrizales OT Occupational Therapist                  Therapy Charges for Today       Code Description Service Date Service Provider Modifiers Qty    89470435672 HC OT THER PROC EA 15 MIN 7/17/2025 Hitesh Mcknight OT GO 1    37456086119 HC OT THERAPEUTIC ACT EA 15 MIN 7/17/2025 Hitesh Mcknight OT GO 1                 Hitesh Mcknight OT  7/17/2025

## 2025-07-17 NOTE — THERAPY TREATMENT NOTE
Patient Name: Erickson Gerard  : 1945    MRN: [id number]                              Today's Date: 2025       Admit Date: 7/15/2025    Visit Dx:     ICD-10-CM ICD-9-CM   1. Decreased activities of daily living (ADL)  Z78.9 V49.89   2. Coronary artery disease involving native heart, unspecified vessel or lesion type, unspecified whether angina present  I25.10 414.01   3. Abnormal findings on diagnostic imaging of heart and coronary circulation  R93.1 794.39     Patient Active Problem List   Diagnosis    Essential hypertension    GERD (gastroesophageal reflux disease)    Medicare annual wellness visit, subsequent    Benign prostatic hyperplasia with urinary frequency    Umbilical hernia without obstruction and without gangrene    Colon cancer screening    Mixed hyperlipidemia    Localized edema    Right leg swelling    Aneurysm of right popliteal artery    Acute deep vein thrombosis (DVT) of distal vein of right lower extremity    DDD (degenerative disc disease), lumbar    Actinic keratosis of forehead    Aneurysm of right popliteal artery    Abdominal aortic aneurysm (AAA) 3.0 cm to 5.5 cm in diameter in male    PVD (peripheral vascular disease)    High risk medication use    Statin intolerance    Immunization deficiency    Chronic deep vein thrombosis (DVT)    Encounter for hepatitis C screening test for low risk patient    Peripheral artery aneurysm    Right elbow pain    Blurred vision    Nausea    Dizziness    Visual disturbance    Preoperative cardiovascular examination    Coronary artery disease of native artery of native heart with stable angina pectoris    Abnormal stress test    Coronary artery disease involving native heart    Abnormal findings on diagnostic imaging of heart and coronary circulation    Hx of CABG     Past Medical History:   Diagnosis Date    AAA (abdominal aortic aneurysm) without rupture 10/14/2021    Abdominal aortic aneurysm, without rupture, unspecified 2023    Acute  embolism and thrombosis of unspecified deep veins of right distal lower extremity     Anesthesia complication     AFTER VEIN SURGERY STATES HE WAS HARD TO WAKE UP    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     Aneurysm of artery of lower extremity 09/09/2021    Arthritis     At risk for sleep apnea     STOP BANG 5    Coronary artery disease     Dyspnea on exertion     Essential (primary) hypertension     GERD (gastroesophageal reflux disease)     [place] (hard of hearing)     Hyperlipidemia     Hypertension     Iliac artery aneurysm 10/14/2021    Localized edema     Low back pain     Mixed hyperlipidemia     Other specified soft tissue disorders     PAD (peripheral artery disease)     Peripheral artery aneurysm     PONV (postoperative nausea and vomiting)     Popliteal aneurysm     Thrombosis 09/09/2021    Calf Vein Thrombosis Rt tibial vein    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     Past Surgical History:   Procedure Laterality Date    ANGIOPLASTY POPLITEAL ARTERY Right 11/09/2021    Procedure: RIGHT POPITEAL ANEURYSM REPAIR VIABOHN;  Surgeon: Campos Archer MD;  Location: [facility] OR 18/19;  Service: Vascular;  Laterality: Right;    ANGIOPLASTY POPLITEAL ARTERY Right 01/25/2022    Procedure: AORTO ILIAC FEMORAL RIGHT POPLITEAL VIABOHN STENT PLACEMENT X 2;  Surgeon: Campos Archer MD;  Location: [facility] OR 18/19;  Service: Vascular;  Laterality: Right;    CARDIAC CATHETERIZATION N/A 06/19/2025    Procedure: Left Heart Cath;  Surgeon: Oneil Escobar MD;  Location: [facility] INVASIVE LOCATION;  Service: Cardiovascular;  Laterality: N/A;    CARDIAC CATHETERIZATION N/A 06/19/2025    Procedure: Left ventriculography;  Surgeon: Oneil Escobar MD;  Location: [facility] CATH INVASIVE LOCATION;  Service: Cardiovascular;  Laterality: N/A;    COLONOSCOPY      CORONARY ARTERY BYPASS GRAFT N/A 7/15/2025    Procedure: STERNOTOMY; CORONARY ARTERY BYPASS GRAFTING TIMES 6 USING LEFT INTERNAL MAMMARY ARTERY AND LEFT  SAPHENOUS VEIN; TRANSESOPHAGEAL ECHOCARDIOGRAM WITH ANESTHESIA;LEFT ATRIAL APPENDAGE EXCLUSION; PRP;  Surgeon: Jr Oscar Patino MD;  Location: [facility];  Service: Cardiothoracic;  Laterality: N/A;    EYE SURGERY Bilateral     CATARACTS    HERNIA REPAIR Bilateral     POLITEAL ARTERY ANEURYSM REPAIR Right 11/09/2021    Viabahn Stent Graft    VEIN LIGATION AND STRIPPING N/A       General Information       Row Name 07/17/25 1242          Physical Therapy Time and Intention    Document Type therapy note (daily note)  -AR     Mode of Treatment physical therapy  -AR       Row Name 07/17/25 1242          General Information    Patient Profile Reviewed yes  -AR     Existing Precautions/Restrictions fall;cardiac;sternal  -AR       Row Name 07/17/25 1242          Cognition    Orientation Status (Cognition) oriented x 3  -AR               User Key  (r) = Recorded By, (t) = Taken By, (c) = Cosigned By      Initials Name Provider Type    AR Mary Curry PT Physical Therapist                   Mobility       Row Name 07/17/25 1242          Bed Mobility    All Activities, [place] (Bed Mobility) not tested  -AR     Comment, (Bed Mobility) in chair  -AR       Row Name 07/17/25 1242          Sit-Stand Transfer    Sit-Stand [place] (Transfers) contact guard  -AR     Assistive Device (Sit-Stand Transfers) walker, front-wheeled  -AR     Comment, (Sit-Stand Transfer) sit>stand 5x  -AR       Row Name 07/17/25 1242          Gait/Stairs (Locomotion)    [place] Level (Gait) contact guard  -AR     Assistive Device (Gait) walker, front-wheeled  -AR     Patient was able to Ambulate yes  -AR     Distance in Feet (Gait) 50  back and forth in room, on airvo  -AR     Deviations/Abnormal Patterns (Gait) gait speed decreased;stride length decreased;festinating/shuffling  -AR     Bilateral Gait Deviations heel strike decreased  -AR               User Key  (r) = Recorded By, (t) = Taken By, (c) = Cosigned By      Initials  Name Provider Type    AR Mary Curry, PT Physical Therapist                   Obj/Interventions       Row Name 07/17/25 1244          Motor Skills    Therapeutic Exercise --  standing marches 15x4 with standing and sitting rest breaks, cues for coughing/ deep breathing  -AR       Row Name 07/17/25 1244          Balance    Dynamic Standing Balance minimal assist  -AR     Position/Device Used, Standing Balance walker, rolling  -AR               User Key  (r) = Recorded By, (t) = Taken By, (c) = Cosigned By      Initials Name Provider Type    AR Mary Curry, PT Physical Therapist                   Goals/Plan    No documentation.                  Clinical Impression       Row Name 07/17/25 1245          Pain    Pretreatment Pain Rating 3/10  -AR     Posttreatment Pain Rating 3/10  -AR     Pain Side/Orientation anterior;generalized  -AR     Response to Pain Interventions activity participation with tolerable pain  -AR       Row Name 07/17/25 1245          Plan of Care Review    Outcome Evaluation Improving activity tolerance despite increased 02 support, on airvo.   Ambulated 50' w/ CGA using RW, back and forth in room.  Performed standing exercises w/ few sitting and standing exercises, cues for deep breathing and coughing.  Anticipate progress for DC to home with assist.  -AR       Row Name 07/17/25 1245          Therapy Assessment/Plan (PT)    Rehab Potential (PT) good  -AR     Therapy Frequency (PT) 6 times/wk  -AR       Row Name 07/17/25 1245          Vital Signs    Pre SpO2 (%) 90  -AR     O2 Delivery Pre Treatment --  airvo  -AR     Intra SpO2 (%) 92  -AR     Post SpO2 (%) 91  -AR       Row Name 07/17/25 1245          Positioning and Restraints    Pre-Treatment Position sitting in chair/recliner  -AR     Post Treatment Position chair  -AR     In Chair notified nsg;sitting;call light within reach;encouraged to call for assist;with nsg  -AR               User Key  (r) = Recorded By, (t) = Taken By, (c) =  Cosigned By      Initials Name Provider Type    Mary Boucher, PT Physical Therapist                   Outcome Measures       Row Name 07/17/25 1254 07/17/25 0800       How much help from another person do you currently need...    Turning from your back to your side while in flat bed without using bedrails? 3  -AR 2  -CE    Moving from lying on back to sitting on the side of a flat bed without bedrails? 2  -AR 2  -CE    Moving to and from a bed to a chair (including a wheelchair)? 3  -AR 2  -CE    Standing up from a chair using your arms (e.g., wheelchair, bedside chair)? 3  -AR 2  -CE    Climbing 3-5 steps with a railing? 2  -AR 2  -CE    To walk in hospital room? 3  -AR 2  -CE    AM-PAC 6 Clicks Score (PT) 16  -AR 12  -CE    Highest Level of Mobility Goal Stand (1 or More Minutes)-5  -AR Move to Chair/Commode-4  -CE      Row Name 07/17/25 1254          Functional Assessment    Outcome Measure Options AM-PAC 6 Clicks Basic Mobility (PT)  -AR               User Key  (r) = Recorded By, (t) = Taken By, (c) = Cosigned By      Initials Name Provider Type    Mary Boucher PT Physical Therapist    CE Suzanne Wilkins, RN Registered Nurse                                 Physical Therapy Education       Title: PT OT SLP Therapies (In Progress)       Topic: Physical Therapy (In Progress)       Point: Mobility training (In Progress)       Learning Progress Summary            Patient Acceptance, E, NR by AR at 7/17/2025 1254    Acceptance, E, NR by EM at 7/16/2025 1022                      Point: Home exercise program (In Progress)       Learning Progress Summary            Patient Acceptance, E, NR by AR at 7/17/2025 1254    Acceptance, E, NR by EM at 7/16/2025 1022                      Point: Body mechanics (In Progress)       Learning Progress Summary            Patient Acceptance, E, NR by AR at 7/17/2025 1254                      Point: Precautions (In Progress)       Learning Progress Summary             Patient Acceptance, E, NR by AR at 7/17/2025 1254                                      User Key       Initials Effective Dates Name Provider Type Discipline    EM 06/16/21 -  Kayy Adams, PT Physical Therapist PT    AR 06/16/21 -  Mary Curry PT Physical Therapist PT                  PT Recommendation and Plan     Outcome Evaluation: Improving activity tolerance despite increased 02 support, on airvo.   Ambulated 50' w/ CGA using RW, back and forth in room.  Performed standing exercises w/ few sitting and standing exercises, cues for deep breathing and coughing.  Anticipate progress for DC to home with assist.     Time Calculation:         PT Charges       Row Name 07/17/25 1241 07/17/25 1240          Time Calculation    Start Time 0845  -AR 0845  -AR     Stop Time 0930  -AR --  -AR     Time Calculation (min) 45 min  -AR --  -AR     PT Received On 07/17/25  -AR 07/17/25  -AR     PT - Next Appointment 07/18/25  -AR 07/18/25  -AR               User Key  (r) = Recorded By, (t) = Taken By, (c) = Cosigned By      Initials Name Provider Type    AR Mary Curry, PT Physical Therapist                      PT G-Codes  Outcome Measure Options: AM-PAC 6 Clicks Basic Mobility (PT)  AM-PAC 6 Clicks Score (PT): 16  AM-PAC 6 Clicks Score (OT): 15  Modified Kathy Scale: 0 - No Symptoms at all.  PT Discharge Summary  Anticipated Discharge Disposition (PT): home with assist    Mary Curry PT  7/17/2025

## 2025-07-17 NOTE — PROGRESS NOTES
CVICU Intensivist Progress Note    HPI/Chief Complaint: 78 yo male with CAD who presents to the CVICU immediately s/p 6V CABG + WEN clip    PMHx: HTN, HL, PAD s/p aorto-iliac fem-pop bypass with stent repair on Xarelto, h/o right DVT, PONV, GERD, 5.3 cm AAA followed by Dr. Archer w/ plans for repair in the near future    Procedure: 7/15/25 6V CABG (LIMA - LAD, SVG - acute marginal of RCA, PDA, OM1, D1) + WEN clip by Dr. Patino. He was easily intubated, but MARCOS placement was somewhat difficult. MARCOS showed LVEF 60%, nonrheumatic mild AI. He received 500ml of cellsaver only, no other products and required no shocks coming off CPB. He was hemoconcentrated 2.6L. He was brought to the CVICU on sedation and low dose cardene. Initial CI 2.1 without inotropes.    Hospital Course:   7/15 operative course  7/16 hypoxic requiring heated high flow, VERN, gastric distention/ileus requiring NGT for decompression    Daily Assessment and Plan 7/17: Slept well overnight w/ melatonin. Remains w/ acute hypoxic respiratory failure, multifactorial and related to atelectasis/pulmonary edema, still on heated high flow. Will wean as able, maintain Pox >92%. Continue aggressive IS/flutter, duonebs + hypertonic nebs + guaifenesin to assist with secretion clearance. Also encouraging pain control to assist with his ability to really participate w/ pulmonary toilet. Ongoing diuresis to address pulmonary edema, will increase bumex to 2mg q6 hrs for now and reassess this afternoon. Remains with VERN, but slightly downtrending Cr this morning to 1.6. F/up afternoon BMP to evaluate renal function. Abdomen remains firm but not nearly as distended as yesterday; 500ml out from the NGT and large amount of air, stomach now decompressed. Will clamp the NGT for today, allow ice chips and meds only and follow abdominal exam throughout the day, possibly repeat KUB this afternoon. Increase bowel regimen to include miralax, docusate/senna and dulcolax  suppository. SSI to address hyperglycemia. Lovenox currently on hold 2/2 thrombocytopenia, will repeat CBC this afternoon to evaluate.     Dr. Archer following for known AAA that requires repair once patient recovered from this acute hospitalization. Avoid hypertension and fluoroquinolones to avoid aneurysm growth/rupture.    Historical Information of Importance:  - A1C 6.1  - Xarelto for PAD and stents, last dose 7/11    aspirin, 81 mg, Oral, Daily  atorvastatin, 40 mg, Oral, Nightly  bisacodyl, 10 mg, Rectal, Daily  bumetanide, 2 mg, Intravenous, Q6H  [Held by provider] enoxaparin sodium, 40 mg, Subcutaneous, Daily  guaiFENesin, 1,200 mg, Oral, Q12H  insulin lispro, 2-7 Units, Subcutaneous, 4x Daily AC & at Bedtime  ipratropium-albuterol, 3 mL, Nebulization, 4x Daily - RT  melatonin, 5 mg, Oral, Nightly  metoprolol tartrate, 12.5 mg, Oral, Q12H  mupirocin, 1 Application, Each Nare, BID  pantoprazole, 40 mg, Oral, Q AM  polyethylene glycol, 17 g, Oral, Daily  senna-docusate sodium, 2 tablet, Oral, BID  sodium chloride, 4 mL, Nebulization, BID - RT  trimethoprim-polymyxin b, 1 drop, Right Eye, 6x Daily        Relevant Physical Exam:  Feet warm, 1+ pulses bilaterally.  Abdomen soft, nondistended  Lungs clear but distant breath sounds    ------------------------------------------------------------------------------------------------------------------------------------------------------  Prophy: HOB elevated + oral care + scds + buitrago stat lock + PPI + holding lovenox 2/2 thrombocytopenia  Code Status: full  Lines: JESS CVL(7/15)      Critical Care time excluding procedures on this date:41 mins on 7/17/25 by Kayy Melgar MD for the assessment and treatment of: interpretation of serial cardiac output measurements, evaluation of CXR, interpretation of serial blood gases,  coagulopathy, VERN, thrombocytopenia, acute hypoxic respiratory failure

## 2025-07-17 NOTE — PLAN OF CARE
Goal Outcome Evaluation:  Plan of Care Reviewed With: patient        Progress: improving  Outcome Evaluation: Patient seen for OT this pm.  Upon arrival, patient was transferring from recliner chair to bed with CGA/HHA per Nursing staff..  Patient assisted back to bed and positioned in supine.  Patient agreeable to BUE cardiac protocol exercises.  Patient completed exercises with mod rest breaks and O2 sat never dropping below 90%. Patient continues on Airvo for O2 support.  Patient continues to plan to d/c to home with assist and HH as needed.    Anticipated Discharge Disposition (OT): home with assist, home with home health

## 2025-07-18 ENCOUNTER — APPOINTMENT (OUTPATIENT)
Dept: GENERAL RADIOLOGY | Facility: HOSPITAL | Age: 80
DRG: 235 | End: 2025-07-18
Payer: MEDICARE

## 2025-07-18 LAB
ALBUMIN SERPL-MCNC: 3.9 G/DL (ref 3.5–5.2)
ALP SERPL-CCNC: 59 U/L (ref 39–117)
ALT SERPL W P-5'-P-CCNC: 5 U/L (ref 1–41)
ANION GAP SERPL CALCULATED.3IONS-SCNC: 13.2 MMOL/L (ref 5–15)
ANION GAP SERPL CALCULATED.3IONS-SCNC: 14 MMOL/L (ref 5–15)
ARTERIAL PATENCY WRIST A: ABNORMAL
AST SERPL-CCNC: 46 U/L (ref 1–40)
ATMOSPHERIC PRESS: 749.3 MMHG
ATMOSPHERIC PRESS: 749.4 MMHG
ATMOSPHERIC PRESS: 749.9 MMHG
ATMOSPHERIC PRESS: 750.5 MMHG
BACTERIA SPEC RESP CULT: NORMAL
BASE EXCESS BLDA CALC-SCNC: -0.6 MMOL/L (ref 0–2)
BASE EXCESS BLDA CALC-SCNC: 1.3 MMOL/L (ref 0–2)
BASE EXCESS BLDA CALC-SCNC: 1.7 MMOL/L (ref 0–2)
BASE EXCESS BLDA CALC-SCNC: 2.9 MMOL/L (ref 0–2)
BDY SITE: ABNORMAL
BILIRUB CONJ SERPL-MCNC: 0.3 MG/DL (ref 0–0.3)
BILIRUB INDIRECT SERPL-MCNC: 0.1 MG/DL
BILIRUB SERPL-MCNC: 0.4 MG/DL (ref 0–1.2)
BUN SERPL-MCNC: 31 MG/DL (ref 8–23)
BUN SERPL-MCNC: 37 MG/DL (ref 8–23)
BUN/CREAT SERPL: 16.8 (ref 7–25)
BUN/CREAT SERPL: 21.1 (ref 7–25)
CA-I SERPL ISE-MCNC: 1.07 MMOL/L (ref 1.15–1.35)
CA-I SERPL ISE-MCNC: 1.11 MMOL/L (ref 1.15–1.35)
CALCIUM SPEC-SCNC: 8.4 MG/DL (ref 8.6–10.5)
CALCIUM SPEC-SCNC: 8.5 MG/DL (ref 8.6–10.5)
CHLORIDE SERPL-SCNC: 105 MMOL/L (ref 98–107)
CHLORIDE SERPL-SCNC: 105 MMOL/L (ref 98–107)
CHLORIDE UR-SCNC: 21 MMOL/L
CO2 SERPL-SCNC: 21.8 MMOL/L (ref 22–29)
CO2 SERPL-SCNC: 22 MMOL/L (ref 22–29)
CREAT SERPL-MCNC: 1.75 MG/DL (ref 0.76–1.27)
CREAT SERPL-MCNC: 1.85 MG/DL (ref 0.76–1.27)
CREAT UR-MCNC: 129.6 MG/DL
DEPRECATED RDW RBC AUTO: 42 FL (ref 37–54)
DEPRECATED RDW RBC AUTO: 42.5 FL (ref 37–54)
DEVICE COMMENT: ABNORMAL
EGFRCR SERPLBLD CKD-EPI 2021: 36.8 ML/MIN/1.73
EGFRCR SERPLBLD CKD-EPI 2021: 39.4 ML/MIN/1.73
ERYTHROCYTE [DISTWIDTH] IN BLOOD BY AUTOMATED COUNT: 12.5 % (ref 12.3–15.4)
ERYTHROCYTE [DISTWIDTH] IN BLOOD BY AUTOMATED COUNT: 12.6 % (ref 12.3–15.4)
GLUCOSE BLDC GLUCOMTR-MCNC: 119 MG/DL (ref 70–130)
GLUCOSE BLDC GLUCOMTR-MCNC: 120 MG/DL (ref 70–130)
GLUCOSE BLDC GLUCOMTR-MCNC: 126 MG/DL (ref 70–130)
GLUCOSE BLDC GLUCOMTR-MCNC: 132 MG/DL (ref 70–130)
GLUCOSE BLDC GLUCOMTR-MCNC: 99 MG/DL (ref 70–130)
GLUCOSE SERPL-MCNC: 124 MG/DL (ref 65–99)
GLUCOSE SERPL-MCNC: 130 MG/DL (ref 65–99)
GRAM STN SPEC: NORMAL
HBV SURFACE AG SERPL QL IA: NORMAL
HCO3 BLDA-SCNC: 23.3 MMOL/L (ref 22–28)
HCO3 BLDA-SCNC: 23.8 MMOL/L (ref 22–28)
HCO3 BLDA-SCNC: 25.1 MMOL/L (ref 22–28)
HCO3 BLDA-SCNC: 25.7 MMOL/L (ref 22–28)
HCT VFR BLD AUTO: 30.1 % (ref 37.5–51)
HCT VFR BLD AUTO: 31.5 % (ref 37.5–51)
HEMODILUTION: NO
HGB BLD-MCNC: 10.4 G/DL (ref 13–17.7)
HGB BLD-MCNC: 9.8 G/DL (ref 13–17.7)
INHALED O2 CONCENTRATION: 100 %
INHALED O2 CONCENTRATION: 45 %
INHALED O2 CONCENTRATION: 50 %
INHALED O2 CONCENTRATION: 60 %
INSPIRATORY TIME: 1
MAGNESIUM SERPL-MCNC: 2.8 MG/DL (ref 1.6–2.4)
MAGNESIUM SERPL-MCNC: 2.9 MG/DL (ref 1.6–2.4)
MCH RBC QN AUTO: 30.4 PG (ref 26.6–33)
MCH RBC QN AUTO: 30.5 PG (ref 26.6–33)
MCHC RBC AUTO-ENTMCNC: 32.6 G/DL (ref 31.5–35.7)
MCHC RBC AUTO-ENTMCNC: 33 G/DL (ref 31.5–35.7)
MCV RBC AUTO: 92.4 FL (ref 79–97)
MCV RBC AUTO: 93.5 FL (ref 79–97)
MODALITY: ABNORMAL
MRSA DNA SPEC QL NAA+PROBE: NORMAL
O2 A-A PPRESDIFF RESPIRATORY: 0.2 MMHG
PAW @ PEAK INSP FLOW SETTING VENT: 15 CMH2O
PCO2 BLDA: 29.1 MM HG (ref 35–45)
PCO2 BLDA: 32.3 MM HG (ref 35–45)
PCO2 BLDA: 34.6 MM HG (ref 35–45)
PCO2 BLDA: 36 MM HG (ref 35–45)
PH BLDA: 7.44 PH UNITS (ref 7.35–7.45)
PH BLDA: 7.45 PH UNITS (ref 7.35–7.45)
PH BLDA: 7.51 PH UNITS (ref 7.35–7.45)
PH BLDA: 7.52 PH UNITS (ref 7.35–7.45)
PHOSPHATE SERPL-MCNC: 3.7 MG/DL (ref 2.5–4.5)
PLATELET # BLD AUTO: 145 10*3/MM3 (ref 140–450)
PLATELET # BLD AUTO: 145 10*3/MM3 (ref 140–450)
PMV BLD AUTO: 10 FL (ref 6–12)
PMV BLD AUTO: 9.9 FL (ref 6–12)
PO2 BLD: 102 MM[HG] (ref 0–500)
PO2 BLD: 119 MM[HG] (ref 0–500)
PO2 BLD: 137 MM[HG] (ref 0–500)
PO2 BLD: 144 MM[HG] (ref 0–500)
PO2 BLDA: 119.2 MM HG (ref 80–100)
PO2 BLDA: 61.4 MM HG (ref 80–100)
PO2 BLDA: 64.7 MM HG (ref 80–100)
PO2 BLDA: 68.6 MM HG (ref 80–100)
POTASSIUM SERPL-SCNC: 3.6 MMOL/L (ref 3.5–5.2)
POTASSIUM SERPL-SCNC: 3.7 MMOL/L (ref 3.5–5.2)
POTASSIUM SERPL-SCNC: 3.7 MMOL/L (ref 3.5–5.2)
POTASSIUM SERPL-SCNC: 3.8 MMOL/L (ref 3.5–5.2)
PROCALCITONIN SERPL-MCNC: 1.93 NG/ML (ref 0–0.25)
PROT ?TM UR-MCNC: 89.4 MG/DL
PROT SERPL-MCNC: 6.3 G/DL (ref 6–8.5)
PROT/CREAT UR: 689.8 MG/G CREA (ref 0–200)
QT INTERVAL: 306 MS
QT INTERVAL: 347 MS
QTC INTERVAL: 469 MS
QTC INTERVAL: 484 MS
RBC # BLD AUTO: 3.22 10*6/MM3 (ref 4.14–5.8)
RBC # BLD AUTO: 3.41 10*6/MM3 (ref 4.14–5.8)
SAO2 % BLDCOA: 93.6 % (ref 92–98.5)
SAO2 % BLDCOA: 94.4 % (ref 92–98.5)
SAO2 % BLDCOA: 94.7 % (ref 92–98.5)
SAO2 % BLDCOA: 98.8 % (ref 92–98.5)
SET MECH RESP RATE: 20
SODIUM SERPL-SCNC: 140 MMOL/L (ref 136–145)
SODIUM SERPL-SCNC: 141 MMOL/L (ref 136–145)
SODIUM UR-SCNC: 45 MMOL/L
TOTAL RATE: 18 BREATHS/MINUTE
TOTAL RATE: 18 BREATHS/MINUTE
TOTAL RATE: 26 BREATHS/MINUTE
TOTAL RATE: 34 BREATHS/MINUTE
VENTILATOR MODE: ABNORMAL
VT ON VENT VENT: 500 ML
WBC NRBC COR # BLD AUTO: 10.9 10*3/MM3 (ref 3.4–10.8)
WBC NRBC COR # BLD AUTO: 13.18 10*3/MM3 (ref 3.4–10.8)

## 2025-07-18 PROCEDURE — 99231 SBSQ HOSP IP/OBS SF/LOW 25: CPT | Performed by: SURGERY

## 2025-07-18 PROCEDURE — 80048 BASIC METABOLIC PNL TOTAL CA: CPT | Performed by: ANESTHESIOLOGY

## 2025-07-18 PROCEDURE — 84156 ASSAY OF PROTEIN URINE: CPT | Performed by: INTERNAL MEDICINE

## 2025-07-18 PROCEDURE — 84132 ASSAY OF SERUM POTASSIUM: CPT

## 2025-07-18 PROCEDURE — P9041 ALBUMIN (HUMAN),5%, 50ML: HCPCS

## 2025-07-18 PROCEDURE — 82803 BLOOD GASES ANY COMBINATION: CPT

## 2025-07-18 PROCEDURE — 25010000002 VANCOMYCIN 10 G RECONSTITUTED SOLUTION

## 2025-07-18 PROCEDURE — 25010000002 BUMETANIDE PER 0.5 MG: Performed by: INTERNAL MEDICINE

## 2025-07-18 PROCEDURE — 25010000002 ENOXAPARIN PER 10 MG

## 2025-07-18 PROCEDURE — 94660 CPAP INITIATION&MGMT: CPT

## 2025-07-18 PROCEDURE — 94799 UNLISTED PULMONARY SVC/PX: CPT

## 2025-07-18 PROCEDURE — 83735 ASSAY OF MAGNESIUM: CPT | Performed by: ANESTHESIOLOGY

## 2025-07-18 PROCEDURE — 84132 ASSAY OF SERUM POTASSIUM: CPT | Performed by: ANESTHESIOLOGY

## 2025-07-18 PROCEDURE — 84300 ASSAY OF URINE SODIUM: CPT | Performed by: INTERNAL MEDICINE

## 2025-07-18 PROCEDURE — 71045 X-RAY EXAM CHEST 1 VIEW: CPT

## 2025-07-18 PROCEDURE — 87340 HEPATITIS B SURFACE AG IA: CPT | Performed by: INTERNAL MEDICINE

## 2025-07-18 PROCEDURE — 25010000002 CEFEPIME PER 500 MG: Performed by: ANESTHESIOLOGY

## 2025-07-18 PROCEDURE — 93005 ELECTROCARDIOGRAM TRACING: CPT

## 2025-07-18 PROCEDURE — 85027 COMPLETE CBC AUTOMATED: CPT | Performed by: ANESTHESIOLOGY

## 2025-07-18 PROCEDURE — 87641 MR-STAPH DNA AMP PROBE: CPT

## 2025-07-18 PROCEDURE — 25010000002 ALBUMIN HUMAN 5% PER 50 ML

## 2025-07-18 PROCEDURE — 84145 PROCALCITONIN (PCT): CPT

## 2025-07-18 PROCEDURE — 82330 ASSAY OF CALCIUM: CPT | Performed by: ANESTHESIOLOGY

## 2025-07-18 PROCEDURE — 82570 ASSAY OF URINE CREATININE: CPT | Performed by: INTERNAL MEDICINE

## 2025-07-18 PROCEDURE — 25010000002 CALCIUM GLUCONATE 2-0.675 GM/100ML-% SOLUTION

## 2025-07-18 PROCEDURE — 94761 N-INVAS EAR/PLS OXIMETRY MLT: CPT

## 2025-07-18 PROCEDURE — 80076 HEPATIC FUNCTION PANEL: CPT | Performed by: ANESTHESIOLOGY

## 2025-07-18 PROCEDURE — 25010000002 AMIODARONE IN DEXTROSE 5% 360-4.14 MG/200ML-% SOLUTION

## 2025-07-18 PROCEDURE — 87205 SMEAR GRAM STAIN: CPT | Performed by: ANESTHESIOLOGY

## 2025-07-18 PROCEDURE — 82948 REAGENT STRIP/BLOOD GLUCOSE: CPT

## 2025-07-18 PROCEDURE — 84100 ASSAY OF PHOSPHORUS: CPT | Performed by: ANESTHESIOLOGY

## 2025-07-18 PROCEDURE — 25010000002 POTASSIUM CHLORIDE PER 2 MEQ

## 2025-07-18 PROCEDURE — 82436 ASSAY OF URINE CHLORIDE: CPT | Performed by: INTERNAL MEDICINE

## 2025-07-18 PROCEDURE — 25810000003 SODIUM CHLORIDE 0.9 % SOLUTION

## 2025-07-18 PROCEDURE — 99291 CRITICAL CARE FIRST HOUR: CPT | Performed by: ANESTHESIOLOGY

## 2025-07-18 PROCEDURE — 99232 SBSQ HOSP IP/OBS MODERATE 35: CPT | Performed by: STUDENT IN AN ORGANIZED HEALTH CARE EDUCATION/TRAINING PROGRAM

## 2025-07-18 PROCEDURE — 25010000002 CEFEPIME PER 500 MG

## 2025-07-18 PROCEDURE — 93010 ELECTROCARDIOGRAM REPORT: CPT | Performed by: INTERNAL MEDICINE

## 2025-07-18 PROCEDURE — 97530 THERAPEUTIC ACTIVITIES: CPT

## 2025-07-18 RX ORDER — VANCOMYCIN/0.9 % SOD CHLORIDE 1.5G/250ML
20 PLASTIC BAG, INJECTION (ML) INTRAVENOUS ONCE
Status: COMPLETED | OUTPATIENT
Start: 2025-07-18 | End: 2025-07-18

## 2025-07-18 RX ORDER — CALCIUM GLUCONATE 20 MG/ML
2000 INJECTION, SOLUTION INTRAVENOUS ONCE
Status: DISCONTINUED | OUTPATIENT
Start: 2025-07-18 | End: 2025-07-19

## 2025-07-18 RX ORDER — AMIODARONE HYDROCHLORIDE 200 MG/1
200 TABLET ORAL EVERY 12 HOURS SCHEDULED
Status: DISCONTINUED | OUTPATIENT
Start: 2025-07-18 | End: 2025-07-19

## 2025-07-18 RX ORDER — POTASSIUM CHLORIDE 29.8 MG/ML
20 INJECTION INTRAVENOUS ONCE
Status: COMPLETED | OUTPATIENT
Start: 2025-07-18 | End: 2025-07-18

## 2025-07-18 RX ORDER — CALCIUM GLUCONATE 20 MG/ML
2000 INJECTION, SOLUTION INTRAVENOUS EVERY 6 HOURS
Status: COMPLETED | OUTPATIENT
Start: 2025-07-18 | End: 2025-07-18

## 2025-07-18 RX ORDER — POTASSIUM CHLORIDE 1.5 G/1.58G
20 POWDER, FOR SOLUTION ORAL ONCE
Status: COMPLETED | OUTPATIENT
Start: 2025-07-18 | End: 2025-07-18

## 2025-07-18 RX ORDER — BUMETANIDE 0.25 MG/ML
4 INJECTION, SOLUTION INTRAMUSCULAR; INTRAVENOUS EVERY 8 HOURS
Status: DISPENSED | OUTPATIENT
Start: 2025-07-18 | End: 2025-07-19

## 2025-07-18 RX ORDER — ALBUMIN HUMAN 50 G/1000ML
12.5 SOLUTION INTRAVENOUS ONCE
Status: COMPLETED | OUTPATIENT
Start: 2025-07-19 | End: 2025-07-18

## 2025-07-18 RX ORDER — POTASSIUM CHLORIDE 1500 MG/1
20 TABLET, EXTENDED RELEASE ORAL ONCE
Status: COMPLETED | OUTPATIENT
Start: 2025-07-18 | End: 2025-07-18

## 2025-07-18 RX ADMIN — IPRATROPIUM BROMIDE AND ALBUTEROL SULFATE 3 ML: .5; 3 SOLUTION RESPIRATORY (INHALATION) at 21:35

## 2025-07-18 RX ADMIN — POTASSIUM CHLORIDE 20 MEQ: 1500 TABLET, EXTENDED RELEASE ORAL at 22:45

## 2025-07-18 RX ADMIN — Medication 4 ML: at 13:51

## 2025-07-18 RX ADMIN — Medication 5 MG: at 20:15

## 2025-07-18 RX ADMIN — Medication: at 06:03

## 2025-07-18 RX ADMIN — SENNOSIDES AND DOCUSATE SODIUM 2 TABLET: 50; 8.6 TABLET ORAL at 09:02

## 2025-07-18 RX ADMIN — PANTOPRAZOLE SODIUM 40 MG: 40 TABLET, DELAYED RELEASE ORAL at 06:12

## 2025-07-18 RX ADMIN — IPRATROPIUM BROMIDE AND ALBUTEROL SULFATE 3 ML: .5; 3 SOLUTION RESPIRATORY (INHALATION) at 06:55

## 2025-07-18 RX ADMIN — BUMETANIDE 4 MG: 0.25 INJECTION INTRAMUSCULAR; INTRAVENOUS at 17:13

## 2025-07-18 RX ADMIN — GUAIFENESIN 1200 MG: 600 TABLET, EXTENDED RELEASE ORAL at 20:15

## 2025-07-18 RX ADMIN — IPRATROPIUM BROMIDE AND ALBUTEROL SULFATE 3 ML: .5; 3 SOLUTION RESPIRATORY (INHALATION) at 13:50

## 2025-07-18 RX ADMIN — POLYMYXIN B SULFATE AND TRIMETHOPRIM 1 DROP: 10000; 1 SOLUTION OPHTHALMIC at 13:08

## 2025-07-18 RX ADMIN — POLYMYXIN B SULFATE AND TRIMETHOPRIM 1 DROP: 10000; 1 SOLUTION OPHTHALMIC at 06:12

## 2025-07-18 RX ADMIN — IPRATROPIUM BROMIDE AND ALBUTEROL SULFATE 3 ML: .5; 3 SOLUTION RESPIRATORY (INHALATION) at 10:12

## 2025-07-18 RX ADMIN — CALCIUM GLUCONATE 2000 MG: 20 INJECTION, SOLUTION INTRAVENOUS at 15:36

## 2025-07-18 RX ADMIN — BISACODYL 10 MG: 10 SUPPOSITORY RECTAL at 09:05

## 2025-07-18 RX ADMIN — HYDROCODONE BITARTRATE AND ACETAMINOPHEN 2 TABLET: 5; 325 TABLET ORAL at 22:13

## 2025-07-18 RX ADMIN — ACETAMINOPHEN 650 MG: 325 TABLET, FILM COATED ORAL at 13:36

## 2025-07-18 RX ADMIN — POLYMYXIN B SULFATE AND TRIMETHOPRIM 1 DROP: 10000; 1 SOLUTION OPHTHALMIC at 09:03

## 2025-07-18 RX ADMIN — POTASSIUM CHLORIDE 20 MEQ: 1.5 POWDER, FOR SOLUTION ORAL at 11:18

## 2025-07-18 RX ADMIN — SENNOSIDES AND DOCUSATE SODIUM 2 TABLET: 50; 8.6 TABLET ORAL at 20:15

## 2025-07-18 RX ADMIN — ENOXAPARIN SODIUM 40 MG: 100 INJECTION SUBCUTANEOUS at 17:59

## 2025-07-18 RX ADMIN — ALBUMIN (HUMAN) 12.5 G: 12.5 INJECTION, SOLUTION INTRAVENOUS at 23:27

## 2025-07-18 RX ADMIN — ATORVASTATIN CALCIUM 40 MG: 20 TABLET, FILM COATED ORAL at 20:15

## 2025-07-18 RX ADMIN — Medication 4 ML: at 06:56

## 2025-07-18 RX ADMIN — VANCOMYCIN HYDROCHLORIDE 1500 MG: 10 INJECTION, POWDER, LYOPHILIZED, FOR SOLUTION INTRAVENOUS at 05:58

## 2025-07-18 RX ADMIN — CEFEPIME 2000 MG: 2 INJECTION, POWDER, FOR SOLUTION INTRAVENOUS at 17:13

## 2025-07-18 RX ADMIN — ASPIRIN 81 MG: 81 TABLET, COATED ORAL at 09:03

## 2025-07-18 RX ADMIN — POLYMYXIN B SULFATE AND TRIMETHOPRIM 1 DROP: 10000; 1 SOLUTION OPHTHALMIC at 15:36

## 2025-07-18 RX ADMIN — BUMETANIDE 4 MG: 0.25 INJECTION INTRAMUSCULAR; INTRAVENOUS at 09:03

## 2025-07-18 RX ADMIN — GUAIFENESIN 1200 MG: 600 TABLET, EXTENDED RELEASE ORAL at 09:02

## 2025-07-18 RX ADMIN — MUPIROCIN 1 APPLICATION: 20 OINTMENT TOPICAL at 09:02

## 2025-07-18 RX ADMIN — MAGNESIUM HYDROXIDE 15 ML: 400 SUSPENSION ORAL at 11:18

## 2025-07-18 RX ADMIN — POLYETHYLENE GLYCOL 3350 17 G: 17 POWDER, FOR SOLUTION ORAL at 09:02

## 2025-07-18 RX ADMIN — MUPIROCIN 1 APPLICATION: 20 OINTMENT TOPICAL at 20:15

## 2025-07-18 RX ADMIN — AMIODARONE HYDROCHLORIDE 0.5 MG/MIN: 1.8 INJECTION, SOLUTION INTRAVENOUS at 03:23

## 2025-07-18 RX ADMIN — CALCIUM GLUCONATE 2000 MG: 20 INJECTION, SOLUTION INTRAVENOUS at 09:02

## 2025-07-18 RX ADMIN — POTASSIUM CHLORIDE 20 MEQ: 1.5 POWDER, FOR SOLUTION ORAL at 16:22

## 2025-07-18 RX ADMIN — AMIODARONE HYDROCHLORIDE 200 MG: 200 TABLET ORAL at 16:22

## 2025-07-18 RX ADMIN — POTASSIUM CHLORIDE 20 MEQ: 29.8 INJECTION, SOLUTION INTRAVENOUS at 02:51

## 2025-07-18 RX ADMIN — CEFEPIME 1000 MG: 1 INJECTION, POWDER, FOR SOLUTION INTRAMUSCULAR; INTRAVENOUS at 05:58

## 2025-07-18 NOTE — PLAN OF CARE
Goal Outcome Evaluation:  Plan of Care Reviewed With: patient           Outcome Evaluation: Upon entering room, pt. supine in bed, awake/alert, and agreeable to work with P.T. this date.  This PM, pt. able to ambulate 15 feet at bedside, CGA x 2, with HHA x 2.  Pt. requires Min. assist x 1 for bed mobility and CGA x 2 for sit <-> stand transfers. Cardiac ther. ex. program x 10 reps completed for general strengthening/stretching.  x 2 seated rest breaks required due to decreased in oxygen saturations (72%), limiting his overall gait distance this date.  Will continue to progress functional mobility as tolerated.

## 2025-07-18 NOTE — PROGRESS NOTES
LOS: 3 days   Patient Care Team:  Farhad Metcalf MD as PCP - General (Family Medicine)    Chief Complaint:  Following post-CABG    Interval History:     .  Status quo today.  He is making productive sputum.  Still on HFNC.  Did have a brief episode of A-fib responded to amiodarone.    Objective   Vital Signs  Temp:  [98.6 °F (37 °C)-99.7 °F (37.6 °C)] 99.7 °F (37.6 °C)  Heart Rate:  [] 66  Resp:  [15-20] 18  BP: ()/(62-89) 107/82  Arterial Line BP: ()/(49-82) 111/62    Intake/Output Summary (Last 24 hours) at 7/18/2025 0859  Last data filed at 7/18/2025 0700  Gross per 24 hour   Intake 983 ml   Output 2805 ml   Net -1822 ml       Comfortable NAD, resting in bed  PERRL, conjunctivae clear  Neck supple, no JVD or thyromegaly appreciated.  Central access in place.  S1/S2 RRR, no m/r/g are appreciated   normal effort. coarse breath sounds.  On HFNC  Abdomen S/NT/ND (+) BS, no HSM appreciated  Extremities warm, no clubbing, cyanosis, no significant edema   No visible or palpable skin lesions  A/O x 3, mood and affect appropriate    Results Review:      Results from last 7 days   Lab Units 07/18/25  0142 07/17/25  1415 07/17/25  1130 07/17/25  0300   SODIUM mmol/L 140 140  --  138   POTASSIUM mmol/L 3.7 4.3 4.2 3.8   CHLORIDE mmol/L 105 107  --  104   CO2 mmol/L 21.8* 22.7  --  20.0*   BUN mg/dL 31.0* 30.0*  --  23.0   CREATININE mg/dL 1.85* 1.99*  --  1.67*   GLUCOSE mg/dL 130* 116*  --  135*   CALCIUM mg/dL 8.4* 8.2*  --  8.0*         Results from last 7 days   Lab Units 07/18/25  0142 07/17/25  1415 07/17/25  0300   WBC 10*3/mm3 13.18* 15.05* 12.77*   HEMOGLOBIN g/dL 9.8* 10.4* 9.8*   HEMATOCRIT % 30.1* 30.8* 29.5*   PLATELETS 10*3/mm3 145 128* 101*     Results from last 7 days   Lab Units 07/16/25  0333 07/15/25  1625 07/14/25  0716   INR  1.65* 1.68* 1.27*   APTT seconds  --  36.7* 33.2     Results from last 7 days   Lab Units 07/14/25  0716   CHOLESTEROL mg/dL 141     Results from last 7  days   Lab Units 07/18/25  0142   MAGNESIUM mg/dL 2.9*     Results from last 7 days   Lab Units 07/14/25  0716   CHOLESTEROL mg/dL 141   TRIGLYCERIDES mg/dL 139   HDL CHOL mg/dL 42   LDL CHOL mg/dL 75       I reviewed the patient's new clinical results.  I personally viewed and interpreted the patient's EKG/Telemetry data        Medication Review:   aspirin, 81 mg, Oral, Daily  atorvastatin, 40 mg, Oral, Nightly  bisacodyl, 10 mg, Rectal, Daily  bumetanide, 4 mg, Intravenous, Q8H  calcium gluconate, 2,000 mg, Intravenous, Q6H  cefepime, 1,000 mg, Intravenous, Q12H  enoxaparin sodium, 40 mg, Subcutaneous, Daily  guaiFENesin, 1,200 mg, Oral, Q12H  insulin lispro, 2-7 Units, Subcutaneous, 4x Daily AC & at Bedtime  ipratropium-albuterol, 3 mL, Nebulization, 4x Daily - RT  melatonin, 5 mg, Oral, Nightly  [Held by provider] metoprolol tartrate, 12.5 mg, Oral, Q12H  mupirocin, 1 Application, Each Nare, BID  polyethylene glycol, 17 g, Oral, Daily  senna-docusate sodium, 2 tablet, Oral, BID  sodium chloride, 4 mL, Nebulization, BID - RT  trimethoprim-polymyxin b, 1 drop, Right Eye, 6x Daily  Vancomycin Pharmacy Intermittent/Pulse Dosing, , Not Applicable, Daily        amiodarone, 0.5 mg/min, Last Rate: 0.5 mg/min (07/18/25 0323)  clevidipine, 2-32 mg/hr  DOPamine, 2-20 mcg/kg/min  EPINEPHrine, 0.02-0.3 mcg/kg/min  niCARdipine, 5-15 mg/hr, Last Rate: Stopped (07/15/25 1625)  nitroglycerin, 5-200 mcg/min  norepinephrine, 0.02-0.3 mcg/kg/min, Last Rate: Stopped (07/18/25 0645)  Pharmacy Consult - Pharmacy to dose,   phenylephrine, 0.2-3 mcg/kg/min        Assessment & Plan       Hx of CABG    Coronary artery disease involving native heart    Abnormal findings on diagnostic imaging of heart and coronary circulation    Severe multivessel CAD, status post CABG X6, left atrial appendage occlusion with atrial clip device July 15, 2025 with Dr. Patino, indication stable coronary artery disease  Aspirin 81  Atorvastatin 40  AAA, 5.3  cm.  Postoperative hypoxic respiratory failure, on HFNC.  Getting diuretics today.  Nephrology following.  Postoperative VERN, nephrology following, on bumetanide  History of prior DVT  History of PAD with stenting, on Xarelto maintenance 10 mg therapy per vascular surgery  Atrial fibrillation, postoperative.  In sinus rhythm today.  Pressure is on lower side.  On amiodarone  Metoprolol has been held due to lower blood pressures, will reassess determine about reinitiation of this.  Prophylactic Lovenox for now, I suspect that this was transient postoperative.  Postoperative anemia, suspected acute blood loss, expected.  Thrombocytopenia, improving  Hypertension  Postoperative ileus requiring NG tube decompression.  No nausea today.    Continuing with diuretics to improve postop hypoxia.     Jorge A Sandoval MD  07/18/25  08:59 EDT      Part of this note may be an electronic transcription/translation of spoken language to printed text using the Dragon Dictation System.

## 2025-07-18 NOTE — PROGRESS NOTES
[facility] Clinical Pharmacy Services: Vancomycin Pharmacokinetic Initial Consult Note    Erickson Gerard is a 79 y.o. male who is on day 1 of pharmacy to dose vancomycin.    Indication: Pneumonia  Consulting Provider: TATI Lino  Planned Duration of Therapy: 7 days  Loading Dose Ordered: 1500 mg on 7/18 at 0545  MRSA PCR Ordered: 7/18 at 0430; Result: Pending collection  Culture/Source: None   Target: Dose by levels    Vitals/Labs  Ht:  ; Wt: 71.4 kg (157 lb 6.5 oz)  Temp Readings from Last 1 Encounters:   07/17/25 99 °F (37.2 °C) (Bladder)    Estimated Creatinine Clearance: 33.2 mL/min (A) (by C-G formula based on SCr of 1.85 mg/dL (H)).       Results from last 7 days   Lab Units 07/18/25  0142 07/17/25  1415 07/17/25  0300   CREATININE mg/dL 1.85* 1.99* 1.67*   WBC 10*3/mm3 13.18* 15.05* 12.77*     Assessment/Plan:    Vancomycin Loading Dose:   1500 mg IV once 7/18 at 0545  Patient's renal function is not stable will dose by levels.  Vanc Random has been ordered for 7/18 at 1800     Pharmacy will follow patient's kidney function and will adjust doses and obtain levels as necessary. Thank you for involving pharmacy in this patient's care. Please contact pharmacy with any questions or concerns.                           Rani [facility]  Clinical Pharmacist

## 2025-07-18 NOTE — CONSULTS
[facility] Consult Note      Patient Name: Erickson Gerard  : 1945  MRN: [id number]  Primary Care Physician:  Farhad Metcalf MD  Referring Physician: Jr Oscar Patino MD  Date of admission: 7/15/2025    Subjective     Reason for Consult: Acute kidney injury on chronic kidney disease stage II.    HPI:   Erickson Gerard is a 79 y.o. male patient was admitted on 7/15/2025 underwent 6 vessel CABG.  Has history of 5.3 abdominal aortic aneurysm being monitored was scheduled to have surgery for repair next week but that is on hold and currently monitored by vascular surgery.  The patient has a history of hypertension treated with ARB, GERD, dyslipidemia .  His baseline creatinine about 1.2 GFR was 63 on admission creatinine was 1.37 and gradually increased up to 1.99 yesterday and today 1.85 has evidence of volume excess and being diuresed and requiring increased oxygen demand.  Review of Systems:   14 point review of systems is otherwise negative except for mentioned above on HPI    Personal History     Past Medical History:   Diagnosis Date    AAA (abdominal aortic aneurysm) without rupture 10/14/2021    Abdominal aortic aneurysm, without rupture, unspecified 2023    Acute embolism and thrombosis of unspecified deep veins of right distal lower extremity     Anesthesia complication     AFTER VEIN SURGERY STATES HE WAS HARD TO WAKE UP    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     Aneurysm of artery of lower extremity 2021    Arthritis     At risk for sleep apnea     STOP BANG 5    Coronary artery disease     Dyspnea on exertion     Essential (primary) hypertension     GERD (gastroesophageal reflux disease)     [place] (hard of hearing)     Hyperlipidemia     Hypertension     Iliac artery aneurysm 10/14/2021    Localized edema     Low back pain     Mixed hyperlipidemia     Other specified soft tissue disorders     PAD (peripheral artery disease)     Peripheral artery  aneurysm     PONV (postoperative nausea and vomiting)     Popliteal aneurysm     Thrombosis 09/09/2021    Calf Vein Thrombosis Rt tibial vein    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       Past Surgical History:   Procedure Laterality Date    ANGIOPLASTY POPLITEAL ARTERY Right 11/09/2021    Procedure: RIGHT POPITEAL ANEURYSM REPAIR VIABOHN;  Surgeon: Campos Archer MD;  Location: [facility] OR 18/19;  Service: Vascular;  Laterality: Right;    ANGIOPLASTY POPLITEAL ARTERY Right 01/25/2022    Procedure: AORTO ILIAC FEMORAL RIGHT POPLITEAL VIABOHN STENT PLACEMENT X 2;  Surgeon: Campos Archer MD;  Location: [facility] OR 18/19;  Service: Vascular;  Laterality: Right;    CARDIAC CATHETERIZATION N/A 06/19/2025    Procedure: Left Heart Cath;  Surgeon: Oneil Escobar MD;  Location: [facility] INVASIVE LOCATION;  Service: Cardiovascular;  Laterality: N/A;    CARDIAC CATHETERIZATION N/A 06/19/2025    Procedure: Left ventriculography;  Surgeon: Oneil Escobar MD;  Location: [facility] INVASIVE LOCATION;  Service: Cardiovascular;  Laterality: N/A;    COLONOSCOPY      CORONARY ARTERY BYPASS GRAFT N/A 7/15/2025    Procedure: STERNOTOMY; CORONARY ARTERY BYPASS GRAFTING TIMES 6 USING LEFT INTERNAL MAMMARY ARTERY AND LEFT SAPHENOUS VEIN; TRANSESOPHAGEAL ECHOCARDIOGRAM WITH ANESTHESIA;LEFT ATRIAL APPENDAGE EXCLUSION; PRP;  Surgeon: Jr Oscar Patino MD;  Location: [facility];  Service: Cardiothoracic;  Laterality: N/A;    EYE SURGERY Bilateral     CATARACTS    HERNIA REPAIR Bilateral     POLITEAL ARTERY ANEURYSM REPAIR Right 11/09/2021    Viabahn Stent Graft    VEIN LIGATION AND STRIPPING N/A        Family History: family history includes Heart attack in his father; Heart disease in his father; No Known Problems in his mother; Stroke in his brother, brother, and sister.    Social History:  reports that he has never smoked. He has never been exposed to tobacco smoke. He has never used smokeless tobacco. He reports  that he does not drink alcohol and does not use drugs.    Home Medications:  Prior to Admission medications    Medication Sig Start Date End Date Taking? Authorizing Provider   amLODIPine (NORVASC) 10 MG tablet TAKE 1 TABLET BY MOUTH DAILY 5/28/25  Yes Farhad Metcalf MD   chlorhexidine (PERIDEX) 0.12 % solution Apply 15 mL to the mouth or throat 2 (Two) Times a Day. USE AS DIRECTED   Yes Loyd oRe MD   ezetimibe (Zetia) 10 MG tablet Take 1 tablet by mouth Daily. 8/15/24  Yes Farhad Metcalf MD   metoprolol succinate XL (TOPROL-XL) 50 MG 24 hr tablet Take 1 tablet by mouth Daily. 6/19/25  Yes Oneil Escobar MD   mupirocin (BACTROBAN) 2 % ointment Apply 1 Application topically to the appropriate area as directed 3 (Three) Times a Day. USE AS DIRECTED   Yes Loyd Roe MD   omeprazole (priLOSEC) 20 MG capsule TAKE 1 CAPSULE BY MOUTH DAILY 5/28/25  Yes Farhad Metcalf MD   rosuvastatin (CRESTOR) 5 MG tablet Take 1 tablet by mouth 3 (Three) Times a Week. 6/9/25  Yes Trace Cabrales MD   trimethoprim-polymyxin b (POLYTRIM) 85033-4.1 UNIT/ML-% ophthalmic solution Apply 1 drop to eye(s) as directed by provider Every 6 (Six) Hours. 7/8/25  Yes Loyd Roe MD   valsartan (DIOVAN) 160 MG tablet Take 1 tablet by mouth Daily. 6/9/25  Yes Trace Cabrales MD   VITAMIN D, CHOLECALCIFEROL, PO Take 1 capsule by mouth 2 (Two) Times a Week. HOLDING FOR DOS    Loyd Roe MD   Xarelto 10 MG tablet TAKE 1 TABLET BY MOUTH DAILY WITH DINNER  Patient taking differently: Take 1 tablet by mouth Daily With Dinner. HOLDING FOR DOS      LAST DOSE PRIOR TO SURGERY 7-11-25 5/28/25   Farhad Metcalf MD       Allergies:  Allergies   Allergen Reactions    Bee Venom Anaphylaxis    Ciprofloxacin Other (See Comments)     As per Dr. Archer    Levaquin [Levofloxacin] Other (See Comments)     As per Dr. Archer       Objective     Vitals:   Temp:  [98.6 °F (37 °C)-99.7 °F (37.6 °C)] 99.7 °F (37.6 °C)  Heart  Rate:  [] 66  Resp:  [15-20] 18  BP: ()/(62-89) 107/82  Arterial Line BP: ()/(49-82) 111/62  Flow (L/min) (Oxygen Therapy):  [60-70] 60    Intake/Output Summary (Last 24 hours) at 7/18/2025 0838  Last data filed at 7/18/2025 0700  Gross per 24 hour   Intake 983 ml   Output 2805 ml   Net -1822 ml       Physical Exam:   Constitutional: Awake, alert, no acute distress.  Chronically  HEENT: Sclera anicteric, no conjunctival injection, high flow oxygen via nasal cannula.  Neck: He has Cordis in the right IJ left IJ slightly distended JVD  Respiratory: Bilateral rhonchi and crackles.  Cardiovascular: RRR, no murmurs, no rubs or gallops  Gastrointestinal: Positive bowel sounds, abdomen is soft, nontender and nondistended  : No palpable bladder, Grewal catheter anchored  Musculoskeletal: No edema, no clubbing or cyanosis  Psychiatric: Flat affect affect, cooperative  Neurologic: Oriented x3, moving all extremities, normal speech and mental status  Skin: Warm and dry       Scheduled Meds:     aspirin, 81 mg, Oral, Daily  atorvastatin, 40 mg, Oral, Nightly  bisacodyl, 10 mg, Rectal, Daily  cefepime, 1,000 mg, Intravenous, Q12H  [Held by provider] enoxaparin sodium, 40 mg, Subcutaneous, Daily  guaiFENesin, 1,200 mg, Oral, Q12H  insulin lispro, 2-7 Units, Subcutaneous, 4x Daily AC & at Bedtime  ipratropium-albuterol, 3 mL, Nebulization, 4x Daily - RT  melatonin, 5 mg, Oral, Nightly  [Held by provider] metoprolol tartrate, 12.5 mg, Oral, Q12H  mupirocin, 1 Application, Each Nare, BID  polyethylene glycol, 17 g, Oral, Daily  senna-docusate sodium, 2 tablet, Oral, BID  sodium chloride, 4 mL, Nebulization, BID - RT  trimethoprim-polymyxin b, 1 drop, Right Eye, 6x Daily  Vancomycin Pharmacy Intermittent/Pulse Dosing, , Not Applicable, Daily      IV Meds:   amiodarone, 0.5 mg/min, Last Rate: 0.5 mg/min (07/18/25 0323)  clevidipine, 2-32 mg/hr  DOPamine, 2-20 mcg/kg/min  EPINEPHrine, 0.02-0.3  mcg/kg/min  niCARdipine, 5-15 mg/hr, Last Rate: Stopped (07/15/25 1620)  nitroglycerin, 5-200 mcg/min  norepinephrine, 0.02-0.3 mcg/kg/min, Last Rate: Stopped (07/18/25 0645)  Pharmacy Consult - Pharmacy to dose,   phenylephrine, 0.2-3 mcg/kg/min        Results Reviewed:   I have personally reviewed the results from the time of this admission to 7/18/2025 08:38 EDT     Lab Results   Component Value Date    GLUCOSE 130 (H) 07/18/2025    CALCIUM 8.4 (L) 07/18/2025     07/18/2025    K 3.7 07/18/2025    CO2 21.8 (L) 07/18/2025     07/18/2025    BUN 31.0 (H) 07/18/2025    CREATININE 1.85 (H) 07/18/2025    EGFRIFAFRI 80 12/29/2020    EGFRIFNONA 75 01/24/2022    BCR 16.8 07/18/2025    ANIONGAP 13.2 07/18/2025      Lab Results   Component Value Date    MG 2.9 (H) 07/18/2025    PHOS 4.4 07/15/2025    ALBUMIN 4.0 07/17/2025           Assessment / Plan       Hx of CABG    Coronary artery disease involving native heart    Abnormal findings on diagnostic imaging of heart and coronary circulation      ASSESSMENT:  Acute kidney injury on chronic kidney disease stage II secondary to hemodynamic changes post CABG creatinine is stable.  Patient has evidence of volume excess, his electrolytes within acceptable range.  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  Coronary artery disease status post 6 vessel CABG.  AAA 5.3 cm monitored by vascular surgery  Hypertension with CKD currently on vasopressors.  Volume excess  Postop anemia hemoglobin 9.8.    PLAN:  Check random urine for sodium, chloride and protein to creatinine ratio.  I will diurese more with IV bumetanide.  Monitor for diuretic toxicity  Surveillance labs    I reviewed the chart and other providers notes, reviewed labs and imaging.  I discussed the case with the patient and with his nurse at bedside.    Thank you for involving us in the care of Erickson Gerard.   Please feel free to call with any questions.    Angel Franco MD  07/18/25  08:38 EDT    [facility]  [phone]      Please note that portions of this note were completed with a voice recognition program.

## 2025-07-18 NOTE — CASE MANAGEMENT/SOCIAL WORK
Discharge Planning Assessment  [facility]     Patient Name: Erickson Gerard  MRN: [id number]  Today's Date: 7/18/2025    Admit Date: 7/15/2025    Plan: Home w/ [facility]; follow for possible O2 needs at d/c.   Discharge Needs Assessment       Row Name 07/18/25 1411       Living Environment    People in Home spouse    Name(s) of People in Home Genie Gerard/wife    Current Living Arrangements home    Potentially Unsafe Housing Conditions none    In the past 12 months has the electric, gas, oil, or water company threatened to shut off services in your home? No    Primary Care Provided by self    Provides Primary Care For pet(s)  1 DOG/CRUZ    Family Caregiver if Needed spouse;child(anat), adult    Family Caregiver Names Genie/daughter & Pura Whitney/daughter    Quality of Family Relationships helpful;involved;supportive    Able to Return to Prior Arrangements yes       Resource/Environmental Concerns    Resource/Environmental Concerns home accessibility    Home Accessibility Concerns stairs to access bedroom or bathroom  13 stairs to bedroom    Transportation Concerns none       Transportation Needs    In the past 12 months, has lack of transportation kept you from medical appointments or from getting medications? no    In the past 12 months, has lack of transportation kept you from meetings, work, or from getting things needed for daily living? No       Food Insecurity    Within the past 12 months, you worried that your food would run out before you got the money to buy more. Never true    Within the past 12 months, the food you bought just didn't last and you didn't have money to get more. Never true       Transition Planning    Patient/Family Anticipates Transition to home with family    Patient/Family Anticipated Services at Transition home health care    Transportation Anticipated family or friend will provide       Discharge Needs Assessment    Readmission Within the Last 30 Days no previous admission in  last 30 days    Equipment Currently Used at Home bp cuff;scales    Concerns to be Addressed discharge planning    Do you want help finding or keeping work or a job? I do not need or want help    Do you want help with school or training? For example, starting or completing job training or getting a high school diploma, GED or equivalent No    Anticipated Changes Related to Illness none    Equipment Needed After Discharge other (see comments)  TBD; Will follow for possible oxygen needs at d/c.    Discharge Facility/Level of Care Needs home with home health    Provided Post Acute Provider Quality & Resource List? Yes    Post Acute Provider Quality and Resource List Home Health    Delivered To Patient    Method of Delivery In person    Offered/Gave Vendor List yes    Patient's Choice of Community Agency(s) Patient chose [facility]                    Discharge Plan       Row Name 07/18/25 141       Plan    Plan Home w/ [facility] ; follow for possible O2 needs at d/c.    Plan Comments CCP spoke with the patient at bedside in CVR5. Patient is post op day 3, status post CABG x6.  Explained role, verified face sheet, and discussed discharge plan.  Patient stated he is IADL's, retired and drives.  Patient lives with spouse/Genie Gerard in a multi-level home with his bedroom being up thirteen stair steps.  Patient reports he lives on 1 acre of land, and he takes care of said land.  Patients PCP confirmed as Farhad Metcalf and home pharmacy is [facility].  Patient has the following DME- BP cuff and scale.  The patient is currently on hi flow oxygen and may need it when he discharges home.  CCP will f/u on this closer to discharge.  Patient denies use of past home health or going to a sub-acute rehab.  Patient plans to return home at discharge with assistance of his spouse and daughter/Pura Whitney.  Patient chose [facility]  from the list provided by CCP.  CCP sent a referral to [facility].  CCP will continue to  follow….…Tami RUBIN. /ANA LUISA.                  Continued Care and Services - Admitted Since 7/15/2025       Home Medical Care       Service Provider Request Status Services Address Phone Fax Patient Preferred    [facility] Accepted -- [address], [address], [place] [id number] [phone] --                  Expected Discharge Date and Time       Expected Discharge Date Expected Discharge Time    Jul 22, 2025            Demographic Summary       Row Name 07/18/25 1410       General Information    Admission Type inpatient    Arrived From home    Required Notices Provided Important Message from Medicare    Referral Source admission list;physician    Reason for Consult discharge planning    Preferred Language English       Contact Information    Permission Granted to Share Info With                    Functional Status       Row Name 07/18/25 1410       Functional Status    Usual Activity Tolerance good    Current Activity Tolerance fair       Physical Activity    On average, how many days per week do you engage in moderate to strenuous exercise (like a brisk walk)? 3 days    On average, how many minutes do you engage in exercise at this level? 30 min    Number of minutes of exercise per week 90       Assessment of Health Literacy    How often do you have someone help you read hospital materials? Never    How often do you have problems learning about your medical condition because of difficulty understanding written information? Never    How often do you have a problem understanding what is told to you about your medical condition? Never    How confident are you filling out medical forms by yourself? Quite a bit    Health Literacy Good       Functional Status, IADL    Medications independent    Meal Preparation independent    Housekeeping independent    Laundry independent    Shopping independent    If for any reason you need help with day-to-day activities such  as bathing, preparing meals, shopping, managing finances, etc., do you get the help you need? I don't need any help       Mental Status    General Appearance WDL WDL       Mental Status Summary    Recent Changes in Mental Status/Cognitive Functioning no changes       Employment/    Employment Status retired                   Psychosocial    No documentation.                  Abuse/Neglect    No documentation.                  Legal    No documentation.                  Substance Abuse    No documentation.                  Patient Forms    No documentation.                     Tami Martínez RN

## 2025-07-18 NOTE — PROGRESS NOTES
[facility]   Surgical Progress Note    Patient Name: Erickson Gerard  : 1945  MRN: [id number]  Date of admission: 7/15/2025  Surgical Procedures Since Admission:  Procedure(s):  STERNOTOMY; CORONARY ARTERY BYPASS GRAFTING TIMES 6 USING LEFT INTERNAL MAMMARY ARTERY AND LEFT SAPHENOUS VEIN; TRANSESOPHAGEAL ECHOCARDIOGRAM WITH ANESTHESIA;LEFT ATRIAL APPENDAGE EXCLUSION; PRP  Surgeon:  Jr Oscar Patino MD  Status:  3 Days Post-Op  -------------------    Subjective   Subjective     Chief Complaint: Abdominal aortic aneurysm and bilateral popliteal aneurysms    History of Present Illness   79-year-old gentleman with known abdominal aortic aneurysms up to 5.3 cm expanded from 4.7.  Now postop day 2 from CABG.  At this point in time he will need it repaired but he is recovering from his CABG and we will plan on doing this in around 6 weeks if cleared by cardiology and cardiothoracic.  Patient is aware of the need to follow-up in the office in 4 to 6 weeks to reschedule.    Review of Systems on BiPAP feeling okay    Objective   Objective     Vitals:   Temp:  [98 °F (36.7 °C)-99.7 °F (37.6 °C)] 98 °F (36.7 °C)  Heart Rate:  [] 88  Resp:  [15-20] 20  BP: ()/(62-89) 110/68  Arterial Line BP: ()/(49-82) 127/75  Flow (L/min) (Oxygen Therapy):  [60-70] 60  Output by Drain (mL) 25 0701 - 25 1900 25 1901 - 25 0700 25 0701 - 25 1025 Range Total   NG/OG Tube Nasogastric Left nostril 0 150  150   Urethral Catheter Straight-tip;Temperature probe;Silicone;Non-latex 16 Fr.         Physical Exam    Legs with mild edema and no evidence of ischemia.  Lungs clear  Abdomen soft    Result Review    Result Review:  I have personally reviewed the results from the time of this admission to 2025 10:25 EDT and agree with these findings:  [x]  Laboratory list / accordion  []  Microbiology  []  Radiology  []  EKG/Telemetry   []  Cardiology/Vascular   []   Pathology  []  Old records  []  Other:  Most notable findings include: Platelets of 145 with improvement.  Creatinine of 1.85.  White count slightly elevated and hemoglobin stable      Results from last 7 days   Lab Units 07/18/25  0142 07/17/25  1415 07/17/25  0300 07/16/25  0333 07/15/25  2140 07/15/25  1625 07/15/25  1524 07/15/25  1448 07/15/25  1229 07/14/25  0716   WBC 10*3/mm3 13.18* 15.05* 12.77* 10.16 11.22* 10.81*  --   --   --  6.54   HEMOGLOBIN g/dL 9.8* 10.4* 9.8* 9.8* 10.3* 11.7*  --   --   --  13.7   HEMOGLOBIN, POC g/dL  --   --   --   --   --   --  9.9*  --    < >  --    PLATELETS 10*3/mm3 145 128* 101* 82* 81* 100*  --  106*  --  168    < > = values in this interval not displayed.     Results from last 7 days   Lab Units 07/18/25  0901 07/18/25  0142 07/17/25  1415 07/17/25  1130 07/17/25  0300 07/16/25  1517 07/16/25  0904 07/15/25  2133 07/15/25  1625 07/14/25  0716   SODIUM mmol/L  --  140 140  --  138 140 144 143 142 141   POTASSIUM mmol/L 3.6 3.7 4.3 4.2 3.8 4.3 4.3 4.6 4.1 3.6   CHLORIDE mmol/L  --  105 107  --  104 109* 111* 111* 109* 106   CO2 mmol/L  --  21.8* 22.7  --  20.0* 19.5* 20.2* 18.7* 21.7* 22.2   BUN mg/dL  --  31.0* 30.0*  --  23.0 22.0 19.0 18.0 18.0 26.0*   CREATININE mg/dL  --  1.85* 1.99*  --  1.67* 1.70* 1.56* 1.34* 1.30* 1.37*   GLUCOSE mg/dL  --  130* 116*  --  135* 136* 163* 135* 113* 131*   MAGNESIUM mg/dL  --  2.9* 2.9*  --   --   --  2.7* 2.7* 2.5* 2.1   PHOSPHORUS mg/dL 3.7  --   --   --   --   --   --  4.4 3.3  --    Estimated Creatinine Clearance: 33 mL/min (A) (by C-G formula based on SCr of 1.85 mg/dL (H)).  Results from last 7 days   Lab Units 07/16/25  0333 07/15/25  1625 07/14/25  0716   PROTIME Seconds 19.6* 19.9* 15.9*   INR  1.65* 1.68* 1.27*     Lab Results   Component Value Date    HGBA1C 6.10 (H) 07/14/2025     Glucose   Date/Time Value Ref Range Status   07/18/2025 0702 120 70 - 130 mg/dL Final   07/18/2025 0147 132 (H) 70 - 130 mg/dL Final   07/17/2025  2058 153 (H) 70 - 130 mg/dL Final   07/17/2025 1653 119 70 - 130 mg/dL Final   07/17/2025 1118 125 70 - 130 mg/dL Final   07/17/2025 0757 119 70 - 130 mg/dL Final   07/16/2025 2158 118 70 - 130 mg/dL Final   07/16/2025 1521 132 (H) 70 - 130 mg/dL Final       Assessment & Plan   Assessment / Plan     Brief Patient Summary:  Erickson Gerard is a 79 y.o. male who is covering face after CABG.  He is doing fairly well and looks good.  We will of course be waiting at least 6 weeks to fix his aneurysm at this point we will need clearance from cardiothoracic when that time comes.  The meantime avoiding blood pressure spikes and avoiding fluoroquinolones will be our recommendations.  I have asked the nurses to make Cipro and Levaquin part of his allergy profile.  Technically he is not allergic to these 2 antibiotics have data pointing towards increasing aneurysm size and rupture in some of the white papers.  We will be available if needed over the weekend and we will follow-up on Monday if he still admitted.      Active Hospital Problems:   Active Hospital Problems    Diagnosis     **Hx of CABG     Coronary artery disease involving native heart     Abnormal findings on diagnostic imaging of heart and coronary circulation      Plan:   Abdominal aortic will plan to repair his abdominal aortic aneurysm when he is cleared after his CABG likely around 6 weeks.  Will discuss with Dr. Patino before proceeding.  For the meantime strict blood pressure control and avoid fluoroquinolones will be recommendations.  Will follow-up outpatient and recheck on Monday if he still here.  Will sign off over the weekend but please call for issues    VTE Prophylaxis:  Pharmacologic & mechanical VTE prophylaxis orders are present.        Campos Archer MD

## 2025-07-18 NOTE — DISCHARGE PLACEMENT REQUEST
"Travis Gerard W \"Maldonado\" (79 y.o. Male)       Date of Birth   1945    Social Security Number       Address   [address] [place] [address]    Home Phone   [phone]    MRN   [id number]       [employer]ism   [facility]    Marital Status                               Admission Date   7/15/2025    Admission Type   Elective    Admitting Provider   Jr Oscar Patino MD    Attending Provider   Jr Oscar Patino MD    Department, Room/Bed   [facility] CARDIO RECOVERY, CVR5/1       Discharge Date       Discharge Disposition       Discharge Destination                                 Attending Provider: Jr Oscar Patino MD    Allergies: Bee Venom, Ciprofloxacin, Levaquin [Levofloxacin]    Isolation: None   Infection: None   Code Status: CPR    Ht: 165.1 cm (65\")   Wt: 70.9 kg (156 lb 4.9 oz)    Admission Cmt: None   Principal Problem: Hx of CABG [Z95.1]                   Active Insurance as of 7/15/2025       Primary Coverage       Payor Plan Insurance Group Employer/Plan Group    ANTH MEDICARE REPLACEMENT ANTHEM MEDICARE ADVANTAGE PPO [id number]       Payor Plan Address Payor Plan Phone Number Payor Plan Fax Number Effective Dates    [address] [phone]  1/1/2024 - None Entered    [facility] [id number]         Subscriber Name Subscriber Birth Date Member ID       TRAVIS GERARD 1945 [id number]                     Emergency Contacts        (Rel.) Home Phone Work Phone Mobile Phone    ANNA GERARD (Spouse) [phone] -- [phone]                "

## 2025-07-18 NOTE — PROGRESS NOTES
CVICU Intensivist Progress Note    HPI/Chief Complaint: 78 yo male with CAD who presents to the CVICU immediately s/p 6V CABG + WEN clip    PMHx: HTN, HL, PAD s/p aorto-iliac fem-pop bypass with stent repair on Xarelto, h/o right DVT, PONV, GERD, 5.3 cm AAA followed by Dr. Archer w/ plans for repair in the near future    Procedure: 7/15/25 6V CABG (LIMA - LAD, SVG - acute marginal of RCA, PDA, OM1, D1) + WEN clip by Dr. Patino. He was easily intubated, but MARCOS placement was somewhat difficult. MARCOS showed LVEF 60%, nonrheumatic mild AI. He received 500ml of cellsaver only, no other products and required no shocks coming off CPB. He was hemoconcentrated 2.6L. He was brought to the CVICU on sedation and low dose cardene. Initial CI 2.1 without inotropes.    Hospital Course:   7/15 operative course  7/16 hypoxic requiring heated high flow, VERN, gastric distention/ileus requiring NGT for decompression  7/17 diuresed for worsening respiratory function, became hypotensive w/ hypovolemia and Cr bumped to 1.99 so diuretics stopped. O2 escalated    Daily Assessment and Plan 7/18: Required escalation to bipap overnight, however was able to decrease to 50% throughout the night and CXR shows less atelectasis this morning. Transitioned back to Optiflow, now on 60L/60%. Planning bipap for sleep and naps, Pox goal 92% or greater. Continue aggressive pulmonary toilet w/ duonebs + hypertonic nebs + guaifenesin + IS/flutter. He is coughing up a good amount of secretions today, which he was not yesterday. Given the clinical scenario in addition to leukocytosis and elevated procal, broad spectrum abx started overnight. MRSA swab negative this morning, so vancomycin d/c'd. Sputum cx obtained, awaiting results. I suspect his hypoxia is very multifactorial, however cannot rule out pneumonia so will continue abx for now. Hemodynamics slightly better today, he does get mildly hypotensive when sleeping, but otherwise has been off pressors  since around 7am this morning. He did go into Afib w/ RVR overnight, received the amio protocol and has been in sinus rhythm vs. Sinus w/ PACs throughout today. Plan to transition to PO amio this afternoon once 24hrs of IV has completed. ASA + statin + lovenox for DVT prophylaxis; he does have a history of DVT, so will need full AC once further out from surgery. Known AAA followed by Dr. Archer, avoiding hypertension and fluoroquinolones. VERN, now followed and being diuresed by Nephrology, appreciate recs. Still without BM despite aggressive bowel regimen including suppository and enema yesterday, however abdomen is much softer and he denies nausea. NGT placed to suction during bipap overnight, clamped again this morning and will allow a CLD. Likely need to place to suction again with bipap tonight, will clinically evaluate. Continue aggressive bowel reg, give 1 time dose milk of mag and continue BID docusate/senna + Miralax + suppository. Will attempt to mobilize more through the day, which will assist with motility as well. SSI to address hyperglycemia. Infectious w/up due to leukocytosis and hypoxia: MRSA negative, UA with bacteria but minimal WBCs, sputum cx pending. Continue cefepime for gram negative coverage, adjust based on sputum cx results. Repeat procal in AM, add blood cultures if febrile or hemodynamics worsen.    Patient and his wife updated throughout the day. Discussed ongoing progress and plans, explained the need to remain in the ICU    Historical Information of Importance:  - A1C 6.1  - Xarelto for PAD and stents and h/o popliteal DVT, last dose 7/11    amiodarone, 200 mg, Oral, Q12H  aspirin, 81 mg, Oral, Daily  atorvastatin, 40 mg, Oral, Nightly  bisacodyl, 10 mg, Rectal, Daily  bumetanide, 4 mg, Intravenous, Q8H  calcium gluconate, 2,000 mg, Intravenous, Q6H  cefepime, 2,000 mg, Intravenous, Q12H  enoxaparin sodium, 40 mg, Subcutaneous, Daily  guaiFENesin, 1,200 mg, Oral, Q12H  insulin lispro,  2-7 Units, Subcutaneous, 4x Daily AC & at Bedtime  ipratropium-albuterol, 3 mL, Nebulization, 4x Daily - RT  melatonin, 5 mg, Oral, Nightly  [Held by provider] metoprolol tartrate, 12.5 mg, Oral, Q12H  mupirocin, 1 Application, Each Nare, BID  polyethylene glycol, 17 g, Oral, Daily  senna-docusate sodium, 2 tablet, Oral, BID  sodium chloride, 4 mL, Nebulization, BID - RT  trimethoprim-polymyxin b, 1 drop, Right Eye, 6x Daily        Relevant Physical Exam:  Feet warm, 1+ pulses bilaterally.  Abdomen soft, nondistended  Lungs clear but distant breath sounds    ------------------------------------------------------------------------------------------------------------------------------------------------------  Prophy: HOB elevated + oral care + scds + buitrago stat lock + PPI + lovenox  Code Status: full  Lines: RIJ BLUE(7/15)      Critical Care time excluding procedures on this date: 78 mins on 7/18/25 by Kayy Melgar MD for the assessment and treatment of: interpretation of serial cardiac output measurements, evaluation of CXR, interpretation of serial blood gases,  coagulopathy, VERN, thrombocytopenia, acute hypoxic respiratory failure

## 2025-07-18 NOTE — PROGRESS NOTES
LOS: 3 days   Patient Care Team:  Farhad Metcalf MD as PCP - General (Family Medicine)    Chief Complaint: Post-op follow-up, s/p POD # 3 CABG/WEN clip-- [place]      Subjective: Making some progress.  Up to the chair.  Airvo at 60% FiO2.    Vital Signs  Temp:  [98.6 °F (37 °C)-99.7 °F (37.6 °C)] 99.7 °F (37.6 °C)  Heart Rate:  [] 66  Resp:  [15-20] 18  BP: ()/(62-89) 107/82  Arterial Line BP: ()/(49-82) 111/62      07/16/25  0500 07/17/25  0500 07/18/25  0605   Weight: 75.8 kg (167 lb 1.7 oz) 71.4 kg (157 lb 6.5 oz) 70.9 kg (156 lb 4.9 oz)     Body mass index is 26.01 kg/m².    Intake/Output Summary (Last 24 hours) at 7/18/2025 0849  Last data filed at 7/18/2025 0700  Gross per 24 hour   Intake 983 ml   Output 2805 ml   Net -1822 ml     No intake/output data recorded.        Objective:  Vital signs: (most recent): Blood pressure 110/68, pulse 88, temperature 98 °F (36.7 °C), temperature source Oral, resp. rate 20, weight 70.9 kg (156 lb 4.9 oz), SpO2 91%.              Physical Examination:   General appearance - alert, in no distress and ill-appearing  Mental status - normal mood, behavior, speech, dress, motor activity, and thought processes  Chest - clear to auscultation, no wheezes, rales or rhonchi, symmetric air entry, decreased air entry noted bilateral bases  Heart - normal rate, regular rhythm, normal S1, S2, no murmurs, rubs, clicks or gallops  Abdomen - soft, nontender, nondistended, no masses or organomegaly  bowel sounds normal  Neurological - alert, oriented, normal speech, no focal findings or movement disorder noted  Extremities - peripheral pulses normal, no pedal edema, no clubbing or cyanosis, post-CABG donor site noted on the left leg  Skin - normal coloration and turgor, no rashes, no suspicious skin lesions noted  Midsternal and chest tube incision healing well, no redness or drainage.    Results Review:      WBC WBC   Date Value Ref Range Status   07/18/2025 13.18 (H)  3.40 - 10.80 10*3/mm3 Final   07/17/2025 15.05 (H) 3.40 - 10.80 10*3/mm3 Final   07/17/2025 12.77 (H) 3.40 - 10.80 10*3/mm3 Final   07/16/2025 10.16 3.40 - 10.80 10*3/mm3 Final   07/15/2025 11.22 (H) 3.40 - 10.80 10*3/mm3 Final   07/15/2025 10.81 (H) 3.40 - 10.80 10*3/mm3 Final      HGB Hemoglobin   Date Value Ref Range Status   07/18/2025 9.8 (L) 13.0 - 17.7 g/dL Final   07/17/2025 10.4 (L) 13.0 - 17.7 g/dL Final   07/17/2025 9.8 (L) 13.0 - 17.7 g/dL Final   07/16/2025 9.8 (L) 13.0 - 17.7 g/dL Final   07/15/2025 10.3 (L) 13.0 - 17.7 g/dL Final   07/15/2025 11.7 (L) 13.0 - 17.7 g/dL Final   07/15/2025 9.9 (L) 12.0 - 17.0 g/dL Final   07/15/2025 10.5 (L) 12.0 - 17.0 g/dL Final   07/15/2025 9.5 (L) 12.0 - 17.0 g/dL Final   07/15/2025 9.5 (L) 12.0 - 17.0 g/dL Final   07/15/2025 9.2 (L) 12.0 - 17.0 g/dL Final   07/15/2025 11.6 (L) 12.0 - 17.0 g/dL Final      HCT Hematocrit   Date Value Ref Range Status   07/18/2025 30.1 (L) 37.5 - 51.0 % Final   07/17/2025 30.8 (L) 37.5 - 51.0 % Final   07/17/2025 29.5 (L) 37.5 - 51.0 % Final   07/16/2025 29.4 (L) 37.5 - 51.0 % Final   07/15/2025 30.2 (L) 37.5 - 51.0 % Final   07/15/2025 34.1 (L) 37.5 - 51.0 % Final   07/15/2025 29 (L) 38 - 51 % Final   07/15/2025 31 (L) 38 - 51 % Final   07/15/2025 28 (L) 38 - 51 % Final   07/15/2025 28 (L) 38 - 51 % Final   07/15/2025 27 (L) 38 - 51 % Final   07/15/2025 34 (L) 38 - 51 % Final      Platelets Platelets   Date Value Ref Range Status   07/18/2025 145 140 - 450 10*3/mm3 Final   07/17/2025 128 (L) 140 - 450 10*3/mm3 Final   07/17/2025 101 (L) 140 - 450 10*3/mm3 Final   07/16/2025 82 (L) 140 - 450 10*3/mm3 Final   07/15/2025 81 (L) 140 - 450 10*3/mm3 Final   07/15/2025 100 (L) 140 - 450 10*3/mm3 Final   07/15/2025 106 (L) 140 - 450 10*3/mm3 Final        PT/INR:    Protime   Date Value Ref Range Status   07/16/2025 19.6 (H) 11.7 - 14.2 Seconds Final   07/15/2025 19.9 (H) 11.7 - 14.2 Seconds Final   /  INR   Date Value Ref Range Status    07/16/2025 1.65 (H) 0.90 - 1.10 Final   07/15/2025 1.68 (H) 0.90 - 1.10 Final       Sodium Sodium   Date Value Ref Range Status   07/18/2025 140 136 - 145 mmol/L Final   07/17/2025 140 136 - 145 mmol/L Final   07/17/2025 138 136 - 145 mmol/L Final   07/16/2025 140 136 - 145 mmol/L Final   07/16/2025 144 136 - 145 mmol/L Final   07/15/2025 143 136 - 145 mmol/L Final   07/15/2025 142 136 - 145 mmol/L Final      Potassium Potassium   Date Value Ref Range Status   07/18/2025 3.7 3.5 - 5.2 mmol/L Final     Comment:     Slight hemolysis detected by analyzer. Result may be falsely elevated.   07/17/2025 4.3 3.5 - 5.2 mmol/L Final   07/17/2025 4.2 3.5 - 5.2 mmol/L Final   07/17/2025 3.8 3.5 - 5.2 mmol/L Final   07/16/2025 4.3 3.5 - 5.2 mmol/L Final   07/16/2025 4.3 3.5 - 5.2 mmol/L Final   07/15/2025 4.6 3.5 - 5.2 mmol/L Final   07/15/2025 4.1 3.5 - 5.2 mmol/L Final     Comment:     Slight hemolysis detected by analyzer. Result may be falsely elevated.      Chloride Chloride   Date Value Ref Range Status   07/18/2025 105 98 - 107 mmol/L Final   07/17/2025 107 98 - 107 mmol/L Final   07/17/2025 104 98 - 107 mmol/L Final   07/16/2025 109 (H) 98 - 107 mmol/L Final   07/16/2025 111 (H) 98 - 107 mmol/L Final   07/15/2025 111 (H) 98 - 107 mmol/L Final   07/15/2025 109 (H) 98 - 107 mmol/L Final      Bicarbonate CO2   Date Value Ref Range Status   07/18/2025 21.8 (L) 22.0 - 29.0 mmol/L Final   07/17/2025 22.7 22.0 - 29.0 mmol/L Final   07/17/2025 20.0 (L) 22.0 - 29.0 mmol/L Final   07/16/2025 19.5 (L) 22.0 - 29.0 mmol/L Final   07/16/2025 20.2 (L) 22.0 - 29.0 mmol/L Final   07/15/2025 18.7 (L) 22.0 - 29.0 mmol/L Final   07/15/2025 21.7 (L) 22.0 - 29.0 mmol/L Final      BUN BUN   Date Value Ref Range Status   07/18/2025 31.0 (H) 8.0 - 23.0 mg/dL Final   07/17/2025 30.0 (H) 8.0 - 23.0 mg/dL Final   07/17/2025 23.0 8.0 - 23.0 mg/dL Final   07/16/2025 22.0 8.0 - 23.0 mg/dL Final   07/16/2025 19.0 8.0 - 23.0 mg/dL Final   07/15/2025  18.0 8.0 - 23.0 mg/dL Final   07/15/2025 18.0 8.0 - 23.0 mg/dL Final      Creatinine Creatinine   Date Value Ref Range Status   07/18/2025 1.85 (H) 0.76 - 1.27 mg/dL Final   07/17/2025 1.99 (H) 0.76 - 1.27 mg/dL Final   07/17/2025 1.67 (H) 0.76 - 1.27 mg/dL Final   07/16/2025 1.70 (H) 0.76 - 1.27 mg/dL Final   07/16/2025 1.56 (H) 0.76 - 1.27 mg/dL Final   07/15/2025 1.34 (H) 0.76 - 1.27 mg/dL Final   07/15/2025 1.30 (H) 0.76 - 1.27 mg/dL Final      Calcium Calcium   Date Value Ref Range Status   07/18/2025 8.4 (L) 8.6 - 10.5 mg/dL Final   07/17/2025 8.2 (L) 8.6 - 10.5 mg/dL Final   07/17/2025 8.0 (L) 8.6 - 10.5 mg/dL Final   07/16/2025 8.5 (L) 8.6 - 10.5 mg/dL Final   07/16/2025 8.8 8.6 - 10.5 mg/dL Final   07/15/2025 9.0 8.6 - 10.5 mg/dL Final   07/15/2025 9.8 8.6 - 10.5 mg/dL Final      Magnesium Magnesium   Date Value Ref Range Status   07/18/2025 2.9 (H) 1.6 - 2.4 mg/dL Final   07/17/2025 2.9 (H) 1.6 - 2.4 mg/dL Final   07/16/2025 2.7 (H) 1.6 - 2.4 mg/dL Final   07/15/2025 2.7 (H) 1.6 - 2.4 mg/dL Final   07/15/2025 2.5 (H) 1.6 - 2.4 mg/dL Final          aspirin, 81 mg, Oral, Daily  atorvastatin, 40 mg, Oral, Nightly  bisacodyl, 10 mg, Rectal, Daily  bumetanide, 4 mg, Intravenous, Q8H  calcium gluconate, 2,000 mg, Intravenous, Q6H  cefepime, 1,000 mg, Intravenous, Q12H  enoxaparin sodium, 40 mg, Subcutaneous, Daily  guaiFENesin, 1,200 mg, Oral, Q12H  insulin lispro, 2-7 Units, Subcutaneous, 4x Daily AC & at Bedtime  ipratropium-albuterol, 3 mL, Nebulization, 4x Daily - RT  melatonin, 5 mg, Oral, Nightly  [Held by provider] metoprolol tartrate, 12.5 mg, Oral, Q12H  mupirocin, 1 Application, Each Nare, BID  polyethylene glycol, 17 g, Oral, Daily  senna-docusate sodium, 2 tablet, Oral, BID  sodium chloride, 4 mL, Nebulization, BID - RT  trimethoprim-polymyxin b, 1 drop, Right Eye, 6x Daily  Vancomycin Pharmacy Intermittent/Pulse Dosing, , Not Applicable, Daily      amiodarone, 0.5 mg/min, Last Rate: 0.5 mg/min  (07/18/25 9216)  clevidipine, 2-32 mg/hr  DOPamine, 2-20 mcg/kg/min  EPINEPHrine, 0.02-0.3 mcg/kg/min  niCARdipine, 5-15 mg/hr, Last Rate: Stopped (07/15/25 8586)  nitroglycerin, 5-200 mcg/min  norepinephrine, 0.02-0.3 mcg/kg/min, Last Rate: Stopped (07/18/25 4750)  Pharmacy Consult - Pharmacy to dose,   phenylephrine, 0.2-3 mcg/kg/min          Hx of CABG    Coronary artery disease involving native heart    Abnormal findings on diagnostic imaging of heart and coronary circulation      Assessment & Plan    - Severe MV-CAD -- s/p CABG, LEVH, WEN occlusion via atriclip with Dr. Patino 7/15/25  - AAA (hx aorto iliac femoral right popliteal stent repair x2 with Dr. Archer) -- AAA measuring 5.3cm--plans for surgery in 4 to 6 weeks  - HTN  - HLD  - PAD  - Hx DVT  - Right leg vein stripping  - Post-op anemia, expected ABLA, watch closely  - Post-op TCP, resolved      POD # 3  - Looks good sitting up in the chair.  60 L and 60% FiO2 on Airvo.  - Currently rate controlled AF, 80s-90s.  On IV Amio.  - Creatinine down to 1.82 today, 1.99 yesterday.  Nephrology consulted.  IV Bumex per nephrology.  - Platelets 145 today.  Will resume Lovenox.  - Ionized calcium 1.07, will replete.  - NGT output has improved.  Currently clamped.  Plans to see what residuals are and decide about p.o. intake.  - ASA/statin/held/Lovenox resumed for VTE  - Continue to encourage pulmonary toilet with IS, coughing, and deep breathing.  - Continue routine and supportive care.   - Discharge planning ongoing.      TATI Dias  07/18/25  08:49 EDT

## 2025-07-19 LAB
ALBUMIN SERPL-MCNC: 3.9 G/DL (ref 3.5–5.2)
ALP SERPL-CCNC: 75 U/L (ref 39–117)
ALT SERPL W P-5'-P-CCNC: 7 U/L (ref 1–41)
ANION GAP SERPL CALCULATED.3IONS-SCNC: 15 MMOL/L (ref 5–15)
ANION GAP SERPL CALCULATED.3IONS-SCNC: 16 MMOL/L (ref 5–15)
ARTERIAL PATENCY WRIST A: ABNORMAL
ARTERIAL PATENCY WRIST A: NORMAL
AST SERPL-CCNC: 41 U/L (ref 1–40)
ATMOSPHERIC PRESS: 748.9 MMHG
ATMOSPHERIC PRESS: 749.8 MMHG
ATMOSPHERIC PRESS: 749.9 MMHG
ATMOSPHERIC PRESS: 750.5 MMHG
BASE EXCESS BLDA CALC-SCNC: -1 MMOL/L (ref 0–2)
BASE EXCESS BLDA CALC-SCNC: 0.2 MMOL/L (ref 0–2)
BASE EXCESS BLDA CALC-SCNC: 0.5 MMOL/L (ref 0–2)
BASE EXCESS BLDA CALC-SCNC: 1.6 MMOL/L (ref 0–2)
BASOPHILS # BLD AUTO: 0.03 10*3/MM3 (ref 0–0.2)
BASOPHILS NFR BLD AUTO: 0.3 % (ref 0–1.5)
BDY SITE: ABNORMAL
BDY SITE: NORMAL
BILIRUB CONJ SERPL-MCNC: 0.3 MG/DL (ref 0–0.3)
BILIRUB INDIRECT SERPL-MCNC: 0.4 MG/DL
BILIRUB SERPL-MCNC: 0.7 MG/DL (ref 0–1.2)
BUN SERPL-MCNC: 38 MG/DL (ref 8–23)
BUN SERPL-MCNC: 39 MG/DL (ref 8–23)
BUN/CREAT SERPL: 21.5 (ref 7–25)
BUN/CREAT SERPL: 21.5 (ref 7–25)
CA-I SERPL ISE-MCNC: 1.1 MMOL/L (ref 1.15–1.35)
CA-I SERPL ISE-MCNC: 1.14 MMOL/L (ref 1.15–1.35)
CALCIUM SPEC-SCNC: 8.2 MG/DL (ref 8.6–10.5)
CALCIUM SPEC-SCNC: 8.6 MG/DL (ref 8.6–10.5)
CHLORIDE SERPL-SCNC: 102 MMOL/L (ref 98–107)
CHLORIDE SERPL-SCNC: 104 MMOL/L (ref 98–107)
CO2 SERPL-SCNC: 20 MMOL/L (ref 22–29)
CO2 SERPL-SCNC: 21 MMOL/L (ref 22–29)
CREAT SERPL-MCNC: 1.77 MG/DL (ref 0.76–1.27)
CREAT SERPL-MCNC: 1.81 MG/DL (ref 0.76–1.27)
DEPRECATED RDW RBC AUTO: 41.7 FL (ref 37–54)
DEPRECATED RDW RBC AUTO: 44 FL (ref 37–54)
DEVICE COMMENT: ABNORMAL
DEVICE COMMENT: NORMAL
EGFRCR SERPLBLD CKD-EPI 2021: 37.8 ML/MIN/1.73
EGFRCR SERPLBLD CKD-EPI 2021: 38.8 ML/MIN/1.73
EOSINOPHIL # BLD AUTO: 0.14 10*3/MM3 (ref 0–0.4)
EOSINOPHIL NFR BLD AUTO: 1.2 % (ref 0.3–6.2)
ERYTHROCYTE [DISTWIDTH] IN BLOOD BY AUTOMATED COUNT: 12.5 % (ref 12.3–15.4)
ERYTHROCYTE [DISTWIDTH] IN BLOOD BY AUTOMATED COUNT: 12.7 % (ref 12.3–15.4)
GLUCOSE BLDC GLUCOMTR-MCNC: 107 MG/DL (ref 70–130)
GLUCOSE BLDC GLUCOMTR-MCNC: 136 MG/DL (ref 70–130)
GLUCOSE BLDC GLUCOMTR-MCNC: 143 MG/DL (ref 70–130)
GLUCOSE BLDC GLUCOMTR-MCNC: 98 MG/DL (ref 70–130)
GLUCOSE SERPL-MCNC: 115 MG/DL (ref 65–99)
GLUCOSE SERPL-MCNC: 158 MG/DL (ref 65–99)
HCO3 BLDA-SCNC: 19.7 MMOL/L (ref 22–28)
HCO3 BLDA-SCNC: 22.4 MMOL/L (ref 22–28)
HCO3 BLDA-SCNC: 24.3 MMOL/L (ref 22–28)
HCO3 BLDA-SCNC: 25.3 MMOL/L (ref 22–28)
HCT VFR BLD AUTO: 31 % (ref 37.5–51)
HCT VFR BLD AUTO: 32.5 % (ref 37.5–51)
HEMODILUTION: NO
HGB BLD-MCNC: 10.3 G/DL (ref 13–17.7)
HGB BLD-MCNC: 10.7 G/DL (ref 13–17.7)
IMM GRANULOCYTES # BLD AUTO: 0.08 10*3/MM3 (ref 0–0.05)
IMM GRANULOCYTES NFR BLD AUTO: 0.7 % (ref 0–0.5)
INHALED O2 CONCENTRATION: 54 %
INHALED O2 CONCENTRATION: 55 %
INHALED O2 CONCENTRATION: 55 %
INHALED O2 CONCENTRATION: 65 %
LYMPHOCYTES # BLD AUTO: 1.17 10*3/MM3 (ref 0.7–3.1)
LYMPHOCYTES NFR BLD AUTO: 10.4 % (ref 19.6–45.3)
Lab: ABNORMAL
MAGNESIUM SERPL-MCNC: 2.6 MG/DL (ref 1.6–2.4)
MAGNESIUM SERPL-MCNC: 2.7 MG/DL (ref 1.6–2.4)
MCH RBC QN AUTO: 30.7 PG (ref 26.6–33)
MCH RBC QN AUTO: 31.1 PG (ref 26.6–33)
MCHC RBC AUTO-ENTMCNC: 32.9 G/DL (ref 31.5–35.7)
MCHC RBC AUTO-ENTMCNC: 33.2 G/DL (ref 31.5–35.7)
MCV RBC AUTO: 92.3 FL (ref 79–97)
MCV RBC AUTO: 94.5 FL (ref 79–97)
MODALITY: ABNORMAL
MODALITY: NORMAL
MONOCYTES # BLD AUTO: 0.96 10*3/MM3 (ref 0.1–0.9)
MONOCYTES NFR BLD AUTO: 8.5 % (ref 5–12)
NEUTROPHILS NFR BLD AUTO: 78.9 % (ref 42.7–76)
NEUTROPHILS NFR BLD AUTO: 8.9 10*3/MM3 (ref 1.7–7)
NOTIFIED WHO: ABNORMAL
NRBC BLD AUTO-RTO: 0 /100 WBC (ref 0–0.2)
O2 A-A PPRESDIFF RESPIRATORY: 0.2 MMHG
PCO2 BLDA: 21.5 MM HG (ref 35–45)
PCO2 BLDA: 27.9 MM HG (ref 35–45)
PCO2 BLDA: 35.2 MM HG (ref 35–45)
PCO2 BLDA: 35.3 MM HG (ref 35–45)
PH BLDA: 7.45 PH UNITS (ref 7.35–7.45)
PH BLDA: 7.46 PH UNITS (ref 7.35–7.45)
PH BLDA: 7.51 PH UNITS (ref 7.35–7.45)
PH BLDA: 7.57 PH UNITS (ref 7.35–7.45)
PHOSPHATE SERPL-MCNC: 3.6 MG/DL (ref 2.5–4.5)
PHOSPHATE SERPL-MCNC: 4.2 MG/DL (ref 2.5–4.5)
PLATELET # BLD AUTO: 186 10*3/MM3 (ref 140–450)
PLATELET # BLD AUTO: 191 10*3/MM3 (ref 140–450)
PMV BLD AUTO: 10.1 FL (ref 6–12)
PMV BLD AUTO: 10.2 FL (ref 6–12)
PO2 BLD: 112 MM[HG] (ref 0–500)
PO2 BLD: 129 MM[HG] (ref 0–500)
PO2 BLD: 138 MM[HG] (ref 0–500)
PO2 BLD: 139 MM[HG] (ref 0–500)
PO2 BLDA: 60.6 MM HG (ref 80–100)
PO2 BLDA: 71.1 MM HG (ref 80–100)
PO2 BLDA: 76.4 MM HG (ref 80–100)
PO2 BLDA: 89.5 MM HG (ref 80–100)
POTASSIUM SERPL-SCNC: 3.8 MMOL/L (ref 3.5–5.2)
POTASSIUM SERPL-SCNC: 3.9 MMOL/L (ref 3.5–5.2)
POTASSIUM SERPL-SCNC: 4.1 MMOL/L (ref 3.5–5.2)
PROCALCITONIN SERPL-MCNC: 1.26 NG/ML (ref 0–0.25)
PROT SERPL-MCNC: 6.5 G/DL (ref 6–8.5)
QT INTERVAL: 366 MS
QTC INTERVAL: 515 MS
RBC # BLD AUTO: 3.36 10*6/MM3 (ref 4.14–5.8)
RBC # BLD AUTO: 3.44 10*6/MM3 (ref 4.14–5.8)
READ BACK: YES
SAO2 % BLDCOA: 93.6 % (ref 92–98.5)
SAO2 % BLDCOA: 95.1 % (ref 92–98.5)
SAO2 % BLDCOA: 97.3 % (ref 92–98.5)
SAO2 % BLDCOA: 97.4 % (ref 92–98.5)
SET MECH RESP RATE: 20
SODIUM SERPL-SCNC: 138 MMOL/L (ref 136–145)
SODIUM SERPL-SCNC: 140 MMOL/L (ref 136–145)
TOTAL RATE: 23 BREATHS/MINUTE
TOTAL RATE: 24 BREATHS/MINUTE
URATE SERPL-MCNC: 7.2 MG/DL (ref 3.4–7)
VENTILATOR MODE: NORMAL
VT ON VENT VENT: 510 ML
WBC NRBC COR # BLD AUTO: 10.31 10*3/MM3 (ref 3.4–10.8)
WBC NRBC COR # BLD AUTO: 11.28 10*3/MM3 (ref 3.4–10.8)

## 2025-07-19 PROCEDURE — 80048 BASIC METABOLIC PNL TOTAL CA: CPT | Performed by: ANESTHESIOLOGY

## 2025-07-19 PROCEDURE — 82948 REAGENT STRIP/BLOOD GLUCOSE: CPT

## 2025-07-19 PROCEDURE — 82803 BLOOD GASES ANY COMBINATION: CPT | Performed by: ANESTHESIOLOGY

## 2025-07-19 PROCEDURE — 93010 ELECTROCARDIOGRAM REPORT: CPT | Performed by: INTERNAL MEDICINE

## 2025-07-19 PROCEDURE — 84100 ASSAY OF PHOSPHORUS: CPT | Performed by: THORACIC SURGERY (CARDIOTHORACIC VASCULAR SURGERY)

## 2025-07-19 PROCEDURE — 92610 EVALUATE SWALLOWING FUNCTION: CPT | Performed by: SPEECH-LANGUAGE PATHOLOGIST

## 2025-07-19 PROCEDURE — 84550 ASSAY OF BLOOD/URIC ACID: CPT | Performed by: INTERNAL MEDICINE

## 2025-07-19 PROCEDURE — 94799 UNLISTED PULMONARY SVC/PX: CPT

## 2025-07-19 PROCEDURE — 99232 SBSQ HOSP IP/OBS MODERATE 35: CPT | Performed by: STUDENT IN AN ORGANIZED HEALTH CARE EDUCATION/TRAINING PROGRAM

## 2025-07-19 PROCEDURE — 25010000002 CALCIUM GLUCONATE 2-0.675 GM/100ML-% SOLUTION: Performed by: PHYSICIAN ASSISTANT

## 2025-07-19 PROCEDURE — 82330 ASSAY OF CALCIUM: CPT | Performed by: ANESTHESIOLOGY

## 2025-07-19 PROCEDURE — 94761 N-INVAS EAR/PLS OXIMETRY MLT: CPT

## 2025-07-19 PROCEDURE — 25010000002 CEFEPIME PER 500 MG: Performed by: ANESTHESIOLOGY

## 2025-07-19 PROCEDURE — 80076 HEPATIC FUNCTION PANEL: CPT | Performed by: ANESTHESIOLOGY

## 2025-07-19 PROCEDURE — 94640 AIRWAY INHALATION TREATMENT: CPT

## 2025-07-19 PROCEDURE — 25010000002 BUMETANIDE PER 0.5 MG: Performed by: INTERNAL MEDICINE

## 2025-07-19 PROCEDURE — 97530 THERAPEUTIC ACTIVITIES: CPT

## 2025-07-19 PROCEDURE — 25010000002 AMIODARONE IN DEXTROSE 5% 150-4.21 MG/100ML-% SOLUTION

## 2025-07-19 PROCEDURE — 85027 COMPLETE CBC AUTOMATED: CPT | Performed by: ANESTHESIOLOGY

## 2025-07-19 PROCEDURE — 94664 DEMO&/EVAL PT USE INHALER: CPT

## 2025-07-19 PROCEDURE — 84100 ASSAY OF PHOSPHORUS: CPT | Performed by: ANESTHESIOLOGY

## 2025-07-19 PROCEDURE — 93005 ELECTROCARDIOGRAM TRACING: CPT

## 2025-07-19 PROCEDURE — 83735 ASSAY OF MAGNESIUM: CPT | Performed by: ANESTHESIOLOGY

## 2025-07-19 PROCEDURE — 84145 PROCALCITONIN (PCT): CPT | Performed by: ANESTHESIOLOGY

## 2025-07-19 PROCEDURE — 25010000002 AMIODARONE IN DEXTROSE 5% 360-4.14 MG/200ML-% SOLUTION: Performed by: STUDENT IN AN ORGANIZED HEALTH CARE EDUCATION/TRAINING PROGRAM

## 2025-07-19 PROCEDURE — 85025 COMPLETE CBC W/AUTO DIFF WBC: CPT | Performed by: INTERNAL MEDICINE

## 2025-07-19 PROCEDURE — 84132 ASSAY OF SERUM POTASSIUM: CPT | Performed by: THORACIC SURGERY (CARDIOTHORACIC VASCULAR SURGERY)

## 2025-07-19 PROCEDURE — 82803 BLOOD GASES ANY COMBINATION: CPT

## 2025-07-19 PROCEDURE — 25010000002 ENOXAPARIN PER 10 MG

## 2025-07-19 RX ORDER — BUMETANIDE 0.25 MG/ML
4 INJECTION, SOLUTION INTRAMUSCULAR; INTRAVENOUS ONCE
Status: COMPLETED | OUTPATIENT
Start: 2025-07-19 | End: 2025-07-19

## 2025-07-19 RX ORDER — CALCIUM GLUCONATE 20 MG/ML
2000 INJECTION, SOLUTION INTRAVENOUS ONCE
Status: COMPLETED | OUTPATIENT
Start: 2025-07-19 | End: 2025-07-19

## 2025-07-19 RX ORDER — POTASSIUM CHLORIDE 1.5 G/1.58G
20 POWDER, FOR SOLUTION ORAL ONCE
Status: COMPLETED | OUTPATIENT
Start: 2025-07-19 | End: 2025-07-19

## 2025-07-19 RX ORDER — ASPIRIN 81 MG/1
81 TABLET, CHEWABLE ORAL DAILY
Status: DISCONTINUED | OUTPATIENT
Start: 2025-07-19 | End: 2025-07-25 | Stop reason: HOSPADM

## 2025-07-19 RX ADMIN — SENNOSIDES AND DOCUSATE SODIUM 2 TABLET: 50; 8.6 TABLET ORAL at 20:13

## 2025-07-19 RX ADMIN — SENNOSIDES AND DOCUSATE SODIUM 2 TABLET: 50; 8.6 TABLET ORAL at 08:54

## 2025-07-19 RX ADMIN — MUPIROCIN 1 APPLICATION: 20 OINTMENT TOPICAL at 08:54

## 2025-07-19 RX ADMIN — ENOXAPARIN SODIUM 40 MG: 100 INJECTION SUBCUTANEOUS at 17:38

## 2025-07-19 RX ADMIN — Medication 5 MG: at 20:13

## 2025-07-19 RX ADMIN — GUAIFENESIN 1200 MG: 600 TABLET, EXTENDED RELEASE ORAL at 20:13

## 2025-07-19 RX ADMIN — AMIODARONE HYDROCHLORIDE 1 MG/MIN: 1.8 INJECTION, SOLUTION INTRAVENOUS at 10:23

## 2025-07-19 RX ADMIN — HYDROCODONE BITARTRATE AND ACETAMINOPHEN 2 TABLET: 5; 325 TABLET ORAL at 20:13

## 2025-07-19 RX ADMIN — AMIODARONE HYDROCHLORIDE 1 MG/MIN: 1.8 INJECTION, SOLUTION INTRAVENOUS at 16:03

## 2025-07-19 RX ADMIN — Medication 4 ML: at 06:13

## 2025-07-19 RX ADMIN — ALPRAZOLAM 0.25 MG: 0.25 TABLET ORAL at 00:26

## 2025-07-19 RX ADMIN — CEFEPIME 2000 MG: 2 INJECTION, POWDER, FOR SOLUTION INTRAVENOUS at 04:45

## 2025-07-19 RX ADMIN — POTASSIUM CHLORIDE 20 MEQ: 1.5 POWDER, FOR SOLUTION ORAL at 04:48

## 2025-07-19 RX ADMIN — AMIODARONE HYDROCHLORIDE 150 MG: 1.5 INJECTION, SOLUTION INTRAVENOUS at 00:54

## 2025-07-19 RX ADMIN — AMIODARONE HYDROCHLORIDE 1 MG/MIN: 1.8 INJECTION, SOLUTION INTRAVENOUS at 21:57

## 2025-07-19 RX ADMIN — Medication 4 ML: at 14:54

## 2025-07-19 RX ADMIN — BUMETANIDE 4 MG: 0.25 INJECTION INTRAMUSCULAR; INTRAVENOUS at 15:31

## 2025-07-19 RX ADMIN — ATORVASTATIN CALCIUM 40 MG: 20 TABLET, FILM COATED ORAL at 20:13

## 2025-07-19 RX ADMIN — AMIODARONE HYDROCHLORIDE 200 MG: 200 TABLET ORAL at 08:54

## 2025-07-19 RX ADMIN — IPRATROPIUM BROMIDE AND ALBUTEROL SULFATE 3 ML: .5; 3 SOLUTION RESPIRATORY (INHALATION) at 14:55

## 2025-07-19 RX ADMIN — AMIODARONE HYDROCHLORIDE 150 MG: 1.5 INJECTION, SOLUTION INTRAVENOUS at 03:35

## 2025-07-19 RX ADMIN — IPRATROPIUM BROMIDE AND ALBUTEROL SULFATE 3 ML: .5; 3 SOLUTION RESPIRATORY (INHALATION) at 06:14

## 2025-07-19 RX ADMIN — CEFEPIME 2000 MG: 2 INJECTION, POWDER, FOR SOLUTION INTRAVENOUS at 17:39

## 2025-07-19 RX ADMIN — CALCIUM GLUCONATE 2000 MG: 20 INJECTION, SOLUTION INTRAVENOUS at 08:54

## 2025-07-19 RX ADMIN — POLYETHYLENE GLYCOL 3350 17 G: 17 POWDER, FOR SOLUTION ORAL at 08:54

## 2025-07-19 RX ADMIN — POTASSIUM CHLORIDE 20 MEQ: 1.5 POWDER, FOR SOLUTION ORAL at 17:39

## 2025-07-19 RX ADMIN — ASPIRIN 81 MG CHEWABLE TABLET 81 MG: 81 TABLET CHEWABLE at 09:10

## 2025-07-19 RX ADMIN — IPRATROPIUM BROMIDE AND ALBUTEROL SULFATE 3 ML: .5; 3 SOLUTION RESPIRATORY (INHALATION) at 20:17

## 2025-07-19 RX ADMIN — Medication 0.1 MCG/KG/MIN: at 08:54

## 2025-07-19 NOTE — PROGRESS NOTES
LOS: 4 days   Patient Care Team:  Farhad Metcalf MD as PCP - General (Family Medicine)    Chief Complaint: Post-op follow-up, s/p POD #4 CABG/WEN clip-- [place]      Subjective:   Reports having 2 bowel movements yesterday. Denies nausea & abdominal pain.     Vital Signs  Temp:  [98 °F (36.7 °C)-100.6 °F (38.1 °C)] 98.6 °F (37 °C)  Heart Rate:  [] 132  Resp:  [16-22] 18  BP: ()/() 105/74  Arterial Line BP: ()/(54-82) 110/71      07/17/25  0500 07/18/25  0605 07/19/25  0600   Weight: 71.4 kg (157 lb 6.5 oz) 70.9 kg (156 lb 4.9 oz) 70 kg (154 lb 5.2 oz)     Body mass index is 25.68 kg/m².    Intake/Output Summary (Last 24 hours) at 7/19/2025 0838  Last data filed at 7/19/2025 0830  Gross per 24 hour   Intake 1246 ml   Output 4030 ml   Net -2784 ml     I/O this shift:  In: -   Out: 225 [Urine:225]        Objective:  General Appearance:  Comfortable, well-appearing, in no acute distress and not in pain (Up in chair).    Vital signs: (most recent): Blood pressure 113/90, pulse (!) 134, temperature 99.7 °F (37.6 °C), resp. rate 20, weight 70 kg (154 lb 5.2 oz), SpO2 93%.  Vital signs are normal.    Output: Producing urine (+buitrago).    Lungs:  Normal effort and normal respiratory rate.  He is not in respiratory distress.  (AirVo 60L/55% FiO2)  Heart: Tachycardia.  Irregular rhythm.  (Tele: Afib 130s)  Abdomen: Abdomen is soft and non-distended.  There is no abdominal tenderness.     Extremities: Normal range of motion.  There is no dependent edema.    Neurological: Patient is alert and oriented to person, place and time.    Skin:  Warm and dry.  (Incisions healing well without erythema or drainage)            Results Review:      WBC WBC   Date Value Ref Range Status   07/19/2025 11.28 (H) 3.40 - 10.80 10*3/mm3 Final   07/18/2025 10.90 (H) 3.40 - 10.80 10*3/mm3 Final   07/18/2025 13.18 (H) 3.40 - 10.80 10*3/mm3 Final   07/17/2025 15.05 (H) 3.40 - 10.80 10*3/mm3 Final   07/17/2025 12.77 (H)  "3.40 - 10.80 10*3/mm3 Final      HGB Hemoglobin   Date Value Ref Range Status   07/19/2025 10.3 (L) 13.0 - 17.7 g/dL Final   07/18/2025 10.4 (L) 13.0 - 17.7 g/dL Final   07/18/2025 9.8 (L) 13.0 - 17.7 g/dL Final   07/17/2025 10.4 (L) 13.0 - 17.7 g/dL Final   07/17/2025 9.8 (L) 13.0 - 17.7 g/dL Final      HCT Hematocrit   Date Value Ref Range Status   07/19/2025 31.0 (L) 37.5 - 51.0 % Final   07/18/2025 31.5 (L) 37.5 - 51.0 % Final   07/18/2025 30.1 (L) 37.5 - 51.0 % Final   07/17/2025 30.8 (L) 37.5 - 51.0 % Final   07/17/2025 29.5 (L) 37.5 - 51.0 % Final      Platelets Platelets   Date Value Ref Range Status   07/19/2025 191 140 - 450 10*3/mm3 Final   07/18/2025 145 140 - 450 10*3/mm3 Final   07/18/2025 145 140 - 450 10*3/mm3 Final   07/17/2025 128 (L) 140 - 450 10*3/mm3 Final   07/17/2025 101 (L) 140 - 450 10*3/mm3 Final        PT/INR:    No results found for: \"PROTIME\"  /  No results found for: \"INR\"      Sodium Sodium   Date Value Ref Range Status   07/19/2025 140 136 - 145 mmol/L Final   07/18/2025 141 136 - 145 mmol/L Final   07/18/2025 140 136 - 145 mmol/L Final   07/17/2025 140 136 - 145 mmol/L Final   07/17/2025 138 136 - 145 mmol/L Final   07/16/2025 140 136 - 145 mmol/L Final   07/16/2025 144 136 - 145 mmol/L Final      Potassium Potassium   Date Value Ref Range Status   07/19/2025 3.8 3.5 - 5.2 mmol/L Final     Comment:     Slight hemolysis detected by analyzer. Result may be falsely elevated.   07/18/2025 3.7 3.5 - 5.2 mmol/L Final   07/18/2025 3.8 3.5 - 5.2 mmol/L Final   07/18/2025 3.6 3.5 - 5.2 mmol/L Final   07/18/2025 3.7 3.5 - 5.2 mmol/L Final     Comment:     Slight hemolysis detected by analyzer. Result may be falsely elevated.   07/17/2025 4.3 3.5 - 5.2 mmol/L Final   07/17/2025 4.2 3.5 - 5.2 mmol/L Final   07/17/2025 3.8 3.5 - 5.2 mmol/L Final   07/16/2025 4.3 3.5 - 5.2 mmol/L Final   07/16/2025 4.3 3.5 - 5.2 mmol/L Final      Chloride Chloride   Date Value Ref Range Status   07/19/2025 104 98 " - 107 mmol/L Final   07/18/2025 105 98 - 107 mmol/L Final   07/18/2025 105 98 - 107 mmol/L Final   07/17/2025 107 98 - 107 mmol/L Final   07/17/2025 104 98 - 107 mmol/L Final   07/16/2025 109 (H) 98 - 107 mmol/L Final   07/16/2025 111 (H) 98 - 107 mmol/L Final      Bicarbonate CO2   Date Value Ref Range Status   07/19/2025 21.0 (L) 22.0 - 29.0 mmol/L Final   07/18/2025 22.0 22.0 - 29.0 mmol/L Final   07/18/2025 21.8 (L) 22.0 - 29.0 mmol/L Final   07/17/2025 22.7 22.0 - 29.0 mmol/L Final   07/17/2025 20.0 (L) 22.0 - 29.0 mmol/L Final   07/16/2025 19.5 (L) 22.0 - 29.0 mmol/L Final   07/16/2025 20.2 (L) 22.0 - 29.0 mmol/L Final      BUN BUN   Date Value Ref Range Status   07/19/2025 38.0 (H) 8.0 - 23.0 mg/dL Final   07/18/2025 37.0 (H) 8.0 - 23.0 mg/dL Final   07/18/2025 31.0 (H) 8.0 - 23.0 mg/dL Final   07/17/2025 30.0 (H) 8.0 - 23.0 mg/dL Final   07/17/2025 23.0 8.0 - 23.0 mg/dL Final   07/16/2025 22.0 8.0 - 23.0 mg/dL Final   07/16/2025 19.0 8.0 - 23.0 mg/dL Final      Creatinine Creatinine   Date Value Ref Range Status   07/19/2025 1.77 (H) 0.76 - 1.27 mg/dL Final   07/18/2025 1.75 (H) 0.76 - 1.27 mg/dL Final   07/18/2025 1.85 (H) 0.76 - 1.27 mg/dL Final   07/17/2025 1.99 (H) 0.76 - 1.27 mg/dL Final   07/17/2025 1.67 (H) 0.76 - 1.27 mg/dL Final   07/16/2025 1.70 (H) 0.76 - 1.27 mg/dL Final   07/16/2025 1.56 (H) 0.76 - 1.27 mg/dL Final      Calcium Calcium   Date Value Ref Range Status   07/19/2025 8.2 (L) 8.6 - 10.5 mg/dL Final   07/18/2025 8.5 (L) 8.6 - 10.5 mg/dL Final   07/18/2025 8.4 (L) 8.6 - 10.5 mg/dL Final   07/17/2025 8.2 (L) 8.6 - 10.5 mg/dL Final   07/17/2025 8.0 (L) 8.6 - 10.5 mg/dL Final   07/16/2025 8.5 (L) 8.6 - 10.5 mg/dL Final   07/16/2025 8.8 8.6 - 10.5 mg/dL Final      Magnesium Magnesium   Date Value Ref Range Status   07/19/2025 2.7 (H) 1.6 - 2.4 mg/dL Final   07/18/2025 2.8 (H) 1.6 - 2.4 mg/dL Final   07/18/2025 2.9 (H) 1.6 - 2.4 mg/dL Final   07/17/2025 2.9 (H) 1.6 - 2.4 mg/dL Final    07/16/2025 2.7 (H) 1.6 - 2.4 mg/dL Final          amiodarone, 200 mg, Oral, Q12H  amiodarone, 150 mg, Intravenous, Once  aspirin, 81 mg, Oral, Daily  atorvastatin, 40 mg, Oral, Nightly  bisacodyl, 10 mg, Rectal, Daily  bumetanide, 4 mg, Intravenous, Q8H  calcium gluconate, 2,000 mg, Intravenous, Once  cefepime, 2,000 mg, Intravenous, Q12H  enoxaparin sodium, 40 mg, Subcutaneous, Daily  guaiFENesin, 1,200 mg, Oral, Q12H  insulin lispro, 2-7 Units, Subcutaneous, 4x Daily AC & at Bedtime  ipratropium-albuterol, 3 mL, Nebulization, 4x Daily - RT  melatonin, 5 mg, Oral, Nightly  [Held by provider] metoprolol tartrate, 12.5 mg, Oral, Q12H  mupirocin, 1 Application, Each Nare, BID  polyethylene glycol, 17 g, Oral, Daily  senna-docusate sodium, 2 tablet, Oral, BID  sodium chloride, 4 mL, Nebulization, BID - RT  [Held by provider] trimethoprim-polymyxin b, 1 drop, Right Eye, 6x Daily      clevidipine, 2-32 mg/hr  DOPamine, 2-20 mcg/kg/min  EPINEPHrine, 0.02-0.3 mcg/kg/min  niCARdipine, 5-15 mg/hr, Last Rate: Stopped (07/15/25 1625)  nitroglycerin, 5-200 mcg/min  norepinephrine, 0.02-0.3 mcg/kg/min, Last Rate: 0.01 mcg/kg/min (07/19/25 0711)  phenylephrine, 0.2-3 mcg/kg/min          Hx of CABG    Coronary artery disease involving native heart    Abnormal findings on diagnostic imaging of heart and coronary circulation      Assessment & Plan    - Severe MV-CAD -- s/p CABG, LEVH, WEN occlusion via atriclip with Dr. Patino 7/15/25  - AAA (hx aorto iliac femoral right popliteal stent repair x2 with Dr. Archer) -- AAA measuring 5.3cm--plans for surgery in 4 to 6 weeks  - HTN  - HLD  - PAD  - Hx DVT  - Right leg vein stripping  - VERN on CKD stage 2---renal following   - Post-op anemia, expected ABLA, watch closely  - Post-op TCP, resolved  - PAF with RVR post-op---currently Afib  - Post-op hypoxia---on AirVo  - Post-op ileus   - Prolonged QTc      POD #4. Diuresed well yesterday. Afib overnight. Required Amio bolus x 2. Afib with  RVR currently. On Levophed 0.01. QTc 515. Discussed with Cardiology and they are ordering Amiodarone drip. No beta blocker due to hypotension. Replace calcium. Since patient had 2 bowel movements yesterday, will remove NG tube per Dr. Patino. Nursing reports possible dysphagia with clear liquids, so Speech Therapy consulted. Still on AirVo 60L, but FiO2 weaned down to 55% from 60% yesterday. Diuretics per Nephrology. Keep in CVR today. Encourage IS and ambulation.        Kayy Marcial PA-C  07/19/25  08:38 EDT

## 2025-07-19 NOTE — PROGRESS NOTES
LOS: 4 days   Patient Care Team:  Farhad Metcalf MD as PCP - General (Family Medicine)    Chief Complaint:  Following post-CABG    Interval History:     He continues to have paroxysms of atrial fibrillation and atrial flutter.  He remains on IV amiodarone this morning, having short paroxysms.  It does appear to impact his blood pressure a bit when he develops A-fib.    Otherwise, he feels fine.  He has no awareness of irregular heartbeat and has no palpitations    Objective   Vital Signs  Temp:  [98 °F (36.7 °C)-100.6 °F (38.1 °C)] 98.8 °F (37.1 °C)  Heart Rate:  [] 80  Resp:  [16-22] 18  BP: ()/() 105/74  Arterial Line BP: ()/(54-82) 113/63    Intake/Output Summary (Last 24 hours) at 7/19/2025 0917  Last data filed at 7/19/2025 0830  Gross per 24 hour   Intake 1246 ml   Output 4030 ml   Net -2784 ml       Comfortable NAD, resting in bed on HFNC  PERRL, conjunctivae clear  Neck supple, no JVD or thyromegaly appreciated.     S1/S2 irregularly irregular, no m/r/g are appreciated   normal effort. coarse breath sounds.  On HFNC  NG tube no longer in place.  Abdomen nondistended.  Extremities warm, no clubbing, cyanosis, no significant edema   No visible or palpable skin lesions  A/O x 3, mood and affect appropriate    Results Review:      Results from last 7 days   Lab Units 07/19/25  0256 07/18/25  2122 07/18/25  1457 07/18/25  0901 07/18/25  0142   SODIUM mmol/L 140  --  141  --  140   POTASSIUM mmol/L 3.8 3.7 3.8   < > 3.7   CHLORIDE mmol/L 104  --  105  --  105   CO2 mmol/L 21.0*  --  22.0  --  21.8*   BUN mg/dL 38.0*  --  37.0*  --  31.0*   CREATININE mg/dL 1.77*  --  1.75*  --  1.85*   GLUCOSE mg/dL 115*  --  124*  --  130*   CALCIUM mg/dL 8.2*  --  8.5*  --  8.4*    < > = values in this interval not displayed.         Results from last 7 days   Lab Units 07/19/25  0256 07/18/25  1457 07/18/25  0142   WBC 10*3/mm3 11.28* 10.90* 13.18*   HEMOGLOBIN g/dL 10.3* 10.4* 9.8*   HEMATOCRIT  % 31.0* 31.5* 30.1*   PLATELETS 10*3/mm3 191 145 145     Results from last 7 days   Lab Units 07/16/25  0333 07/15/25  1625 07/14/25  0716   INR  1.65* 1.68* 1.27*   APTT seconds  --  36.7* 33.2     Results from last 7 days   Lab Units 07/14/25  0716   CHOLESTEROL mg/dL 141     Results from last 7 days   Lab Units 07/19/25  0256   MAGNESIUM mg/dL 2.7*     Results from last 7 days   Lab Units 07/14/25  0716   CHOLESTEROL mg/dL 141   TRIGLYCERIDES mg/dL 139   HDL CHOL mg/dL 42   LDL CHOL mg/dL 75       I reviewed the patient's new clinical results.  I personally viewed and interpreted the patient's EKG/Telemetry data        Medication Review:   amiodarone, 200 mg, Oral, Q12H  amiodarone, 150 mg, Intravenous, Once  aspirin, 81 mg, Oral, Daily  atorvastatin, 40 mg, Oral, Nightly  bisacodyl, 10 mg, Rectal, Daily  bumetanide, 4 mg, Intravenous, Q8H  cefepime, 2,000 mg, Intravenous, Q12H  enoxaparin sodium, 40 mg, Subcutaneous, Daily  guaiFENesin, 1,200 mg, Oral, Q12H  insulin lispro, 2-7 Units, Subcutaneous, 4x Daily AC & at Bedtime  ipratropium-albuterol, 3 mL, Nebulization, 4x Daily - RT  melatonin, 5 mg, Oral, Nightly  [Held by provider] metoprolol tartrate, 12.5 mg, Oral, Q12H  mupirocin, 1 Application, Each Nare, BID  polyethylene glycol, 17 g, Oral, Daily  senna-docusate sodium, 2 tablet, Oral, BID  sodium chloride, 4 mL, Nebulization, BID - RT  [Held by provider] trimethoprim-polymyxin b, 1 drop, Right Eye, 6x Daily        clevidipine, 2-32 mg/hr  DOPamine, 2-20 mcg/kg/min  EPINEPHrine, 0.02-0.3 mcg/kg/min  niCARdipine, 5-15 mg/hr, Last Rate: Stopped (07/15/25 1625)  nitroglycerin, 5-200 mcg/min  norepinephrine, 0.02-0.3 mcg/kg/min, Last Rate: 0.01 mcg/kg/min (07/19/25 0711)  phenylephrine, 0.2-3 mcg/kg/min, Last Rate: 0.1 mcg/kg/min (07/19/25 0854)        Assessment & Plan       Hx of CABG    Coronary artery disease involving native heart    Abnormal findings on diagnostic imaging of heart and coronary  circulation    Severe multivessel CAD, status post CABG X6, left atrial appendage occlusion with atrial clip device July 15, 2025 with Dr. Patino, indication stable coronary artery disease  Aspirin 81  Atorvastatin 40  AAA, 5.3 cm.  Postoperative hypoxic respiratory failure, on HFNC.  Continue diuresis per nephrology  Postoperative VERN, nephrology following, on bumetanide  History of prior DVT  History of PAD with stenting, on Xarelto maintenance 10 mg therapy per vascular surgery.  Xarelto on hold  Atrial fibrillation, postoperative.  In sinus rhythm today.  His blood pressure on the lower side with his CKD I would try to maintain sinus rhythm.  The A-fib does impact his blood pressure little bit  Would continue IV amiodarone continue 1 mg a day given multiple paroxysms of A-fib  This QTc is a bit prolonged however would favor continuing amiodarone given the hemodynamic consequence per above.  I feel his risk of pro-arrhythmia with amiodarone is low  Metoprolol has been held due to lower blood pressures, will reassess determine about reinitiation of this.  Prophylactic Lovenox for now, if this keeps happening he may need to escalate his anticoagulation if this keeps happening and we keep using amiodarone we may need to consider therapeutic anticoagulation for at least the next 30 days.  His atrium was clipped.  Postoperative anemia, suspected acute blood loss, expected.  Thrombocytopenia, improving  Hypertension  Postoperative ileus requiring NG tube decompression.  No nausea today.    Continuing with diuretics to improve postop hypoxia.  Continue IV amiodarone to maintain sinus rhythm given his borderline blood pressures that somewhat worsened in setting of A-fib.    Jorge A Sandoval MD  07/19/25  09:17 EDT      Part of this note may be an electronic transcription/translation of spoken language to printed text using the Dragon Dictation System.

## 2025-07-19 NOTE — THERAPY TREATMENT NOTE
Patient Name: Erickson Gerard  : 1945    MRN: [id number]                              Today's Date: 2025       Admit Date: 7/15/2025    Visit Dx:     ICD-10-CM ICD-9-CM   1. Decreased activities of daily living (ADL)  Z78.9 V49.89   2. Coronary artery disease involving native heart, unspecified vessel or lesion type, unspecified whether angina present  I25.10 414.01   3. Abnormal findings on diagnostic imaging of heart and coronary circulation  R93.1 794.39   4. S/P CABG (coronary artery bypass graft)  Z95.1 V45.81     Patient Active Problem List   Diagnosis    Essential hypertension    GERD (gastroesophageal reflux disease)    Medicare annual wellness visit, subsequent    Benign prostatic hyperplasia with urinary frequency    Umbilical hernia without obstruction and without gangrene    Colon cancer screening    Mixed hyperlipidemia    Localized edema    Right leg swelling    Aneurysm of right popliteal artery    Acute deep vein thrombosis (DVT) of distal vein of right lower extremity    DDD (degenerative disc disease), lumbar    Actinic keratosis of forehead    Aneurysm of right popliteal artery    Abdominal aortic aneurysm (AAA) 3.0 cm to 5.5 cm in diameter in male    PVD (peripheral vascular disease)    High risk medication use    Statin intolerance    Immunization deficiency    Chronic deep vein thrombosis (DVT)    Encounter for hepatitis C screening test for low risk patient    Peripheral artery aneurysm    Right elbow pain    Blurred vision    Nausea    Dizziness    Visual disturbance    Preoperative cardiovascular examination    Coronary artery disease of native artery of native heart with stable angina pectoris    Abnormal stress test    Coronary artery disease involving native heart    Abnormal findings on diagnostic imaging of heart and coronary circulation    Hx of CABG     Past Medical History:   Diagnosis Date    AAA (abdominal aortic aneurysm) without rupture 10/14/2021    Abdominal aortic  aneurysm, without rupture, unspecified 03/14/2023    Acute embolism and thrombosis of unspecified deep veins of right distal lower extremity     Anesthesia complication     AFTER VEIN SURGERY STATES HE WAS HARD TO WAKE UP    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     Aneurysm of artery of lower extremity 09/09/2021    Arthritis     At risk for sleep apnea     STOP BANG 5    Coronary artery disease     Dyspnea on exertion     Essential (primary) hypertension     GERD (gastroesophageal reflux disease)     [place] (hard of hearing)     Hyperlipidemia     Hypertension     Iliac artery aneurysm 10/14/2021    Localized edema     Low back pain     Mixed hyperlipidemia     Other specified soft tissue disorders     PAD (peripheral artery disease)     Peripheral artery aneurysm     PONV (postoperative nausea and vomiting)     Popliteal aneurysm     Thrombosis 09/09/2021    Calf Vein Thrombosis Rt tibial vein    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     Past Surgical History:   Procedure Laterality Date    ANGIOPLASTY POPLITEAL ARTERY Right 11/09/2021    Procedure: RIGHT POPITEAL ANEURYSM REPAIR VIABOHN;  Surgeon: Campos Archer MD;  Location: [facility] OR 18/19;  Service: Vascular;  Laterality: Right;    ANGIOPLASTY POPLITEAL ARTERY Right 01/25/2022    Procedure: AORTO ILIAC FEMORAL RIGHT POPLITEAL VIABOHN STENT PLACEMENT X 2;  Surgeon: Campos Archer MD;  Location: [facility] OR 18/19;  Service: Vascular;  Laterality: Right;    CARDIAC CATHETERIZATION N/A 06/19/2025    Procedure: Left Heart Cath;  Surgeon: Oneil Escobar MD;  Location: [facility] INVASIVE LOCATION;  Service: Cardiovascular;  Laterality: N/A;    CARDIAC CATHETERIZATION N/A 06/19/2025    Procedure: Left ventriculography;  Surgeon: Oneil Escobar MD;  Location: [facility] INVASIVE LOCATION;  Service: Cardiovascular;  Laterality: N/A;    COLONOSCOPY      CORONARY ARTERY BYPASS GRAFT N/A 7/15/2025    Procedure: STERNOTOMY; CORONARY ARTERY BYPASS  GRAFTING TIMES 6 USING LEFT INTERNAL MAMMARY ARTERY AND LEFT SAPHENOUS VEIN; TRANSESOPHAGEAL ECHOCARDIOGRAM WITH ANESTHESIA;LEFT ATRIAL APPENDAGE EXCLUSION; PRP;  Surgeon: Jr Oscar Patino MD;  Location: [facility];  Service: Cardiothoracic;  Laterality: N/A;    EYE SURGERY Bilateral     CATARACTS    HERNIA REPAIR Bilateral     POLITEAL ARTERY ANEURYSM REPAIR Right 11/09/2021    Viabahn Stent Graft    VEIN LIGATION AND STRIPPING N/A       General Information       Row Name 07/19/25 1047          Physical Therapy Time and Intention    Document Type therapy note (daily note)  -DB     Mode of Treatment physical therapy;individual therapy  -DB       Row Name 07/19/25 1047          General Information    Patient Profile Reviewed yes  -DB               User Key  (r) = Recorded By, (t) = Taken By, (c) = Cosigned By      Initials Name Provider Type    DB Elise Mata, MIREILLE Physical Therapist                   Mobility       Row Name 07/19/25 1047          Bed Mobility    All Activities, [place] (Bed Mobility) not tested  -DB       Row Name 07/19/25 1047          Sit-Stand Transfer    Sit-Stand [place] (Transfers) contact guard;2 person assist  -DB     Assistive Device (Sit-Stand Transfers) other (see comments)  HHA  -DB     Comment, (Sit-Stand Transfer) performed 3x  -DB       Row Name 07/19/25 1047          Gait/Stairs (Locomotion)    Comment, (Gait/Stairs) performs standing MIP, 15 steps x 3 sets  -DB               User Key  (r) = Recorded By, (t) = Taken By, (c) = Cosigned By      Initials Name Provider Type    DB Elise Mata, MIREILLE Physical Therapist                   Obj/Interventions       Row Name 07/19/25 1048          Motor Skills    Therapeutic Exercise other (see comments)  cardiac ther ex x 10  -DB               User Key  (r) = Recorded By, (t) = Taken By, (c) = Cosigned By      Initials Name Provider Type    DB Elise Mata, MIREILLE Physical Therapist                   Goals/Plan    No  documentation.                  Clinical Impression       Row Name 07/19/25 1049          Plan of Care Review    Plan of Care Reviewed With patient  -DB     Progress improving  -DB     Outcome Evaluation Pt seated UIC at start of the session. Pt was agreeable to work with PT. Limited 2/2 O2 needs. Pt was able to perform standing MIP, ~15 steps, 3 sets with seated rest break. Limited 2/2 elevated HR at end of session, RN aware. Will continue to progress as able.  -DB       Row Name 07/19/25 1049          Vital Signs    O2 Delivery Pre Treatment supplemental O2  -DB     O2 Delivery Intra Treatment supplemental O2  -DB     O2 Delivery Post Treatment supplemental O2  -DB     Pre Patient Position Sitting  -DB     Intra Patient Position Standing  -DB     Post Patient Position Sitting  -DB       Row Name 07/19/25 1049          Positioning and Restraints    Pre-Treatment Position sitting in chair/recliner  -DB     Post Treatment Position chair  -DB     In Chair reclined;call light within reach;encouraged to call for assist;with nsg  -DB               User Key  (r) = Recorded By, (t) = Taken By, (c) = Cosigned By      Initials Name Provider Type    DB Elise Mata PT Physical Therapist                   Outcome Measures       Row Name 07/19/25 1050          How much help from another person do you currently need...    Turning from your back to your side while in flat bed without using bedrails? 3  -DB     Moving from lying on back to sitting on the side of a flat bed without bedrails? 3  -DB     Moving to and from a bed to a chair (including a wheelchair)? 3  -DB     Standing up from a chair using your arms (e.g., wheelchair, bedside chair)? 3  -DB     Climbing 3-5 steps with a railing? 3  -DB     To walk in hospital room? 3  -DB     AM-PAC 6 Clicks Score (PT) 18  -DB     Highest Level of Mobility Goal Walk 10 Steps or More-6  -DB       Row Name 07/19/25 1050          Functional Assessment    Outcome Measure Options  AM-PAC 6 Clicks Basic Mobility (PT)  -DB               User Key  (r) = Recorded By, (t) = Taken By, (c) = Cosigned By      Initials Name Provider Type    DB Elise Mata, PT Physical Therapist                                 Physical Therapy Education       Title: PT OT SLP Therapies (Done)       Topic: Physical Therapy (Done)       Point: Mobility training (Done)       Learning Progress Summary            Patient Acceptance, E, VU by DB at 7/19/2025 1050    Acceptance, E,D, VU,NR by MS at 7/18/2025 1121    Acceptance, E, NR by AR at 7/17/2025 1254    Acceptance, E, NR by EM at 7/16/2025 1022                      Point: Home exercise program (Done)       Learning Progress Summary            Patient Acceptance, E, VU by DB at 7/19/2025 1050    Acceptance, E,D, VU,NR by MS at 7/18/2025 1121    Acceptance, E, NR by AR at 7/17/2025 1254    Acceptance, E, NR by EM at 7/16/2025 1022                      Point: Body mechanics (Done)       Learning Progress Summary            Patient Acceptance, E, VU by DB at 7/19/2025 1050    Acceptance, E,D, VU,NR by MS at 7/18/2025 1121    Acceptance, E, NR by AR at 7/17/2025 1254                      Point: Precautions (Done)       Learning Progress Summary            Patient Acceptance, E, VU by DB at 7/19/2025 1050    Acceptance, E,D, VU,NR by MS at 7/18/2025 1121    Acceptance, E, NR by AR at 7/17/2025 1254                                      User Key       Initials Effective Dates Name Provider Type Discipline    EM 06/16/21 -  Kayy Adams, PT Physical Therapist PT    MS 06/16/21 -  Campos Costa PT Physical Therapist PT    AR 06/16/21 -  Mary Curry PT Physical Therapist PT    DB 06/27/25 -  Elise Mata, MIREILLE Physical Therapist PT                  PT Recommendation and Plan     Progress: improving  Outcome Evaluation: Pt seated UIC at start of the session. Pt was agreeable to work with PT. Limited 2/2 O2 needs. Pt was able to perform standing MIP, ~15  steps, 3 sets with seated rest break. Limited 2/2 elevated HR at end of session, RN aware. Will continue to progress as able.     Time Calculation:         PT Charges       Row Name 07/19/25 1051             Time Calculation    Start Time 0954  -DB      Stop Time 1006  -DB      Time Calculation (min) 12 min  -DB      PT Received On 07/19/25  -DB      PT - Next Appointment 07/21/25  -DB         Time Calculation- PT    Total Timed Code Minutes- PT 12 minute(s)  -DB                User Key  (r) = Recorded By, (t) = Taken By, (c) = Cosigned By      Initials Name Provider Type    DB Elise Mata, MIREILLE Physical Therapist                  Therapy Charges for Today       Code Description Service Date Service Provider Modifiers Qty    07755475150 HC PT THERAPEUTIC ACT EA 15 MIN 7/19/2025 Elise Mata, PT GP 1    30263328768 HC PT THER SUPP EA 15 MIN 7/19/2025 Elise Mata, PT GP 1            PT G-Codes  Outcome Measure Options: AM-PAC 6 Clicks Basic Mobility (PT)  AM-PAC 6 Clicks Score (PT): 18  AM-PAC 6 Clicks Score (OT): 16  Modified Bartholomew Scale: 3 - Moderate disability.  Requiring some help, but able to walk without assistance.       Elise Mata PT  7/19/2025

## 2025-07-19 NOTE — PLAN OF CARE
Goal Outcome Evaluation:              Outcome Evaluation: Clinical swallow evaluation completed recommend mechanical soft chopped and thin liquids, meds whole or crushed as tolerated and small bites and sips.    Anticipated Discharge Disposition (SLP): unknown          SLP Swallowing Diagnosis: swallow WFL/no suspected pharyngeal impairment (07/19/25 1200)

## 2025-07-19 NOTE — THERAPY EVALUATION
Acute Care - Speech Language Pathology   Swallow Initial Evaluation [facility]     Patient Name: Erickson Gerard  : 1945  MRN: [id number]  Today's Date: 2025               Admit Date: 7/15/2025    Visit Dx:     ICD-10-CM ICD-9-CM   1. Decreased activities of daily living (ADL)  Z78.9 V49.89   2. Coronary artery disease involving native heart, unspecified vessel or lesion type, unspecified whether angina present  I25.10 414.01   3. Abnormal findings on diagnostic imaging of heart and coronary circulation  R93.1 794.39   4. S/P CABG (coronary artery bypass graft)  Z95.1 V45.81     Patient Active Problem List   Diagnosis    Essential hypertension    GERD (gastroesophageal reflux disease)    Medicare annual wellness visit, subsequent    Benign prostatic hyperplasia with urinary frequency    Umbilical hernia without obstruction and without gangrene    Colon cancer screening    Mixed hyperlipidemia    Localized edema    Right leg swelling    Aneurysm of right popliteal artery    Acute deep vein thrombosis (DVT) of distal vein of right lower extremity    DDD (degenerative disc disease), lumbar    Actinic keratosis of forehead    Aneurysm of right popliteal artery    Abdominal aortic aneurysm (AAA) 3.0 cm to 5.5 cm in diameter in male    PVD (peripheral vascular disease)    High risk medication use    Statin intolerance    Immunization deficiency    Chronic deep vein thrombosis (DVT)    Encounter for hepatitis C screening test for low risk patient    Peripheral artery aneurysm    Right elbow pain    Blurred vision    Nausea    Dizziness    Visual disturbance    Preoperative cardiovascular examination    Coronary artery disease of native artery of native heart with stable angina pectoris    Abnormal stress test    Coronary artery disease involving native heart    Abnormal findings on diagnostic imaging of heart and coronary circulation    Hx of CABG     Past Medical History:   Diagnosis Date    AAA (abdominal  aortic aneurysm) without rupture 10/14/2021    Abdominal aortic aneurysm, without rupture, unspecified 03/14/2023    Acute embolism and thrombosis of unspecified deep veins of right distal lower extremity     Anesthesia complication     AFTER VEIN SURGERY STATES HE WAS HARD TO WAKE UP    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     Aneurysm of artery of lower extremity 09/09/2021    Arthritis     At risk for sleep apnea     STOP BANG 5    Coronary artery disease     Dyspnea on exertion     Essential (primary) hypertension     GERD (gastroesophageal reflux disease)     [place] (hard of hearing)     Hyperlipidemia     Hypertension     Iliac artery aneurysm 10/14/2021    Localized edema     Low back pain     Mixed hyperlipidemia     Other specified soft tissue disorders     PAD (peripheral artery disease)     Peripheral artery aneurysm     PONV (postoperative nausea and vomiting)     Popliteal aneurysm     Thrombosis 09/09/2021    Calf Vein Thrombosis Rt tibial vein    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     Past Surgical History:   Procedure Laterality Date    ANGIOPLASTY POPLITEAL ARTERY Right 11/09/2021    Procedure: RIGHT POPITEAL ANEURYSM REPAIR VIABOHN;  Surgeon: Campos Archer MD;  Location: [facility] OR 18/19;  Service: Vascular;  Laterality: Right;    ANGIOPLASTY POPLITEAL ARTERY Right 01/25/2022    Procedure: AORTO ILIAC FEMORAL RIGHT POPLITEAL VIABOHN STENT PLACEMENT X 2;  Surgeon: Campos Archer MD;  Location: [facility] OR 18/19;  Service: Vascular;  Laterality: Right;    CARDIAC CATHETERIZATION N/A 06/19/2025    Procedure: Left Heart Cath;  Surgeon: Oneil Escobar MD;  Location: [facility]VAS[facility] LOC[facility];  Service: Cardiovascular;  Laterality: N/A;    CARDIAC CATHETERIZATION N/A 06/19/2025    Procedure: Left ventriculography;  Surgeon: Oneil Escobar MD;  Location: [facility];  Service: Cardiovascular;  Laterality: N/A;    COLONOSCOPY      CORONARY ARTERY BYPASS GRAFT N/A  7/15/2025    Procedure: STERNOTOMY; CORONARY ARTERY BYPASS GRAFTING TIMES 6 USING LEFT INTERNAL MAMMARY ARTERY AND LEFT SAPHENOUS VEIN; TRANSESOPHAGEAL ECHOCARDIOGRAM WITH ANESTHESIA;LEFT ATRIAL APPENDAGE EXCLUSION; PRP;  Surgeon: Jr Oscar Patino MD;  Location: [facility];  Service: Cardiothoracic;  Laterality: N/A;    EYE SURGERY Bilateral     CATARACTS    HERNIA REPAIR Bilateral     POLITEAL ARTERY ANEURYSM REPAIR Right 11/09/2021    Viabahn Stent Graft    VEIN LIGATION AND STRIPPING N/A        SLP Recommendation and Plan  SLP Swallowing Diagnosis: swallow WFL/no suspected pharyngeal impairment (07/19/25 1200)  SLP Diet Recommendation: soft to chew textures, chopped, thin liquids (07/19/25 1200)  Recommended Precautions and Strategies: upright posture during/after eating, small bites of food and sips of liquid, general aspiration precautions, reflux precautions (07/19/25 1200)  SLP Rec. for Method of Medication Administration: meds whole, meds crushed, as tolerated (07/19/25 1200)     Monitor for Signs of Aspiration: yes, notify SLP if any concerns (07/19/25 1200)     Swallow Criteria for Skilled Therapeutic Interventions Met: demonstrates skilled criteria (07/19/25 1200)  Anticipated Discharge Disposition (SLP): unknown (07/19/25 1200)  Rehab Potential/Prognosis, Swallowing: good, to achieve stated therapy goals (07/19/25 1200)  Therapy Frequency (Swallow): PRN (07/19/25 1200)  Predicted Duration Therapy Intervention (Days): until discharge (07/19/25 1200)  Oral Care Recommendations: Oral Care BID/PRN (07/19/25 1200)                                        Outcome Evaluation: Clinical swallow evaluation completed recommend mechanical soft chopped and thin liquids, meds whole or crushed as tolerated and small bites and sips.      SWALLOW EVALUATION (Last 72 Hours)       SLP Adult Swallow Evaluation       Row Name 07/19/25 1200                   Rehab Evaluation    Document Type evaluation  -KA         Subjective Information no complaints  -KA        Patient Observations alert;cooperative  -KA        Patient Effort excellent  -KA        Symptoms Noted During/After Treatment none  -KA           General Information    Patient Profile Reviewed yes  -KA        Pertinent History Of Current Problem s/p CABG x6  acute hypoxic respiratory failure  -KA        Current Method of Nutrition NPO  -KA        Precautions/Limitations, Vision WFL;for purposes of eval  -KA        Precautions/Limitations, Hearing WFL;for purposes of eval  -KA        Prior Level of Function-Communication WFL  -KA        Prior Level of Function-Swallowing no diet consistency restrictions;safe, efficient swallowing in all situations  -KA        Plans/Goals Discussed with patient  -KA        Barriers to Rehab none identified  -KA        Patient's Goals for Discharge return to PO diet  -KA           Pain    Pretreatment Pain Rating 0/10 - no pain  -KA        Posttreatment Pain Rating 0/10 - no pain  -KA           Oral Motor Structure and Function    Dentition Assessment natural, present and adequate  -KA        Secretion Management WNL/WFL  -KA        Mucosal Quality moist, healthy  -KA        Volitional Swallow WFL  -KA        Volitional Cough WFL  -KA           Oral Musculature and Cranial Nerve Assessment    Oral Motor General Assessment WFL  -KA           General Eating/Swallowing Observations    Respiratory Support Currently in Use high-flow nasal cannula  -KA        Eating/Swallowing Skills self-fed  -KA        Positioning During Eating upright in chair  -KA        Utensils Used spoon;cup;straw  -KA        Consistencies Trialed soft to chew textures;chopped;mixed consistency;pureed;thin liquids  -KA           Clinical Swallow Eval    Clinical Swallow Evaluation Summary Patient alert and upright sitting in chair. RN reports pt previously on clear liquid diet and coughed with thin liquids lastnight, and pt had NG tube but that was pulled this morning.  Patient demonstrated a baseline throat clear. Pt accepted trials of ice chips, thins via spoon, cup and straw, puree and mechanical soft mixed with no immediate overt s/s of pen/asp. Subtle delayed throat clears at times, do not feel related to swallow. Laryngeal elevation felt adequate and timely upon neck palpation and mastication slow but functional. Discussed with RN if concerned with safety of PO recommend NPO.  -           SLP Evaluation Clinical Impression    SLP Swallowing Diagnosis swallow WFL/no suspected pharyngeal impairment  -KA        Functional Impact risk of aspiration/pneumonia  -KA        Rehab Potential/Prognosis, Swallowing good, to achieve stated therapy goals  -        Swallow Criteria for Skilled Therapeutic Interventions Met demonstrates skilled criteria  -KA           Recommendations    Therapy Frequency (Swallow) PRN  -KA        Predicted Duration Therapy Intervention (Days) until discharge  -        SLP Diet Recommendation soft to chew textures;chopped;thin liquids  -        Recommended Precautions and Strategies upright posture during/after eating;small bites of food and sips of liquid;general aspiration precautions;reflux precautions  -        Oral Care Recommendations Oral Care BID/PRN  -        SLP Rec. for Method of Medication Administration meds whole;meds crushed;as tolerated  -        Monitor for Signs of Aspiration yes;notify SLP if any concerns  -        Anticipated Discharge Disposition (SLP) unknown  -KA           Swallow Goals (SLP)    Swallow STGs diet tolerance goal selection (SLP)  -KA        Diet Tolerance Goal Selection (SLP) Swallow Short Term Goal 1  -           (STG) Swallow 1    (STG) Swallow 1 Patient will tolerate least restrictive diet without overt s/s of pen/asp  -KA                  User Key  (r) = Recorded By, (t) = Taken By, (c) = Cosigned By      Initials Name Effective Dates    Demetrius Nguyen, BLAKE 01/05/24 -                      EDUCATION  The patient has been educated in the following areas:   Dysphagia (Swallowing Impairment).        SLP GOALS       Row Name 07/19/25 1200             (STG) Swallow 1    (STG) Swallow 1 Patient will tolerate least restrictive diet without overt s/s of pen/asp  -KA                User Key  (r) = Recorded By, (t) = Taken By, (c) = Cosigned By      Initials Name Provider Type    Demetrius Nguyen SLP Speech and Language Pathologist                         Time Calculation:    Time Calculation- SLP       Row Name 07/19/25 1232             Time Calculation- SLP    SLP Start Time 1130  -KA      SLP Received On 07/19/25  -KA         Untimed Charges    SLP Eval/Re-eval  ST Eval Oral Pharyng Swallow - 14534  -KA      [id number] Eval Oral Pharyng Swallow Minutes 55  -KA         Total Minutes    Untimed Charges Total Minutes 55  -KA       Total Minutes 55  -KA                User Key  (r) = Recorded By, (t) = Taken By, (c) = Cosigned By      Initials Name Provider Type    Demetrius Nguyen SLP Speech and Language Pathologist                    Therapy Charges for Today       Code Description Service Date Service Provider Modifiers Qty    [id number] HC ST EVAL ORAL PHARYNG SWALLOW 4 7/19/2025 Demetrius Lindquist SLP GN 1                 BLAKE Rodas  7/19/2025

## 2025-07-19 NOTE — PROGRESS NOTES
[facility] Progress Note      Patient Name: Erickson Gerard  : 1945  MRN: [id number]  Primary Care Physician:  Farhad Metcalf MD  Date of admission: 7/15/2025    Subjective     Interval History:   Acute kidney injury on chronic kidney disease    Patient's same, his blood pressure is on the low side last dose of diuretics was not given because of the low blood pressure atrial.  No nausea or vomiting, he is getting oxygen via high flow nasal cannula    Review of Systems:   As noted above    Objective     Vitals:   Temp:  [98 °F (36.7 °C)-100.6 °F (38.1 °C)] 98.6 °F (37 °C)  Heart Rate:  [] 116  Resp:  [16-22] 18  BP: ()/() 90/70  Arterial Line BP: ()/(54-82) 92/61  Flow (L/min) (Oxygen Therapy):  [60] 60    Intake/Output Summary (Last 24 hours) at 2025 0817  Last data filed at 2025 0500  Gross per 24 hour   Intake 1246 ml   Output 3805 ml   Net -2559 ml       Physical Exam:    General Appearance: alert, awake, chronically ill no acute distress   Skin: warm and dry  HEENT: oral mucosa normal, nonicteric sclera nasal cannula in place  Neck: No JVD  Lungs: Bilateral rhonchi, breathing effort not  Heart: Regular, no  Abdomen: soft, nontender, nondistended  : no palpable bladder thoracic  Extremities: no edema, cyanosis or clubbing  Neuro: Moving all EXTR    Scheduled Meds:     amiodarone, 200 mg, Oral, Q12H  amiodarone, 150 mg, Intravenous, Once  aspirin, 81 mg, Oral, Daily  atorvastatin, 40 mg, Oral, Nightly  bisacodyl, 10 mg, Rectal, Daily  bumetanide, 4 mg, Intravenous, Q8H  calcium gluconate, 2,000 mg, Intravenous, Once  cefepime, 2,000 mg, Intravenous, Q12H  enoxaparin sodium, 40 mg, Subcutaneous, Daily  guaiFENesin, 1,200 mg, Oral, Q12H  insulin lispro, 2-7 Units, Subcutaneous, 4x Daily AC & at Bedtime  ipratropium-albuterol, 3 mL, Nebulization, 4x Daily - RT  melatonin, 5 mg, Oral, Nightly  [Held by provider] metoprolol tartrate, 12.5  mg, Oral, Q12H  mupirocin, 1 Application, Each Nare, BID  polyethylene glycol, 17 g, Oral, Daily  senna-docusate sodium, 2 tablet, Oral, BID  sodium chloride, 4 mL, Nebulization, BID - RT  [Held by provider] trimethoprim-polymyxin b, 1 drop, Right Eye, 6x Daily      IV Meds:   clevidipine, 2-32 mg/hr  DOPamine, 2-20 mcg/kg/min  EPINEPHrine, 0.02-0.3 mcg/kg/min  niCARdipine, 5-15 mg/hr, Last Rate: Stopped (07/15/25 1625)  nitroglycerin, 5-200 mcg/min  norepinephrine, 0.02-0.3 mcg/kg/min, Last Rate: 0.01 mcg/kg/min (07/19/25 0711)  phenylephrine, 0.2-3 mcg/kg/min        Results Reviewed:   I have personally reviewed the results from the time of this admission to 7/19/2025 08:17 EDT     Results from last 7 days   Lab Units 07/19/25  0256 07/18/25  2122 07/18/25  1457 07/18/25  0901 07/18/25  0142 07/17/25  1130 07/17/25  0300   SODIUM mmol/L 140  --  141  --  140   < > 138   POTASSIUM mmol/L 3.8 3.7 3.8 3.6 3.7   < > 3.8   CHLORIDE mmol/L 104  --  105  --  105   < > 104   CO2 mmol/L 21.0*  --  22.0  --  21.8*   < > 20.0*   BUN mg/dL 38.0*  --  37.0*  --  31.0*   < > 23.0   CREATININE mg/dL 1.77*  --  1.75*  --  1.85*   < > 1.67*   CALCIUM mg/dL 8.2*  --  8.5*  --  8.4*   < > 8.0*   BILIRUBIN mg/dL 0.7  --   --  0.4  --   --  0.5   ALK PHOS U/L 75  --   --  59  --   --  49   ALT (SGPT) U/L 7  --   --  5  --   --  7   AST (SGOT) U/L 41*  --   --  46*  --   --  36   GLUCOSE mg/dL 115*  --  124*  --  130*   < > 135*    < > = values in this interval not displayed.       Estimated Creatinine Clearance: 34.1 mL/min (A) (by C-G formula based on SCr of 1.77 mg/dL (H)).    Results from last 7 days   Lab Units 07/19/25  0256 07/18/25  1457 07/18/25  0901 07/18/25  0142 07/16/25  0904 07/15/25  2133   MAGNESIUM mg/dL 2.7* 2.8*  --  2.9*   < > 2.7*   PHOSPHORUS mg/dL 4.2  --  3.7  --   --  4.4    < > = values in this interval not displayed.       Results from last 7 days   Lab Units 07/19/25  0256   URIC ACID mg/dL 7.2*        Results from last 7 days   Lab Units 07/19/25  0256 07/18/25  1457 07/18/25  0142 07/17/25  1415 07/17/25  0300   WBC 10*3/mm3 11.28* 10.90* 13.18* 15.05* 12.77*   HEMOGLOBIN g/dL 10.3* 10.4* 9.8* 10.4* 9.8*   PLATELETS 10*3/mm3 191 145 145 128* 101*       Results from last 7 days   Lab Units 07/16/25  0333 07/15/25  1625 07/14/25  0716   INR  1.65* 1.68* 1.27*       Assessment / Plan     ASSESSMENT:  Acute kidney injury on chronic kidney disease stage II secondary to hemodynamic changes post CABG creatinine is stable.  Patient has evidence of volume excess, his electrolytes within acceptable range.  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  AAA 5.3 cm monitored by vascular surgery  Hypertension with CKD currently on vasopressors.  Volume excess,  Postop anemia hemoglobin up to 10 point    PLAN:  Will consider diuresis after having his heart rate controlled hoping his blood pressure will improve  Continue the same treatment  Surveillance labs    I reviewed the chart and other providers notes, reviewed labs.  Discussed the case with the CTS team  Copied text in this note has been reviewed and is accurate as of 07/19/25.         Thank you for involving us in the care of Erickson Gerard.  Please feel free to call with any questions.    Angel Franco MD  07/19/25  08:17 EDT    [facility]  [phone]    Please note that portions of this note were completed with a voice recognition program.

## 2025-07-19 NOTE — PLAN OF CARE
Goal Outcome Evaluation:  Plan of Care Reviewed With: patient        Progress: improving  Outcome Evaluation: Pt seated UIC at start of the session. Pt was agreeable to work with PT. Limited 2/2 O2 needs. Pt was able to perform standing MIP, ~15 steps, 3 sets with seated rest break. Limited 2/2 elevated HR at end of session, RN aware. Will continue to progress as able.

## 2025-07-20 ENCOUNTER — APPOINTMENT (OUTPATIENT)
Dept: GENERAL RADIOLOGY | Facility: HOSPITAL | Age: 80
DRG: 235 | End: 2025-07-20
Payer: MEDICARE

## 2025-07-20 LAB
ALBUMIN SERPL-MCNC: 3.6 G/DL (ref 3.5–5.2)
ALBUMIN/GLOB SERPL: 1.4 G/DL
ALP SERPL-CCNC: 113 U/L (ref 39–117)
ALT SERPL W P-5'-P-CCNC: 11 U/L (ref 1–41)
ANION GAP SERPL CALCULATED.3IONS-SCNC: 13.6 MMOL/L (ref 5–15)
ANION GAP SERPL CALCULATED.3IONS-SCNC: 14 MMOL/L (ref 5–15)
ANION GAP SERPL CALCULATED.3IONS-SCNC: 14 MMOL/L (ref 5–15)
ARTERIAL PATENCY WRIST A: ABNORMAL
AST SERPL-CCNC: 41 U/L (ref 1–40)
ATMOSPHERIC PRESS: 747.7 MMHG
ATMOSPHERIC PRESS: 747.7 MMHG
ATMOSPHERIC PRESS: 748.4 MMHG
ATMOSPHERIC PRESS: 748.7 MMHG
BASE EXCESS BLDA CALC-SCNC: -0.1 MMOL/L (ref 0–2)
BASE EXCESS BLDA CALC-SCNC: -0.1 MMOL/L (ref 0–2)
BASE EXCESS BLDA CALC-SCNC: 0.6 MMOL/L (ref 0–2)
BASE EXCESS BLDA CALC-SCNC: 1.6 MMOL/L (ref 0–2)
BASOPHILS # BLD AUTO: 0.04 10*3/MM3 (ref 0–0.2)
BASOPHILS NFR BLD AUTO: 0.4 % (ref 0–1.5)
BDY SITE: ABNORMAL
BILIRUB CONJ SERPL-MCNC: 0.2 MG/DL (ref 0–0.3)
BILIRUB SERPL-MCNC: 0.5 MG/DL (ref 0–1.2)
BUN SERPL-MCNC: 41 MG/DL (ref 8–23)
BUN SERPL-MCNC: 42 MG/DL (ref 8–23)
BUN SERPL-MCNC: 42 MG/DL (ref 8–23)
BUN/CREAT SERPL: 21.1 (ref 7–25)
CA-I SERPL ISE-MCNC: 1.06 MMOL/L (ref 1.15–1.35)
CA-I SERPL ISE-MCNC: 1.15 MMOL/L (ref 1.15–1.35)
CALCIUM SPEC-SCNC: 7.8 MG/DL (ref 8.6–10.5)
CALCIUM SPEC-SCNC: 7.8 MG/DL (ref 8.6–10.5)
CALCIUM SPEC-SCNC: 8.8 MG/DL (ref 8.6–10.5)
CHLORIDE SERPL-SCNC: 100 MMOL/L (ref 98–107)
CHLORIDE SERPL-SCNC: 100 MMOL/L (ref 98–107)
CHLORIDE SERPL-SCNC: 104 MMOL/L (ref 98–107)
CO2 SERPL-SCNC: 20.4 MMOL/L (ref 22–29)
CO2 SERPL-SCNC: 21 MMOL/L (ref 22–29)
CO2 SERPL-SCNC: 21 MMOL/L (ref 22–29)
CREAT SERPL-MCNC: 1.94 MG/DL (ref 0.76–1.27)
CREAT SERPL-MCNC: 1.99 MG/DL (ref 0.76–1.27)
CREAT SERPL-MCNC: 1.99 MG/DL (ref 0.76–1.27)
DEPRECATED RDW RBC AUTO: 43.1 FL (ref 37–54)
DEPRECATED RDW RBC AUTO: 43.7 FL (ref 37–54)
DEVICE COMMENT 2: 0.9
DEVICE COMMENT: ABNORMAL
EGFRCR SERPLBLD CKD-EPI 2021: 33.7 ML/MIN/1.73
EGFRCR SERPLBLD CKD-EPI 2021: 33.7 ML/MIN/1.73
EGFRCR SERPLBLD CKD-EPI 2021: 34.8 ML/MIN/1.73
EOSINOPHIL # BLD AUTO: 0.4 10*3/MM3 (ref 0–0.4)
EOSINOPHIL NFR BLD AUTO: 3.5 % (ref 0.3–6.2)
ERYTHROCYTE [DISTWIDTH] IN BLOOD BY AUTOMATED COUNT: 12.6 % (ref 12.3–15.4)
ERYTHROCYTE [DISTWIDTH] IN BLOOD BY AUTOMATED COUNT: 12.7 % (ref 12.3–15.4)
GAS FLOW AIRWAY: 10 LPM
GAS FLOW AIRWAY: 12 LPM
GLOBULIN UR ELPH-MCNC: 2.5 GM/DL
GLUCOSE BLDC GLUCOMTR-MCNC: 111 MG/DL (ref 70–130)
GLUCOSE BLDC GLUCOMTR-MCNC: 113 MG/DL (ref 70–130)
GLUCOSE BLDC GLUCOMTR-MCNC: 117 MG/DL (ref 70–130)
GLUCOSE BLDC GLUCOMTR-MCNC: 120 MG/DL (ref 70–130)
GLUCOSE SERPL-MCNC: 119 MG/DL (ref 65–99)
GLUCOSE SERPL-MCNC: 119 MG/DL (ref 65–99)
GLUCOSE SERPL-MCNC: 121 MG/DL (ref 65–99)
HCO3 BLDA-SCNC: 22.3 MMOL/L (ref 22–28)
HCO3 BLDA-SCNC: 22.9 MMOL/L (ref 22–28)
HCO3 BLDA-SCNC: 23.2 MMOL/L (ref 22–28)
HCO3 BLDA-SCNC: 25.6 MMOL/L (ref 22–28)
HCT VFR BLD AUTO: 30 % (ref 37.5–51)
HCT VFR BLD AUTO: 32.7 % (ref 37.5–51)
HEMODILUTION: NO
HGB BLD-MCNC: 10.1 G/DL (ref 13–17.7)
HGB BLD-MCNC: 11 G/DL (ref 13–17.7)
IMM GRANULOCYTES # BLD AUTO: 0.07 10*3/MM3 (ref 0–0.05)
IMM GRANULOCYTES NFR BLD AUTO: 0.6 % (ref 0–0.5)
INHALED O2 CONCENTRATION: 30 %
INHALED O2 CONCENTRATION: 65 %
LYMPHOCYTES # BLD AUTO: 1.57 10*3/MM3 (ref 0.7–3.1)
LYMPHOCYTES NFR BLD AUTO: 13.8 % (ref 19.6–45.3)
MAGNESIUM SERPL-MCNC: 2.3 MG/DL (ref 1.6–2.4)
MAGNESIUM SERPL-MCNC: 2.6 MG/DL (ref 1.6–2.4)
MCH RBC QN AUTO: 31.3 PG (ref 26.6–33)
MCH RBC QN AUTO: 31.4 PG (ref 26.6–33)
MCHC RBC AUTO-ENTMCNC: 33.6 G/DL (ref 31.5–35.7)
MCHC RBC AUTO-ENTMCNC: 33.7 G/DL (ref 31.5–35.7)
MCV RBC AUTO: 93.2 FL (ref 79–97)
MCV RBC AUTO: 93.2 FL (ref 79–97)
MODALITY: ABNORMAL
MONOCYTES # BLD AUTO: 1.17 10*3/MM3 (ref 0.1–0.9)
MONOCYTES NFR BLD AUTO: 10.3 % (ref 5–12)
NEUTROPHILS NFR BLD AUTO: 71.4 % (ref 42.7–76)
NEUTROPHILS NFR BLD AUTO: 8.11 10*3/MM3 (ref 1.7–7)
NRBC BLD AUTO-RTO: 0 /100 WBC (ref 0–0.2)
O2 A-A PPRESDIFF RESPIRATORY: 0.3 MMHG
O2 A-A PPRESDIFF RESPIRATORY: 0.3 MMHG
PCO2 BLDA: 28.5 MM HG (ref 35–45)
PCO2 BLDA: 29.9 MM HG (ref 35–45)
PCO2 BLDA: 31.2 MM HG (ref 35–45)
PCO2 BLDA: 36.7 MM HG (ref 35–45)
PH BLDA: 7.45 PH UNITS (ref 7.35–7.45)
PH BLDA: 7.47 PH UNITS (ref 7.35–7.45)
PH BLDA: 7.5 PH UNITS (ref 7.35–7.45)
PH BLDA: 7.5 PH UNITS (ref 7.35–7.45)
PHOSPHATE SERPL-MCNC: 4.2 MG/DL (ref 2.5–4.5)
PLATELET # BLD AUTO: 213 10*3/MM3 (ref 140–450)
PLATELET # BLD AUTO: 238 10*3/MM3 (ref 140–450)
PMV BLD AUTO: 9.6 FL (ref 6–12)
PMV BLD AUTO: 9.9 FL (ref 6–12)
PO2 BLD: 191 MM[HG] (ref 0–500)
PO2 BLD: 192 MM[HG] (ref 0–500)
PO2 BLDA: 100.2 MM HG (ref 80–100)
PO2 BLDA: 124.3 MM HG (ref 80–100)
PO2 BLDA: 57.5 MM HG (ref 80–100)
PO2 BLDA: 68.8 MM HG (ref 80–100)
POTASSIUM SERPL-SCNC: 3.8 MMOL/L (ref 3.5–5.2)
POTASSIUM SERPL-SCNC: 3.8 MMOL/L (ref 3.5–5.2)
POTASSIUM SERPL-SCNC: 3.9 MMOL/L (ref 3.5–5.2)
POTASSIUM SERPL-SCNC: 4.1 MMOL/L (ref 3.5–5.2)
PROT SERPL-MCNC: 6.1 G/DL (ref 6–8.5)
QT INTERVAL: 522 MS
QTC INTERVAL: 554 MS
RBC # BLD AUTO: 3.22 10*6/MM3 (ref 4.14–5.8)
RBC # BLD AUTO: 3.51 10*6/MM3 (ref 4.14–5.8)
SAO2 % BLDCOA: 92.3 % (ref 92–98.5)
SAO2 % BLDCOA: 95.2 % (ref 92–98.5)
SAO2 % BLDCOA: 98.3 % (ref 92–98.5)
SAO2 % BLDCOA: 99 % (ref 92–98.5)
SET MECH RESP RATE: 20
SODIUM SERPL-SCNC: 135 MMOL/L (ref 136–145)
SODIUM SERPL-SCNC: 135 MMOL/L (ref 136–145)
SODIUM SERPL-SCNC: 138 MMOL/L (ref 136–145)
TOTAL RATE: 20 BREATHS/MINUTE
TOTAL RATE: 24 BREATHS/MINUTE
TOTAL RATE: 24 BREATHS/MINUTE
TOTAL RATE: 27 BREATHS/MINUTE
URATE SERPL-MCNC: 7.3 MG/DL (ref 3.4–7)
VENTILATOR MODE: ABNORMAL
VT ON VENT VENT: 455 ML
WBC NRBC COR # BLD AUTO: 11.36 10*3/MM3 (ref 3.4–10.8)
WBC NRBC COR # BLD AUTO: 9.2 10*3/MM3 (ref 3.4–10.8)

## 2025-07-20 PROCEDURE — 85027 COMPLETE CBC AUTOMATED: CPT | Performed by: ANESTHESIOLOGY

## 2025-07-20 PROCEDURE — 93010 ELECTROCARDIOGRAM REPORT: CPT | Performed by: INTERNAL MEDICINE

## 2025-07-20 PROCEDURE — 97530 THERAPEUTIC ACTIVITIES: CPT

## 2025-07-20 PROCEDURE — 25010000002 CALCIUM GLUCONATE 2-0.675 GM/100ML-% SOLUTION: Performed by: PHYSICIAN ASSISTANT

## 2025-07-20 PROCEDURE — 25010000002 ENOXAPARIN PER 10 MG

## 2025-07-20 PROCEDURE — 94799 UNLISTED PULMONARY SVC/PX: CPT

## 2025-07-20 PROCEDURE — 94761 N-INVAS EAR/PLS OXIMETRY MLT: CPT

## 2025-07-20 PROCEDURE — 82803 BLOOD GASES ANY COMBINATION: CPT | Performed by: ANESTHESIOLOGY

## 2025-07-20 PROCEDURE — 83735 ASSAY OF MAGNESIUM: CPT | Performed by: ANESTHESIOLOGY

## 2025-07-20 PROCEDURE — 25010000002 DIGOXIN PER 500 MCG: Performed by: STUDENT IN AN ORGANIZED HEALTH CARE EDUCATION/TRAINING PROGRAM

## 2025-07-20 PROCEDURE — 71045 X-RAY EXAM CHEST 1 VIEW: CPT

## 2025-07-20 PROCEDURE — 94664 DEMO&/EVAL PT USE INHALER: CPT

## 2025-07-20 PROCEDURE — 80053 COMPREHEN METABOLIC PANEL: CPT | Performed by: ANESTHESIOLOGY

## 2025-07-20 PROCEDURE — 82948 REAGENT STRIP/BLOOD GLUCOSE: CPT

## 2025-07-20 PROCEDURE — 25010000002 CEFEPIME PER 500 MG: Performed by: ANESTHESIOLOGY

## 2025-07-20 PROCEDURE — 84100 ASSAY OF PHOSPHORUS: CPT | Performed by: ANESTHESIOLOGY

## 2025-07-20 PROCEDURE — 25010000002 AMIODARONE IN DEXTROSE 5% 360-4.14 MG/200ML-% SOLUTION: Performed by: STUDENT IN AN ORGANIZED HEALTH CARE EDUCATION/TRAINING PROGRAM

## 2025-07-20 PROCEDURE — 82248 BILIRUBIN DIRECT: CPT | Performed by: ANESTHESIOLOGY

## 2025-07-20 PROCEDURE — 84132 ASSAY OF SERUM POTASSIUM: CPT | Performed by: THORACIC SURGERY (CARDIOTHORACIC VASCULAR SURGERY)

## 2025-07-20 PROCEDURE — 84550 ASSAY OF BLOOD/URIC ACID: CPT | Performed by: INTERNAL MEDICINE

## 2025-07-20 PROCEDURE — 82330 ASSAY OF CALCIUM: CPT | Performed by: ANESTHESIOLOGY

## 2025-07-20 PROCEDURE — 93005 ELECTROCARDIOGRAM TRACING: CPT

## 2025-07-20 PROCEDURE — 99232 SBSQ HOSP IP/OBS MODERATE 35: CPT | Performed by: STUDENT IN AN ORGANIZED HEALTH CARE EDUCATION/TRAINING PROGRAM

## 2025-07-20 PROCEDURE — 85025 COMPLETE CBC W/AUTO DIFF WBC: CPT | Performed by: INTERNAL MEDICINE

## 2025-07-20 PROCEDURE — 25010000002 BUMETANIDE PER 0.5 MG: Performed by: INTERNAL MEDICINE

## 2025-07-20 PROCEDURE — 82803 BLOOD GASES ANY COMBINATION: CPT

## 2025-07-20 RX ORDER — MIDODRINE HYDROCHLORIDE 5 MG/1
5 TABLET ORAL
Status: DISCONTINUED | OUTPATIENT
Start: 2025-07-20 | End: 2025-07-21

## 2025-07-20 RX ORDER — DIGOXIN 0.25 MG/ML
250 INJECTION INTRAMUSCULAR; INTRAVENOUS ONCE
Status: COMPLETED | OUTPATIENT
Start: 2025-07-21 | End: 2025-07-21

## 2025-07-20 RX ORDER — POTASSIUM CHLORIDE 1.5 G/1.58G
20 POWDER, FOR SOLUTION ORAL ONCE
Status: COMPLETED | OUTPATIENT
Start: 2025-07-20 | End: 2025-07-20

## 2025-07-20 RX ORDER — CALCIUM GLUCONATE 20 MG/ML
2000 INJECTION, SOLUTION INTRAVENOUS ONCE
Status: COMPLETED | OUTPATIENT
Start: 2025-07-20 | End: 2025-07-20

## 2025-07-20 RX ORDER — DIGOXIN 0.25 MG/ML
250 INJECTION INTRAMUSCULAR; INTRAVENOUS ONCE
Status: COMPLETED | OUTPATIENT
Start: 2025-07-20 | End: 2025-07-20

## 2025-07-20 RX ORDER — BUMETANIDE 0.25 MG/ML
4 INJECTION, SOLUTION INTRAMUSCULAR; INTRAVENOUS EVERY 8 HOURS
Status: COMPLETED | OUTPATIENT
Start: 2025-07-20 | End: 2025-07-21

## 2025-07-20 RX ORDER — POTASSIUM CHLORIDE 1500 MG/1
20 TABLET, EXTENDED RELEASE ORAL ONCE
Status: COMPLETED | OUTPATIENT
Start: 2025-07-20 | End: 2025-07-20

## 2025-07-20 RX ADMIN — MIDODRINE HYDROCHLORIDE 5 MG: 5 TABLET ORAL at 10:41

## 2025-07-20 RX ADMIN — AMIODARONE HYDROCHLORIDE 0.5 MG/MIN: 1.8 INJECTION, SOLUTION INTRAVENOUS at 10:40

## 2025-07-20 RX ADMIN — CYCLOBENZAPRINE HYDROCHLORIDE 5 MG: 10 TABLET, FILM COATED ORAL at 19:58

## 2025-07-20 RX ADMIN — SENNOSIDES AND DOCUSATE SODIUM 2 TABLET: 50; 8.6 TABLET ORAL at 09:25

## 2025-07-20 RX ADMIN — HYDROCODONE BITARTRATE AND ACETAMINOPHEN 2 TABLET: 5; 325 TABLET ORAL at 21:00

## 2025-07-20 RX ADMIN — POTASSIUM CHLORIDE 20 MEQ: 1.5 POWDER, FOR SOLUTION ORAL at 12:25

## 2025-07-20 RX ADMIN — SENNOSIDES AND DOCUSATE SODIUM 2 TABLET: 50; 8.6 TABLET ORAL at 20:05

## 2025-07-20 RX ADMIN — AMIODARONE HYDROCHLORIDE 0.5 MG/MIN: 1.8 INJECTION, SOLUTION INTRAVENOUS at 21:09

## 2025-07-20 RX ADMIN — ATORVASTATIN CALCIUM 40 MG: 20 TABLET, FILM COATED ORAL at 20:05

## 2025-07-20 RX ADMIN — GUAIFENESIN 1200 MG: 600 TABLET, EXTENDED RELEASE ORAL at 09:25

## 2025-07-20 RX ADMIN — Medication 5 MG: at 20:05

## 2025-07-20 RX ADMIN — POLYETHYLENE GLYCOL 3350 17 G: 17 POWDER, FOR SOLUTION ORAL at 18:05

## 2025-07-20 RX ADMIN — IPRATROPIUM BROMIDE AND ALBUTEROL SULFATE 3 ML: .5; 3 SOLUTION RESPIRATORY (INHALATION) at 19:41

## 2025-07-20 RX ADMIN — BUMETANIDE 4 MG: 0.25 INJECTION INTRAMUSCULAR; INTRAVENOUS at 10:40

## 2025-07-20 RX ADMIN — ENOXAPARIN SODIUM 40 MG: 100 INJECTION SUBCUTANEOUS at 17:49

## 2025-07-20 RX ADMIN — ALPRAZOLAM 0.25 MG: 0.25 TABLET ORAL at 19:58

## 2025-07-20 RX ADMIN — IPRATROPIUM BROMIDE AND ALBUTEROL SULFATE 3 ML: .5; 3 SOLUTION RESPIRATORY (INHALATION) at 14:35

## 2025-07-20 RX ADMIN — POTASSIUM CHLORIDE 20 MEQ: 1500 TABLET, EXTENDED RELEASE ORAL at 06:01

## 2025-07-20 RX ADMIN — CEFEPIME 2000 MG: 2 INJECTION, POWDER, FOR SOLUTION INTRAVENOUS at 17:49

## 2025-07-20 RX ADMIN — BUMETANIDE 4 MG: 0.25 INJECTION INTRAMUSCULAR; INTRAVENOUS at 17:50

## 2025-07-20 RX ADMIN — Medication 4 ML: at 06:41

## 2025-07-20 RX ADMIN — CALCIUM GLUCONATE 2000 MG: 20 INJECTION, SOLUTION INTRAVENOUS at 12:25

## 2025-07-20 RX ADMIN — ASPIRIN 81 MG CHEWABLE TABLET 81 MG: 81 TABLET CHEWABLE at 09:25

## 2025-07-20 RX ADMIN — ACETAMINOPHEN 650 MG: 325 TABLET, FILM COATED ORAL at 09:25

## 2025-07-20 RX ADMIN — DIGOXIN 250 MCG: 0.25 INJECTION INTRAMUSCULAR; INTRAVENOUS at 16:19

## 2025-07-20 RX ADMIN — MIDODRINE HYDROCHLORIDE 5 MG: 5 TABLET ORAL at 17:50

## 2025-07-20 RX ADMIN — MUPIROCIN 1 APPLICATION: 20 OINTMENT TOPICAL at 09:25

## 2025-07-20 RX ADMIN — AMIODARONE HYDROCHLORIDE 1 MG/MIN: 1.8 INJECTION, SOLUTION INTRAVENOUS at 03:54

## 2025-07-20 RX ADMIN — CEFEPIME 2000 MG: 2 INJECTION, POWDER, FOR SOLUTION INTRAVENOUS at 06:01

## 2025-07-20 RX ADMIN — IPRATROPIUM BROMIDE AND ALBUTEROL SULFATE 3 ML: .5; 3 SOLUTION RESPIRATORY (INHALATION) at 06:40

## 2025-07-20 RX ADMIN — GUAIFENESIN 1200 MG: 600 TABLET, EXTENDED RELEASE ORAL at 20:05

## 2025-07-20 NOTE — PROGRESS NOTES
LOS: 5 days   Patient Care Team:  Farhad Metcalf MD as PCP - General (Family Medicine)    Chief Complaint: Post-op follow-up, s/p POD #4 CABG/WEN clip-- [place]      Subjective:   Denies nausea and abdominal pain. Reports tolerating an oral diet without difficulty.     Vital Signs  Temp:  [98.6 °F (37 °C)-100.4 °F (38 °C)] 98.9 °F (37.2 °C)  Heart Rate:  [] 74  Resp:  [15-29] 16  BP: ()/() 90/70  Arterial Line BP: ()/(43-86) 108/63      07/17/25  0500 07/18/25  0605 07/19/25  0600   Weight: 71.4 kg (157 lb 6.5 oz) 70.9 kg (156 lb 4.9 oz) 70 kg (154 lb 5.2 oz)     Body mass index is 25.68 kg/m².    Intake/Output Summary (Last 24 hours) at 7/20/2025 0757  Last data filed at 7/20/2025 0700  Gross per 24 hour   Intake 3194.7 ml   Output 2595 ml   Net 599.7 ml     No intake/output data recorded.        Objective:  General Appearance:  Comfortable, well-appearing, in no acute distress and not in pain (Up in chair).    Vital signs: (most recent): Blood pressure 94/68, pulse 82, temperature 99.7 °F (37.6 °C), resp. rate 16, weight 70 kg (154 lb 5.2 oz), SpO2 94%.  Vital signs are normal.    Output: Producing urine (+buitrago) and producing stool.    Lungs:  Normal effort and normal respiratory rate.  He is not in respiratory distress.  (AirVo 40L/30% FiO2)  Heart: Tachycardia.  Irregular rhythm.  (Tele: Afib 113)  Abdomen: Abdomen is soft and non-distended.  There is no abdominal tenderness.     Extremities: Normal range of motion.  There is no dependent edema.    Neurological: Patient is alert and oriented to person, place and time.    Skin:  Warm and dry.  (Incisions healing well without erythema or drainage)            Results Review:      WBC WBC   Date Value Ref Range Status   07/20/2025 11.36 (H) 3.40 - 10.80 10*3/mm3 Final   07/19/2025 10.31 3.40 - 10.80 10*3/mm3 Final   07/19/2025 11.28 (H) 3.40 - 10.80 10*3/mm3 Final   07/18/2025 10.90 (H) 3.40 - 10.80 10*3/mm3 Final   07/18/2025 13.18  "(H) 3.40 - 10.80 10*3/mm3 Final   07/17/2025 15.05 (H) 3.40 - 10.80 10*3/mm3 Final      HGB Hemoglobin   Date Value Ref Range Status   07/20/2025 10.1 (L) 13.0 - 17.7 g/dL Final   07/19/2025 10.7 (L) 13.0 - 17.7 g/dL Final   07/19/2025 10.3 (L) 13.0 - 17.7 g/dL Final   07/18/2025 10.4 (L) 13.0 - 17.7 g/dL Final   07/18/2025 9.8 (L) 13.0 - 17.7 g/dL Final   07/17/2025 10.4 (L) 13.0 - 17.7 g/dL Final      HCT Hematocrit   Date Value Ref Range Status   07/20/2025 30.0 (L) 37.5 - 51.0 % Final   07/19/2025 32.5 (L) 37.5 - 51.0 % Final   07/19/2025 31.0 (L) 37.5 - 51.0 % Final   07/18/2025 31.5 (L) 37.5 - 51.0 % Final   07/18/2025 30.1 (L) 37.5 - 51.0 % Final   07/17/2025 30.8 (L) 37.5 - 51.0 % Final      Platelets Platelets   Date Value Ref Range Status   07/20/2025 238 140 - 450 10*3/mm3 Final   07/19/2025 186 140 - 450 10*3/mm3 Final   07/19/2025 191 140 - 450 10*3/mm3 Final   07/18/2025 145 140 - 450 10*3/mm3 Final   07/18/2025 145 140 - 450 10*3/mm3 Final   07/17/2025 128 (L) 140 - 450 10*3/mm3 Final        PT/INR:    No results found for: \"PROTIME\"  /  No results found for: \"INR\"      Sodium Sodium   Date Value Ref Range Status   07/20/2025 135 (L) 136 - 145 mmol/L Final   07/20/2025 135 (L) 136 - 145 mmol/L Final   07/19/2025 138 136 - 145 mmol/L Final   07/19/2025 140 136 - 145 mmol/L Final   07/18/2025 141 136 - 145 mmol/L Final   07/18/2025 140 136 - 145 mmol/L Final   07/17/2025 140 136 - 145 mmol/L Final      Potassium Potassium   Date Value Ref Range Status   07/20/2025 3.8 3.5 - 5.2 mmol/L Final   07/20/2025 3.8 3.5 - 5.2 mmol/L Final   07/19/2025 3.9 3.5 - 5.2 mmol/L Final   07/19/2025 4.1 3.5 - 5.2 mmol/L Final   07/19/2025 3.8 3.5 - 5.2 mmol/L Final     Comment:     Slight hemolysis detected by analyzer. Result may be falsely elevated.   07/18/2025 3.7 3.5 - 5.2 mmol/L Final   07/18/2025 3.8 3.5 - 5.2 mmol/L Final   07/18/2025 3.6 3.5 - 5.2 mmol/L Final   07/18/2025 3.7 3.5 - 5.2 mmol/L Final     Comment: "     Slight hemolysis detected by analyzer. Result may be falsely elevated.   07/17/2025 4.3 3.5 - 5.2 mmol/L Final   07/17/2025 4.2 3.5 - 5.2 mmol/L Final      Chloride Chloride   Date Value Ref Range Status   07/20/2025 100 98 - 107 mmol/L Final   07/20/2025 100 98 - 107 mmol/L Final   07/19/2025 102 98 - 107 mmol/L Final   07/19/2025 104 98 - 107 mmol/L Final   07/18/2025 105 98 - 107 mmol/L Final   07/18/2025 105 98 - 107 mmol/L Final   07/17/2025 107 98 - 107 mmol/L Final      Bicarbonate CO2   Date Value Ref Range Status   07/20/2025 21.0 (L) 22.0 - 29.0 mmol/L Final   07/20/2025 21.0 (L) 22.0 - 29.0 mmol/L Final   07/19/2025 20.0 (L) 22.0 - 29.0 mmol/L Final   07/19/2025 21.0 (L) 22.0 - 29.0 mmol/L Final   07/18/2025 22.0 22.0 - 29.0 mmol/L Final   07/18/2025 21.8 (L) 22.0 - 29.0 mmol/L Final   07/17/2025 22.7 22.0 - 29.0 mmol/L Final      BUN BUN   Date Value Ref Range Status   07/20/2025 42.0 (H) 8.0 - 23.0 mg/dL Final   07/20/2025 42.0 (H) 8.0 - 23.0 mg/dL Final   07/19/2025 39.0 (H) 8.0 - 23.0 mg/dL Final   07/19/2025 38.0 (H) 8.0 - 23.0 mg/dL Final   07/18/2025 37.0 (H) 8.0 - 23.0 mg/dL Final   07/18/2025 31.0 (H) 8.0 - 23.0 mg/dL Final   07/17/2025 30.0 (H) 8.0 - 23.0 mg/dL Final      Creatinine Creatinine   Date Value Ref Range Status   07/20/2025 1.99 (H) 0.76 - 1.27 mg/dL Final   07/20/2025 1.99 (H) 0.76 - 1.27 mg/dL Final   07/19/2025 1.81 (H) 0.76 - 1.27 mg/dL Final   07/19/2025 1.77 (H) 0.76 - 1.27 mg/dL Final   07/18/2025 1.75 (H) 0.76 - 1.27 mg/dL Final   07/18/2025 1.85 (H) 0.76 - 1.27 mg/dL Final   07/17/2025 1.99 (H) 0.76 - 1.27 mg/dL Final      Calcium Calcium   Date Value Ref Range Status   07/20/2025 7.8 (L) 8.6 - 10.5 mg/dL Final   07/20/2025 7.8 (L) 8.6 - 10.5 mg/dL Final   07/19/2025 8.6 8.6 - 10.5 mg/dL Final   07/19/2025 8.2 (L) 8.6 - 10.5 mg/dL Final   07/18/2025 8.5 (L) 8.6 - 10.5 mg/dL Final   07/18/2025 8.4 (L) 8.6 - 10.5 mg/dL Final   07/17/2025 8.2 (L) 8.6 - 10.5 mg/dL Final       Magnesium Magnesium   Date Value Ref Range Status   07/20/2025 2.6 (H) 1.6 - 2.4 mg/dL Final   07/19/2025 2.6 (H) 1.6 - 2.4 mg/dL Final   07/19/2025 2.7 (H) 1.6 - 2.4 mg/dL Final   07/18/2025 2.8 (H) 1.6 - 2.4 mg/dL Final   07/18/2025 2.9 (H) 1.6 - 2.4 mg/dL Final   07/17/2025 2.9 (H) 1.6 - 2.4 mg/dL Final          aspirin, 81 mg, Oral, Daily  atorvastatin, 40 mg, Oral, Nightly  bisacodyl, 10 mg, Rectal, Daily  cefepime, 2,000 mg, Intravenous, Q12H  enoxaparin sodium, 40 mg, Subcutaneous, Daily  guaiFENesin, 1,200 mg, Oral, Q12H  insulin lispro, 2-7 Units, Subcutaneous, 4x Daily AC & at Bedtime  ipratropium-albuterol, 3 mL, Nebulization, 4x Daily - RT  melatonin, 5 mg, Oral, Nightly  [Held by provider] metoprolol tartrate, 12.5 mg, Oral, Q12H  mupirocin, 1 Application, Each Nare, BID  polyethylene glycol, 17 g, Oral, Daily  senna-docusate sodium, 2 tablet, Oral, BID  [Held by provider] trimethoprim-polymyxin b, 1 drop, Right Eye, 6x Daily      amiodarone, 1 mg/min, Last Rate: 1 mg/min (07/20/25 0700)  clevidipine, 2-32 mg/hr  DOPamine, 2-20 mcg/kg/min  EPINEPHrine, 0.02-0.3 mcg/kg/min  niCARdipine, 5-15 mg/hr, Last Rate: Stopped (07/15/25 1625)  nitroglycerin, 5-200 mcg/min  norepinephrine, 0.02-0.3 mcg/kg/min, Last Rate: Stopped (07/19/25 2301)  phenylephrine, 0.2-3 mcg/kg/min, Last Rate: 0.4 mcg/kg/min (07/20/25 0700)        Assessment & Plan    - Severe MV-CAD -- s/p CABG, LEVH, WEN occlusion via atriclip with Dr. Patino 7/15/25  - AAA (hx aorto iliac femoral right popliteal stent repair x2 with Dr. Archer) -- AAA measuring 5.3cm--plans for surgery in 4 to 6 weeks  - HTN  - HLD  - PAD  - Hx DVT  - Right leg vein stripping  - VERN on CKD stage 2---renal following   - Post-op anemia, expected ABLA, watch closely  - Post-op TCP, resolved  - PAF with RVR post-op---currently PAF, on Amio gtt  - Post-op hypoxia---on AirVo  - Post-op ileus   - Prolonged QTc      POD #5. Slowly progressing. Tolerating oral diet.  Currently on AirVo 40L/30%. Discussed with nursing and RT and we will try transitioning to high flow nasal cannula today so patient can ambulate. Still in and out of Afib. Blood pressure clearly affected when in Afib and heart rate elevated. Currently on Amiodarone drip at 1 mg/min and Mayank 0.2. QTc 554 today. Discussed with Dr. Sandoval and Dr. Patino -- keep on Amiodarone drip today at 0.5 mg/min and start Midodrine 5 mg po tid. Not on beta blocker secondary to hypotension. Replace calcium. Creatine up to 1.99 today. Nephrology following and planning to diuresis again today. Repeat CXR in am. Keep in CVR today. Encourage IS and ambulation.       Kayy Marcial PA-C  07/20/25  07:57 EDT

## 2025-07-20 NOTE — PROGRESS NOTES
[facility] Progress Note      Patient Name: Erickson Gerard  : 1945  MRN: [id number]  Primary Care Physician:  Farhad Metcalf MD  Date of admission: 7/15/2025    Subjective     Interval History:   Acute kidney injury on chronic kidney disease    The patient is on high flow oxygen via nasal cannula, O2 saturation is 90%, blood pressure is soft, he has shortness of breath, remains on room vasopressors.    Review of Systems:   As noted above    Objective     Vitals:   Temp:  [98.7 °F (37.1 °C)-100.4 °F (38 °C)] 98.9 °F (37.2 °C)  Heart Rate:  [] 74  Resp:  [15-29] 16  BP: ()/() 90/70  Arterial Line BP: ()/(43-86) 108/63  Flow (L/min) (Oxygen Therapy):  [12-60] 12    Intake/Output Summary (Last 24 hours) at 2025 0943  Last data filed at 2025 0700  Gross per 24 hour   Intake 3044.7 ml   Output 2370 ml   Net 674.7 ml       Physical Exam:    General Appearance: alert, awake, chronically ill no acute distress   Skin: warm and dry  HEENT: oral mucosa normal, nonicteric sclera nasal cannula in place  Neck: No JVD  Lungs: Bilateral rhonchi, breathing effort not  Heart: Irregularly irregular, no rub  Abdomen: soft, nontender, nondistended  : no palpable bladder thoracic  Extremities: 1+ ankle edema.  Neuro: Moving all extremities    Scheduled Meds:     aspirin, 81 mg, Oral, Daily  atorvastatin, 40 mg, Oral, Nightly  bisacodyl, 10 mg, Rectal, Daily  cefepime, 2,000 mg, Intravenous, Q12H  enoxaparin sodium, 40 mg, Subcutaneous, Daily  guaiFENesin, 1,200 mg, Oral, Q12H  insulin lispro, 2-7 Units, Subcutaneous, 4x Daily AC & at Bedtime  ipratropium-albuterol, 3 mL, Nebulization, 4x Daily - RT  melatonin, 5 mg, Oral, Nightly  [Held by provider] metoprolol tartrate, 12.5 mg, Oral, Q12H  mupirocin, 1 Application, Each Nare, BID  polyethylene glycol, 17 g, Oral, Daily  senna-docusate sodium, 2 tablet, Oral, BID  [Held by provider] trimethoprim-polymyxin b, 1  drop, Right Eye, 6x Daily      IV Meds:   clevidipine, 2-32 mg/hr  DOPamine, 2-20 mcg/kg/min  EPINEPHrine, 0.02-0.3 mcg/kg/min  niCARdipine, 5-15 mg/hr, Last Rate: Stopped (07/15/25 1625)  nitroglycerin, 5-200 mcg/min  norepinephrine, 0.02-0.3 mcg/kg/min, Last Rate: Stopped (07/19/25 2301)  phenylephrine, 0.2-3 mcg/kg/min, Last Rate: 0.2 mcg/kg/min (07/20/25 0800)        Results Reviewed:   I have personally reviewed the results from the time of this admission to 7/20/2025 09:43 EDT     Results from last 7 days   Lab Units 07/20/25  0254 07/19/25  1529 07/19/25  1029 07/19/25  0256 07/18/25  1457 07/18/25  0901   SODIUM mmol/L 135*  135* 138  --  140   < >  --    POTASSIUM mmol/L 3.8  3.8 3.9 4.1 3.8   < > 3.6   CHLORIDE mmol/L 100  100 102  --  104   < >  --    CO2 mmol/L 21.0*  21.0* 20.0*  --  21.0*   < >  --    BUN mg/dL 42.0*  42.0* 39.0*  --  38.0*   < >  --    CREATININE mg/dL 1.99*  1.99* 1.81*  --  1.77*   < >  --    CALCIUM mg/dL 7.8*  7.8* 8.6  --  8.2*   < >  --    BILIRUBIN mg/dL 0.5  --   --  0.7  --  0.4   ALK PHOS U/L 113  --   --  75  --  59   ALT (SGPT) U/L 11  --   --  7  --  5   AST (SGOT) U/L 41*  --   --  41*  --  46*   GLUCOSE mg/dL 119*  119* 158*  --  115*   < >  --     < > = values in this interval not displayed.       Estimated Creatinine Clearance: 30.3 mL/min (A) (by C-G formula based on SCr of 1.99 mg/dL (H)).    Results from last 7 days   Lab Units 07/20/25  0254 07/19/25  1529 07/19/25  0256   MAGNESIUM mg/dL 2.6* 2.6* 2.7*   PHOSPHORUS mg/dL 4.2 3.6 4.2       Results from last 7 days   Lab Units 07/20/25  0254 07/19/25  0256   URIC ACID mg/dL 7.3* 7.2*       Results from last 7 days   Lab Units 07/20/25  0254 07/19/25  1529 07/19/25  0256 07/18/25  1457 07/18/25  0142   WBC 10*3/mm3 11.36* 10.31 11.28* 10.90* 13.18*   HEMOGLOBIN g/dL 10.1* 10.7* 10.3* 10.4* 9.8*   PLATELETS 10*3/mm3 238 186 191 145 145       Results from last 7 days   Lab Units 07/16/25  0333 07/15/25  1625  07/14/25  0716   INR  1.65* 1.68* 1.27*       Assessment / Plan     ASSESSMENT:  Acute kidney injury on chronic kidney disease stage II secondary to hemodynamic changes post CABG creatinine is stable.  Patient has evidence of volume excess, his electrolytes within acceptable range.  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  AAA 5.3 cm monitored by vascular surgery  Hypertension with CKD currently on vasopressors.  Volume excess,  Postop anemia hemoglobin today is 10.1, stable  Relative hypoxia associate with volume excess    PLAN:  Will diurese today with IV bumetanide  Continue the same treatment  Surveillance labs    I reviewed the chart and other providers notes, reviewed labs.  Discussed the case with the CTS team  Copied text in this note has been reviewed and is accurate as of 07/20/25.         Thank you for involving us in the care of Erickson Gerard.  Please feel free to call with any questions.    Angel Franco MD  07/20/25  09:43 EDT    [facility]  [phone]    Please note that portions of this note were completed with a voice recognition program.

## 2025-07-20 NOTE — PROGRESS NOTES
LOS: 5 days   Patient Care Team:  Farhad Metcalf MD as PCP - General (Family Medicine)    Chief Complaint:  Following post-CABG    Interval History:     Paroxysmal A-fib continues with the patient's rapidly improving.  He is on nasal cannula down from HFNC.  No chest pain.    Objective   Vital Signs  Temp:  [98.7 °F (37.1 °C)-100.4 °F (38 °C)] 98.9 °F (37.2 °C)  Heart Rate:  [] 74  Resp:  [15-29] 16  BP: ()/() 90/70  Arterial Line BP: ()/(43-86) 108/63    Intake/Output Summary (Last 24 hours) at 7/20/2025 0817  Last data filed at 7/20/2025 0700  Gross per 24 hour   Intake 3194.7 ml   Output 2595 ml   Net 599.7 ml       Comfortable NAD, resting in bed   PERRL, conjunctivae clear  Neck supple, no JVD or thyromegaly appreciated.     S1/S2 regular rate and rhythm time of exam, no m/r/g are appreciated   normal effort. coarse breath sounds.  Nasal cannula O2.  NG tube no longer in place.  Abdomen nondistended.  Extremities warm, no clubbing, cyanosis, no significant edema   No visible or palpable skin lesions  A/O x 3, mood and affect appropriate    Results Review:      Results from last 7 days   Lab Units 07/20/25 0254 07/19/25  1529 07/19/25  1029 07/19/25  0256   SODIUM mmol/L 135*  135* 138  --  140   POTASSIUM mmol/L 3.8  3.8 3.9 4.1 3.8   CHLORIDE mmol/L 100  100 102  --  104   CO2 mmol/L 21.0*  21.0* 20.0*  --  21.0*   BUN mg/dL 42.0*  42.0* 39.0*  --  38.0*   CREATININE mg/dL 1.99*  1.99* 1.81*  --  1.77*   GLUCOSE mg/dL 119*  119* 158*  --  115*   CALCIUM mg/dL 7.8*  7.8* 8.6  --  8.2*         Results from last 7 days   Lab Units 07/20/25  0254 07/19/25  1529 07/19/25  0256   WBC 10*3/mm3 11.36* 10.31 11.28*   HEMOGLOBIN g/dL 10.1* 10.7* 10.3*   HEMATOCRIT % 30.0* 32.5* 31.0*   PLATELETS 10*3/mm3 238 186 191     Results from last 7 days   Lab Units 07/16/25  0333 07/15/25  1625 07/14/25  0716   INR  1.65* 1.68* 1.27*   APTT seconds  --  36.7* 33.2     Results from last 7  days   Lab Units 07/14/25  0716   CHOLESTEROL mg/dL 141     Results from last 7 days   Lab Units 07/20/25  0254   MAGNESIUM mg/dL 2.6*     Results from last 7 days   Lab Units 07/14/25  0716   CHOLESTEROL mg/dL 141   TRIGLYCERIDES mg/dL 139   HDL CHOL mg/dL 42   LDL CHOL mg/dL 75       I reviewed the patient's new clinical results.  I personally viewed and interpreted the patient's EKG/Telemetry data        Medication Review:   aspirin, 81 mg, Oral, Daily  atorvastatin, 40 mg, Oral, Nightly  bisacodyl, 10 mg, Rectal, Daily  cefepime, 2,000 mg, Intravenous, Q12H  enoxaparin sodium, 40 mg, Subcutaneous, Daily  guaiFENesin, 1,200 mg, Oral, Q12H  insulin lispro, 2-7 Units, Subcutaneous, 4x Daily AC & at Bedtime  ipratropium-albuterol, 3 mL, Nebulization, 4x Daily - RT  melatonin, 5 mg, Oral, Nightly  [Held by provider] metoprolol tartrate, 12.5 mg, Oral, Q12H  mupirocin, 1 Application, Each Nare, BID  polyethylene glycol, 17 g, Oral, Daily  senna-docusate sodium, 2 tablet, Oral, BID  [Held by provider] trimethoprim-polymyxin b, 1 drop, Right Eye, 6x Daily        clevidipine, 2-32 mg/hr  DOPamine, 2-20 mcg/kg/min  EPINEPHrine, 0.02-0.3 mcg/kg/min  niCARdipine, 5-15 mg/hr, Last Rate: Stopped (07/15/25 1625)  nitroglycerin, 5-200 mcg/min  norepinephrine, 0.02-0.3 mcg/kg/min, Last Rate: Stopped (07/19/25 2301)  phenylephrine, 0.2-3 mcg/kg/min, Last Rate: 0.4 mcg/kg/min (07/20/25 0700)        Assessment & Plan       Hx of CABG    Coronary artery disease involving native heart    Abnormal findings on diagnostic imaging of heart and coronary circulation    Severe multivessel CAD, status post CABG X6, left atrial appendage occlusion with atrial clip device July 15, 2025 with Dr. Patino, indication stable coronary artery disease  Aspirin 81  Atorvastatin 40  AAA, 5.3 cm.  Postoperative hypoxic respiratory failure, on HFNC.  Continue diuresis per nephrology  Postoperative VERN, nephrology following, on bumetanide  History of  prior DVT  History of PAD with stenting, on Xarelto maintenance 10 mg therapy per vascular surgery.  Xarelto on hold  Atrial fibrillation, postoperative.  In sinus rhythm today.  His blood pressure on the lower side with his CKD I would try to maintain sinus rhythm.  The A-fib does impact his blood pressure little bit  Continue IV amiodarone, since he is having paroxysmal A-fib still, I would recommend continuing 0.5 mg amio throughout the remainder of the day  This QTc is a bit prolonged however would favor continuing amiodarone given the hemodynamic consequence per above.  I feel his risk of pro-arrhythmia with amiodarone is low  He continues to have paroxysms with a slightly rapid rate, and very mild impact on his hemodynamics.  Will give a dose of IV digoxin.  Metoprolol has been held due to lower blood pressures, will reassess determine about reinitiation of this.  Prophylactic Lovenox for now, if this keeps happening he may need to escalate his anticoagulation if this keeps happening and we keep using amiodarone we may need to consider therapeutic anticoagulation for at least the next 30 days.  His atrium was clipped.  Postoperative anemia, suspected acute blood loss, expected.  Thrombocytopenia, improving  Hypertension  Postoperative ileus requiring NG tube decompression.  No nausea today.    Hypoxia improving.  Will continue amiodarone to try to maintain sinus rhythm since he is having frequent paroxysms of A-fib.  May need to consider therapeutic anticoagulation if the A-fib becomes more persistent, thankfully his appendage was clipped.  Will follow.    Jorge A Sandoval MD  07/20/25  08:17 EDT      Part of this note may be an electronic transcription/translation of spoken language to printed text using the Dragon Dictation System.

## 2025-07-20 NOTE — PLAN OF CARE
Goal Outcome Evaluation:  Plan of Care Reviewed With: patient        Progress: improving  Outcome Evaluation: Pt seated UIC at start of session and eager to work with PT. Pt on Hi flow O2, able to ambulate further distance as a result. 120' with B HHA, Edgard x 2, 3-4 standing rest breaks during gait. He continues to benefit from skilled PT, will progress as tolerated.

## 2025-07-20 NOTE — THERAPY TREATMENT NOTE
Patient Name: Erickson Gerard  : 1945    MRN: [id number]                              Today's Date: 2025       Admit Date: 7/15/2025    Visit Dx:     ICD-10-CM ICD-9-CM   1. Decreased activities of daily living (ADL)  Z78.9 V49.89   2. Coronary artery disease involving native heart, unspecified vessel or lesion type, unspecified whether angina present  I25.10 414.01   3. Abnormal findings on diagnostic imaging of heart and coronary circulation  R93.1 794.39   4. S/P CABG (coronary artery bypass graft)  Z95.1 V45.81     Patient Active Problem List   Diagnosis    Essential hypertension    GERD (gastroesophageal reflux disease)    Medicare annual wellness visit, subsequent    Benign prostatic hyperplasia with urinary frequency    Umbilical hernia without obstruction and without gangrene    Colon cancer screening    Mixed hyperlipidemia    Localized edema    Right leg swelling    Aneurysm of right popliteal artery    Acute deep vein thrombosis (DVT) of distal vein of right lower extremity    DDD (degenerative disc disease), lumbar    Actinic keratosis of forehead    Aneurysm of right popliteal artery    Abdominal aortic aneurysm (AAA) 3.0 cm to 5.5 cm in diameter in male    PVD (peripheral vascular disease)    High risk medication use    Statin intolerance    Immunization deficiency    Chronic deep vein thrombosis (DVT)    Encounter for hepatitis C screening test for low risk patient    Peripheral artery aneurysm    Right elbow pain    Blurred vision    Nausea    Dizziness    Visual disturbance    Preoperative cardiovascular examination    Coronary artery disease of native artery of native heart with stable angina pectoris    Abnormal stress test    Coronary artery disease involving native heart    Abnormal findings on diagnostic imaging of heart and coronary circulation    Hx of CABG     Past Medical History:   Diagnosis Date    AAA (abdominal aortic aneurysm) without rupture 10/14/2021    Abdominal aortic  aneurysm, without rupture, unspecified 03/14/2023    Acute embolism and thrombosis of unspecified deep veins of right distal lower extremity     Anesthesia complication     AFTER VEIN SURGERY STATES HE WAS HARD TO WAKE UP    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     Aneurysm of artery of lower extremity 09/09/2021    Arthritis     At risk for sleep apnea     STOP BANG 5    Coronary artery disease     Dyspnea on exertion     Essential (primary) hypertension     GERD (gastroesophageal reflux disease)     [place] (hard of hearing)     Hyperlipidemia     Hypertension     Iliac artery aneurysm 10/14/2021    Localized edema     Low back pain     Mixed hyperlipidemia     Other specified soft tissue disorders     PAD (peripheral artery disease)     Peripheral artery aneurysm     PONV (postoperative nausea and vomiting)     Popliteal aneurysm     Thrombosis 09/09/2021    Calf Vein Thrombosis Rt tibial vein    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     Past Surgical History:   Procedure Laterality Date    ANGIOPLASTY POPLITEAL ARTERY Right 11/09/2021    Procedure: RIGHT POPITEAL ANEURYSM REPAIR VIABOHN;  Surgeon: Campos Archer MD;  Location: [facility] OR 18/19;  Service: Vascular;  Laterality: Right;    ANGIOPLASTY POPLITEAL ARTERY Right 01/25/2022    Procedure: AORTO ILIAC FEMORAL RIGHT POPLITEAL VIABOHN STENT PLACEMENT X 2;  Surgeon: Campos Archer MD;  Location: [facility] OR 18/19;  Service: Vascular;  Laterality: Right;    CARDIAC CATHETERIZATION N/A 06/19/2025    Procedure: Left Heart Cath;  Surgeon: Oneil Escobar MD;  Location: [facility] INVASIVE LOCATION;  Service: Cardiovascular;  Laterality: N/A;    CARDIAC CATHETERIZATION N/A 06/19/2025    Procedure: Left ventriculography;  Surgeon: Oneil Escobar MD;  Location: [facility] INVASIVE LOCATION;  Service: Cardiovascular;  Laterality: N/A;    COLONOSCOPY      CORONARY ARTERY BYPASS GRAFT N/A 7/15/2025    Procedure: STERNOTOMY; CORONARY ARTERY BYPASS  GRAFTING TIMES 6 USING LEFT INTERNAL MAMMARY ARTERY AND LEFT SAPHENOUS VEIN; TRANSESOPHAGEAL ECHOCARDIOGRAM WITH ANESTHESIA;LEFT ATRIAL APPENDAGE EXCLUSION; PRP;  Surgeon: Jr Oscar Patino MD;  Location: [facility];  Service: Cardiothoracic;  Laterality: N/A;    EYE SURGERY Bilateral     CATARACTS    HERNIA REPAIR Bilateral     POLITEAL ARTERY ANEURYSM REPAIR Right 11/09/2021    Viabahn Stent Graft    VEIN LIGATION AND STRIPPING N/A       General Information       Row Name 07/20/25 1200          Physical Therapy Time and Intention    Document Type therapy note (daily note)  -DB     Mode of Treatment physical therapy;individual therapy  -DB       Row Name 07/20/25 1200          General Information    Patient Profile Reviewed yes  -DB               User Key  (r) = Recorded By, (t) = Taken By, (c) = Cosigned By      Initials Name Provider Type    DB Elise Mata PT Physical Therapist                   Mobility       Row Name 07/20/25 1201          Sit-Stand Transfer    Sit-Stand [place] (Transfers) 2 person assist  -DB     Assistive Device (Sit-Stand Transfers) other (see comments)  HHA  -DB       Row Name 07/20/25 1201          Gait/Stairs (Locomotion)    [place] Level (Gait) minimum assist (75% patient effort);2 person assist;verbal cues  -DB     Assistive Device (Gait) other (see comments)  B HHA  -DB     Distance in Feet (Gait) 120  -DB     Deviations/Abnormal Patterns (Gait) antonella decreased;base of support, narrow;gait speed decreased  -DB     Bilateral Gait Deviations forward flexed posture;heel strike decreased  -DB               User Key  (r) = Recorded By, (t) = Taken By, (c) = Cosigned By      Initials Name Provider Type    Elise Flores PT Physical Therapist                   Obj/Interventions       Row Name 07/20/25 1202          Motor Skills    Therapeutic Exercise other (see comments)  cardiac ther ex x 10  -DB               User Key  (r) = Recorded By, (t) = Taken By, (c) =  Cosigned By      Initials Name Provider Type    DB Elise Mata, PT Physical Therapist                   Goals/Plan    No documentation.                  Clinical Impression       Row Name 07/20/25 1202          Plan of Care Review    Plan of Care Reviewed With patient  -DB     Progress improving  -DB     Outcome Evaluation Pt seated UIC at start of session and eager to work with PT. Pt on Hi flow O2, able to ambulate further distance as a result. 120' with B HHA, Edgard x 2, 3-4 standing rest breaks during gait. He continues to benefit from skilled PT, will progress as tolerated.  -DB       Row Name 07/20/25 1202          Vital Signs    O2 Delivery Pre Treatment supplemental O2  -DB     O2 Delivery Intra Treatment supplemental O2  -DB     O2 Delivery Post Treatment supplemental O2  -DB     Pre Patient Position Sitting  -DB     Intra Patient Position Standing  -DB     Post Patient Position Sitting  -DB       Row Name 07/20/25 1202          Positioning and Restraints    Pre-Treatment Position sitting in chair/recliner  -DB     Post Treatment Position chair  -DB     In Chair reclined;call light within reach;encouraged to call for assist;with nsg  -DB               User Key  (r) = Recorded By, (t) = Taken By, (c) = Cosigned By      Initials Name Provider Type    Elise Flores, PT Physical Therapist                   Outcome Measures       Row Name 07/20/25 1204          How much help from another person do you currently need...    Turning from your back to your side while in flat bed without using bedrails? 3  -DB     Moving from lying on back to sitting on the side of a flat bed without bedrails? 3  -DB     Moving to and from a bed to a chair (including a wheelchair)? 3  -DB     Standing up from a chair using your arms (e.g., wheelchair, bedside chair)? 3  -DB     Climbing 3-5 steps with a railing? 2  -DB     To walk in hospital room? 3  -DB     AM-PAC 6 Clicks Score (PT) 17  -DB     Highest Level of Mobility  Goal Stand (1 or More Minutes)-5  -DB       Row Name 07/20/25 1204          Functional Assessment    Outcome Measure Options AM-PAC 6 Clicks Basic Mobility (PT)  -DB               User Key  (r) = Recorded By, (t) = Taken By, (c) = Cosigned By      Initials Name Provider Type    DB Elise Mata, PT Physical Therapist                                 Physical Therapy Education       Title: PT OT SLP Therapies (Done)       Topic: Physical Therapy (Done)       Point: Mobility training (Done)       Learning Progress Summary            Patient Acceptance, E, VU by DB at 7/19/2025 1050    Acceptance, E,D, VU,NR by MS at 7/18/2025 1121    Acceptance, E, NR by AR at 7/17/2025 1254    Acceptance, E, NR by EM at 7/16/2025 1022                      Point: Home exercise program (Done)       Learning Progress Summary            Patient Acceptance, E, VU by DB at 7/19/2025 1050    Acceptance, E,D, VU,NR by MS at 7/18/2025 1121    Acceptance, E, NR by AR at 7/17/2025 1254    Acceptance, E, NR by EM at 7/16/2025 1022                      Point: Body mechanics (Done)       Learning Progress Summary            Patient Acceptance, E, VU by DB at 7/19/2025 1050    Acceptance, E,D, VU,NR by MS at 7/18/2025 1121    Acceptance, E, NR by AR at 7/17/2025 1254                      Point: Precautions (Done)       Learning Progress Summary            Patient Acceptance, E, VU by DB at 7/19/2025 1050    Acceptance, E,D, VU,NR by MS at 7/18/2025 1121    Acceptance, E, NR by AR at 7/17/2025 1254                                      User Key       Initials Effective Dates Name Provider Type Discipline    EM 06/16/21 -  Kayy Adams, PT Physical Therapist PT    MS 06/16/21 -  Campos Costa, PT Physical Therapist PT    AR 06/16/21 -  Mary Curry, PT Physical Therapist PT    DB 06/27/25 -  Elise Mata, PT Physical Therapist PT                  PT Recommendation and Plan     Progress: improving  Outcome Evaluation: Pt seated  UIC at start of session and eager to work with PT. Pt on Hi flow O2, able to ambulate further distance as a result. 120' with B HHA, Edgard x 2, 3-4 standing rest breaks during gait. He continues to benefit from skilled PT, will progress as tolerated.     Time Calculation:         PT Charges       Row Name 07/20/25 1204             Time Calculation    Start Time 0950  -DB      Stop Time 1006  -DB      Time Calculation (min) 16 min  -DB      PT Received On 07/20/25  -DB      PT - Next Appointment 07/21/25  -DB         Time Calculation- PT    Total Timed Code Minutes- PT 16 minute(s)  -DB                User Key  (r) = Recorded By, (t) = Taken By, (c) = Cosigned By      Initials Name Provider Type    Elise Flores, [place] Physical Therapist                  Therapy Charges for Today       Code Description Service Date Service Provider Modifiers Qty    81108406724  PT THERAPEUTIC ACT EA 15 MIN 7/19/2025 Elise Mata, PT GP 1    [id number]  PT THER SUPP EA 15 MIN 7/19/2025 Elise Mata, PT GP 1    [id number]  PT THERAPEUTIC ACT EA 15 MIN 7/20/2025 Elise Mata, PT GP 1    [id number]  PT THER SUPP EA 15 MIN 7/20/2025 Elise Mata, PT GP 1            PT G-Codes  Outcome Measure Options: AM-PAC 6 Clicks Basic Mobility (PT)  AM-PAC 6 Clicks Score (PT): 17  AM-PAC 6 Clicks Score (OT): 16  Modified Kathy Scale: 3 - Moderate disability.  Requiring some help, but able to walk without assistance.       Elise Mata PT  7/20/2025

## 2025-07-21 ENCOUNTER — APPOINTMENT (OUTPATIENT)
Dept: GENERAL RADIOLOGY | Facility: HOSPITAL | Age: 80
DRG: 235 | End: 2025-07-21
Payer: MEDICARE

## 2025-07-21 PROBLEM — Z78.9 STATIN INTOLERANCE: Status: RESOLVED | Noted: 2022-12-07 | Resolved: 2025-07-21

## 2025-07-21 PROBLEM — I48.0 PAROXYSMAL ATRIAL FIBRILLATION: Status: ACTIVE | Noted: 2025-07-21

## 2025-07-21 PROBLEM — Z01.810 PREOPERATIVE CARDIOVASCULAR EXAMINATION: Status: RESOLVED | Noted: 2025-06-09 | Resolved: 2025-07-21

## 2025-07-21 PROBLEM — R42 DIZZINESS: Status: RESOLVED | Noted: 2024-11-22 | Resolved: 2025-07-21

## 2025-07-21 PROBLEM — M79.89 RIGHT LEG SWELLING: Status: RESOLVED | Noted: 2021-07-26 | Resolved: 2025-07-21

## 2025-07-21 PROBLEM — R11.0 NAUSEA: Status: RESOLVED | Noted: 2024-11-22 | Resolved: 2025-07-21

## 2025-07-21 PROBLEM — Z95.1 HX OF CABG: Status: RESOLVED | Noted: 2025-07-15 | Resolved: 2025-07-21

## 2025-07-21 PROBLEM — R93.1 ABNORMAL FINDINGS ON DIAGNOSTIC IMAGING OF HEART AND CORONARY CIRCULATION: Status: RESOLVED | Noted: 2025-06-19 | Resolved: 2025-07-21

## 2025-07-21 PROBLEM — R60.0 LOCALIZED EDEMA: Status: RESOLVED | Noted: 2021-07-26 | Resolved: 2025-07-21

## 2025-07-21 PROBLEM — R94.39 ABNORMAL STRESS TEST: Status: RESOLVED | Noted: 2025-06-11 | Resolved: 2025-07-21

## 2025-07-21 PROBLEM — H53.9 VISUAL DISTURBANCE: Status: RESOLVED | Noted: 2024-12-09 | Resolved: 2025-07-21

## 2025-07-21 PROBLEM — M25.521 RIGHT ELBOW PAIN: Status: RESOLVED | Noted: 2024-08-15 | Resolved: 2025-07-21

## 2025-07-21 LAB
ALBUMIN SERPL-MCNC: 3.9 G/DL (ref 3.5–5.2)
ALP SERPL-CCNC: 181 U/L (ref 39–117)
ALT SERPL W P-5'-P-CCNC: 31 U/L (ref 1–41)
AMYLASE SERPL-CCNC: 40 U/L (ref 28–100)
ANION GAP SERPL CALCULATED.3IONS-SCNC: 16.8 MMOL/L (ref 5–15)
ANION GAP SERPL CALCULATED.3IONS-SCNC: 17 MMOL/L (ref 5–15)
ARTERIAL PATENCY WRIST A: ABNORMAL
AST SERPL-CCNC: 62 U/L (ref 1–40)
ATMOSPHERIC PRESS: 746 MMHG
ATMOSPHERIC PRESS: 747.3 MMHG
ATMOSPHERIC PRESS: 747.9 MMHG
B-OH-BUTYR SERPL-SCNC: 0.2 MMOL/L (ref 0.02–0.27)
BASE EXCESS BLDA CALC-SCNC: -0.4 MMOL/L (ref 0–2)
BASE EXCESS BLDA CALC-SCNC: -1.7 MMOL/L (ref 0–2)
BASE EXCESS BLDA CALC-SCNC: 0.3 MMOL/L (ref 0–2)
BASOPHILS # BLD AUTO: 0.04 10*3/MM3 (ref 0–0.2)
BASOPHILS NFR BLD AUTO: 0.4 % (ref 0–1.5)
BDY SITE: ABNORMAL
BILIRUB CONJ SERPL-MCNC: 0.2 MG/DL (ref 0–0.3)
BILIRUB INDIRECT SERPL-MCNC: 0.4 MG/DL
BILIRUB SERPL-MCNC: 0.6 MG/DL (ref 0–1.2)
BUN SERPL-MCNC: 46 MG/DL (ref 8–23)
BUN SERPL-MCNC: 46 MG/DL (ref 8–23)
BUN/CREAT SERPL: 19.5 (ref 7–25)
BUN/CREAT SERPL: 20.6 (ref 7–25)
CA-I SERPL ISE-MCNC: 1.05 MMOL/L (ref 1.15–1.35)
CA-I SERPL ISE-MCNC: 1.13 MMOL/L (ref 1.15–1.35)
CALCIUM SPEC-SCNC: 8.4 MG/DL (ref 8.6–10.5)
CALCIUM SPEC-SCNC: 8.4 MG/DL (ref 8.6–10.5)
CHLORIDE SERPL-SCNC: 101 MMOL/L (ref 98–107)
CHLORIDE SERPL-SCNC: 103 MMOL/L (ref 98–107)
CO2 SERPL-SCNC: 19.2 MMOL/L (ref 22–29)
CO2 SERPL-SCNC: 20 MMOL/L (ref 22–29)
CORTIS SERPL-MCNC: 24.2 MCG/DL
CREAT SERPL-MCNC: 2.23 MG/DL (ref 0.76–1.27)
CREAT SERPL-MCNC: 2.36 MG/DL (ref 0.76–1.27)
DEPRECATED RDW RBC AUTO: 41.9 FL (ref 37–54)
DEPRECATED RDW RBC AUTO: 44.5 FL (ref 37–54)
DEVICE COMMENT 2: ABNORMAL
DEVICE COMMENT: ABNORMAL
DIGOXIN SERPL-MCNC: 1.5 NG/ML (ref 0.6–1.2)
EGFRCR SERPLBLD CKD-EPI 2021: 27.5 ML/MIN/1.73
EGFRCR SERPLBLD CKD-EPI 2021: 29.4 ML/MIN/1.73
EOSINOPHIL # BLD AUTO: 0.26 10*3/MM3 (ref 0–0.4)
EOSINOPHIL NFR BLD AUTO: 2.4 % (ref 0.3–6.2)
ERYTHROCYTE [DISTWIDTH] IN BLOOD BY AUTOMATED COUNT: 12.3 % (ref 12.3–15.4)
ERYTHROCYTE [DISTWIDTH] IN BLOOD BY AUTOMATED COUNT: 12.9 % (ref 12.3–15.4)
GAS FLOW AIRWAY: 3 LPM
GAS FLOW AIRWAY: 6 LPM
GLUCOSE BLDC GLUCOMTR-MCNC: 128 MG/DL (ref 70–130)
GLUCOSE BLDC GLUCOMTR-MCNC: 143 MG/DL (ref 70–130)
GLUCOSE BLDC GLUCOMTR-MCNC: 171 MG/DL (ref 70–130)
GLUCOSE SERPL-MCNC: 131 MG/DL (ref 65–99)
GLUCOSE SERPL-MCNC: 174 MG/DL (ref 65–99)
HCO3 BLDA-SCNC: 21.9 MMOL/L (ref 22–28)
HCO3 BLDA-SCNC: 22.8 MMOL/L (ref 22–28)
HCO3 BLDA-SCNC: 23.8 MMOL/L (ref 22–28)
HCT VFR BLD AUTO: 33.6 % (ref 37.5–51)
HCT VFR BLD AUTO: 35.3 % (ref 37.5–51)
HEMODILUTION: NO
HGB BLD-MCNC: 11.2 G/DL (ref 13–17.7)
HGB BLD-MCNC: 11.7 G/DL (ref 13–17.7)
IMM GRANULOCYTES # BLD AUTO: 0.08 10*3/MM3 (ref 0–0.05)
IMM GRANULOCYTES NFR BLD AUTO: 0.7 % (ref 0–0.5)
INHALED O2 CONCENTRATION: 30 %
INSPIRATORY TIME: 1
LIPASE SERPL-CCNC: 31 U/L (ref 13–60)
LYMPHOCYTES # BLD AUTO: 1.32 10*3/MM3 (ref 0.7–3.1)
LYMPHOCYTES NFR BLD AUTO: 12.4 % (ref 19.6–45.3)
MAGNESIUM SERPL-MCNC: 2.3 MG/DL (ref 1.6–2.4)
MAGNESIUM SERPL-MCNC: 2.3 MG/DL (ref 1.6–2.4)
MCH RBC QN AUTO: 31 PG (ref 26.6–33)
MCH RBC QN AUTO: 31.1 PG (ref 26.6–33)
MCHC RBC AUTO-ENTMCNC: 33.1 G/DL (ref 31.5–35.7)
MCHC RBC AUTO-ENTMCNC: 33.3 G/DL (ref 31.5–35.7)
MCV RBC AUTO: 93.3 FL (ref 79–97)
MCV RBC AUTO: 93.4 FL (ref 79–97)
MODALITY: ABNORMAL
MONOCYTES # BLD AUTO: 1.34 10*3/MM3 (ref 0.1–0.9)
MONOCYTES NFR BLD AUTO: 12.5 % (ref 5–12)
NEUTROPHILS NFR BLD AUTO: 7.64 10*3/MM3 (ref 1.7–7)
NEUTROPHILS NFR BLD AUTO: 71.6 % (ref 42.7–76)
NRBC BLD AUTO-RTO: 0.2 /100 WBC (ref 0–0.2)
O2 A-A PPRESDIFF RESPIRATORY: 0.5 MMHG
PCO2 BLDA: 31.8 MM HG (ref 35–45)
PCO2 BLDA: 32.3 MM HG (ref 35–45)
PCO2 BLDA: 33.8 MM HG (ref 35–45)
PEEP RESPIRATORY: 10 CM[H2O]
PH BLDA: 7.44 PH UNITS (ref 7.35–7.45)
PH BLDA: 7.46 PH UNITS (ref 7.35–7.45)
PH BLDA: 7.46 PH UNITS (ref 7.35–7.45)
PHOSPHATE SERPL-MCNC: 4.6 MG/DL (ref 2.5–4.5)
PLATELET # BLD AUTO: 290 10*3/MM3 (ref 140–450)
PLATELET # BLD AUTO: 292 10*3/MM3 (ref 140–450)
PMV BLD AUTO: 10 FL (ref 6–12)
PMV BLD AUTO: 10.1 FL (ref 6–12)
PO2 BLD: 292 MM[HG] (ref 0–500)
PO2 BLDA: 71.2 MM HG (ref 80–100)
PO2 BLDA: 87.5 MM HG (ref 80–100)
PO2 BLDA: 90.3 MM HG (ref 80–100)
POTASSIUM SERPL-SCNC: 3.2 MMOL/L (ref 3.5–5.2)
POTASSIUM SERPL-SCNC: 4.1 MMOL/L (ref 3.5–5.2)
PROCALCITONIN SERPL-MCNC: 1.2 NG/ML (ref 0–0.25)
PROT SERPL-MCNC: 7 G/DL (ref 6–8.5)
QT INTERVAL: 462 MS
QTC INTERVAL: 596 MS
RBC # BLD AUTO: 3.6 10*6/MM3 (ref 4.14–5.8)
RBC # BLD AUTO: 3.78 10*6/MM3 (ref 4.14–5.8)
SAO2 % BLDCOA: 94.9 % (ref 92–98.5)
SAO2 % BLDCOA: 97.3 % (ref 92–98.5)
SAO2 % BLDCOA: 97.5 % (ref 92–98.5)
SET MECH RESP RATE: 20
SODIUM SERPL-SCNC: 138 MMOL/L (ref 136–145)
SODIUM SERPL-SCNC: 139 MMOL/L (ref 136–145)
TOTAL RATE: 16 BREATHS/MINUTE
TOTAL RATE: 16 BREATHS/MINUTE
TOTAL RATE: 24 BREATHS/MINUTE
URATE SERPL-MCNC: 7.2 MG/DL (ref 3.4–7)
VENTILATOR MODE: ABNORMAL
VT ON VENT VENT: 500 ML
WBC NRBC COR # BLD AUTO: 10.68 10*3/MM3 (ref 3.4–10.8)
WBC NRBC COR # BLD AUTO: 9.77 10*3/MM3 (ref 3.4–10.8)

## 2025-07-21 PROCEDURE — 93005 ELECTROCARDIOGRAM TRACING: CPT | Performed by: NURSE PRACTITIONER

## 2025-07-21 PROCEDURE — 82150 ASSAY OF AMYLASE: CPT | Performed by: NURSE PRACTITIONER

## 2025-07-21 PROCEDURE — 82330 ASSAY OF CALCIUM: CPT | Performed by: ANESTHESIOLOGY

## 2025-07-21 PROCEDURE — 83735 ASSAY OF MAGNESIUM: CPT | Performed by: ANESTHESIOLOGY

## 2025-07-21 PROCEDURE — 94799 UNLISTED PULMONARY SVC/PX: CPT

## 2025-07-21 PROCEDURE — 80048 BASIC METABOLIC PNL TOTAL CA: CPT | Performed by: ANESTHESIOLOGY

## 2025-07-21 PROCEDURE — 82010 KETONE BODYS QUAN: CPT | Performed by: NURSE PRACTITIONER

## 2025-07-21 PROCEDURE — 93010 ELECTROCARDIOGRAM REPORT: CPT | Performed by: INTERNAL MEDICINE

## 2025-07-21 PROCEDURE — 84145 PROCALCITONIN (PCT): CPT | Performed by: ANESTHESIOLOGY

## 2025-07-21 PROCEDURE — 94664 DEMO&/EVAL PT USE INHALER: CPT

## 2025-07-21 PROCEDURE — P9041 ALBUMIN (HUMAN),5%, 50ML: HCPCS | Performed by: ANESTHESIOLOGY

## 2025-07-21 PROCEDURE — 80162 ASSAY OF DIGOXIN TOTAL: CPT | Performed by: STUDENT IN AN ORGANIZED HEALTH CARE EDUCATION/TRAINING PROGRAM

## 2025-07-21 PROCEDURE — 25010000002 ENOXAPARIN PER 10 MG: Performed by: NURSE PRACTITIONER

## 2025-07-21 PROCEDURE — 84100 ASSAY OF PHOSPHORUS: CPT | Performed by: ANESTHESIOLOGY

## 2025-07-21 PROCEDURE — 99232 SBSQ HOSP IP/OBS MODERATE 35: CPT | Performed by: INTERNAL MEDICINE

## 2025-07-21 PROCEDURE — 63710000001 INSULIN LISPRO (HUMAN) PER 5 UNITS: Performed by: ANESTHESIOLOGY

## 2025-07-21 PROCEDURE — 71045 X-RAY EXAM CHEST 1 VIEW: CPT

## 2025-07-21 PROCEDURE — 85025 COMPLETE CBC W/AUTO DIFF WBC: CPT | Performed by: INTERNAL MEDICINE

## 2025-07-21 PROCEDURE — 80076 HEPATIC FUNCTION PANEL: CPT | Performed by: ANESTHESIOLOGY

## 2025-07-21 PROCEDURE — 25010000002 ALBUMIN HUMAN 5% PER 50 ML: Performed by: ANESTHESIOLOGY

## 2025-07-21 PROCEDURE — 99231 SBSQ HOSP IP/OBS SF/LOW 25: CPT | Performed by: SURGERY

## 2025-07-21 PROCEDURE — 82803 BLOOD GASES ANY COMBINATION: CPT

## 2025-07-21 PROCEDURE — 83690 ASSAY OF LIPASE: CPT | Performed by: NURSE PRACTITIONER

## 2025-07-21 PROCEDURE — 97530 THERAPEUTIC ACTIVITIES: CPT

## 2025-07-21 PROCEDURE — 25010000002 BUMETANIDE PER 0.5 MG: Performed by: INTERNAL MEDICINE

## 2025-07-21 PROCEDURE — 25010000002 FAMOTIDINE 10 MG/ML SOLUTION: Performed by: ANESTHESIOLOGY

## 2025-07-21 PROCEDURE — 99291 CRITICAL CARE FIRST HOUR: CPT | Performed by: ANESTHESIOLOGY

## 2025-07-21 PROCEDURE — 25010000002 CEFEPIME PER 500 MG: Performed by: ANESTHESIOLOGY

## 2025-07-21 PROCEDURE — 94660 CPAP INITIATION&MGMT: CPT

## 2025-07-21 PROCEDURE — 25010000002 CALCIUM GLUCONATE 2-0.675 GM/100ML-% SOLUTION: Performed by: NURSE PRACTITIONER

## 2025-07-21 PROCEDURE — 82803 BLOOD GASES ANY COMBINATION: CPT | Performed by: ANESTHESIOLOGY

## 2025-07-21 PROCEDURE — 82948 REAGENT STRIP/BLOOD GLUCOSE: CPT

## 2025-07-21 PROCEDURE — 25010000002 DIGOXIN PER 500 MCG: Performed by: STUDENT IN AN ORGANIZED HEALTH CARE EDUCATION/TRAINING PROGRAM

## 2025-07-21 PROCEDURE — 85027 COMPLETE CBC AUTOMATED: CPT | Performed by: ANESTHESIOLOGY

## 2025-07-21 PROCEDURE — 25010000002 CEFTRIAXONE PER 250 MG: Performed by: ANESTHESIOLOGY

## 2025-07-21 PROCEDURE — 97110 THERAPEUTIC EXERCISES: CPT

## 2025-07-21 PROCEDURE — 82533 TOTAL CORTISOL: CPT | Performed by: NURSE PRACTITIONER

## 2025-07-21 PROCEDURE — 25010000002 ONDANSETRON PER 1 MG

## 2025-07-21 PROCEDURE — 84550 ASSAY OF BLOOD/URIC ACID: CPT | Performed by: INTERNAL MEDICINE

## 2025-07-21 PROCEDURE — 25010000002 AMIODARONE IN DEXTROSE 5% 360-4.14 MG/200ML-% SOLUTION: Performed by: NURSE PRACTITIONER

## 2025-07-21 PROCEDURE — 92526 ORAL FUNCTION THERAPY: CPT

## 2025-07-21 PROCEDURE — 94760 N-INVAS EAR/PLS OXIMETRY 1: CPT

## 2025-07-21 RX ORDER — ALBUMIN HUMAN 50 G/1000ML
250 SOLUTION INTRAVENOUS ONCE
Status: COMPLETED | OUTPATIENT
Start: 2025-07-21 | End: 2025-07-21

## 2025-07-21 RX ORDER — SODIUM CHLORIDE FOR INHALATION 7 %
4 VIAL, NEBULIZER (ML) INHALATION
Status: DISCONTINUED | OUTPATIENT
Start: 2025-07-21 | End: 2025-07-21

## 2025-07-21 RX ORDER — MIDODRINE HYDROCHLORIDE 5 MG/1
10 TABLET ORAL
Status: DISCONTINUED | OUTPATIENT
Start: 2025-07-21 | End: 2025-07-24

## 2025-07-21 RX ORDER — ENOXAPARIN SODIUM 100 MG/ML
30 INJECTION SUBCUTANEOUS DAILY
Status: DISCONTINUED | OUTPATIENT
Start: 2025-07-21 | End: 2025-07-22

## 2025-07-21 RX ORDER — PANTOPRAZOLE SODIUM 40 MG/1
40 TABLET, DELAYED RELEASE ORAL
Status: DISCONTINUED | OUTPATIENT
Start: 2025-07-22 | End: 2025-07-25 | Stop reason: HOSPADM

## 2025-07-21 RX ORDER — FAMOTIDINE 10 MG/ML
20 INJECTION, SOLUTION INTRAVENOUS ONCE
Status: COMPLETED | OUTPATIENT
Start: 2025-07-21 | End: 2025-07-21

## 2025-07-21 RX ORDER — POTASSIUM CHLORIDE 1500 MG/1
20 TABLET, EXTENDED RELEASE ORAL ONCE
Status: COMPLETED | OUTPATIENT
Start: 2025-07-21 | End: 2025-07-21

## 2025-07-21 RX ORDER — CALCIUM GLUCONATE 20 MG/ML
2000 INJECTION, SOLUTION INTRAVENOUS ONCE
Status: COMPLETED | OUTPATIENT
Start: 2025-07-21 | End: 2025-07-21

## 2025-07-21 RX ORDER — SODIUM CHLORIDE FOR INHALATION 7 %
4 VIAL, NEBULIZER (ML) INHALATION
Status: COMPLETED | OUTPATIENT
Start: 2025-07-21 | End: 2025-07-23

## 2025-07-21 RX ADMIN — ASPIRIN 81 MG CHEWABLE TABLET 81 MG: 81 TABLET CHEWABLE at 08:26

## 2025-07-21 RX ADMIN — ONDANSETRON 4 MG: 2 INJECTION, SOLUTION INTRAMUSCULAR; INTRAVENOUS at 10:09

## 2025-07-21 RX ADMIN — ALPRAZOLAM 0.25 MG: 0.25 TABLET ORAL at 20:38

## 2025-07-21 RX ADMIN — POLYETHYLENE GLYCOL 3350 17 G: 17 POWDER, FOR SOLUTION ORAL at 08:25

## 2025-07-21 RX ADMIN — MIDODRINE HYDROCHLORIDE 10 MG: 5 TABLET ORAL at 11:03

## 2025-07-21 RX ADMIN — Medication 5 MG: at 20:39

## 2025-07-21 RX ADMIN — ENOXAPARIN SODIUM 30 MG: 100 INJECTION SUBCUTANEOUS at 17:05

## 2025-07-21 RX ADMIN — GUAIFENESIN 1200 MG: 600 TABLET, EXTENDED RELEASE ORAL at 08:25

## 2025-07-21 RX ADMIN — IPRATROPIUM BROMIDE AND ALBUTEROL SULFATE 3 ML: .5; 3 SOLUTION RESPIRATORY (INHALATION) at 07:13

## 2025-07-21 RX ADMIN — Medication 4 ML: at 11:40

## 2025-07-21 RX ADMIN — BUMETANIDE 4 MG: 0.25 INJECTION INTRAMUSCULAR; INTRAVENOUS at 02:25

## 2025-07-21 RX ADMIN — IPRATROPIUM BROMIDE AND ALBUTEROL SULFATE 3 ML: .5; 3 SOLUTION RESPIRATORY (INHALATION) at 11:34

## 2025-07-21 RX ADMIN — Medication 0.6 MCG/KG/MIN: at 05:01

## 2025-07-21 RX ADMIN — CEFEPIME 2000 MG: 2 INJECTION, POWDER, FOR SOLUTION INTRAVENOUS at 04:42

## 2025-07-21 RX ADMIN — IPRATROPIUM BROMIDE AND ALBUTEROL SULFATE 3 ML: .5; 3 SOLUTION RESPIRATORY (INHALATION) at 20:54

## 2025-07-21 RX ADMIN — FAMOTIDINE 20 MG: 10 INJECTION INTRAVENOUS at 16:02

## 2025-07-21 RX ADMIN — Medication 4 ML: at 20:54

## 2025-07-21 RX ADMIN — POTASSIUM CHLORIDE 20 MEQ: 1500 TABLET, EXTENDED RELEASE ORAL at 17:05

## 2025-07-21 RX ADMIN — INSULIN LISPRO 2 UNITS: 100 INJECTION, SOLUTION INTRAVENOUS; SUBCUTANEOUS at 16:47

## 2025-07-21 RX ADMIN — DIGOXIN 250 MCG: 0.25 INJECTION INTRAMUSCULAR; INTRAVENOUS at 00:28

## 2025-07-21 RX ADMIN — SENNOSIDES AND DOCUSATE SODIUM 2 TABLET: 50; 8.6 TABLET ORAL at 08:25

## 2025-07-21 RX ADMIN — CEFTRIAXONE SODIUM 1000 MG: 1 INJECTION, POWDER, FOR SOLUTION INTRAMUSCULAR; INTRAVENOUS at 11:02

## 2025-07-21 RX ADMIN — MIDODRINE HYDROCHLORIDE 10 MG: 5 TABLET ORAL at 06:34

## 2025-07-21 RX ADMIN — CALCIUM GLUCONATE 2000 MG: 20 INJECTION, SOLUTION INTRAVENOUS at 08:27

## 2025-07-21 RX ADMIN — IPRATROPIUM BROMIDE AND ALBUTEROL SULFATE 3 ML: .5; 3 SOLUTION RESPIRATORY (INHALATION) at 15:05

## 2025-07-21 RX ADMIN — MIDODRINE HYDROCHLORIDE 10 MG: 5 TABLET ORAL at 16:40

## 2025-07-21 RX ADMIN — AMIODARONE HYDROCHLORIDE 1 MG/MIN: 1.8 INJECTION, SOLUTION INTRAVENOUS at 10:00

## 2025-07-21 RX ADMIN — ALBUMIN (HUMAN) 250 ML: 12.5 INJECTION, SOLUTION INTRAVENOUS at 16:01

## 2025-07-21 RX ADMIN — ATORVASTATIN CALCIUM 40 MG: 20 TABLET, FILM COATED ORAL at 21:35

## 2025-07-21 RX ADMIN — BISACODYL 10 MG: 10 SUPPOSITORY RECTAL at 13:55

## 2025-07-21 NOTE — PLAN OF CARE
Goal Outcome Evaluation:                    A&Ox4. Ambulated in the hallway twice. On 3L NC. Remains in afib. Amio bolus and drip increased to 1. Diet advanced to regular. 2 BM today. Care on going.

## 2025-07-21 NOTE — PROGRESS NOTES
[facility] Clinical Pharmacy Services: Dose Adjustment    Cefepime dose has been appropriately adjusted for renal function according to our System P&T approved policy.     Antoine Hanley III, PharmD  Clinical Pharmacist

## 2025-07-21 NOTE — THERAPY RE-EVALUATION
Acute Care - Speech Language Pathology   Swallow Re-Evaluation [facility]     Patient Name: Erickson Gerard  : 1945  MRN: [id number]  Today's Date: 2025               Admit Date: 7/15/2025    Visit Dx:     ICD-10-CM ICD-9-CM   1. Decreased activities of daily living (ADL)  Z78.9 V49.89   2. Coronary artery disease involving native heart, unspecified vessel or lesion type, unspecified whether angina present  I25.10 414.01   3. Abnormal findings on diagnostic imaging of heart and coronary circulation  R93.1 794.39   4. S/P CABG (coronary artery bypass graft)  Z95.1 V45.81     Patient Active Problem List   Diagnosis    Essential hypertension    GERD (gastroesophageal reflux disease)    Medicare annual wellness visit, subsequent    Benign prostatic hyperplasia with urinary frequency    Umbilical hernia without obstruction and without gangrene    Colon cancer screening    Mixed hyperlipidemia    Aneurysm of right popliteal artery    Acute deep vein thrombosis (DVT) of distal vein of right lower extremity    DDD (degenerative disc disease), lumbar    Actinic keratosis of forehead    Aneurysm of right popliteal artery    Abdominal aortic aneurysm (AAA) 3.0 cm to 5.5 cm in diameter in male    PVD (peripheral vascular disease)    High risk medication use    Immunization deficiency    Chronic deep vein thrombosis (DVT)    Encounter for hepatitis C screening test for low risk patient    Peripheral artery aneurysm    Blurred vision    Coronary artery disease of native artery of native heart with stable angina pectoris    Coronary artery disease involving native heart    Paroxysmal atrial fibrillation     Past Medical History:   Diagnosis Date    AAA (abdominal aortic aneurysm) without rupture 10/14/2021    Abdominal aortic aneurysm, without rupture, unspecified 2023    Acute embolism and thrombosis of unspecified deep veins of right distal lower extremity     Anesthesia complication     AFTER VEIN SURGERY  STATES HE WAS HARD TO WAKE UP    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     Aneurysm of artery of lower extremity 09/09/2021    Arthritis     At risk for sleep apnea     STOP BANG 5    Coronary artery disease     Dyspnea on exertion     Essential (primary) hypertension     GERD (gastroesophageal reflux disease)     [place] (hard of hearing)     Hyperlipidemia     Hypertension     Iliac artery aneurysm 10/14/2021    Localized edema     Low back pain     Mixed hyperlipidemia     Other specified soft tissue disorders     PAD (peripheral artery disease)     Peripheral artery aneurysm     PONV (postoperative nausea and vomiting)     Popliteal aneurysm     Thrombosis 09/09/2021    Calf Vein Thrombosis Rt tibial vein    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     Past Surgical History:   Procedure Laterality Date    ANGIOPLASTY POPLITEAL ARTERY Right 11/09/2021    Procedure: RIGHT POPITEAL ANEURYSM REPAIR VIABOHN;  Surgeon: Campos Archer MD;  [facility];  Service: Vascular;  Laterality: Right;    ANGIOPLASTY POPLITEAL ARTERY Right 01/25/2022    Procedure: AORTO ILIAC FEMORAL RIGHT POPLITEAL VIABOHN STENT PLACEMENT X 2;  Surgeon: Campos Archer MD;  [facility];  Service: Vascular;  Laterality: Right;    CARDIAC CATHETERIZATION N/A 06/19/2025    Procedure: Left Heart Cath;  Surgeon: Oneil Escobar MD;  Location: [facility] CATH INVASIVE LOCATION;  Service: Cardiovascular;  Laterality: N/A;    CARDIAC CATHETERIZATION N/A 06/19/2025    Procedure: Left ventriculography;  Surgeon: Oneil Escobar MD;  Location: [facility] CATH INVASIVE LOCATION;  Service: Cardiovascular;  Laterality: N/A;    COLONOSCOPY      CORONARY ARTERY BYPASS GRAFT N/A 7/15/2025    Procedure: STERNOTOMY; CORONARY ARTERY BYPASS GRAFTING TIMES 6 USING LEFT INTERNAL MAMMARY ARTERY AND LEFT SAPHENOUS VEIN; TRANSESOPHAGEAL ECHOCARDIOGRAM WITH ANESTHESIA;LEFT ATRIAL APPENDAGE EXCLUSION; PRP;  Surgeon: Jr Oscar Patino,  MD;  Location: [facility];  Service: Cardiothoracic;  Laterality: N/A;    EYE SURGERY Bilateral     CATARACTS    HERNIA REPAIR Bilateral     POLITEAL ARTERY ANEURYSM REPAIR Right 11/09/2021    Viabahn Stent Graft    VEIN LIGATION AND STRIPPING N/A        SLP Recommendation and Plan  SLP Swallowing Diagnosis: swallow WFL/no suspected pharyngeal impairment (07/21/25 1600)  SLP Diet Recommendation: regular textures, thin liquids (07/21/25 1600)  Recommended Precautions and Strategies: upright posture during/after eating, small bites of food and sips of liquid (07/21/25 1600)  SLP Rec. for Method of Medication Administration: meds whole, as tolerated (07/21/25 1600)        Recommended Diagnostics: No further SLP services recommended (07/21/25 1600)           Therapy Frequency (Swallow): evaluation only (07/21/25 1600)     Oral Care Recommendations: Oral Care BID/PRN (07/21/25 1600)                                        Outcome Evaluation: Clinical swallow evaluation completed. Pt reports no diet restrictions prior to hospital admittance. Currently on soft to chew with chopped meat, thin liquids. Pt trialed this date on thin via straw, soft, mixed, and regular solids with no overt s/s of aspiration. An attending doctor mentioned GI concerns for which he is being monitored. Recommend regular diet with thin liquids, meds as tolerated. Sitting upright, small bites/sips, slow rate. ST to s/o 7/21. May re-consult if pt demonstrates any s/s of aspiration during meals.      SWALLOW EVALUATION (Last 72 Hours)       SLP Adult Swallow Evaluation       Row Name 07/21/25 1600 07/19/25 1200                Rehab Evaluation    Document Type re-evaluation  -MAXIM evaluation  -KA       Subjective Information no complaints  -MAXIM no complaints  -KA       Patient Observations cooperative;alert  -MAXIM alert;cooperative  -KA       Patient Effort good  -MAXIM excellent  -KA       Symptoms Noted During/After Treatment -- none  -KA          General  "Information    Patient Profile Reviewed yes  -MAXIM yes  -KA       Pertinent History Of Current Problem \"s/p CABG x6  acute hypoxic respiratory failure\"  -MAXIM s/p CABG x6  acute hypoxic respiratory failure  -KA       Current Method of Nutrition chopped;soft to chew textures;thin liquids  -MAXIM NPO  -KA       Precautions/Limitations, Vision WFL;for purposes of eval  -MAXIM WFL;for purposes of eval  -KA       Precautions/Limitations, Hearing hearing aid, bilaterally  -MAXIM WFL;for purposes of eval  -KA       Prior Level of Function-Communication -- WFL  -KA       Prior Level of Function-Swallowing no diet consistency restrictions  -MAXIM no diet consistency restrictions;safe, efficient swallowing in all situations  -KA       Plans/Goals Discussed with patient;agreed upon  -MAXIM patient  -KA       Barriers to Rehab -- none identified  -KA       Patient's Goals for Discharge -- return to PO diet  -KA          Pain    Pretreatment Pain Rating 0/10 - no pain  -MAXIM 0/10 - no pain  -KA       Posttreatment Pain Rating 0/10 - no pain  -MAXIM 0/10 - no pain  -KA          Oral Motor Structure and Function    Dentition Assessment -- natural, present and adequate  -KA       Secretion Management -- WNL/WFL  -KA       Mucosal Quality -- moist, healthy  -KA       Volitional Swallow -- WFL  -KA       Volitional Cough -- WFL  -KA          Oral Musculature and Cranial Nerve Assessment    Oral Motor General Assessment -- WFL  -KA          General Eating/Swallowing Observations    Respiratory Support Currently in Use -- high-flow nasal cannula  -KA       Eating/Swallowing Skills -- self-fed  -KA       Positioning During Eating -- upright in chair  -KA       Utensils Used -- spoon;cup;straw  -KA       Consistencies Trialed -- soft to chew textures;chopped;mixed consistency;pureed;thin liquids  -KA          Clinical Swallow Eval    Clinical Swallow Evaluation Summary Clinical swallow evaluation completed. Pt reports no diet restrictions prior to hospital " admittance. Currently on soft to chew with chopped meat, thin liquids. Pt trialed this date on thin via straw, soft, mixed, and regular solids with no overt s/s of aspiration. A doctor present mentioned GI concerns for which he is being monitored. Recommend regular diet with thin liquids, meds as tolerated. Sitting upright, small bites/sips, slow rate. ST to s/o 7/21. May re-consult if pt demonstrates any s/s of aspiration during meals.  -MAXIM Patient alert and upright sitting in chair. RN reports pt previously on clear liquid diet and coughed with thin liquids lastnight, and pt had NG tube put that was pulled this morning. Patient demonstrated a baseline throat clear. Pt accepted trials of ice chips, thins via spoon, cup and straw, puree and mechanical soft mixed with no immediate overt s/s of pen/asp. Subtle delayed throat clears at times, do not feel related to swallow. Laryngeal elevation felt adequate and timely upon neck palpation and mastication slow but functional. Discussed with RN if concerned with safety of PO recommend NPO.  -          SLP Evaluation Clinical Impression    SLP Swallowing Diagnosis swallow WFL/no suspected pharyngeal impairment  - swallow WFL/no suspected pharyngeal impairment  -       Functional Impact no impact on function  - risk of aspiration/pneumonia  -       Rehab Potential/Prognosis, Swallowing -- good, to achieve stated therapy goals  -       Swallow Criteria for Skilled Therapeutic Interventions Met -- demonstrates skilled criteria  -          Recommendations    Therapy Frequency (Swallow) evaluation only  - PRN  -       Predicted Duration Therapy Intervention (Days) -- until discharge  -       SLP Diet Recommendation regular textures;thin liquids  -MAXIM soft to chew textures;chopped;thin liquids  -       Recommended Diagnostics No further SLP services recommended  - --       Recommended Precautions and Strategies upright posture during/after eating;small  bites of food and sips of liquid  -MAXIM upright posture during/after eating;small bites of food and sips of liquid;general aspiration precautions;reflux precautions  -       Oral Care Recommendations Oral Care BID/PRN  -MAXIM Oral Care BID/PRN  -KA       SLP Rec. for Method of Medication Administration meds whole;as tolerated  -MAXIM meds whole;meds crushed;as tolerated  -       Monitor for Signs of Aspiration -- yes;notify SLP if any concerns  -       Anticipated Discharge Disposition (SLP) -- unknown  -          Swallow Goals (SLP)    Swallow STGs -- diet tolerance goal selection (SLP)  -       Diet Tolerance Goal Selection (SLP) -- Swallow Short Term Goal 1  -KA          (STG) Swallow 1    (STG) Swallow 1 -- Patient will tolerate least restrictive diet without overt s/s of pen/asp  -KA                 User Key  (r) = Recorded By, (t) = Taken By, (c) = Cosigned By      Initials Name Effective Dates    Demetrius Nguyen, BLAKE 01/05/24 -     Kyleigh Pearl SLP 03/05/25 -                     EDUCATION  The patient has been educated in the following areas:   Dysphagia (Swallowing Impairment).        SLP GOALS       Row Name 07/19/25 1200             (STG) Swallow 1    (STG) Swallow 1 Patient will tolerate least restrictive diet without overt s/s of pen/asp  -KA                User Key  (r) = Recorded By, (t) = Taken By, (c) = Cosigned By      Initials Name Provider Type    Demetrius Nguyen SLP Speech and Language Pathologist                         Time Calculation:    Time Calculation- SLP       Row Name 07/21/25 1620             Time Calculation- SLP    SLP Start Time 0800  -MAXIM      SLP Received On 07/21/25  -MAXIM         Untimed Charges    59226-OB Treatment Swallow Minutes 60  -MAXIM         Total Minutes    Untimed Charges Total Minutes 60  -MAXIM       Total Minutes 60  -MAXIM                User Key  (r) = Recorded By, (t) = Taken By, (c) = Cosigned By      Initials Name Provider Type    Kyleigh Pearl  SLP Speech and Language Pathologist                    Therapy Charges for Today       Code Description Service Date Service Provider Modifiers Qty    29291642260 HC ST TREATMENT SWALLOW 4 7/21/2025 Kyleigh Gaston, SLP GN 1                 BLAKE Clark  7/21/2025

## 2025-07-21 NOTE — PLAN OF CARE
Goal Outcome Evaluation:  Plan of Care Reviewed With: patient        Progress: improving  Outcome Evaluation: Pt agreed to PT session, pt improved with tfers -CGA1 for STS , another to assist w/lines /tubes, pt judith amb ~150ft CGA-2, judith cardiac protocol exer x10 reps , plans home at DC w/wife assist    Anticipated Discharge Disposition (PT): home with assist

## 2025-07-21 NOTE — PROGRESS NOTES
[facility]   Surgical Progress Note    Patient Name: Erickson Gerard  : 1945  MRN: [id number]  Date of admission: 7/15/2025  Surgical Procedures Since Admission:  Procedure(s):  STERNOTOMY; CORONARY ARTERY BYPASS GRAFTING TIMES 6 USING LEFT INTERNAL MAMMARY ARTERY AND LEFT SAPHENOUS VEIN; TRANSESOPHAGEAL ECHOCARDIOGRAM WITH ANESTHESIA;LEFT ATRIAL APPENDAGE EXCLUSION; PRP  Surgeon:  Jr Oscar Patino MD  Status:  6 Days Post-Op  -------------------    Subjective   Subjective     Chief Complaint: Abdominal aortic aneurysm and bilateral popliteal aneurysms    History of Present Illness   79-year-old gentleman with known abdominal aortic aneurysms up to 5.3 cm expanded from 4.7.  Now postop  from CABG.  At this point in time he will need AAA repaired but he is recovering from his CABG and we will plan on doing this in around 6 weeks if cleared by cardiology and cardiothoracic.  Patient is aware of the need to follow-up in the office in 4 to 6 weeks to reschedule.  He is still in recovery mode but is weaning oxygen now.    Review of Systems tolerated on oxygen wean.    Objective   Objective     Vitals:   Temp:  [97.8 °F (36.6 °C)-99.7 °F (37.6 °C)] 97.8 °F (36.6 °C)  Heart Rate:  [] 97  Resp:  [20-25] 20  BP: ()/(53-91) 94/72  Arterial Line BP: ()/(45-95) 68/64  Flow (L/min) (Oxygen Therapy):  [6-40] 6  Output by Drain (mL) 25 0701 - 25 1900 25 1901 - 25 0700 25 0701 - 25 0827 Range Total   Urethral Catheter Straight-tip;Temperature probe;Silicone;Non-latex 16 Fr. 3439 [id number]       Physical Exam    Legs with mild edema and no evidence of ischemia.  Lungs clear  Abdomen soft    Result Review    Result Review:  I have personally reviewed the results from the time of this admission to 2025 08:27 EDT and agree with these findings:  [x]  Laboratory list / accordion  []  Microbiology  []  Radiology  []  EKG/Telemetry   []  Cardiology/Vascular   []   Pathology  []  Old records  []  Other:  Most notable findings include: .  Creatinine of 2.23.  Slowly climbing CBC overall improved.      Results from last 7 days   Lab Units 07/21/25  0440 07/20/25  1435 07/20/25  0254 07/19/25  1529 07/19/25  0256 07/18/25  1457 07/18/25  0142   WBC 10*3/mm3 10.68 9.20 11.36* 10.31 11.28* 10.90* 13.18*   HEMOGLOBIN g/dL 11.7* 11.0* 10.1* 10.7* 10.3* 10.4* 9.8*   PLATELETS 10*3/mm3 292 213 238 186 191 145 145     Results from last 7 days   Lab Units 07/21/25  0440 07/20/25  1435 07/20/25  1017 07/20/25  0254 07/19/25  1529 07/19/25  1029 07/19/25  0256 07/18/25 2122 07/18/25  1457 07/18/25  0901 07/18/25  0142 07/16/25  0904 07/15/25  2133 07/15/25  1625   SODIUM mmol/L 139 138  --  135*  135* 138  --  140  --  141  --  140   < > 143 142   POTASSIUM mmol/L 4.1 4.1 3.9 3.8  3.8 3.9 4.1 3.8   < > 3.8 3.6 3.7   < > 4.6 4.1   CHLORIDE mmol/L 103 104  --  100  100 102  --  104  --  105  --  105   < > 111* 109*   CO2 mmol/L 19.2* 20.4*  --  21.0*  21.0* 20.0*  --  21.0*  --  22.0  --  21.8*   < > 18.7* 21.7*   BUN mg/dL 46.0* 41.0*  --  42.0*  42.0* 39.0*  --  38.0*  --  37.0*  --  31.0*   < > 18.0 18.0   CREATININE mg/dL 2.23* 1.94*  --  1.99*  1.99* 1.81*  --  1.77*  --  1.75*  --  1.85*   < > 1.34* 1.30*   GLUCOSE mg/dL 131* 121*  --  119*  119* 158*  --  115*  --  124*  --  130*   < > 135* 113*   MAGNESIUM mg/dL 2.3 2.3  --  2.6* 2.6*  --  2.7*  --  2.8*  --  2.9*   < > 2.7* 2.5*   PHOSPHORUS mg/dL 4.6*  --   --  4.2 3.6  --  4.2  --   --  3.7  --   --  4.4 3.3    < > = values in this interval not displayed.   Estimated Creatinine Clearance: 27.2 mL/min (A) (by C-G formula based on SCr of 2.23 mg/dL (H)).  Results from last 7 days   Lab Units 07/16/25  0333 07/15/25  1625   PROTIME Seconds 19.6* 19.9*   INR  1.65* 1.68*     Lab Results   Component Value Date    HGBA1C 6.10 (H) 07/14/2025     Glucose   Date/Time Value Ref Range Status   07/20/2025 1952 113 70 - 130 mg/dL Final    07/20/2025 1618 111 70 - 130 mg/dL Final   07/20/2025 1018 117 70 - 130 mg/dL Final   07/20/2025 0639 120 70 - 130 mg/dL Final   07/19/2025 2007 136 (H) 70 - 130 mg/dL Final   07/19/2025 1538 143 (H) 70 - 130 mg/dL Final   07/19/2025 1132 98 70 - 130 mg/dL Final   07/19/2025 0614 107 70 - 130 mg/dL Final       Assessment & Plan   Assessment / Plan     Brief Patient Summary:  Erickson Gerard is a 79 y.o. male who is covering face after CABG.  He is doing fairly well and looks good.  We will of course be waiting at least 6 weeks to fix his aneurysm at this point we will need clearance from cardiothoracic when that time comes.  The meantime avoiding blood pressure spikes and avoiding fluoroquinolones will be our recommendations.  I have asked the nurses to make Cipro and Levaquin part of his allergy profile.  Technically he is not allergic to these 2 antibiotics have data pointing towards increasing aneurysm size and rupture in some of the white papers.  Creatinine climbing is a little bit worrisome.  He is weaning off oxygen and is down to 6 L from 12.  No indication for urgent intervention on his aneurysms.    Active Hospital Problems:   Active Hospital Problems    Diagnosis     **Hx of CABG     Coronary artery disease involving native heart     Abnormal findings on diagnostic imaging of heart and coronary circulation      Plan:   Abdominal aortic will plan to repair his abdominal aortic aneurysm when he is cleared after his CABG likely around 6 weeks.  Will discuss with Dr. Patino before proceeding.  For the meantime strict blood pressure control and avoid fluoroquinolones will be recommendations.  Some concern for his creatinine climbing is raised.  Will continue to watch with you.    VTE Prophylaxis:  Pharmacologic & mechanical VTE prophylaxis orders are present.        Campos Archer MD

## 2025-07-21 NOTE — PROGRESS NOTES
LOS: 6 days   Patient Care Team:  Farhad Metcalf MD as PCP - General (Family Medicine)    Chief Complaint: CAD     Interval History: Sitting in chair. Remains in AF with rapid rates, and with low BP requiring phenylephrine.       Objective   Vital Signs  Temp:  [94.8 °F (34.9 °C)-99.5 °F (37.5 °C)] 99.5 °F (37.5 °C)  Heart Rate:  [] 101  Resp:  [20-25] 20  BP: ()/(53-88) 103/62  Arterial Line BP: ()/(45-95) 68/64    Intake/Output Summary (Last 24 hours) at 7/21/2025 1528  Last data filed at 7/21/2025 1400  Gross per 24 hour   Intake 1849.74 ml   Output 3720 ml   Net -1870.26 ml       Last Weight and Admission Weight        07/21/25  0511   Weight: 70.4 kg (155 lb 3.3 oz)     Flowsheet Rows      Flowsheet Row First Filed Value   Admission Height --   Admission Weight 75.8 kg (167 lb 1.7 oz) Documented at 07/16/2025 0500                    Physical Exam  Constitutional:       General: He is not in acute distress.  HENT:      Head: Normocephalic.      Mouth/Throat:      Mouth: Mucous membranes are dry.   Eyes:      Conjunctiva/sclera: Conjunctivae normal.   Cardiovascular:      Rate and Rhythm: Tachycardia present. Rhythm irregular.   Pulmonary:      Effort: Pulmonary effort is normal.      Breath sounds: Normal breath sounds.   Abdominal:      General: There is distension.   Musculoskeletal:         General: No swelling.   Psychiatric:         Mood and Affect: Mood normal.         Results Review:      Results from last 7 days   Lab Units 07/21/25  1438 07/21/25  0440 07/20/25  1435   SODIUM mmol/L 138 139 138   POTASSIUM mmol/L 3.2* 4.1 4.1   CHLORIDE mmol/L 101 103 104   CO2 mmol/L 20.0* 19.2* 20.4*   BUN mg/dL 46.0* 46.0* 41.0*   CREATININE mg/dL 2.36* 2.23* 1.94*   GLUCOSE mg/dL 174* 131* 121*   CALCIUM mg/dL 8.4* 8.4* 8.8         Results from last 7 days   Lab Units 07/21/25  1438 07/21/25  0440 07/20/25  1435   WBC 10*3/mm3 9.77 10.68 9.20   HEMOGLOBIN g/dL 11.2* 11.7* 11.0*   HEMATOCRIT  % 33.6* 35.3* 32.7*   PLATELETS 10*3/mm3 290 292 213     Results from last 7 days   Lab Units 07/16/25  0333 07/15/25  1625   INR  1.65* 1.68*   APTT seconds  --  36.7*         Results from last 7 days   Lab Units 07/21/25  1438   MAGNESIUM mg/dL 2.3           I reviewed the patient's new clinical results.  I personally viewed and interpreted the patient's EKG/Telemetry data        Medication Review:   albumin human, 250 mL, Intravenous, Once  amiodarone, 150 mg, Intravenous, Once  aspirin, 81 mg, Oral, Daily  atorvastatin, 40 mg, Oral, Nightly  bisacodyl, 10 mg, Rectal, Daily  cefTRIAXone, 1,000 mg, Intravenous, Q24H  enoxaparin sodium, 30 mg, Subcutaneous, Daily  guaiFENesin, 1,200 mg, Oral, Q12H  insulin lispro, 2-7 Units, Subcutaneous, 4x Daily AC & at Bedtime  ipratropium-albuterol, 3 mL, Nebulization, 4x Daily - RT  melatonin, 5 mg, Oral, Nightly  [Held by provider] metoprolol tartrate, 12.5 mg, Oral, Q12H  midodrine, 10 mg, Oral, TID AC  polyethylene glycol, 17 g, Oral, Daily  senna-docusate sodium, 2 tablet, Oral, BID  sodium chloride, 4 mL, Nebulization, BID - RT        amiodarone, 1 mg/min, Last Rate: 1 mg/min (07/21/25 1000)   Followed by  amiodarone, 0.5 mg/min  clevidipine, 2-32 mg/hr  DOPamine, 2-20 mcg/kg/min  EPINEPHrine, 0.02-0.3 mcg/kg/min  niCARdipine, 5-15 mg/hr, Last Rate: Stopped (07/15/25 1625)  norepinephrine, 0.02-0.3 mcg/kg/min, Last Rate: Stopped (07/19/25 2301)  phenylephrine, 0.2-3 mcg/kg/min, Last Rate: 0.2 mcg/kg/min (07/21/25 1430)        Assessment & Plan     1. Multivessel CAD s/p CABG x 6, POD# 6  *Continue aspirin/statin    2. PAF -- difficult to manage due to hypotension requiring phenylephrine. I reviewed his EKG and his QT is really long. He's gotten several amio boluses and is on a drip, and has received digoxin (but has worsening VERN). I asked Dr Rankin to see him. We'll have to consider IV heparin at some point, as well; while he is s/p LAAO clipping, that does not  eliminate the need for AC.    3. VERN -- diuresis has been stopped, renal following    4. AAA -- up to 5.3cm, Dr Archer recommends outpatient follow up    5. History of DVT    6. Anemia -- stable    7. Thrombocytopenia -- resolved    8. Postoperative ileus requiring NGT -- no longer has a tube, but still distended. Monitor.    Julio C Knutson MD  07/21/25  15:28 EDT

## 2025-07-21 NOTE — PROGRESS NOTES
[facility] Progress Note      Patient Name: Erickson Gerard  : 1945  MRN: [id number]  Primary Care Physician:  Farhad Metcalf MD  Date of admission: 7/15/2025    Subjective     Interval History:   Acute kidney injury on chronic kidney disease    The patient currently on regular nasal cannula, his chest x-ray showed improvement, remains on amiodarone and vasopressors drips  No nausea or vomiting, no chest pain    Review of Systems:   As noted above    Objective     Vitals:   Temp:  [97.8 °F (36.6 °C)-99.7 °F (37.6 °C)] 97.8 °F (36.6 °C)  Heart Rate:  [] 97  Resp:  [20-25] 20  BP: ()/(53-91) 94/72  Arterial Line BP: ()/(45-95) 68/64  Flow (L/min) (Oxygen Therapy):  [6-40] 6    Intake/Output Summary (Last 24 hours) at 2025 0806  Last data filed at 2025 0442  Gross per 24 hour   Intake 1540.74 ml   Output 3935 ml   Net -2394.26 ml       Physical Exam:    General Appearance: alert, awake, chronically ill no acute distress   Skin: warm and dry  HEENT: oral mucosa normal, nonicteric sclera nasal cannula in place  Neck: No JVD  Lungs: Bilateral rhonchi, breathing effort not  Heart: Irregularly irregular, no rub  Abdomen: soft, nontender, nondistended  : no palpable bladder thoracic  Extremities: No lower extremity edema  Neuro: Moving all extremities    Scheduled Meds:     aspirin, 81 mg, Oral, Daily  atorvastatin, 40 mg, Oral, Nightly  bisacodyl, 10 mg, Rectal, Daily  cefepime, 2,000 mg, Intravenous, Q12H  enoxaparin sodium, 40 mg, Subcutaneous, Daily  guaiFENesin, 1,200 mg, Oral, Q12H  insulin lispro, 2-7 Units, Subcutaneous, 4x Daily AC & at Bedtime  ipratropium-albuterol, 3 mL, Nebulization, 4x Daily - RT  melatonin, 5 mg, Oral, Nightly  [Held by provider] metoprolol tartrate, 12.5 mg, Oral, Q12H  midodrine, 10 mg, Oral, TID AC  polyethylene glycol, 17 g, Oral, Daily  senna-docusate sodium, 2 tablet, Oral, BID  [Held by provider]  trimethoprim-polymyxin b, 1 drop, Right Eye, 6x Daily      IV Meds:   amiodarone, 0.5 mg/min, Last Rate: 0.5 mg/min (07/20/25 2109)  clevidipine, 2-32 mg/hr  DOPamine, 2-20 mcg/kg/min  EPINEPHrine, 0.02-0.3 mcg/kg/min  niCARdipine, 5-15 mg/hr, Last Rate: Stopped (07/15/25 1625)  nitroglycerin, 5-200 mcg/min  norepinephrine, 0.02-0.3 mcg/kg/min, Last Rate: Stopped (07/19/25 2301)  phenylephrine, 0.2-3 mcg/kg/min, Last Rate: 0.8 mcg/kg/min (07/21/25 0558)        Results Reviewed:   I have personally reviewed the results from the time of this admission to 7/21/2025 08:06 EDT     Results from last 7 days   Lab Units 07/21/25  0440 07/20/25  1435 07/20/25  1017 07/20/25  0254 07/19/25  1029 07/19/25  0256   SODIUM mmol/L 139 138  --  135*  135*   < > 140   POTASSIUM mmol/L 4.1 4.1 3.9 3.8  3.8   < > 3.8   CHLORIDE mmol/L 103 104  --  100  100   < > 104   CO2 mmol/L 19.2* 20.4*  --  21.0*  21.0*   < > 21.0*   BUN mg/dL 46.0* 41.0*  --  42.0*  42.0*   < > 38.0*   CREATININE mg/dL 2.23* 1.94*  --  1.99*  1.99*   < > 1.77*   CALCIUM mg/dL 8.4* 8.8  --  7.8*  7.8*   < > 8.2*   BILIRUBIN mg/dL 0.6  --   --  0.5  --  0.7   ALK PHOS U/L 181*  --   --  113  --  75   ALT (SGPT) U/L 31  --   --  11  --  7   AST (SGOT) U/L 62*  --   --  41*  --  41*   GLUCOSE mg/dL 131* 121*  --  119*  119*   < > 115*    < > = values in this interval not displayed.       Estimated Creatinine Clearance: 27.2 mL/min (A) (by C-G formula based on SCr of 2.23 mg/dL (H)).    Results from last 7 days   Lab Units 07/21/25 0440 07/20/25  1435 07/20/25  0254 07/19/25  1529   MAGNESIUM mg/dL 2.3 2.3 2.6* 2.6*   PHOSPHORUS mg/dL 4.6*  --  4.2 3.6       Results from last 7 days   Lab Units 07/21/25  0440 07/20/25  0254 07/19/25  0256   URIC ACID mg/dL 7.2* 7.3* 7.2*       Results from last 7 days   Lab Units 07/21/25 0440 07/20/25  1435 07/20/25  0254 07/19/25  1529 07/19/25  0256   WBC 10*3/mm3 10.68 9.20 11.36* 10.31 11.28*   HEMOGLOBIN g/dL 11.7*  11.0* 10.1* 10.7* 10.3*   PLATELETS 10*3/mm3 292 213 238 186 191       Results from last 7 days   Lab Units 07/16/25  0333 07/15/25  1625   INR  1.65* 1.68*       Assessment / Plan     ASSESSMENT:  Acute kidney injury on chronic kidney disease stage II secondary to hemodynamic changes post CABG creatinine is stable.  Patient has evidence of volume excess, his electrolytes within acceptable range.  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  AAA 5.3 cm monitored by vascular surgery  Hypertension with CKD currently on vasopressors.  Volume excess,  Postop anemia hemoglobin today is 11.7, improved  Relative hypoxia associate with volume excess, improved    PLAN:  No diuretics today  Continue the same treatment  Surveillance labs    I reviewed the chart and other providers notes, reviewed labs.  Discussed the case with the CTS team  Copied text in this note has been reviewed and is accurate as of 07/21/25.         Thank you for involving us in the care of Erickson Gerard.  Please feel free to call with any questions.    Angel Franco MD  07/21/25  08:06 EDT    [facility]  [phone]    Please note that portions of this note were completed with a voice recognition program.

## 2025-07-21 NOTE — PROGRESS NOTES
Enter Query Response Below      Query Response: hypoxia               If applicable, please update the problem list.

## 2025-07-21 NOTE — PROGRESS NOTES
Enter Query Response Below      Query Response: acute noncardiogenic pulmonary edema             If applicable, please update the problem list.

## 2025-07-21 NOTE — SIGNIFICANT NOTE
07/21/25 1404   OTHER   Discipline occupational therapist   Rehab Time/Intention   Session Not Performed other (see comments)  (Pt just back to bed with nsg assist and also ambulated with nursing. Will f/u later today or tomorrow as schedule allows.)   Therapy Assessment/Plan (PT)   Criteria for Skilled Interventions Met (PT) yes   Recommendation   OT - Next Appointment 07/22/25

## 2025-07-21 NOTE — THERAPY TREATMENT NOTE
Patient Name: Erickson Gerard  : 1945    MRN: [id number]                              Today's Date: 2025       Admit Date: 7/15/2025    Visit Dx:     ICD-10-CM ICD-9-CM   1. Decreased activities of daily living (ADL)  Z78.9 V49.89   2. Coronary artery disease involving native heart, unspecified vessel or lesion type, unspecified whether angina present  I25.10 414.01   3. Abnormal findings on diagnostic imaging of heart and coronary circulation  R93.1 794.39   4. S/P CABG (coronary artery bypass graft)  Z95.1 V45.81     Patient Active Problem List   Diagnosis    Essential hypertension    GERD (gastroesophageal reflux disease)    Medicare annual wellness visit, subsequent    Benign prostatic hyperplasia with urinary frequency    Umbilical hernia without obstruction and without gangrene    Colon cancer screening    Mixed hyperlipidemia    Localized edema    Right leg swelling    Aneurysm of right popliteal artery    Acute deep vein thrombosis (DVT) of distal vein of right lower extremity    DDD (degenerative disc disease), lumbar    Actinic keratosis of forehead    Aneurysm of right popliteal artery    Abdominal aortic aneurysm (AAA) 3.0 cm to 5.5 cm in diameter in male    PVD (peripheral vascular disease)    High risk medication use    Statin intolerance    Immunization deficiency    Chronic deep vein thrombosis (DVT)    Encounter for hepatitis C screening test for low risk patient    Peripheral artery aneurysm    Right elbow pain    Blurred vision    Nausea    Dizziness    Visual disturbance    Preoperative cardiovascular examination    Coronary artery disease of native artery of native heart with stable angina pectoris    Abnormal stress test    Coronary artery disease involving native heart    Abnormal findings on diagnostic imaging of heart and coronary circulation    Hx of CABG     Past Medical History:   Diagnosis Date    AAA (abdominal aortic aneurysm) without rupture 10/14/2021    Abdominal aortic  aneurysm, without rupture, unspecified 03/14/2023    Acute embolism and thrombosis of unspecified deep veins of right distal lower extremity     Anesthesia complication     AFTER VEIN SURGERY STATES HE WAS HARD TO WAKE UP    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     Aneurysm of artery of lower extremity 09/09/2021    Arthritis     At risk for sleep apnea     STOP BANG 5    Coronary artery disease     Dyspnea on exertion     Essential (primary) hypertension     GERD (gastroesophageal reflux disease)     [place] (hard of hearing)     Hyperlipidemia     Hypertension     Iliac artery aneurysm 10/14/2021    Localized edema     Low back pain     Mixed hyperlipidemia     Other specified soft tissue disorders     PAD (peripheral artery disease)     Peripheral artery aneurysm     PONV (postoperative nausea and vomiting)     Popliteal aneurysm     Thrombosis 09/09/2021    Calf Vein Thrombosis Rt tibial vein    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     Past Surgical History:   Procedure Laterality Date    ANGIOPLASTY POPLITEAL ARTERY Right 11/09/2021    Procedure: RIGHT POPITEAL ANEURYSM REPAIR VIABOHN;  Surgeon: Campos Archer MD;  Location: [facility] OR 18/19;  Service: Vascular;  Laterality: Right;    ANGIOPLASTY POPLITEAL ARTERY Right 01/25/2022    Procedure: AORTO ILIAC FEMORAL RIGHT POPLITEAL VIABOHN STENT PLACEMENT X 2;  Surgeon: Campos Archer MD;  Location: [facility] OR 18/19;  Service: Vascular;  Laterality: Right;    CARDIAC CATHETERIZATION N/A 06/19/2025    Procedure: Left Heart Cath;  Surgeon: Oneil Escobar MD;  Location: [facility] INVASIVE LOCATION;  Service: Cardiovascular;  Laterality: N/A;    CARDIAC CATHETERIZATION N/A 06/19/2025    Procedure: Left ventriculography;  Surgeon: Oneil Escobar MD;  Location: [facility] INVASIVE LOCATION;  Service: Cardiovascular;  Laterality: N/A;    COLONOSCOPY      CORONARY ARTERY BYPASS GRAFT N/A 7/15/2025    Procedure: STERNOTOMY; CORONARY ARTERY BYPASS  GRAFTING TIMES 6 USING LEFT INTERNAL MAMMARY ARTERY AND LEFT SAPHENOUS VEIN; TRANSESOPHAGEAL ECHOCARDIOGRAM WITH ANESTHESIA;LEFT ATRIAL APPENDAGE EXCLUSION; PRP;  Surgeon: Jr Oscar Patino MD;  Location: [facility];  Service: Cardiothoracic;  Laterality: N/A;    EYE SURGERY Bilateral     CATARACTS    HERNIA REPAIR Bilateral     POLITEAL ARTERY ANEURYSM REPAIR Right 11/09/2021    Viabahn Stent Graft    VEIN LIGATION AND STRIPPING N/A       General Information       [facility] Name 07/21/25 1036          Physical Therapy Time and Intention    Document Type therapy note (daily note)  -     Mode of Treatment individual therapy;physical therapy  -       Row Name 07/21/25 1036          General Information    Patient Profile Reviewed yes  -     Existing Precautions/Restrictions fall;cardiac;sternal;oxygen therapy device and L/min  amb on 8L HF, on 6L HF at rest  -[facility] Name 07/21/25 1036          Living Environment    Current Living Arrangements home  -     People in Home spouse  -[facility] Name 07/21/25 1036          Home Main Entrance    Number of Stairs, Main Entrance none  -[facility] Name 07/21/25 1036          Cognition    Orientation Status (Cognition) oriented x 4  -[facility] Name 07/21/25 1036          Safety Issues/Impairments Affecting Functional Mobility    Safety Issues Affecting Function (Mobility) judgment;problem-solving;safety precaution awareness;impulsivity  -     Impairments Affecting Function (Mobility) balance;coordination;strength  -               User Key  (r) = Recorded By, (t) = Taken By, (c) = Cosigned By      Initials Name Provider Type     Kyleigh Jackson PTA Physical Therapist Assistant                   Mobility       Row Name 07/21/25 1038          Bed Mobility    Supine-Sit [place] (Bed Mobility) not tested  -     Comment, (Bed Mobility) up in chair  -       Row Name 07/21/25 1038          Transfers    Comment, (Transfers) cues to slow pace for safety   -JM       Row Name 07/21/25 1038          Sit-Stand Transfer    Sit-Stand [place] (Transfers) contact guard  -JM       Row Name 07/21/25 1038          Gait/Stairs (Locomotion)    Capac Level (Gait) 2 person assist;contact guard;1 person to manage equipment  -     Distance in Feet (Gait) 150  -JM     Deviations/Abnormal Patterns (Gait) base of support, narrow;stride length decreased  diff incr step length due to lines/tubes  -               User Key  (r) = Recorded By, (t) = Taken By, (c) = Cosigned By      Initials Name Provider Type    Kyleigh Thomas PTA Physical Therapist Assistant                   Obj/Interventions       [facility] Name 07/21/25 1040          Motor Skills    Therapeutic Exercise --  cardiac protocol x10 reps , cues to slow pace  -               User Key  (r) = Recorded By, (t) = Taken By, (c) = Cosigned By      Initials Name Provider Type    Kyleigh Thomas PTA Physical Therapist Assistant                   Goals/Plan    No documentation.                  Clinical Impression       Row Name 07/21/25 1040          Pain    Pretreatment Pain Rating 0/10 - no pain  -     Posttreatment Pain Rating 0/10 - no pain  -JM       Row Name 07/21/25 1040          Plan of Care Review    Plan of Care Reviewed With patient  -     Progress improving  -     Outcome Evaluation Pt agreed to PT session, pt improved with tfers -CGA1 for STS , another to assist w/lines /tubes, pt judith amb ~150ft CGA-2, judith cardiac protocol exer x10 reps , plans home at DC w/wife assist  -JM       Row Name 07/21/25 1040          Therapy Assessment/Plan (PT)    Rehab Potential (PT) good  -     Criteria for Skilled Interventions Met (PT) yes  -JM       Row Name 07/21/25 1040          Vital Signs    Pretreatment Heart Rate (beats/min) 117  -JM     Intratreatment Heart Rate (beats/min) 136  -JM     Posttreatment Heart Rate (beats/min) 120  -     Pre SpO2 (%) 96  -JM     O2 Delivery Pre Treatment hi-flow  -      Intra SpO2 (%) 90  -JM     O2 Delivery Intra Treatment hi-flow  -JM     Post SpO2 (%) 99  -JM     O2 Delivery Post Treatment hi-flow  -JM     Recovery Time varied readings , not a good pleth on lower readings, no incr SOA noted, RN aware and will replace finger probe  -       Row Name 07/21/25 1040          Positioning and Restraints    Pre-Treatment Position sitting in chair/recliner  -JM     Post Treatment Position chair  after assisting to BSC  -JM     In Chair reclined;call light within reach;encouraged to call for assist;notified nsg  no alarm upon entry  -               User Key  (r) = Recorded By, (t) = Taken By, (c) = Cosigned By      Initials Name Provider Type    Kyleigh Thomas PTA Physical Therapist Assistant                   Outcome Measures       Row Name 07/21/25 1045          How much help from another person do you currently need...    Turning from your back to your side while in flat bed without using bedrails? 3  -JM     Moving from lying on back to sitting on the side of a flat bed without bedrails? 3  -JM     Moving to and from a bed to a chair (including a wheelchair)? 3  -JM     Standing up from a chair using your arms (e.g., wheelchair, bedside chair)? 3  -JM     Climbing 3-5 steps with a railing? 1  HR limiting  -JM     To walk in hospital room? 3  -JM     AM-PAC 6 Clicks Score (PT) 16  -JM     Highest Level of Mobility Goal Stand (1 or More Minutes)-5  -JM               User Key  (r) = Recorded By, (t) = Taken By, (c) = Cosigned By      Initials Name Provider Type    Kyleigh Thomas PTA Physical Therapist Assistant                                 Physical Therapy Education       Title: PT OT SLP Therapies (Done)       Topic: Physical Therapy (Done)       Point: Mobility training (Done)       Learning Progress Summary            Patient Eager, E,TB,D, VU,DU by CASANDRA at 7/21/2025 1045    Acceptance, E, VU by MARY at 7/19/2025 1050    Acceptance, E,D, VU,NR by MS at 7/18/2025 1121     Acceptance, E, NR by AR at 7/17/2025 1254    Acceptance, E, NR by EM at 7/16/2025 1022                      Point: Home exercise program (Done)       Learning Progress Summary            Patient Eager, E,TB,D, VU,DU by JM at 7/21/2025 1045    Acceptance, E, VU by DB at 7/19/2025 1050    Acceptance, E,D, VU,NR by MS at 7/18/2025 1121    Acceptance, E, NR by AR at 7/17/2025 1254    Acceptance, E, NR by EM at 7/16/2025 1022                      Point: Body mechanics (Done)       Learning Progress Summary            Patient Eager, E,TB,D, VU,DU by  at 7/21/2025 1045    Acceptance, E, VU by DB at 7/19/2025 1050    Acceptance, E,D, VU,NR by MS at 7/18/2025 1121    Acceptance, E, NR by AR at 7/17/2025 1254                      Point: Precautions (Done)       Learning Progress Summary            Patient Eager, E,TB,D, VU,DU by  at 7/21/2025 1045    Acceptance, E, VU by DB at 7/19/2025 1050    Acceptance, E,D, VU,NR by MS at 7/18/2025 1121    Acceptance, E, NR by AR at 7/17/2025 1254                                      User Key       Initials Effective Dates Name Provider Type Discipline     06/16/21 -  Kayy Adams, PT Physical Therapist PT     03/07/18 -  Kyleigh Jackson, PTA Physical Therapist Assistant PT    MS 06/16/21 -  Campos Costa, PT Physical Therapist PT    AR 06/16/21 -  Mary Curry, PT Physical Therapist PT    DB 06/27/25 -  Elise Mata, PT Physical Therapist PT                  PT Recommendation and Plan     Progress: improving  Outcome Evaluation: Pt agreed to PT session, pt improved with tfers -CGA1 for STS , another to assist w/lines /tubes, pt judith amb ~150ft CGA-2, judith cardiac protocol exer x10 reps , plans home at DC w/wife assist     Time Calculation:         PT Charges       Row Name 07/21/25 1046             Time Calculation    Start Time 0849  -      Stop Time 0922  -      Time Calculation (min) 33 min  -      PT Received On 07/21/25  -CASANDRA      PT - Next  Appointment 07/22/25  -CASANDRA                User Key  (r) = Recorded By, (t) = Taken By, (c) = Cosigned By      Initials Name Provider Type    Kyleigh Thomas PTA Physical Therapist Assistant                  Therapy Charges for Today       Code Description Service Date Service Provider Modifiers Qty    17027708517  PT THERAPEUTIC ACT EA 15 MIN 7/21/2025 Kyleigh Jackson, MAURICE GP 1    41850911797 HC PT THER SUPP EA 15 MIN 7/21/2025 Kyleigh Jackson PTA GP 1    41943091986 HC PT THER PROC EA 15 MIN 7/21/2025 Kyleigh Jackson, MAURICE GP 1            PT G-Codes  Outcome Measure Options: AM-PAC 6 Clicks Basic Mobility (PT)  AM-PAC 6 Clicks Score (PT): 16  AM-PAC 6 Clicks Score (OT): 16  Modified [employer] Scale: 3 - Moderate disability.  Requiring some help, but able to walk without assistance.  PT Discharge Summary  Anticipated Discharge Disposition (PT): home with assist    Kyleigh Jackson PTA  7/21/2025

## 2025-07-21 NOTE — PROGRESS NOTES
LOS: 6 days   Patient Care Team:  Farhad Metcalf MD as PCP - General (Family Medicine)    Chief Complaint: Post-op follow-up, s/p POD #4 CABG/WEN clip-- [place]      Subjective  Feeling okay    Vital Signs  Temp:  [97.8 °F (36.6 °C)-99.7 °F (37.6 °C)] 97.8 °F (36.6 °C)  Heart Rate:  [] 97  Resp:  [20-25] 20  BP: ()/(53-91) 94/72  Arterial Line BP: ()/(45-95) 68/64      07/18/25  0605 07/19/25  0600 07/21/25  0511   Weight: 70.9 kg (156 lb 4.9 oz) 70 kg (154 lb 5.2 oz) 70.4 kg (155 lb 3.3 oz)     Body mass index is 25.83 kg/m².    Intake/Output Summary (Last 24 hours) at 7/21/2025 0736  Last data filed at 7/21/2025 0442  Gross per 24 hour   Intake 1780.74 ml   Output 3985 ml   Net -2204.26 ml     No intake/output data recorded.        Objective:  General Appearance:  Comfortable, well-appearing, in no acute distress and not in pain (Up in chair).    Vital signs: (most recent): Blood pressure 106/78, pulse 65, temperature 97.8 °F (36.6 °C), temperature source Oral, resp. rate 20, weight 70.4 kg (155 lb 3.3 oz), SpO2 98%.  Vital signs are normal.    Output: Producing urine (+buitrago) and producing stool.    Lungs:  Normal effort and normal respiratory rate.  He is not in respiratory distress.  (AirVo 40L/30% FiO2)  Heart: Tachycardia.  Irregular rhythm.  (Tele: Afib 113)  Abdomen: Abdomen is soft and non-distended.  There is no abdominal tenderness.     Extremities: Normal range of motion.  There is no dependent edema.    Neurological: Patient is alert and oriented to person, place and time.    Skin:  Warm and dry.  (Incisions healing well without erythema or drainage)            Results Review:      WBC WBC   Date Value Ref Range Status   07/21/2025 10.68 3.40 - 10.80 10*3/mm3 Final   07/20/2025 9.20 3.40 - 10.80 10*3/mm3 Final   07/20/2025 11.36 (H) 3.40 - 10.80 10*3/mm3 Final   07/19/2025 10.31 3.40 - 10.80 10*3/mm3 Final   07/19/2025 11.28 (H) 3.40 - 10.80 10*3/mm3 Final   07/18/2025 10.90 (H)  "3.40 - 10.80 10*3/mm3 Final      HGB Hemoglobin   Date Value Ref Range Status   07/21/2025 11.7 (L) 13.0 - 17.7 g/dL Final   07/20/2025 11.0 (L) 13.0 - 17.7 g/dL Final   07/20/2025 10.1 (L) 13.0 - 17.7 g/dL Final   07/19/2025 10.7 (L) 13.0 - 17.7 g/dL Final   07/19/2025 10.3 (L) 13.0 - 17.7 g/dL Final   07/18/2025 10.4 (L) 13.0 - 17.7 g/dL Final      HCT Hematocrit   Date Value Ref Range Status   07/21/2025 35.3 (L) 37.5 - 51.0 % Final   07/20/2025 32.7 (L) 37.5 - 51.0 % Final   07/20/2025 30.0 (L) 37.5 - 51.0 % Final   07/19/2025 32.5 (L) 37.5 - 51.0 % Final   07/19/2025 31.0 (L) 37.5 - 51.0 % Final   07/18/2025 31.5 (L) 37.5 - 51.0 % Final      Platelets Platelets   Date Value Ref Range Status   07/21/2025 292 140 - 450 10*3/mm3 Final   07/20/2025 213 140 - 450 10*3/mm3 Final   07/20/2025 238 140 - 450 10*3/mm3 Final   07/19/2025 186 140 - 450 10*3/mm3 Final   07/19/2025 191 140 - 450 10*3/mm3 Final   07/18/2025 145 140 - 450 10*3/mm3 Final        PT/INR:    No results found for: \"PROTIME\"  /  No results found for: \"INR\"      Sodium Sodium   Date Value Ref Range Status   07/21/2025 139 136 - 145 mmol/L Final   07/20/2025 138 136 - 145 mmol/L Final   07/20/2025 135 (L) 136 - 145 mmol/L Final   07/20/2025 135 (L) 136 - 145 mmol/L Final   07/19/2025 138 136 - 145 mmol/L Final   07/19/2025 140 136 - 145 mmol/L Final   07/18/2025 141 136 - 145 mmol/L Final      Potassium Potassium   Date Value Ref Range Status   07/21/2025 4.1 3.5 - 5.2 mmol/L Final   07/20/2025 4.1 3.5 - 5.2 mmol/L Final   07/20/2025 3.9 3.5 - 5.2 mmol/L Final   07/20/2025 3.8 3.5 - 5.2 mmol/L Final   07/20/2025 3.8 3.5 - 5.2 mmol/L Final   07/19/2025 3.9 3.5 - 5.2 mmol/L Final   07/19/2025 4.1 3.5 - 5.2 mmol/L Final   07/19/2025 3.8 3.5 - 5.2 mmol/L Final     Comment:     Slight hemolysis detected by analyzer. Result may be falsely elevated.   07/18/2025 3.7 3.5 - 5.2 mmol/L Final   07/18/2025 3.8 3.5 - 5.2 mmol/L Final   07/18/2025 3.6 3.5 - 5.2 " mmol/L Final      Chloride Chloride   Date Value Ref Range Status   07/21/2025 103 98 - 107 mmol/L Final   07/20/2025 104 98 - 107 mmol/L Final   07/20/2025 100 98 - 107 mmol/L Final   07/20/2025 100 98 - 107 mmol/L Final   07/19/2025 102 98 - 107 mmol/L Final   07/19/2025 104 98 - 107 mmol/L Final   07/18/2025 105 98 - 107 mmol/L Final      Bicarbonate CO2   Date Value Ref Range Status   07/21/2025 19.2 (L) 22.0 - 29.0 mmol/L Final   07/20/2025 20.4 (L) 22.0 - 29.0 mmol/L Final   07/20/2025 21.0 (L) 22.0 - 29.0 mmol/L Final   07/20/2025 21.0 (L) 22.0 - 29.0 mmol/L Final   07/19/2025 20.0 (L) 22.0 - 29.0 mmol/L Final   07/19/2025 21.0 (L) 22.0 - 29.0 mmol/L Final   07/18/2025 22.0 22.0 - 29.0 mmol/L Final      BUN BUN   Date Value Ref Range Status   07/21/2025 46.0 (H) 8.0 - 23.0 mg/dL Final   07/20/2025 41.0 (H) 8.0 - 23.0 mg/dL Final   07/20/2025 42.0 (H) 8.0 - 23.0 mg/dL Final   07/20/2025 42.0 (H) 8.0 - 23.0 mg/dL Final   07/19/2025 39.0 (H) 8.0 - 23.0 mg/dL Final   07/19/2025 38.0 (H) 8.0 - 23.0 mg/dL Final   07/18/2025 37.0 (H) 8.0 - 23.0 mg/dL Final      Creatinine Creatinine   Date Value Ref Range Status   07/21/2025 2.23 (H) 0.76 - 1.27 mg/dL Final   07/20/2025 1.94 (H) 0.76 - 1.27 mg/dL Final   07/20/2025 1.99 (H) 0.76 - 1.27 mg/dL Final   07/20/2025 1.99 (H) 0.76 - 1.27 mg/dL Final   07/19/2025 1.81 (H) 0.76 - 1.27 mg/dL Final   07/19/2025 1.77 (H) 0.76 - 1.27 mg/dL Final   07/18/2025 1.75 (H) 0.76 - 1.27 mg/dL Final      Calcium Calcium   Date Value Ref Range Status   07/21/2025 8.4 (L) 8.6 - 10.5 mg/dL Final   07/20/2025 8.8 8.6 - 10.5 mg/dL Final   07/20/2025 7.8 (L) 8.6 - 10.5 mg/dL Final   07/20/2025 7.8 (L) 8.6 - 10.5 mg/dL Final   07/19/2025 8.6 8.6 - 10.5 mg/dL Final   07/19/2025 8.2 (L) 8.6 - 10.5 mg/dL Final   07/18/2025 8.5 (L) 8.6 - 10.5 mg/dL Final      Magnesium Magnesium   Date Value Ref Range Status   07/21/2025 2.3 1.6 - 2.4 mg/dL Final   07/20/2025 2.3 1.6 - 2.4 mg/dL Final   07/20/2025  2.6 (H) 1.6 - 2.4 mg/dL Final   07/19/2025 2.6 (H) 1.6 - 2.4 mg/dL Final   07/19/2025 2.7 (H) 1.6 - 2.4 mg/dL Final   07/18/2025 2.8 (H) 1.6 - 2.4 mg/dL Final          aspirin, 81 mg, Oral, Daily  atorvastatin, 40 mg, Oral, Nightly  bisacodyl, 10 mg, Rectal, Daily  cefepime, 2,000 mg, Intravenous, Q12H  enoxaparin sodium, 40 mg, Subcutaneous, Daily  guaiFENesin, 1,200 mg, Oral, Q12H  insulin lispro, 2-7 Units, Subcutaneous, 4x Daily AC & at Bedtime  ipratropium-albuterol, 3 mL, Nebulization, 4x Daily - RT  melatonin, 5 mg, Oral, Nightly  [Held by provider] metoprolol tartrate, 12.5 mg, Oral, Q12H  midodrine, 10 mg, Oral, TID AC  polyethylene glycol, 17 g, Oral, Daily  senna-docusate sodium, 2 tablet, Oral, BID  [Held by provider] trimethoprim-polymyxin b, 1 drop, Right Eye, 6x Daily      amiodarone, 0.5 mg/min, Last Rate: 0.5 mg/min (07/20/25 2109)  clevidipine, 2-32 mg/hr  DOPamine, 2-20 mcg/kg/min  EPINEPHrine, 0.02-0.3 mcg/kg/min  niCARdipine, 5-15 mg/hr, Last Rate: Stopped (07/15/25 1625)  nitroglycerin, 5-200 mcg/min  norepinephrine, 0.02-0.3 mcg/kg/min, Last Rate: Stopped (07/19/25 2301)  phenylephrine, 0.2-3 mcg/kg/min, Last Rate: 0.8 mcg/kg/min (07/21/25 0558)        Assessment & Plan    - Severe MV-CAD -- s/p CABG, LEVH, WEN occlusion via atriclip with Dr. Patino 7/15/25  - AAA (hx aorto iliac femoral right popliteal stent repair x2 with Dr. Archer) -- AAA measuring 5.3cm--plans for surgery in 4 to 6 weeks  - HTN  - HLD  - PAD  - Hx DVT  - Right leg vein stripping  - VERN on CKD stage 2---renal following   - Post-op anemia, expected ABLA, watch closely  - Post-op TCP, resolved  - PAF with RVR post-op---currently PAF, on Amio gtt  - Post-op hypoxia---on AirVo  - Post-op ileus   - Prolonged QTc      POD #6. Slowly progressing.   Atrial fibrillation rate in the 130s-- remains on IV amiodarone, will rebolus and resume drip at 1 for the protocol, requiring neosynephrine. Will check EKG for QTc. Continue Midodrine  10 mg po tid. Not on beta blocker secondary to hypotension. Replace calcium. Creatine up 2.23-- renal following, no plans for diuretic today.  Keep in CVR today. Encourage IS and ambulation-- will add flutter valve.       TATI Rocha  07/21/25  07:36 EDT

## 2025-07-21 NOTE — PLAN OF CARE
Goal Outcome Evaluation:  Plan of Care Reviewed With: patient        Progress: no change  Outcome Evaluation: POD 6- started on Mayank overnight for low BP, NSR to in and out of Afib, Dr. Sandoval notified and digoxin dose x1 ordered. Currently on amio and mayank. Up to bedside commode x3 this shift. 3 watery small-medium BM's this shift. Will continue to monitor.

## 2025-07-21 NOTE — PROGRESS NOTES
CVICU Intensivist Progress Note    HPI/Chief Complaint: 78 yo male with CAD who presents to the CVICU immediately s/p 6V CABG + WEN clip    PMHx: HTN, HL, PAD s/p aorto-iliac fem-pop bypass with stent repair on Xarelto, h/o right DVT, PONV, GERD, 5.3 cm AAA followed by Dr. Archer w/ plans for repair in the near future    Procedure: 7/15/25 6V CABG (LIMA - LAD, SVG - acute marginal of RCA, PDA, OM1, D1) + WEN clip by Dr. Patino. He was easily intubated, but MARCOS placement was somewhat difficult. MARCOS showed LVEF 60%, nonrheumatic mild AI. He received 500ml of cellsaver only, no other products and required no shocks coming off CPB. He was hemoconcentrated 2.6L. He was brought to the CVICU on sedation and low dose cardene. Initial CI 2.1 without inotropes.    Hospital Course:   7/15 operative course  7/16 hypoxic requiring heated high flow, VERN, gastric distention/ileus requiring NGT for decompression  7/17 diuresed for worsening respiratory function, became hypotensive w/ hypovolemia and Cr bumped to 1.99 so diuretics stopped. O2 escalated. Afib w/ RVR, received amio bolus + drip protocol late in the day  7/18 required Bipap/100% Optiflow. Diruesis + abx started + pain control. Tolerating CLD, NGT clamped. Amio to PO  7/19 back to Afib w/ RVR, repeat drip. Pressors required. NGT removed  7/20 emesis x2 episodes    Daily Assessment and Plan 7/21: Looks well this morning, sleeping w/ qHS melatonin. Remains hypotensive and on phenylephrine; likely 2/2 afib w/ rvr in addition to hypovolemia w/ CVP of 4. If patient unable to hydrate himself, may give 250ml albumin later today to evaluate ability to wean off pressors. Midodrine started over the weekend, hopeful to be able to wean that off as patient is not an ESRD/ESLD patient. ASA + statin, no BB 2/2 pressors. Still requiring IV amio in addition to 2 doses of digoxin given yesterday and last night; check dig level (last dose midnight) given his renal dysfunction. Hopeful to  be able to wean the amio given his prolonged Qtc 598; will d/w Cardiology. Respiratory status markedly improved, CXR shows well expanded lungs bilaterally without evidence of large effusion or significant infiltrate. He is on 6L high flow cannula now, will continue to wean as tolerated. Will attempt to avoid bipap tonight given his abdominal distention. Continue aggressive pulmonary toilet w/ Is/flutter + duonebs + hypertonic nebs. He is coughing up a good amount of secretions, continue guaifenesin. VERN w/ rising Cr, hopeful this will downtrend off diuretics and with appropriate BP/perfusion. Abdomen again quite distended and firm, episodes of emesis x2 yesterday and nauseous today. Will eval w/ KUB, may need to downgrade diet again, hopeful he does not require repeat NGT placement. He is having bowel movements w/ bowel regimen + suppository, will continue. Unfortunately not a candidate for reglan given prolonged Qtc. SSI to address hyperglycemia. Will transition cefepime to ceftriaxone for presumed pneumonia treatment (oxygenation/fever curve improved after starting abx) of total 7 days; sputum cx rejected, patient not able to participate w/ appropriate collection. Lovenox for prophy, s/p WEN clip. He does have a history of DVT in the past, currently on Lovenox for prophy. Will d/w Breezy Patino and Cardiology about the need for AC in setting of WEN clip w/ Afib? Mobilizing with PT/OT, walked quite well today; plan to walk again 1-2 times w/ nursing.    Patient's wife updated at bedside on rounds this morning, discussed progress and plans. Questions answered.      Historical Information of Importance:  - A1C 6.1  - Xarelto for PAD and stents and h/o popliteal DVT, last dose 7/11  - 5.3cm infrarenal AAA followed by Dr. Archer, plans for repair 6 weeks after cardiac surgery. 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 Celiac, SMA and renal arteries are  patent. DANIEL is occluded but branches supplied distally by collaterals (CT scan from 5/21/25)    amiodarone, 150 mg, Intravenous, Once  aspirin, 81 mg, Oral, Daily  atorvastatin, 40 mg, Oral, Nightly  bisacodyl, 10 mg, Rectal, Daily  cefTRIAXone, 1,000 mg, Intravenous, Q24H  enoxaparin sodium, 30 mg, Subcutaneous, Daily  guaiFENesin, 1,200 mg, Oral, Q12H  insulin lispro, 2-7 Units, Subcutaneous, 4x Daily AC & at Bedtime  ipratropium-albuterol, 3 mL, Nebulization, 4x Daily - RT  melatonin, 5 mg, Oral, Nightly  [Held by provider] metoprolol tartrate, 12.5 mg, Oral, Q12H  midodrine, 10 mg, Oral, TID AC  polyethylene glycol, 17 g, Oral, Daily  senna-docusate sodium, 2 tablet, Oral, BID  sodium chloride, 4 mL, Nebulization, BID - RT        Relevant Physical Exam:  Feet warm, 1+ pulses bilaterally, no edema on right LE. LLE w/ access site from vein harvest, bruising and very trace edema  Abdomen distended, mildly tender to palpation globally  Lungs clear but distant breath sounds    ------------------------------------------------------------------------------------------------------------------------------------------------------  Prophy: HOB elevated + oral care + scds + buitrago stat lock + PPI + lovenox  Code Status: full  Lines: JESS MCARTHUR(7/15)      Critical Care time excluding procedures on this date: 41 mins on 7/21/25 by Kayy Melgar MD for the assessment and treatment of: interpretation of serial cardiac output measurements, evaluation of CXR, interpretation of serial blood gases, coagulopathy, VERN, pnuemonia

## 2025-07-21 NOTE — PLAN OF CARE
Goal Outcome Evaluation:              Outcome Evaluation: Clinical swallow evaluation completed. Pt reports no diet restrictions prior to hospital admittance. Currently on soft to chew with chopped meat, thin liquids. Pt trialed this date on thin via straw, soft, mixed, and regular solids with no overt s/s of aspiration. An attending doctor mentioned GI concerns for which he is being monitored. Recommend regular diet with thin liquids, meds as tolerated. Sitting upright, small bites/sips, slow rate. ST to s/o 7/21. May re-consult if pt demonstrates any s/s of aspiration during meals.

## 2025-07-22 ENCOUNTER — APPOINTMENT (OUTPATIENT)
Dept: GENERAL RADIOLOGY | Facility: HOSPITAL | Age: 80
DRG: 235 | End: 2025-07-22
Payer: MEDICARE

## 2025-07-22 LAB
25(OH)D3 SERPL-MCNC: 19.2 NG/ML (ref 30–100)
ALBUMIN SERPL-MCNC: 3.4 G/DL (ref 3.5–5.2)
ALP SERPL-CCNC: 144 U/L (ref 39–117)
ALT SERPL W P-5'-P-CCNC: 26 U/L (ref 1–41)
ANION GAP SERPL CALCULATED.3IONS-SCNC: 11 MMOL/L (ref 5–15)
ANION GAP SERPL CALCULATED.3IONS-SCNC: 13 MMOL/L (ref 5–15)
APTT PPP: 35.8 SECONDS (ref 22.7–35.4)
APTT PPP: 67 SECONDS (ref 22.7–35.4)
APTT PPP: 77.3 SECONDS (ref 22.7–35.4)
AST SERPL-CCNC: 57 U/L (ref 1–40)
BASOPHILS # BLD AUTO: 0.04 10*3/MM3 (ref 0–0.2)
BASOPHILS NFR BLD AUTO: 0.4 % (ref 0–1.5)
BILIRUB CONJ SERPL-MCNC: 0.2 MG/DL (ref 0–0.3)
BILIRUB INDIRECT SERPL-MCNC: 0.2 MG/DL
BILIRUB SERPL-MCNC: 0.4 MG/DL (ref 0–1.2)
BUN SERPL-MCNC: 45 MG/DL (ref 8–23)
BUN SERPL-MCNC: 49 MG/DL (ref 8–23)
BUN/CREAT SERPL: 21.1 (ref 7–25)
BUN/CREAT SERPL: 21.8 (ref 7–25)
CA-I SERPL ISE-MCNC: 1.11 MMOL/L (ref 1.15–1.35)
CA-I SERPL ISE-MCNC: 1.19 MMOL/L (ref 1.15–1.35)
CALCIUM SPEC-SCNC: 8.1 MG/DL (ref 8.6–10.5)
CALCIUM SPEC-SCNC: 8.8 MG/DL (ref 8.6–10.5)
CHLORIDE SERPL-SCNC: 103 MMOL/L (ref 98–107)
CHLORIDE SERPL-SCNC: 103 MMOL/L (ref 98–107)
CO2 SERPL-SCNC: 21 MMOL/L (ref 22–29)
CO2 SERPL-SCNC: 22 MMOL/L (ref 22–29)
CREAT SERPL-MCNC: 2.13 MG/DL (ref 0.76–1.27)
CREAT SERPL-MCNC: 2.25 MG/DL (ref 0.76–1.27)
DEPRECATED RDW RBC AUTO: 42.2 FL (ref 37–54)
DEPRECATED RDW RBC AUTO: 43.6 FL (ref 37–54)
EGFRCR SERPLBLD CKD-EPI 2021: 29.1 ML/MIN/1.73
EGFRCR SERPLBLD CKD-EPI 2021: 31.1 ML/MIN/1.73
EOSINOPHIL # BLD AUTO: 0.36 10*3/MM3 (ref 0–0.4)
EOSINOPHIL NFR BLD AUTO: 3.7 % (ref 0.3–6.2)
ERYTHROCYTE [DISTWIDTH] IN BLOOD BY AUTOMATED COUNT: 12.4 % (ref 12.3–15.4)
ERYTHROCYTE [DISTWIDTH] IN BLOOD BY AUTOMATED COUNT: 12.7 % (ref 12.3–15.4)
GLUCOSE BLDC GLUCOMTR-MCNC: 102 MG/DL (ref 70–130)
GLUCOSE BLDC GLUCOMTR-MCNC: 137 MG/DL (ref 70–130)
GLUCOSE BLDC GLUCOMTR-MCNC: 147 MG/DL (ref 70–130)
GLUCOSE BLDC GLUCOMTR-MCNC: 97 MG/DL (ref 70–130)
GLUCOSE SERPL-MCNC: 105 MG/DL (ref 65–99)
GLUCOSE SERPL-MCNC: 97 MG/DL (ref 65–99)
HCT VFR BLD AUTO: 29.2 % (ref 37.5–51)
HCT VFR BLD AUTO: 30.8 % (ref 37.5–51)
HGB BLD-MCNC: 10 G/DL (ref 13–17.7)
HGB BLD-MCNC: 9.7 G/DL (ref 13–17.7)
IMM GRANULOCYTES # BLD AUTO: 0.08 10*3/MM3 (ref 0–0.05)
IMM GRANULOCYTES NFR BLD AUTO: 0.8 % (ref 0–0.5)
INR PPP: 1.37 (ref 0.9–1.1)
LYMPHOCYTES # BLD AUTO: 1.32 10*3/MM3 (ref 0.7–3.1)
LYMPHOCYTES NFR BLD AUTO: 13.7 % (ref 19.6–45.3)
MAGNESIUM SERPL-MCNC: 2.5 MG/DL (ref 1.6–2.4)
MAGNESIUM SERPL-MCNC: 2.6 MG/DL (ref 1.6–2.4)
MCH RBC QN AUTO: 30.2 PG (ref 26.6–33)
MCH RBC QN AUTO: 30.9 PG (ref 26.6–33)
MCHC RBC AUTO-ENTMCNC: 32.5 G/DL (ref 31.5–35.7)
MCHC RBC AUTO-ENTMCNC: 33.2 G/DL (ref 31.5–35.7)
MCV RBC AUTO: 93 FL (ref 79–97)
MCV RBC AUTO: 93.1 FL (ref 79–97)
MONOCYTES # BLD AUTO: 1.22 10*3/MM3 (ref 0.1–0.9)
MONOCYTES NFR BLD AUTO: 12.7 % (ref 5–12)
NEUTROPHILS NFR BLD AUTO: 6.61 10*3/MM3 (ref 1.7–7)
NEUTROPHILS NFR BLD AUTO: 68.7 % (ref 42.7–76)
NRBC BLD AUTO-RTO: 0 /100 WBC (ref 0–0.2)
PHOSPHATE SERPL-MCNC: 4.4 MG/DL (ref 2.5–4.5)
PLATELET # BLD AUTO: 275 10*3/MM3 (ref 140–450)
PLATELET # BLD AUTO: 308 10*3/MM3 (ref 140–450)
PMV BLD AUTO: 10 FL (ref 6–12)
PMV BLD AUTO: 9.6 FL (ref 6–12)
POTASSIUM SERPL-SCNC: 3.4 MMOL/L (ref 3.5–5.2)
POTASSIUM SERPL-SCNC: 3.7 MMOL/L (ref 3.5–5.2)
POTASSIUM SERPL-SCNC: 4 MMOL/L (ref 3.5–5.2)
POTASSIUM SERPL-SCNC: 4.2 MMOL/L (ref 3.5–5.2)
PROT SERPL-MCNC: 6 G/DL (ref 6–8.5)
PROTHROMBIN TIME: 16.8 SECONDS (ref 11.7–14.2)
RBC # BLD AUTO: 3.14 10*6/MM3 (ref 4.14–5.8)
RBC # BLD AUTO: 3.31 10*6/MM3 (ref 4.14–5.8)
SODIUM SERPL-SCNC: 136 MMOL/L (ref 136–145)
SODIUM SERPL-SCNC: 137 MMOL/L (ref 136–145)
URATE SERPL-MCNC: 6.7 MG/DL (ref 3.4–7)
WBC NRBC COR # BLD AUTO: 11.25 10*3/MM3 (ref 3.4–10.8)
WBC NRBC COR # BLD AUTO: 9.63 10*3/MM3 (ref 3.4–10.8)

## 2025-07-22 PROCEDURE — 25010000002 HEPARIN (PORCINE) 25000-0.45 UT/250ML-% SOLUTION: Performed by: NURSE PRACTITIONER

## 2025-07-22 PROCEDURE — 84100 ASSAY OF PHOSPHORUS: CPT | Performed by: ANESTHESIOLOGY

## 2025-07-22 PROCEDURE — 94799 UNLISTED PULMONARY SVC/PX: CPT

## 2025-07-22 PROCEDURE — 97530 THERAPEUTIC ACTIVITIES: CPT

## 2025-07-22 PROCEDURE — 83735 ASSAY OF MAGNESIUM: CPT | Performed by: ANESTHESIOLOGY

## 2025-07-22 PROCEDURE — 25010000002 AMIODARONE IN DEXTROSE 5% 360-4.14 MG/200ML-% SOLUTION: Performed by: PHYSICIAN ASSISTANT

## 2025-07-22 PROCEDURE — 80048 BASIC METABOLIC PNL TOTAL CA: CPT | Performed by: ANESTHESIOLOGY

## 2025-07-22 PROCEDURE — 25010000002 CALCIUM GLUCONATE 2-0.675 GM/100ML-% SOLUTION: Performed by: NURSE PRACTITIONER

## 2025-07-22 PROCEDURE — 74018 RADEX ABDOMEN 1 VIEW: CPT

## 2025-07-22 PROCEDURE — 80076 HEPATIC FUNCTION PANEL: CPT | Performed by: ANESTHESIOLOGY

## 2025-07-22 PROCEDURE — 84550 ASSAY OF BLOOD/URIC ACID: CPT | Performed by: INTERNAL MEDICINE

## 2025-07-22 PROCEDURE — 99232 SBSQ HOSP IP/OBS MODERATE 35: CPT | Performed by: PHYSICIAN ASSISTANT

## 2025-07-22 PROCEDURE — 94660 CPAP INITIATION&MGMT: CPT

## 2025-07-22 PROCEDURE — 97110 THERAPEUTIC EXERCISES: CPT

## 2025-07-22 PROCEDURE — 99231 SBSQ HOSP IP/OBS SF/LOW 25: CPT | Performed by: SURGERY

## 2025-07-22 PROCEDURE — 84132 ASSAY OF SERUM POTASSIUM: CPT | Performed by: THORACIC SURGERY (CARDIOTHORACIC VASCULAR SURGERY)

## 2025-07-22 PROCEDURE — 85610 PROTHROMBIN TIME: CPT | Performed by: NURSE PRACTITIONER

## 2025-07-22 PROCEDURE — 82306 VITAMIN D 25 HYDROXY: CPT | Performed by: NURSE PRACTITIONER

## 2025-07-22 PROCEDURE — 97535 SELF CARE MNGMENT TRAINING: CPT

## 2025-07-22 PROCEDURE — 82330 ASSAY OF CALCIUM: CPT | Performed by: ANESTHESIOLOGY

## 2025-07-22 PROCEDURE — 85730 THROMBOPLASTIN TIME PARTIAL: CPT | Performed by: NURSE PRACTITIONER

## 2025-07-22 PROCEDURE — 25010000002 AMIODARONE IN DEXTROSE 5% 360-4.14 MG/200ML-% SOLUTION: Performed by: NURSE PRACTITIONER

## 2025-07-22 PROCEDURE — 99291 CRITICAL CARE FIRST HOUR: CPT | Performed by: ANESTHESIOLOGY

## 2025-07-22 PROCEDURE — 71045 X-RAY EXAM CHEST 1 VIEW: CPT

## 2025-07-22 PROCEDURE — 99232 SBSQ HOSP IP/OBS MODERATE 35: CPT | Performed by: INTERNAL MEDICINE

## 2025-07-22 PROCEDURE — 85730 THROMBOPLASTIN TIME PARTIAL: CPT | Performed by: ANESTHESIOLOGY

## 2025-07-22 PROCEDURE — 84132 ASSAY OF SERUM POTASSIUM: CPT | Performed by: ANESTHESIOLOGY

## 2025-07-22 PROCEDURE — 82948 REAGENT STRIP/BLOOD GLUCOSE: CPT

## 2025-07-22 PROCEDURE — 25010000002 CEFTRIAXONE PER 250 MG: Performed by: ANESTHESIOLOGY

## 2025-07-22 PROCEDURE — 85025 COMPLETE CBC W/AUTO DIFF WBC: CPT | Performed by: INTERNAL MEDICINE

## 2025-07-22 PROCEDURE — 94761 N-INVAS EAR/PLS OXIMETRY MLT: CPT

## 2025-07-22 PROCEDURE — 85027 COMPLETE CBC AUTOMATED: CPT | Performed by: ANESTHESIOLOGY

## 2025-07-22 RX ORDER — POTASSIUM CHLORIDE 1.5 G/1.58G
40 POWDER, FOR SOLUTION ORAL ONCE
Status: COMPLETED | OUTPATIENT
Start: 2025-07-22 | End: 2025-07-22

## 2025-07-22 RX ORDER — DOCUSATE SODIUM 100 MG/1
100 CAPSULE, LIQUID FILLED ORAL 2 TIMES DAILY
Status: DISCONTINUED | OUTPATIENT
Start: 2025-07-22 | End: 2025-07-22

## 2025-07-22 RX ORDER — POTASSIUM CHLORIDE 750 MG/1
10 TABLET, FILM COATED, EXTENDED RELEASE ORAL ONCE
Status: COMPLETED | OUTPATIENT
Start: 2025-07-22 | End: 2025-07-22

## 2025-07-22 RX ORDER — POLYETHYLENE GLYCOL 3350 17 G/17G
17 POWDER, FOR SOLUTION ORAL 2 TIMES DAILY
Status: DISCONTINUED | OUTPATIENT
Start: 2025-07-22 | End: 2025-07-25 | Stop reason: HOSPADM

## 2025-07-22 RX ORDER — CALCIUM GLUCONATE 20 MG/ML
2000 INJECTION, SOLUTION INTRAVENOUS ONCE
Status: COMPLETED | OUTPATIENT
Start: 2025-07-22 | End: 2025-07-22

## 2025-07-22 RX ORDER — ERGOCALCIFEROL 1.25 MG/1
50000 CAPSULE, LIQUID FILLED ORAL
Status: DISCONTINUED | OUTPATIENT
Start: 2025-07-22 | End: 2025-07-25 | Stop reason: HOSPADM

## 2025-07-22 RX ORDER — AMIODARONE HYDROCHLORIDE 200 MG/1
400 TABLET ORAL EVERY 8 HOURS
Status: DISCONTINUED | OUTPATIENT
Start: 2025-07-22 | End: 2025-07-25 | Stop reason: HOSPADM

## 2025-07-22 RX ORDER — HEPARIN SODIUM 10000 [USP'U]/100ML
12 INJECTION, SOLUTION INTRAVENOUS
Status: DISCONTINUED | OUTPATIENT
Start: 2025-07-22 | End: 2025-07-24

## 2025-07-22 RX ADMIN — BISACODYL 10 MG: 10 SUPPOSITORY RECTAL at 08:10

## 2025-07-22 RX ADMIN — ATORVASTATIN CALCIUM 40 MG: 20 TABLET, FILM COATED ORAL at 21:18

## 2025-07-22 RX ADMIN — IPRATROPIUM BROMIDE AND ALBUTEROL SULFATE 3 ML: .5; 3 SOLUTION RESPIRATORY (INHALATION) at 11:02

## 2025-07-22 RX ADMIN — GUAIFENESIN 1200 MG: 600 TABLET, EXTENDED RELEASE ORAL at 08:12

## 2025-07-22 RX ADMIN — Medication 4 ML: at 06:34

## 2025-07-22 RX ADMIN — POTASSIUM CHLORIDE 40 MEQ: 1.5 POWDER, FOR SOLUTION ORAL at 11:18

## 2025-07-22 RX ADMIN — ALPRAZOLAM 0.25 MG: 0.25 TABLET ORAL at 21:18

## 2025-07-22 RX ADMIN — IPRATROPIUM BROMIDE AND ALBUTEROL SULFATE 3 ML: .5; 3 SOLUTION RESPIRATORY (INHALATION) at 14:14

## 2025-07-22 RX ADMIN — ASPIRIN 81 MG CHEWABLE TABLET 81 MG: 81 TABLET CHEWABLE at 08:09

## 2025-07-22 RX ADMIN — POLYETHYLENE GLYCOL 3350 17 G: 17 POWDER, FOR SOLUTION ORAL at 21:18

## 2025-07-22 RX ADMIN — SENNOSIDES AND DOCUSATE SODIUM 2 TABLET: 50; 8.6 TABLET ORAL at 08:09

## 2025-07-22 RX ADMIN — ERGOCALCIFEROL 50000 UNITS: 1.25 CAPSULE ORAL at 11:18

## 2025-07-22 RX ADMIN — CEFTRIAXONE SODIUM 1000 MG: 1 INJECTION, POWDER, FOR SOLUTION INTRAMUSCULAR; INTRAVENOUS at 10:43

## 2025-07-22 RX ADMIN — PANTOPRAZOLE SODIUM 40 MG: 40 TABLET, DELAYED RELEASE ORAL at 05:08

## 2025-07-22 RX ADMIN — POLYETHYLENE GLYCOL 3350 17 G: 17 POWDER, FOR SOLUTION ORAL at 08:13

## 2025-07-22 RX ADMIN — Medication 5 MG: at 21:18

## 2025-07-22 RX ADMIN — Medication 4 ML: at 19:59

## 2025-07-22 RX ADMIN — GUAIFENESIN 1200 MG: 600 TABLET, EXTENDED RELEASE ORAL at 21:18

## 2025-07-22 RX ADMIN — MIDODRINE HYDROCHLORIDE 10 MG: 5 TABLET ORAL at 08:12

## 2025-07-22 RX ADMIN — AMIODARONE HYDROCHLORIDE 0.5 MG/MIN: 1.8 INJECTION, SOLUTION INTRAVENOUS at 16:30

## 2025-07-22 RX ADMIN — AMIODARONE HYDROCHLORIDE 400 MG: 200 TABLET ORAL at 10:44

## 2025-07-22 RX ADMIN — IPRATROPIUM BROMIDE AND ALBUTEROL SULFATE 3 ML: .5; 3 SOLUTION RESPIRATORY (INHALATION) at 19:59

## 2025-07-22 RX ADMIN — POTASSIUM CHLORIDE 10 MEQ: 750 TABLET, EXTENDED RELEASE ORAL at 04:04

## 2025-07-22 RX ADMIN — CALCIUM GLUCONATE 2000 MG: 20 INJECTION, SOLUTION INTRAVENOUS at 08:13

## 2025-07-22 RX ADMIN — MIDODRINE HYDROCHLORIDE 10 MG: 5 TABLET ORAL at 16:30

## 2025-07-22 RX ADMIN — SENNOSIDES AND DOCUSATE SODIUM 2 TABLET: 50; 8.6 TABLET ORAL at 21:18

## 2025-07-22 RX ADMIN — HEPARIN SODIUM 12 UNITS/KG/HR: 10000 INJECTION, SOLUTION INTRAVENOUS at 10:43

## 2025-07-22 RX ADMIN — DOCUSATE SODIUM 100 MG: 100 CAPSULE, LIQUID FILLED ORAL at 08:10

## 2025-07-22 RX ADMIN — AMIODARONE HYDROCHLORIDE 0.5 MG/MIN: 1.8 INJECTION, SOLUTION INTRAVENOUS at 03:04

## 2025-07-22 RX ADMIN — AMIODARONE HYDROCHLORIDE 400 MG: 200 TABLET ORAL at 18:51

## 2025-07-22 RX ADMIN — MIDODRINE HYDROCHLORIDE 10 MG: 5 TABLET ORAL at 11:18

## 2025-07-22 RX ADMIN — IPRATROPIUM BROMIDE AND ALBUTEROL SULFATE 3 ML: .5; 3 SOLUTION RESPIRATORY (INHALATION) at 06:33

## 2025-07-22 NOTE — PROGRESS NOTES
CVICU Intensivist Progress Note    HPI/Chief Complaint: 80 yo male with CAD who presents to the CVICU immediately s/p 6V CABG + WEN clip    PMHx: HTN, HL, PAD s/p aorto-iliac fem-pop bypass with stent repair on Xarelto, h/o right DVT, PONV, GERD, 5.3 cm AAA followed by Dr. Archer w/ plans for repair in the near future    Procedure: 7/15/25 6V CABG (LIMA - LAD, SVG - acute marginal of RCA, PDA, OM1, D1) + WEN clip by Dr. Patino. He was easily intubated, but MARCOS placement was somewhat difficult. MARCOS showed LVEF 60%, nonrheumatic mild AI. He received 500ml of cellsaver only, no other products and required no shocks coming off CPB. He was hemoconcentrated 2.6L. He was brought to the CVICU on sedation and low dose cardene. Initial CI 2.1 without inotropes.    Hospital Course:   7/15 operative course  7/16 hypoxic requiring heated high flow, VERN, gastric distention/ileus requiring NGT for decompression  7/17 diuresed for worsening respiratory function, became hypotensive w/ hypovolemia and Cr bumped to 1.99 so diuretics stopped. O2 escalated. Afib w/ RVR, received amio bolus + drip protocol late in the day  7/18 required Bipap/100% Optiflow. Diruesis + abx started + pain control. Tolerating CLD, NGT clamped. Amio to PO  7/19 back to Afib w/ RVR, repeat drip. Pressors required. NGT removed  7/20 emesis x2 episodes. Midodrine started for ongoing hypotension  7/21 required more boluses and amio drip for ongoing Afib. Transitioned to HFNC --> regular nasal cannula    Daily Assessment and Plan 7/22: Feels generally better, slept relatively well overnight. Did wear the bipap for several hours, however I don't think he needs this anymore. Oxygenation dramatically improved, tolerating 4L NCO2, participating w/ IS/flutter and secretions are markedly less. Transitioned from cefepime to rocephin for planned 7 day course of abx for presumed pneumonia (no culture data, unable to obtain sputum sample), will continue as planned.  "Hypotensive still on midodrine and phenylephrine, BP is better when in sinus rhythm; EP consulted and started higher dose oral amiodarone, plan to stop drip after 3 doses per their recommendations. Mayank as needed to maintain MAP >65. ASA + statin, start heparin drip for Afib and h/o DVT and vascular stents (on Xarelto pre-op) w/out boluses. VERN, Cr slightly downtrending, no diuretics for today per Nephrology; appears euvolemic, CVP 7 today. KUB shows large amount of stool in his colon, but no gastric distention; abdominal exam much softer today, continue rather aggressive bowel regimen to address constipation and distention. After multiple discussions about his bowels, he admits today that he has chronic issues w/ his bowels, has several very small stools throughout the day but never feels like he completely \"empties\" his bowels. AST and ALP both elevated, but downtrending; ??related to amio? SSI to address hyperglycemia. Working with PT and OT daily, walking with nursing in the afternoon in order to mobilize as much as possible      Patient's wife and granddaughter updated at bedside on rounds this morning, discussed progress and plans. Questions answered.      Historical Information of Importance:  - A1C 6.1  - Xarelto for PAD and stents and h/o popliteal DVT, last dose 7/11  - 5.3cm infrarenal AAA followed by Dr. Archer, plans for repair 6 weeks after cardiac surgery. 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 Celiac, SMA and renal arteries are patent. DANIEL is occluded but branches supplied distally by collaterals (CT scan from 5/21/25)    amiodarone, 400 mg, Oral, Q8H  aspirin, 81 mg, Oral, Daily  atorvastatin, 40 mg, Oral, Nightly  bisacodyl, 10 mg, Rectal, Daily  cefTRIAXone, 1,000 mg, Intravenous, Q24H  guaiFENesin, 1,200 mg, Oral, Q12H  insulin lispro, 2-7 Units, Subcutaneous, 4x Daily AC & at Bedtime  ipratropium-albuterol, 3 mL, Nebulization, 4x " Daily - RT  melatonin, 5 mg, Oral, Nightly  [Held by provider] metoprolol tartrate, 12.5 mg, Oral, Q12H  midodrine, 10 mg, Oral, TID AC  pantoprazole, 40 mg, Oral, Q AM  polyethylene glycol, 17 g, Oral, BID  senna-docusate sodium, 2 tablet, Oral, BID  sodium chloride, 4 mL, Nebulization, BID - RT  vitamin D, 50,000 Units, Oral, Q7 Days        Relevant Physical Exam:  Feet warm, 1+ pulses bilaterally, no edema on right LE. LLE w/ access site from vein harvest, bruising and very trace edema  Abdomen less distended today, nontender  Lungs clear but distant breath sounds    ------------------------------------------------------------------------------------------------------------------------------------------------------  Prophy: HOB elevated + oral care + scds + buitrago stat lock + PPI + heparin drip  Code Status: full  Lines: JESS MCARTHUR(7/15)      Critical Care time excluding procedures on this date: 39 mins on 7/21/25 by Kayy Melgar MD for the assessment and treatment of: interpretation of serial cardiac output measurements, evaluation of CXR, VERN, pnuemonia,hypotension

## 2025-07-22 NOTE — THERAPY TREATMENT NOTE
Patient Name: Erickson Gerard  : 1945    MRN: [id number]                              Today's Date: 2025       Admit Date: 7/15/2025    Visit Dx:     ICD-10-CM ICD-9-CM   1. Decreased activities of daily living (ADL)  Z78.9 V49.89   2. Coronary artery disease involving native heart, unspecified vessel or lesion type, unspecified whether angina present  I25.10 414.01   3. Abnormal findings on diagnostic imaging of heart and coronary circulation  R93.1 794.39   4. S/P CABG (coronary artery bypass graft)  Z95.1 V45.81     Patient Active Problem List   Diagnosis    Essential hypertension    GERD (gastroesophageal reflux disease)    Medicare annual wellness visit, subsequent    Benign prostatic hyperplasia with urinary frequency    Umbilical hernia without obstruction and without gangrene    Colon cancer screening    Mixed hyperlipidemia    Aneurysm of right popliteal artery    Acute deep vein thrombosis (DVT) of distal vein of right lower extremity    DDD (degenerative disc disease), lumbar    Actinic keratosis of forehead    Aneurysm of right popliteal artery    Abdominal aortic aneurysm (AAA) 3.0 cm to 5.5 cm in diameter in male    PVD (peripheral vascular disease)    High risk medication use    Immunization deficiency    Chronic deep vein thrombosis (DVT)    Encounter for hepatitis C screening test for low risk patient    Peripheral artery aneurysm    Blurred vision    Coronary artery disease of native artery of native heart with stable angina pectoris    Coronary artery disease involving native heart    Paroxysmal atrial fibrillation     Past Medical History:   Diagnosis Date    AAA (abdominal aortic aneurysm) without rupture 10/14/2021    Abdominal aortic aneurysm, without rupture, unspecified 2023    Acute embolism and thrombosis of unspecified deep veins of right distal lower extremity     Anesthesia complication     AFTER VEIN SURGERY STATES HE WAS HARD TO WAKE UP    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     Aneurysm of artery of lower extremity 09/09/2021    Arthritis     At risk for sleep apnea     STOP BANG 5    Coronary artery disease     Dyspnea on exertion     Essential (primary) hypertension     GERD (gastroesophageal reflux disease)     [place] (hard of hearing)     Hyperlipidemia     Hypertension     Iliac artery aneurysm 10/14/2021    Localized edema     Low back pain     Mixed hyperlipidemia     Other specified soft tissue disorders     PAD (peripheral artery disease)     Peripheral artery aneurysm     PONV (postoperative nausea and vomiting)     Popliteal aneurysm     Thrombosis 09/09/2021    Calf Vein Thrombosis Rt tibial vein    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     Past Surgical History:   Procedure Laterality Date    ANGIOPLASTY POPLITEAL ARTERY Right 11/09/2021    Procedure: RIGHT POPITEAL ANEURYSM REPAIR VIABOHN;  Surgeon: Campos Archer MD;  Location: [facility] OR 18/19;  Service: Vascular;  Laterality: Right;    ANGIOPLASTY POPLITEAL ARTERY Right 01/25/2022    Procedure: AORTO ILIAC FEMORAL RIGHT POPLITEAL VIABOHN STENT PLACEMENT X 2;  Surgeon: Campos Archer MD;  Location: [facility] OR 18/19;  Service: Vascular;  Laterality: Right;    CARDIAC CATHETERIZATION N/A 06/19/2025    Procedure: Left Heart Cath;  Surgeon: Oneil Escobar MD;  Location: [facility] CATH INVASIVE LOCATION;  Service: Cardiovascular;  Laterality: N/A;    CARDIAC CATHETERIZATION N/A 06/19/2025    Procedure: Left ventriculography;  Surgeon: Oneil Escobar MD;  Location: [facility] INVASIVE LOCATION;  Service: Cardiovascular;  Laterality: N/A;    COLONOSCOPY      CORONARY ARTERY BYPASS GRAFT N/A 7/15/2025    Procedure: STERNOTOMY; CORONARY ARTERY BYPASS GRAFTING TIMES 6 USING LEFT INTERNAL MAMMARY ARTERY AND LEFT SAPHENOUS VEIN; TRANSESOPHAGEAL ECHOCARDIOGRAM WITH ANESTHESIA;LEFT ATRIAL APPENDAGE EXCLUSION; PRP;  Surgeon: Jr Oscar Patino MD;  Location: [facility];  Service: Cardiothoracic;   Laterality: N/A;    EYE SURGERY Bilateral     CATARACTS    HERNIA REPAIR Bilateral     POLITEAL ARTERY ANEURYSM REPAIR Right 11/09/2021    Viabahn Stent Graft    VEIN LIGATION AND STRIPPING N/A       General Information       [facility] Name 07/22/25 1014          OT Time and Intention    Document Type therapy note (daily note)  -JR     Mode of Treatment occupational therapy  -JR     Patient Effort good  -[facility] Name 07/22/25 1014          General Information    Patient Profile Reviewed yes  -JR     Existing Precautions/Restrictions fall;cardiac;sternal;oxygen therapy device and L/min  -JR     Barriers to Rehab none identified  -[facility] Name 07/22/25 1014          Cognition    Orientation Status (Cognition) oriented x 4  -[facility] Name 07/22/25 1014          Safety Issues/Impairments Affecting Functional Mobility    Impairments Affecting Function (Mobility) balance;coordination;strength  -     Comment, Safety Issues/Impairments (Mobility) non skid socks donned.  -JR               User Key  (r) = Recorded By, (t) = Taken By, (c) = Cosigned By      Initials Name Provider Type    JR Hitesh Mcknight, OT Occupational Therapist                     Mobility/ADL's       [facility] Name 07/22/25 1017          Bed Mobility    All Activities, [place] (Bed Mobility) not tested  -     Comment, (Bed Mobility) NT - [facility] upon arrival.  -[facility] Name 07/22/25 1017          Transfers    Transfers sit-stand transfer  -[facility] Name 07/22/25 1017          Sit-Stand Transfer    Sit-Stand [place] (Transfers) contact guard  -     Comment, (Sit-Stand Transfer) No ad or HHA  -[facility] Name 07/22/25 1017          Functional Mobility    Patient was able to Ambulate yes  -[facility] Name 07/22/25 1017          Activities of Daily Living    BADL Assessment/Intervention grooming  -[facility] Name 07/22/25 1017          Grooming Assessment/Training    [place] Level (Grooming) oral care regimen;wash face,  hands;contact guard assist  -JR     Position (Grooming) unsupported standing  -JR               User Key  (r) = Recorded By, (t) = Taken By, (c) = Cosigned By      Initials Name Provider Type    Hitesh Carrizales OT Occupational Therapist                   Obj/Interventions       Row Name 07/22/25 1020          Sensory Assessment (Somatosensory)    Sensory Assessment (Somatosensory) sensation intact  -[facility] Name 07/22/25 1020          Vision Assessment/Intervention    Visual Impairment/Limitations WFL  -JR       Row Name 07/22/25 1020          Balance    Balance Assessment sitting static balance;sitting dynamic balance;standing static balance;standing dynamic balance  -JR     Static Sitting Balance supervision  -JR     Dynamic Sitting Balance supervision  -JR     Position, Sitting Balance sitting in chair  -JR     Static Standing Balance contact guard  -JR     Dynamic Standing Balance contact guard  -JR               User Key  (r) = Recorded By, (t) = Taken By, (c) = Cosigned By      Initials Name Provider Type    Hitesh Carrizales OT Occupational Therapist                   Goals/Plan    No documentation.                  Clinical Impression       [facility] Name 07/22/25 1021          Pain Assessment    Pretreatment Pain Rating 0/10 - no pain  -JR     Posttreatment Pain Rating 0/10 - no pain  -JR       Row Name 07/22/25 1021          Plan of Care Review    Plan of Care Reviewed With patient  -JR     Progress improving  -JR     Outcome Evaluation Patient seen for OT this am.  Patietn sitting [place] [facility] upon arrival.  STS from chair level with CGA.  Patient able to ambulate to sink side and perform teeth brushing and washing of face and hands with CGA.  No HHA or Ad with standing balance and mobilization.  Patient agreeable to retun to chair with all needs within reach.  O2 desat to low 80s with functional tasks (with 3L O2) but quickly recovered with rest and PLB technique.  Heart rate would spike up to mid 120s with functional  tasks as well.  x2 MD visits with patient during tx.  Cont OT as tolerated.  -       Row Name 07/22/25 1021          Therapy Assessment/Plan (OT)    Rehab Potential (OT) good  -JR     Criteria for Skilled Therapeutic Interventions Met (OT) yes;meets criteria;skilled treatment is necessary  -     Therapy Frequency (OT) 5 times/wk  -JR       Row Name 07/22/25 1021          Therapy Plan Review/Discharge Plan (OT)    Anticipated Discharge Disposition (OT) home with assist;home with home health  -       Row Name 07/22/25 1021          Vital Signs    O2 Delivery Pre Treatment supplemental O2  -JR     O2 Delivery Intra Treatment supplemental O2  -JR     O2 Delivery Post Treatment supplemental O2  -JR     Pre Patient Position Sitting  -JR     Intra Patient Position Standing  -JR     Post Patient Position Sitting  -JR       Row Name 07/22/25 1021          Positioning and Restraints    Pre-Treatment Position sitting in chair/recliner  -JR     Post Treatment Position chair  -JR     In Chair notified nsg;reclined;call light within reach;encouraged to call for assist  -JR               User Key  (r) = Recorded By, (t) = Taken By, (c) = Cosigned By      Initials Name Provider Type    Hitesh Carrizales, OT Occupational Therapist                   Outcome Measures       Row Name 07/22/25 1025          How much help from another is currently needed...    Putting on and taking off regular lower body clothing? 2  -JR     Bathing (including washing, rinsing, and drying) 2  -JR     Toileting (which includes using toilet bed pan or urinal) 2  -JR     Putting on and taking off regular upper body clothing 3  -JR     Taking care of personal grooming (such as brushing teeth) 3  -JR     Eating meals 4  -JR     AM-PAC 6 Clicks Score (OT) 16  -       Row Name 07/22/25 1025          Modified [place] Scale    Modified [place] Scale 3 - Moderate disability.  Requiring some help, but able to walk without assistance.  -       Row Name 07/22/25  1025          Functional Assessment    Outcome Measure Options AM-PAC 6 Clicks Daily Activity (OT);Modified [place]  -               User Key  (r) = Recorded By, (t) = Taken By, (c) = Cosigned By      Initials Name Provider Type    Hitesh Carrizales OT Occupational Therapist                      OT Recommendation and Plan  Therapy Frequency (OT): 5 times/wk  Plan of Care Review  Plan of Care Reviewed With: patient  Progress: improving  Outcome Evaluation: Patient seen for OT this am.  Patietn sitting [facility] upon arrival.  STS from chair level with CGA.  Patient able to ambulate to sink side and perform teeth brushing and washing of face and hands with CGA.  No HHA or Ad with standing balance and mobilization.  Patient agreeable to retun to chair with all needs within reach.  O2 desat to low 80s with functional tasks (with 3L O2) but quickly recovered with rest and PLB technique.  Heart rate would spike up to mid 120s with functional tasks as well.  x2 MD visits with patient during tx.  Cont OT as tolerated.     Time Calculation:         Time Calculation- OT       Row Name 07/22/25 1025             Time Calculation- OT    OT Start Time 0900  -JR      OT Stop Time 0939  -      OT Time Calculation (min) 39 min  -JR      Total Timed Code Minutes- OT 39 minute(s)  -      OT Received On 07/22/25  -      OT - Next Appointment 07/23/25  -         Timed Charges    33232 - OT Self Care/Mgmt Minutes 39  -JR         Total Minutes    Timed Charges Total Minutes 39  -JR       Total Minutes 39  -JR                User Key  (r) = Recorded By, (t) = Taken By, (c) = Cosigned By      Initials Name Provider Type    Hitesh Carrizales OT Occupational Therapist                  Therapy Charges for Today       Code Description Service Date Service Provider Modifiers Qty    [id number]  OT SELF CARE/MGMT/TRAIN EA 15 MIN 7/22/2025 Hitesh Mcknight OT GO 3                 Hitesh Mcknight OT  7/22/2025

## 2025-07-22 NOTE — PROGRESS NOTES
Electrophysiology Follow-Up Note      Patient Name: Erickson Gerard  Age/Sex: 79 y.o. male  : 1945  MRN: [id number]    Date of Admission: 7/15/2025  Day of Service: 25       Chief Complaint: Atrial fibrillation    HPI:   Erickson Gerard is a 79 y.o. male who presented to the hospital for a CABG due to finding of multivessel CAD during work up for a 5.3. AAA with prior plans for EVAR.     S/p CABG x 6 with WEN clipping on 7/15/2025    Post op he was in SR for 48 hours then went into AF with RVR on 2025. He converted with IV digoxing and IV amiodarone boluse and ggt.     In the evening of - he converted back to AF with RVR and was given IV amio bolus and ggt again. His rates would improved but after bolus rates are back to 110-130s range. He also hypotensive on low does favio (now stopped this AM).     EP was asked to weight in.     Case was discussed with ICU Dr and CTS PA.    He is unaware of having high heart rates. States he is starting to feel better.       Temp:  [94.8 °F (34.9 °C)-99.5 °F (37.5 °C)] 99.1 °F (37.3 °C)  Heart Rate:  [] 118  Resp:  [12-20] 18  BP: ()/(52-87) 101/73     PHYSICAL EXAM    General: 79 y.o. male No acute distress, laying in bed. Alert and Oriented.   Neck: No JVD or carotid bruit  Lungs: Clear to ausculation bilaterally, symmetric  Heart: irregular rate and rhythm with no overt murmurs, rubs or gallops. Normal S1 and S2. Midline sternal incision   Abdomen: soft, non-tender  Extremities: No lower extremity edema or cyanosis.   Neuo: no lateralizing defects.        Telemetry/EK25: AF with RVR                          I personally viewed and interpreted the patient's EKG/Telemetry data.      Lab Review:   Results from last 7 days   Lab Units 25  0902 25  0204 25  1438 25  0440   SODIUM mmol/L  --  136 138 139   POTASSIUM mmol/L 3.4* 3.7 3.2* 4.1   CHLORIDE mmol/L  --  103 101 103   CO2 mmol/L  --  22.0 20.0*  19.2*   BUN mg/dL  --  49.0* 46.0* 46.0*   CREATININE mg/dL  --  2.25* 2.36* 2.23*   GLUCOSE mg/dL  --  105* 174* 131*   CALCIUM mg/dL  --  8.1* 8.4* 8.4*     Results from last 7 days   Lab Units 07/22/25  0204 07/21/25  1438 07/21/25  0440   WBC 10*3/mm3 9.63 9.77 10.68   HEMOGLOBIN g/dL 9.7* 11.2* 11.7*   HEMATOCRIT % 29.2* 33.6* 35.3*   PLATELETS 10*3/mm3 275 290 292     Results from last 7 days   Lab Units 07/16/25  0333 07/15/25  1625   INR  1.65* 1.68*                   Current Medications:   Scheduled Meds:amiodarone, 400 mg, Oral, Q8H  aspirin, 81 mg, Oral, Daily  atorvastatin, 40 mg, Oral, Nightly  bisacodyl, 10 mg, Rectal, Daily  cefTRIAXone, 1,000 mg, Intravenous, Q24H  guaiFENesin, 1,200 mg, Oral, Q12H  insulin lispro, 2-7 Units, Subcutaneous, 4x Daily AC & at Bedtime  ipratropium-albuterol, 3 mL, Nebulization, 4x Daily - RT  melatonin, 5 mg, Oral, Nightly  [Held by provider] metoprolol tartrate, 12.5 mg, Oral, Q12H  midodrine, 10 mg, Oral, TID AC  pantoprazole, 40 mg, Oral, Q AM  polyethylene glycol, 17 g, Oral, BID  senna-docusate sodium, 2 tablet, Oral, BID  sodium chloride, 4 mL, Nebulization, BID - RT  vitamin D, 50,000 Units, Oral, Q7 Days          Assessment /Plan:    Post operative atrial fibrillation post CABG x 6 7/15/2025- he is back  in AF with RVR since 7/20 in the evening. His hypotension is improved today and ggt of MAIA has been stopped this AM. He will have improved rates to bolus of IV amiodarone but once completed HR is elevated again.   -poor canidate for more dig given crt of 2.25 today  -Hypotension limits more rate limiting medications  -will plan to start amiodarone 400 mg TID (orally) to improve rate control. Give with fatty foods. Continue IV amiodarone ggt at 0.5 until 3 doses of oral amiodarone have been given. As an OP and when rates improved- would recommend amiodarone 400 mg x 1 month then 200 mg daily  He has plans for AAA repair in around 6 weeks post CABG         Devendra ROQUE  GHISLAINE Garcia  07/22/25  09:59 EDT

## 2025-07-22 NOTE — PLAN OF CARE
Goal Outcome Evaluation:  Plan of Care Reviewed With: patient        Progress: improving  Outcome Evaluation: Patient seen for OT this am.  [place] sitting [facility] upon arrival.  STS from chair level with CGA.  Patient able to ambulate to sink side and perform teeth brushing and washing of face and hands with CGA.  No HHA or Ad with standing balance and mobilization.  Patient agreeable to retun to chair with all needs within reach.  O2 desat to low 80s with functional tasks (with 3L O2) but quickly recovered with rest and PLB technique.  Heart rate would spike up to mid 120s with functional tasks as well.  x2 MD visits with patient during tx.  Cont OT as tolerated.    Anticipated Discharge Disposition (OT): home with assist, home with home health

## 2025-07-22 NOTE — PROGRESS NOTES
Nutrition Services    Patient Name: Erickson Gerard  YOB: 1945  MRN: [id number]  Admission date: 7/15/2025    Reason for Encounter: LOS x 7 days    [facility] Clinical Nutrition Note       Nutrition Intervention Updates:  Monitor/encourage PO intake.        Subjective: 79 y.o. male admitted with abdominal aortic aneurysm and bilateral popliteal aneurysms.  CAD.  S/p CABGx6 with WEN clipping 7/15.  Starting to feel better.  Plans for AAA repair in about 6 weeks.       PO Diet: Diet: Regular/House, Cardiac; Healthy Heart (2-3 Na+); Texture: Regular (IDDSI 7); Fluid Consistency: Thin (IDDSI 0)   PO Supplements: None    PO Intake:  %       Current nutrition support: -   Nutrition support review: -       Labs: Reviewed        GI Function:  Nondistended  Normoactive  +BM 7/22        Brief Weight Review:    Wt Readings from Last 1 Encounters:   07/22/25 0441 70.4 kg (155 lb 3.3 oz)   07/21/25 0511 70.4 kg (155 lb 3.3 oz)   07/19/25 0600 70 kg (154 lb 5.2 oz)   07/18/25 0605 70.9 kg (156 lb 4.9 oz)   07/17/25 0500 71.4 kg (157 lb 6.5 oz)   07/16/25 0500 75.8 kg (167 lb 1.7 oz)        Results from last 7 days   Lab Units 07/22/25  1507 07/22/25  0902 07/22/25  0204 07/21/25  1438 07/21/25  0440 07/20/25  1017 07/20/25  0254   SODIUM mmol/L 137  --  136 138 139   < > 135*  135*   POTASSIUM mmol/L 4.2 3.4* 3.7 3.2* 4.1   < > 3.8  3.8   CHLORIDE mmol/L 103  --  103 101 103   < > 100  100   CO2 mmol/L 21.0*  --  22.0 20.0* 19.2*   < > 21.0*  21.0*   BUN mg/dL 45.0*  --  49.0* 46.0* 46.0*   < > 42.0*  42.0*   CREATININE mg/dL 2.13*  --  2.25* 2.36* 2.23*   < > 1.99*  1.99*   CALCIUM mg/dL 8.8  --  8.1* 8.4* 8.4*   < > 7.8*  7.8*   BILIRUBIN mg/dL  --   --  0.4  --  0.6  --  0.5   ALK PHOS U/L  --   --  144*  --  181*  --  113   ALT (SGPT) U/L  --   --  26  --  31  --  11   AST (SGOT) U/L  --   --  57*  --  62*  --  41*   GLUCOSE mg/dL 97  --  105* 174* 131*   < > 119*  119*    < > = values in this  interval not displayed.     Results from last 7 days   Lab Units 07/22/25  1507 07/22/25  0204 07/21/25  1438   MAGNESIUM mg/dL 2.6* 2.5* 2.3   PHOSPHORUS mg/dL  --  4.4  --    PLATELETS 10*3/mm3 308 275 290   HEMOGLOBIN g/dL 10.0* 9.7* 11.2*   HEMATOCRIT % 30.8* 29.2* 33.6*     Lab Results   Component Value Date    HGBA1C 6.10 (H) 07/14/2025       Electronically signed by:  Parisa Lopez RD  07/22/25 16:54 EDT

## 2025-07-22 NOTE — PROGRESS NOTES
LOS: 7 days   Patient Care Team:  Farhad Metcalf MD as PCP - General (Family Medicine)    Chief Complaint: Post-op follow-up, s/p POD #4 CABG/WEN clip-- [place]      Subjective  Feeling better.  Slept better overnight.  Nausea improved.  Vital Signs  Temp:  [94.8 °F (34.9 °C)-99.5 °F (37.5 °C)] 99.1 °F (37.3 °C)  Heart Rate:  [] 66  Resp:  [12-20] 18  BP: ()/(52-87) 99/65      07/19/25  0600 07/21/25  0511 07/22/25  0441   Weight: 70 kg (154 lb 5.2 oz) 70.4 kg (155 lb 3.3 oz) 70.4 kg (155 lb 3.3 oz)     Body mass index is 25.83 kg/m².    Intake/Output Summary (Last 24 hours) at 7/22/2025 0752  Last data filed at 7/22/2025 0700  Gross per 24 hour   Intake 2201.48 ml   Output 2200 ml   Net 1.48 ml     No intake/output data recorded.        Objective:  General Appearance:  Comfortable, well-appearing, in no acute distress and not in pain (Up in chair).    Vital signs: (most recent): Blood pressure 101/73, pulse 118, temperature 99.1 °F (37.3 °C), resp. rate 18, weight 70.4 kg (155 lb 3.3 oz), SpO2 93%.  Vital signs are normal.    Output: Producing urine (+buitrago) and producing stool.    Lungs:  Normal effort and normal respiratory rate.  He is not in respiratory distress.  (AirVo 40L/30% FiO2)  Heart: Tachycardia.  Irregular rhythm.  (Tele: Afib 113)  Abdomen: Abdomen is soft and non-distended.  There is no abdominal tenderness.     Extremities: Normal range of motion.  There is no dependent edema.    Neurological: Patient is alert and oriented to person, place and time.    Skin:  Warm and dry.  (Incisions healing well without erythema or drainage)            Results Review:      WBC WBC   Date Value Ref Range Status   07/22/2025 9.63 3.40 - 10.80 10*3/mm3 Final   07/21/2025 9.77 3.40 - 10.80 10*3/mm3 Final   07/21/2025 10.68 3.40 - 10.80 10*3/mm3 Final   07/20/2025 9.20 3.40 - 10.80 10*3/mm3 Final   07/20/2025 11.36 (H) 3.40 - 10.80 10*3/mm3 Final   07/19/2025 10.31 3.40 - 10.80 10*3/mm3 Final     "  HGB Hemoglobin   Date Value Ref Range Status   07/22/2025 9.7 (L) 13.0 - 17.7 g/dL Final   07/21/2025 11.2 (L) 13.0 - 17.7 g/dL Final   07/21/2025 11.7 (L) 13.0 - 17.7 g/dL Final   07/20/2025 11.0 (L) 13.0 - 17.7 g/dL Final   07/20/2025 10.1 (L) 13.0 - 17.7 g/dL Final   07/19/2025 10.7 (L) 13.0 - 17.7 g/dL Final      HCT Hematocrit   Date Value Ref Range Status   07/22/2025 29.2 (L) 37.5 - 51.0 % Final   07/21/2025 33.6 (L) 37.5 - 51.0 % Final   07/21/2025 35.3 (L) 37.5 - 51.0 % Final   07/20/2025 32.7 (L) 37.5 - 51.0 % Final   07/20/2025 30.0 (L) 37.5 - 51.0 % Final   07/19/2025 32.5 (L) 37.5 - 51.0 % Final      Platelets Platelets   Date Value Ref Range Status   07/22/2025 275 140 - 450 10*3/mm3 Final   07/21/2025 290 140 - 450 10*3/mm3 Final   07/21/2025 292 140 - 450 10*3/mm3 Final   07/20/2025 213 140 - 450 10*3/mm3 Final   07/20/2025 238 140 - 450 10*3/mm3 Final   07/19/2025 186 140 - 450 10*3/mm3 Final        PT/INR:    No results found for: \"PROTIME\"  /  No results found for: \"INR\"      Sodium Sodium   Date Value Ref Range Status   07/22/2025 136 136 - 145 mmol/L Final   07/21/2025 138 136 - 145 mmol/L Final   07/21/2025 139 136 - 145 mmol/L Final   07/20/2025 138 136 - 145 mmol/L Final   07/20/2025 135 (L) 136 - 145 mmol/L Final   07/20/2025 135 (L) 136 - 145 mmol/L Final   07/19/2025 138 136 - 145 mmol/L Final      Potassium Potassium   Date Value Ref Range Status   07/22/2025 3.7 3.5 - 5.2 mmol/L Final   07/21/2025 3.2 (L) 3.5 - 5.2 mmol/L Final   07/21/2025 4.1 3.5 - 5.2 mmol/L Final   07/20/2025 4.1 3.5 - 5.2 mmol/L Final   07/20/2025 3.9 3.5 - 5.2 mmol/L Final   07/20/2025 3.8 3.5 - 5.2 mmol/L Final   07/20/2025 3.8 3.5 - 5.2 mmol/L Final   07/19/2025 3.9 3.5 - 5.2 mmol/L Final   07/19/2025 4.1 3.5 - 5.2 mmol/L Final      Chloride Chloride   Date Value Ref Range Status   07/22/2025 103 98 - 107 mmol/L Final   07/21/2025 101 98 - 107 mmol/L Final   07/21/2025 103 98 - 107 mmol/L Final   07/20/2025 104 " 98 - 107 mmol/L Final   07/20/2025 100 98 - 107 mmol/L Final   07/20/2025 100 98 - 107 mmol/L Final   07/19/2025 102 98 - 107 mmol/L Final      Bicarbonate CO2   Date Value Ref Range Status   07/22/2025 22.0 22.0 - 29.0 mmol/L Final   07/21/2025 20.0 (L) 22.0 - 29.0 mmol/L Final   07/21/2025 19.2 (L) 22.0 - 29.0 mmol/L Final   07/20/2025 20.4 (L) 22.0 - 29.0 mmol/L Final   07/20/2025 21.0 (L) 22.0 - 29.0 mmol/L Final   07/20/2025 21.0 (L) 22.0 - 29.0 mmol/L Final   07/19/2025 20.0 (L) 22.0 - 29.0 mmol/L Final      BUN BUN   Date Value Ref Range Status   07/22/2025 49.0 (H) 8.0 - 23.0 mg/dL Final   07/21/2025 46.0 (H) 8.0 - 23.0 mg/dL Final   07/21/2025 46.0 (H) 8.0 - 23.0 mg/dL Final   07/20/2025 41.0 (H) 8.0 - 23.0 mg/dL Final   07/20/2025 42.0 (H) 8.0 - 23.0 mg/dL Final   07/20/2025 42.0 (H) 8.0 - 23.0 mg/dL Final   07/19/2025 39.0 (H) 8.0 - 23.0 mg/dL Final      Creatinine Creatinine   Date Value Ref Range Status   07/22/2025 2.25 (H) 0.76 - 1.27 mg/dL Final   07/21/2025 2.36 (H) 0.76 - 1.27 mg/dL Final   07/21/2025 2.23 (H) 0.76 - 1.27 mg/dL Final   07/20/2025 1.94 (H) 0.76 - 1.27 mg/dL Final   07/20/2025 1.99 (H) 0.76 - 1.27 mg/dL Final   07/20/2025 1.99 (H) 0.76 - 1.27 mg/dL Final   07/19/2025 1.81 (H) 0.76 - 1.27 mg/dL Final      Calcium Calcium   Date Value Ref Range Status   07/22/2025 8.1 (L) 8.6 - 10.5 mg/dL Final   07/21/2025 8.4 (L) 8.6 - 10.5 mg/dL Final   07/21/2025 8.4 (L) 8.6 - 10.5 mg/dL Final   07/20/2025 8.8 8.6 - 10.5 mg/dL Final   07/20/2025 7.8 (L) 8.6 - 10.5 mg/dL Final   07/20/2025 7.8 (L) 8.6 - 10.5 mg/dL Final   07/19/2025 8.6 8.6 - 10.5 mg/dL Final      Magnesium Magnesium   Date Value Ref Range Status   07/22/2025 2.5 (H) 1.6 - 2.4 mg/dL Final   07/21/2025 2.3 1.6 - 2.4 mg/dL Final   07/21/2025 2.3 1.6 - 2.4 mg/dL Final   07/20/2025 2.3 1.6 - 2.4 mg/dL Final   07/20/2025 2.6 (H) 1.6 - 2.4 mg/dL Final   07/19/2025 2.6 (H) 1.6 - 2.4 mg/dL Final          amiodarone, 150 mg, Intravenous,  Once  aspirin, 81 mg, Oral, Daily  atorvastatin, 40 mg, Oral, Nightly  bisacodyl, 10 mg, Rectal, Daily  cefTRIAXone, 1,000 mg, Intravenous, Q24H  enoxaparin sodium, 30 mg, Subcutaneous, Daily  guaiFENesin, 1,200 mg, Oral, Q12H  insulin lispro, 2-7 Units, Subcutaneous, 4x Daily AC & at Bedtime  ipratropium-albuterol, 3 mL, Nebulization, 4x Daily - RT  melatonin, 5 mg, Oral, Nightly  [Held by provider] metoprolol tartrate, 12.5 mg, Oral, Q12H  midodrine, 10 mg, Oral, TID AC  pantoprazole, 40 mg, Oral, Q AM  polyethylene glycol, 17 g, Oral, Daily  senna-docusate sodium, 2 tablet, Oral, BID  sodium chloride, 4 mL, Nebulization, BID - RT      amiodarone, 0.5 mg/min, Last Rate: 0.5 mg/min (07/22/25 0700)  clevidipine, 2-32 mg/hr  DOPamine, 2-20 mcg/kg/min  EPINEPHrine, 0.02-0.3 mcg/kg/min  niCARdipine, 5-15 mg/hr, Last Rate: Stopped (07/15/25 1625)  norepinephrine, 0.02-0.3 mcg/kg/min, Last Rate: Stopped (07/19/25 2301)  phenylephrine, 0.2-3 mcg/kg/min, Last Rate: 0.2 mcg/kg/min (07/22/25 0700)        Assessment & Plan    - Severe MV-CAD -- s/p CABG, LEVH, WEN occlusion via atriclip with Dr. Patino 7/15/25  - AAA (hx aorto iliac femoral right popliteal stent repair x2 with Dr. Archer) -- AAA measuring 5.3cm--plans for surgery in 4 to 6 weeks  - HTN  - HLD  - PAD  - Hx DVT  - Right leg vein stripping  - VERN on CKD stage 2---renal following   - Post-op anemia, expected ABLA, watch closely  - Post-op TCP, resolved  - PAF with RVR post-op---currently PAF, on Amio gtt  - Post-op hypoxia---on AirVo  - Post-op ileus   - Prolonged QTc      POD #7.  Slowly progressing.   3L NC--wean as able  Remains in atrial fibrillation rate in the 130s-- IV amiodarone, still requiring favio.  EP has been asked to evaluate. Will discuss starting heparin with Dr. Patino but will likely start low dose heparin gtt.  Continue Midodrine 10 mg po tid. Not on beta blocker secondary to hypotension. Replace calcium. Creatine stable-- renal following.  Vitamin D noted to be low-- will add supplementation.  KUB noted-- has been having small BMs---Increase bowel regimen continue daily suppositories.  Mobilize/increase activity  Continue routine care    Addendum: discussed with Dr. Rankin, will place on PO 400mg of amiodarone Q8 hours to be given with ice cream or peanut butter.    TATI Rocha  07/22/25  07:52 EDT

## 2025-07-22 NOTE — THERAPY TREATMENT NOTE
Patient Name: Erickson Gerard  : 1945    MRN: [id number]                              Today's Date: 2025       Admit Date: 7/15/2025    Visit Dx:     ICD-10-CM ICD-9-CM   1. Decreased activities of daily living (ADL)  Z78.9 V49.89   2. Coronary artery disease involving native heart, unspecified vessel or lesion type, unspecified whether angina present  I25.10 414.01   3. Abnormal findings on diagnostic imaging of heart and coronary circulation  R93.1 794.39   4. S/P CABG (coronary artery bypass graft)  Z95.1 V45.81     Patient Active Problem List   Diagnosis    Essential hypertension    GERD (gastroesophageal reflux disease)    Medicare annual wellness visit, subsequent    Benign prostatic hyperplasia with urinary frequency    Umbilical hernia without obstruction and without gangrene    Colon cancer screening    Mixed hyperlipidemia    Aneurysm of right popliteal artery    Acute deep vein thrombosis (DVT) of distal vein of right lower extremity    DDD (degenerative disc disease), lumbar    Actinic keratosis of forehead    Aneurysm of right popliteal artery    Abdominal aortic aneurysm (AAA) 3.0 cm to 5.5 cm in diameter in male    PVD (peripheral vascular disease)    High risk medication use    Immunization deficiency    Chronic deep vein thrombosis (DVT)    Encounter for hepatitis C screening test for low risk patient    Peripheral artery aneurysm    Blurred vision    Coronary artery disease of native artery of native heart with stable angina pectoris    Coronary artery disease involving native heart    Paroxysmal atrial fibrillation     Past Medical History:   Diagnosis Date    AAA (abdominal aortic aneurysm) without rupture 10/14/2021    Abdominal aortic aneurysm, without rupture, unspecified 2023    Acute embolism and thrombosis of unspecified deep veins of right distal lower extremity     Anesthesia complication     AFTER VEIN SURGERY STATES HE WAS HARD TO WAKE UP    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     Aneurysm of artery of lower extremity 09/09/2021    Arthritis     At risk for sleep apnea     STOP BANG 5    Coronary artery disease     Dyspnea on exertion     Essential (primary) hypertension     GERD (gastroesophageal reflux disease)     [place] (hard of hearing)     Hyperlipidemia     Hypertension     Iliac artery aneurysm 10/14/2021    Localized edema     Low back pain     Mixed hyperlipidemia     Other specified soft tissue disorders     PAD (peripheral artery disease)     Peripheral artery aneurysm     PONV (postoperative nausea and vomiting)     Popliteal aneurysm     Thrombosis 09/09/2021    Calf Vein Thrombosis Rt tibial vein    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     Past Surgical History:   Procedure Laterality Date    ANGIOPLASTY POPLITEAL ARTERY Right 11/09/2021    Procedure: RIGHT POPITEAL ANEURYSM REPAIR VIABOHN;  Surgeon: Campos Archer MD;  Location: [facility] OR 18/19;  Service: Vascular;  Laterality: Right;    ANGIOPLASTY POPLITEAL ARTERY Right 01/25/2022    Procedure: AORTO ILIAC FEMORAL RIGHT POPLITEAL VIABOHN STENT PLACEMENT X 2;  Surgeon: Campos Archer MD;  Location: [facility] OR 18/19;  Service: Vascular;  Laterality: Right;    CARDIAC CATHETERIZATION N/A 06/19/2025    Procedure: Left Heart Cath;  Surgeon: Oneil Escobar MD;  Location: [facility] CATH INVASIVE LOCATION;  Service: Cardiovascular;  Laterality: N/A;    CARDIAC CATHETERIZATION N/A 06/19/2025    Procedure: Left ventriculography;  Surgeon: Oneil Escobar MD;  Location: [facility] INVASIVE LOCATION;  Service: Cardiovascular;  Laterality: N/A;    COLONOSCOPY      CORONARY ARTERY BYPASS GRAFT N/A 7/15/2025    Procedure: STERNOTOMY; CORONARY ARTERY BYPASS GRAFTING TIMES 6 USING LEFT INTERNAL MAMMARY ARTERY AND LEFT SAPHENOUS VEIN; TRANSESOPHAGEAL ECHOCARDIOGRAM WITH ANESTHESIA;LEFT ATRIAL APPENDAGE EXCLUSION; PRP;  Surgeon: Jr Oscar Patino MD;  Location: [facility];  Service: Cardiothoracic;   Laterality: N/A;    EYE SURGERY Bilateral     CATARACTS    HERNIA REPAIR Bilateral     POLITEAL ARTERY ANEURYSM REPAIR Right 11/09/2021    Viabahn Stent Graft    VEIN LIGATION AND STRIPPING N/A       General Information       [facility] Name 07/22/25 1523          Physical Therapy Time and Intention    Document Type therapy note (daily note)  -     Mode of Treatment individual therapy;physical therapy  -[facility] Name 07/22/25 1523          General Information    Patient Profile Reviewed yes  -     Existing Precautions/Restrictions fall;cardiac;sternal;oxygen therapy device and L/min  2L  -[facility] Name 07/22/25 1523          Living Environment    Current Living Arrangements home  -     People in Home spouse  -[facility] Name 07/22/25 1523          Cognition    Orientation Status (Cognition) oriented x 4  -[facility] Name 07/22/25 1523          Safety Issues/Impairments Affecting Functional Mobility    Safety Issues Affecting Function (Mobility) impulsivity;problem-solving;safety precaution awareness  -     Impairments Affecting Function (Mobility) balance;coordination;strength  -               User Key  (r) = Recorded By, (t) = Taken By, (c) = Cosigned By      Initials Name Provider Type     Kyleigh Jackson PTA Physical Therapist Assistant                   Mobility       [facility] Name 07/22/25 1525          Bed Mobility    Comment, (Bed Mobility) in chair  -[facility] Name 07/22/25 1525          Transfers    Comment, (Transfers) still requires cues to slow pace for safety  -JM       Row Name 07/22/25 1525          Sit-Stand Transfer    Sit-Stand [place] (Transfers) contact guard;standby assist;verbal cues  -     Comment, (Sit-Stand Transfer) HHA  -[facility] Name 07/22/25 1525          Gait/Stairs (Locomotion)    [place] Level (Gait) contact guard;2 person assist;1 person to manage equipment  -     Assistive Device (Gait) --  HHA-1  -     Distance in Feet (Gait) 150  -      Deviations/Abnormal Patterns (Gait) base of support, narrow  -     Comment, (Gait/Stairs) improved posture  -               User Key  (r) = Recorded By, (t) = Taken By, (c) = Cosigned By      Initials Name Provider Type    Kyleigh Thomas PTA Physical Therapist Assistant                   Obj/Interventions       Row Name 07/22/25 1527          Motor Skills    Therapeutic Exercise --  cardiac ex protocol x10 reps  -               User Key  (r) = Recorded By, (t) = Taken By, (c) = Cosigned By      Initials Name Provider Type    Kyleigh Thomas PTA Physical Therapist Assistant                   Goals/Plan    No documentation.                  Clinical Impression    No documentation.                  Outcome Measures       [facility] Name 07/22/25 0800          How much help from another person do you currently need...    Turning from your back to your side while in flat bed without using bedrails? 3  -CE     Moving from lying on back to sitting on the side of a flat bed without bedrails? 3  -CE     Moving to and from a bed to a chair (including a wheelchair)? 3  -CE     Standing up from a chair using your arms (e.g., wheelchair, bedside chair)? 3  -CE     Climbing 3-5 steps with a railing? 3  -CE     To walk in hospital room? 3  -CE     AM-PAC 6 Clicks Score (PT) 18  -CE     Highest Level of Mobility Goal Walk 10 Steps or More-6  -CE       [facility] Name 07/22/25 1025          Modified [place] Scale    Modified Kathy Scale 3 - Moderate disability.  Requiring some help, but able to walk without assistance.  -JR       Row Name 07/22/25 1025          Functional Assessment    Outcome Measure Options AM-PAC 6 Clicks Daily Activity (OT);Modified [place]  -               User Key  (r) = Recorded By, (t) = Taken By, (c) = Cosigned By      Initials Name Provider Type    Hitesh Carrizales, OT Occupational Therapist    CE Suzanne Wilkins, RN Registered Nurse                                 Physical Therapy Education       Title:  PT OT SLP Therapies (Done)       Topic: Physical Therapy (Done)       Point: Mobility training (Done)       Learning Progress Summary            Patient Eager, E,TB,D, VU,DU by  at 7/21/2025 1045    Acceptance, E, VU by DB at 7/19/2025 1050    Acceptance, E,D, VU,NR by MS at 7/18/2025 1121    Acceptance, E, NR by AR at 7/17/2025 1254    Acceptance, E, NR by EM at 7/16/2025 1022                      Point: Home exercise program (Done)       Learning Progress Summary            Patient Eager, E,TB,D, VU,DU by  at 7/21/2025 1045    Acceptance, E, VU by DB at 7/19/2025 1050    Acceptance, E,D, VU,NR by MS at 7/18/2025 1121    Acceptance, E, NR by AR at 7/17/2025 1254    Acceptance, E, NR by EM at 7/16/2025 1022                      Point: Body mechanics (Done)       Learning Progress Summary            Patient Eager, E,TB,D, VU,DU by  at 7/21/2025 1045    Acceptance, E, VU by DB at 7/19/2025 1050    Acceptance, E,D, VU,NR by MS at 7/18/2025 1121    Acceptance, E, NR by AR at 7/17/2025 1254                      Point: Precautions (Done)       Learning Progress Summary            Patient Eager, E,TB,D, VU,DU by  at 7/21/2025 1045    Acceptance, E, VU by DB at 7/19/2025 1050    Acceptance, E,D, VU,NR by MS at 7/18/2025 1121    Acceptance, E, NR by AR at 7/17/2025 1254                                      User Key       Initials Effective Dates Name Provider Type Discipline    EM 06/16/21 -  Kayy Adams, PT Physical Therapist PT     03/07/18 -  Kyleigh Jackson, PTA Physical Therapist Assistant PT    MS 06/16/21 -  Campos Costa, PT Physical Therapist PT    AR 06/16/21 -  Mary Curry, PT Physical Therapist PT    DB 06/27/25 -  Elise Mata, PT Physical Therapist PT                  PT Recommendation and Plan     Progress: improving  Outcome Evaluation: Pt agreed to PT session, pt improved with tfers -CGA1 for STS , another to assist w/lines /tubes, pt judith amb ~150ft CGA-2, judith cardiac  protocol exer x10 reps , plans home at [place] w/wife assist     Time Calculation:         PT Charges       Row Name 07/22/25 1650             Time Calculation    Start Time 1442  -      Stop Time 1514  -CASANDRA      Time Calculation (min) 32 min  -CASANDRA      PT Received On 07/22/25  -CASANDRA      PT - Next Appointment 07/23/25  -CASANDRA                User Key  (r) = Recorded By, (t) = Taken By, (c) = Cosigned By      Initials Name Provider Type    Kyleigh Thomas PTA Physical Therapist Assistant                  Therapy Charges for Today       Code Description Service Date Service Provider Modifiers Qty    99735772258 HC PT THERAPEUTIC ACT EA 15 MIN 7/21/2025 Kyleigh Jackson, PTA GP 1    40149021182 HC PT THER SUPP EA 15 MIN 7/21/2025 Kyleigh Jackson, MAURICE GP 1    25330822837 HC PT THER PROC EA 15 MIN 7/21/2025 Kyleigh Jackson, PTA GP 1    39859615644 HC PT THERAPEUTIC ACT EA 15 MIN 7/22/2025 Kyleigh Jackson, MAURICE GP 1    29002353884 HC PT THER PROC EA 15 MIN 7/22/2025 Kyleigh Jackson, PTA GP 1    04673836056 HC PT THER SUPP EA 15 MIN 7/22/2025 Kyleigh Jackson, MAURICE GP 2            PT G-Codes  Outcome Measure Options: AM-PAC 6 Clicks Daily Activity (OT), Modified [place]  AM-PAC 6 Clicks Score (PT): 18  AM-PAC 6 Clicks Score (OT): 16  Modified Kathy Scale: 3 - Moderate disability.  Requiring some help, but able to walk without assistance.  PT Discharge Summary  Anticipated Discharge Disposition (PT): home with assist    Kyleigh Jackson PTA  7/22/2025

## 2025-07-22 NOTE — PROGRESS NOTES
[facility] Progress Note      Patient Name: Erickson Gerard  : 1945  MRN: [id number]  Primary Care Physician:  Farhad Metcalf MD  Date of admission: 7/15/2025    Subjective     Interval History:   Acute kidney injury on chronic kidney disease    Patient is feeling better, he remains on amiodarone drip, dopamine and epi heparin drip.  No chest pain or shortness of air, no nausea or vomiting.    Review of Systems:   As noted above    Objective     Vitals:   Temp:  [94.8 °F (34.9 °C)-99.5 °F (37.5 °C)] 99.1 °F (37.3 °C)  Heart Rate:  [] 118  Resp:  [12-20] 18  BP: ()/(52-87) 101/73  Flow (L/min) (Oxygen Therapy):  [2-6] 3    Intake/Output Summary (Last 24 hours) at 2025 0913  Last data filed at 2025 0906  Gross per 24 hour   Intake 2172.48 ml   Output 1775 ml   Net 397.48 ml       Physical Exam:    General Appearance: alert, awake, chronically ill no acute distress   Skin: warm and dry  HEENT: oral mucosa normal, nonicteric sclera nasal cannula in place  Neck: No JVD  Lungs: Bilateral rhonchi, breathing effort not  Heart: Irregularly irregular, no rub  Abdomen: soft, nontender, nondistended  : no palpable bladder thoracic  Extremities: No lower extremity edema  Neuro: Moving all extremities    Scheduled Meds:     amiodarone, 150 mg, Intravenous, Once  aspirin, 81 mg, Oral, Daily  atorvastatin, 40 mg, Oral, Nightly  bisacodyl, 10 mg, Rectal, Daily  cefTRIAXone, 1,000 mg, Intravenous, Q24H  enoxaparin sodium, 30 mg, Subcutaneous, Daily  guaiFENesin, 1,200 mg, Oral, Q12H  insulin lispro, 2-7 Units, Subcutaneous, 4x Daily AC & at Bedtime  ipratropium-albuterol, 3 mL, Nebulization, 4x Daily - RT  melatonin, 5 mg, Oral, Nightly  [Held by provider] metoprolol tartrate, 12.5 mg, Oral, Q12H  midodrine, 10 mg, Oral, TID AC  pantoprazole, 40 mg, Oral, Q AM  polyethylene glycol, 17 g, Oral, BID  senna-docusate sodium, 2 tablet, Oral, BID  sodium chloride, 4 mL,  Nebulization, BID - RT      IV Meds:   amiodarone, 0.5 mg/min, Last Rate: 0.5 mg/min (07/22/25 0700)  clevidipine, 2-32 mg/hr  DOPamine, 2-20 mcg/kg/min  EPINEPHrine, 0.02-0.3 mcg/kg/min  norepinephrine, 0.02-0.3 mcg/kg/min, Last Rate: Stopped (07/19/25 2301)  phenylephrine, 0.2-3 mcg/kg/min, Last Rate: Stopped (07/22/25 0904)        Results Reviewed:   I have personally reviewed the results from the time of this admission to 7/22/2025 09:13 EDT     Results from last 7 days   Lab Units 07/22/25  0204 07/21/25  1438 07/21/25  0440 07/20/25  1017 07/20/25  0254   SODIUM mmol/L 136 138 139   < > 135*  135*   POTASSIUM mmol/L 3.7 3.2* 4.1   < > 3.8  3.8   CHLORIDE mmol/L 103 101 103   < > 100  100   CO2 mmol/L 22.0 20.0* 19.2*   < > 21.0*  21.0*   BUN mg/dL 49.0* 46.0* 46.0*   < > 42.0*  42.0*   CREATININE mg/dL 2.25* 2.36* 2.23*   < > 1.99*  1.99*   CALCIUM mg/dL 8.1* 8.4* 8.4*   < > 7.8*  7.8*   BILIRUBIN mg/dL 0.4  --  0.6  --  0.5   ALK PHOS U/L 144*  --  181*  --  113   ALT (SGPT) U/L 26  --  31  --  11   AST (SGOT) U/L 57*  --  62*  --  41*   GLUCOSE mg/dL 105* 174* 131*   < > 119*  119*    < > = values in this interval not displayed.       Estimated Creatinine Clearance: 26.9 mL/min (A) (by C-G formula based on SCr of 2.25 mg/dL (H)).    Results from last 7 days   Lab Units 07/22/25  0204 07/21/25  1438 07/21/25  0440 07/20/25  1435 07/20/25  0254   MAGNESIUM mg/dL 2.5* 2.3 2.3   < > 2.6*   PHOSPHORUS mg/dL 4.4  --  4.6*  --  4.2    < > = values in this interval not displayed.       Results from last 7 days   Lab Units 07/22/25  0204 07/21/25  0440 07/20/25  0254 07/19/25  0256   URIC ACID mg/dL 6.7 7.2* 7.3* 7.2*       Results from last 7 days   Lab Units 07/22/25  0204 07/21/25  1438 07/21/25  0440 07/20/25  1435 07/20/25  0254   WBC 10*3/mm3 9.63 9.77 10.68 9.20 11.36*   HEMOGLOBIN g/dL 9.7* 11.2* 11.7* 11.0* 10.1*   PLATELETS 10*3/mm3 275 290 292 213 238       Results from last 7 days   Lab Units  07/16/25  0333 07/15/25  1625   INR  1.65* 1.68*       Assessment / Plan     ASSESSMENT:  Acute kidney injury on chronic kidney disease stage II secondary to hemodynamic changes post CABG creatinine is stable.  Patient has evidence of volume excess, his electrolytes within acceptable range.  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  AAA 5.3 cm monitored by vascular surgery  Hypertension with CKD currently on vasopressors.  Volume excess,  Postop anemia hemoglobin today is 9.7.  Relative hypoxia associate with volume excess, significantly improved  Severe vitamin D deficiency, vitamin D level is 19 point    PLAN:  No diuretics today  Start ergocalciferol 50,000 units orally weekly x 12 weeks  Continue the same treatment  Surveillance labs    I reviewed the chart and other providers notes, reviewed labs.  Discussed the case with the CTS team also I discussed the case with the patient's nurse  Copied text in this note has been reviewed and is accurate as of 07/22/25.         Thank you for involving us in the care of Erickson Gerard.  Please feel free to call with any questions.    Angel Franco MD  07/22/25  09:13 EDT    [facility]  [phone]    Please note that portions of this note were completed with a voice recognition program.

## 2025-07-23 ENCOUNTER — APPOINTMENT (OUTPATIENT)
Dept: GENERAL RADIOLOGY | Facility: HOSPITAL | Age: 80
DRG: 235 | End: 2025-07-23
Payer: MEDICARE

## 2025-07-23 LAB
ALBUMIN SERPL-MCNC: 3.2 G/DL (ref 3.5–5.2)
ALP SERPL-CCNC: 146 U/L (ref 39–117)
ALT SERPL W P-5'-P-CCNC: 44 U/L (ref 1–41)
ANION GAP SERPL CALCULATED.3IONS-SCNC: 11 MMOL/L (ref 5–15)
ANION GAP SERPL CALCULATED.3IONS-SCNC: 14 MMOL/L (ref 5–15)
APTT PPP: 76.1 SECONDS (ref 22.7–35.4)
AST SERPL-CCNC: 98 U/L (ref 1–40)
BASOPHILS # BLD AUTO: 0.06 10*3/MM3 (ref 0–0.2)
BASOPHILS NFR BLD AUTO: 0.6 % (ref 0–1.5)
BILIRUB CONJ SERPL-MCNC: 0.2 MG/DL (ref 0–0.3)
BILIRUB INDIRECT SERPL-MCNC: 0.2 MG/DL
BILIRUB SERPL-MCNC: 0.4 MG/DL (ref 0–1.2)
BUN SERPL-MCNC: 41 MG/DL (ref 8–23)
BUN SERPL-MCNC: 44 MG/DL (ref 8–23)
BUN/CREAT SERPL: 19.9 (ref 7–25)
BUN/CREAT SERPL: 22 (ref 7–25)
CA-I SERPL ISE-MCNC: 1.14 MMOL/L (ref 1.15–1.35)
CA-I SERPL ISE-MCNC: 1.17 MMOL/L (ref 1.15–1.35)
CALCIUM SPEC-SCNC: 8.2 MG/DL (ref 8.6–10.5)
CALCIUM SPEC-SCNC: 8.6 MG/DL (ref 8.6–10.5)
CHLORIDE SERPL-SCNC: 105 MMOL/L (ref 98–107)
CHLORIDE SERPL-SCNC: 106 MMOL/L (ref 98–107)
CO2 SERPL-SCNC: 20 MMOL/L (ref 22–29)
CO2 SERPL-SCNC: 22 MMOL/L (ref 22–29)
CREAT SERPL-MCNC: 2 MG/DL (ref 0.76–1.27)
CREAT SERPL-MCNC: 2.06 MG/DL (ref 0.76–1.27)
DEPRECATED RDW RBC AUTO: 41.9 FL (ref 37–54)
DEPRECATED RDW RBC AUTO: 42.3 FL (ref 37–54)
DEPRECATED RDW RBC AUTO: 42.3 FL (ref 37–54)
EGFRCR SERPLBLD CKD-EPI 2021: 32.4 ML/MIN/1.73
EGFRCR SERPLBLD CKD-EPI 2021: 33.5 ML/MIN/1.73
EOSINOPHIL # BLD AUTO: 0.33 10*3/MM3 (ref 0–0.4)
EOSINOPHIL NFR BLD AUTO: 3.3 % (ref 0.3–6.2)
ERYTHROCYTE [DISTWIDTH] IN BLOOD BY AUTOMATED COUNT: 12.3 % (ref 12.3–15.4)
ERYTHROCYTE [DISTWIDTH] IN BLOOD BY AUTOMATED COUNT: 12.4 % (ref 12.3–15.4)
ERYTHROCYTE [DISTWIDTH] IN BLOOD BY AUTOMATED COUNT: ABNORMAL %
GLUCOSE BLDC GLUCOMTR-MCNC: 122 MG/DL (ref 70–130)
GLUCOSE BLDC GLUCOMTR-MCNC: 84 MG/DL (ref 70–130)
GLUCOSE BLDC GLUCOMTR-MCNC: 93 MG/DL (ref 70–130)
GLUCOSE BLDC GLUCOMTR-MCNC: 95 MG/DL (ref 70–130)
GLUCOSE SERPL-MCNC: 111 MG/DL (ref 65–99)
GLUCOSE SERPL-MCNC: 94 MG/DL (ref 65–99)
HCT VFR BLD AUTO: 27.2 % (ref 37.5–51)
HCT VFR BLD AUTO: 27.2 % (ref 37.5–51)
HCT VFR BLD AUTO: 31.5 % (ref 37.5–51)
HGB BLD-MCNC: 10.3 G/DL (ref 13–17.7)
HGB BLD-MCNC: 8.9 G/DL (ref 13–17.7)
HGB BLD-MCNC: 8.9 G/DL (ref 13–17.7)
IMM GRANULOCYTES # BLD AUTO: 0.1 10*3/MM3 (ref 0–0.05)
IMM GRANULOCYTES NFR BLD AUTO: 1 % (ref 0–0.5)
LYMPHOCYTES # BLD AUTO: 1.38 10*3/MM3 (ref 0.7–3.1)
LYMPHOCYTES NFR BLD AUTO: 13.8 % (ref 19.6–45.3)
MAGNESIUM SERPL-MCNC: 2.4 MG/DL (ref 1.6–2.4)
MAGNESIUM SERPL-MCNC: 2.5 MG/DL (ref 1.6–2.4)
MCH RBC QN AUTO: 30.3 PG (ref 26.6–33)
MCH RBC QN AUTO: 30.6 PG (ref 26.6–33)
MCH RBC QN AUTO: 30.6 PG (ref 26.6–33)
MCHC RBC AUTO-ENTMCNC: 32.7 G/DL (ref 31.5–35.7)
MCHC RBC AUTO-ENTMCNC: 32.7 G/DL (ref 31.5–35.7)
MCHC RBC AUTO-ENTMCNC: ABNORMAL G/DL
MCV RBC AUTO: 92.6 FL (ref 79–97)
MCV RBC AUTO: 93.5 FL (ref 79–97)
MCV RBC AUTO: 93.5 FL (ref 79–97)
MONOCYTES # BLD AUTO: 1.27 10*3/MM3 (ref 0.1–0.9)
MONOCYTES NFR BLD AUTO: 12.7 % (ref 5–12)
NEUTROPHILS NFR BLD AUTO: 6.84 10*3/MM3 (ref 1.7–7)
NEUTROPHILS NFR BLD AUTO: 68.6 % (ref 42.7–76)
NRBC BLD AUTO-RTO: 0 /100 WBC (ref 0–0.2)
PHOSPHATE SERPL-MCNC: 2.9 MG/DL (ref 2.5–4.5)
PLATELET # BLD AUTO: 284 10*3/MM3 (ref 140–450)
PLATELET # BLD AUTO: 284 10*3/MM3 (ref 140–450)
PLATELET # BLD AUTO: 358 10*3/MM3 (ref 140–450)
PMV BLD AUTO: 9.3 FL (ref 6–12)
PMV BLD AUTO: 9.6 FL (ref 6–12)
PMV BLD AUTO: ABNORMAL FL
POTASSIUM SERPL-SCNC: 3.8 MMOL/L (ref 3.5–5.2)
POTASSIUM SERPL-SCNC: 4 MMOL/L (ref 3.5–5.2)
PROT SERPL-MCNC: 5.7 G/DL (ref 6–8.5)
RBC # BLD AUTO: 2.91 10*6/MM3 (ref 4.14–5.8)
RBC # BLD AUTO: 2.91 10*6/MM3 (ref 4.14–5.8)
RBC # BLD AUTO: 3.4 10*6/MM3 (ref 4.14–5.8)
SODIUM SERPL-SCNC: 138 MMOL/L (ref 136–145)
SODIUM SERPL-SCNC: 140 MMOL/L (ref 136–145)
URATE SERPL-MCNC: 5.5 MG/DL (ref 3.4–7)
WBC NRBC COR # BLD AUTO: 12.42 10*3/MM3 (ref 3.4–10.8)
WBC NRBC COR # BLD AUTO: 9.98 10*3/MM3 (ref 3.4–10.8)
WBC NRBC COR # BLD AUTO: 9.98 10*3/MM3 (ref 3.4–10.8)

## 2025-07-23 PROCEDURE — 85730 THROMBOPLASTIN TIME PARTIAL: CPT | Performed by: NURSE PRACTITIONER

## 2025-07-23 PROCEDURE — 80048 BASIC METABOLIC PNL TOTAL CA: CPT | Performed by: ANESTHESIOLOGY

## 2025-07-23 PROCEDURE — 85027 COMPLETE CBC AUTOMATED: CPT | Performed by: ANESTHESIOLOGY

## 2025-07-23 PROCEDURE — 82330 ASSAY OF CALCIUM: CPT | Performed by: ANESTHESIOLOGY

## 2025-07-23 PROCEDURE — 83735 ASSAY OF MAGNESIUM: CPT | Performed by: ANESTHESIOLOGY

## 2025-07-23 PROCEDURE — 94799 UNLISTED PULMONARY SVC/PX: CPT

## 2025-07-23 PROCEDURE — 99232 SBSQ HOSP IP/OBS MODERATE 35: CPT | Performed by: INTERNAL MEDICINE

## 2025-07-23 PROCEDURE — 71045 X-RAY EXAM CHEST 1 VIEW: CPT

## 2025-07-23 PROCEDURE — 80076 HEPATIC FUNCTION PANEL: CPT | Performed by: ANESTHESIOLOGY

## 2025-07-23 PROCEDURE — 25010000002 HEPARIN (PORCINE) 25000-0.45 UT/250ML-% SOLUTION: Performed by: NURSE PRACTITIONER

## 2025-07-23 PROCEDURE — 82948 REAGENT STRIP/BLOOD GLUCOSE: CPT

## 2025-07-23 PROCEDURE — 97530 THERAPEUTIC ACTIVITIES: CPT

## 2025-07-23 PROCEDURE — 84550 ASSAY OF BLOOD/URIC ACID: CPT | Performed by: INTERNAL MEDICINE

## 2025-07-23 PROCEDURE — 93010 ELECTROCARDIOGRAM REPORT: CPT | Performed by: INTERNAL MEDICINE

## 2025-07-23 PROCEDURE — 25010000002 CEFTRIAXONE PER 250 MG: Performed by: ANESTHESIOLOGY

## 2025-07-23 PROCEDURE — 93005 ELECTROCARDIOGRAM TRACING: CPT | Performed by: PHYSICIAN ASSISTANT

## 2025-07-23 PROCEDURE — 84100 ASSAY OF PHOSPHORUS: CPT | Performed by: ANESTHESIOLOGY

## 2025-07-23 PROCEDURE — 85025 COMPLETE CBC W/AUTO DIFF WBC: CPT | Performed by: NURSE PRACTITIONER

## 2025-07-23 PROCEDURE — 25010000002 AMIODARONE IN DEXTROSE 5% 360-4.14 MG/200ML-% SOLUTION: Performed by: PHYSICIAN ASSISTANT

## 2025-07-23 RX ORDER — POTASSIUM CHLORIDE 1500 MG/1
20 TABLET, EXTENDED RELEASE ORAL ONCE
Status: COMPLETED | OUTPATIENT
Start: 2025-07-23 | End: 2025-07-23

## 2025-07-23 RX ADMIN — AMIODARONE HYDROCHLORIDE 400 MG: 200 TABLET ORAL at 16:44

## 2025-07-23 RX ADMIN — POTASSIUM CHLORIDE 20 MEQ: 1500 TABLET, EXTENDED RELEASE ORAL at 21:33

## 2025-07-23 RX ADMIN — SENNOSIDES AND DOCUSATE SODIUM 2 TABLET: 50; 8.6 TABLET ORAL at 21:34

## 2025-07-23 RX ADMIN — ATORVASTATIN CALCIUM 40 MG: 20 TABLET, FILM COATED ORAL at 21:34

## 2025-07-23 RX ADMIN — CEFTRIAXONE SODIUM 1000 MG: 1 INJECTION, POWDER, FOR SOLUTION INTRAMUSCULAR; INTRAVENOUS at 12:08

## 2025-07-23 RX ADMIN — IPRATROPIUM BROMIDE AND ALBUTEROL SULFATE 3 ML: .5; 3 SOLUTION RESPIRATORY (INHALATION) at 13:16

## 2025-07-23 RX ADMIN — MIDODRINE HYDROCHLORIDE 10 MG: 5 TABLET ORAL at 12:07

## 2025-07-23 RX ADMIN — Medication 4 ML: at 07:12

## 2025-07-23 RX ADMIN — POLYETHYLENE GLYCOL 3350 17 G: 17 POWDER, FOR SOLUTION ORAL at 21:34

## 2025-07-23 RX ADMIN — SENNOSIDES AND DOCUSATE SODIUM 2 TABLET: 50; 8.6 TABLET ORAL at 08:42

## 2025-07-23 RX ADMIN — Medication 5 MG: at 22:08

## 2025-07-23 RX ADMIN — AMIODARONE HYDROCHLORIDE 400 MG: 200 TABLET ORAL at 12:12

## 2025-07-23 RX ADMIN — BISACODYL 10 MG: 10 SUPPOSITORY RECTAL at 08:42

## 2025-07-23 RX ADMIN — AMIODARONE HYDROCHLORIDE 400 MG: 200 TABLET ORAL at 01:42

## 2025-07-23 RX ADMIN — POLYETHYLENE GLYCOL 3350 17 G: 17 POWDER, FOR SOLUTION ORAL at 08:43

## 2025-07-23 RX ADMIN — AMIODARONE HYDROCHLORIDE 0.5 MG/MIN: 1.8 INJECTION, SOLUTION INTRAVENOUS at 03:49

## 2025-07-23 RX ADMIN — IPRATROPIUM BROMIDE AND ALBUTEROL SULFATE 3 ML: .5; 3 SOLUTION RESPIRATORY (INHALATION) at 07:12

## 2025-07-23 RX ADMIN — GUAIFENESIN 1200 MG: 600 TABLET, EXTENDED RELEASE ORAL at 21:33

## 2025-07-23 RX ADMIN — MUPIROCIN 1 APPLICATION: 20 OINTMENT TOPICAL at 21:34

## 2025-07-23 RX ADMIN — IPRATROPIUM BROMIDE AND ALBUTEROL SULFATE 3 ML: .5; 3 SOLUTION RESPIRATORY (INHALATION) at 20:06

## 2025-07-23 RX ADMIN — GUAIFENESIN 1200 MG: 600 TABLET, EXTENDED RELEASE ORAL at 08:42

## 2025-07-23 RX ADMIN — ASPIRIN 81 MG CHEWABLE TABLET 81 MG: 81 TABLET CHEWABLE at 08:42

## 2025-07-23 RX ADMIN — HEPARIN SODIUM 12 UNITS/KG/HR: 10000 INJECTION, SOLUTION INTRAVENOUS at 16:44

## 2025-07-23 RX ADMIN — PANTOPRAZOLE SODIUM 40 MG: 40 TABLET, DELAYED RELEASE ORAL at 06:37

## 2025-07-23 RX ADMIN — MIDODRINE HYDROCHLORIDE 10 MG: 5 TABLET ORAL at 16:44

## 2025-07-23 RX ADMIN — MIDODRINE HYDROCHLORIDE 10 MG: 5 TABLET ORAL at 06:37

## 2025-07-23 NOTE — THERAPY TREATMENT NOTE
Patient Name: Erickson Gerard  : 1945    MRN: [id number]                              Today's Date: 2025       Admit Date: 7/15/2025    Visit Dx:     ICD-10-CM ICD-9-CM   1. Decreased activities of daily living (ADL)  Z78.9 V49.89   2. Coronary artery disease involving native heart, unspecified vessel or lesion type, unspecified whether angina present  I25.10 414.01   3. Abnormal findings on diagnostic imaging of heart and coronary circulation  R93.1 794.39   4. S/P CABG (coronary artery bypass graft)  Z95.1 V45.81     Patient Active Problem List   Diagnosis    Essential hypertension    GERD (gastroesophageal reflux disease)    Medicare annual wellness visit, subsequent    Benign prostatic hyperplasia with urinary frequency    Umbilical hernia without obstruction and without gangrene    Colon cancer screening    Mixed hyperlipidemia    Aneurysm of right popliteal artery    Acute deep vein thrombosis (DVT) of distal vein of right lower extremity    DDD (degenerative disc disease), lumbar    Actinic keratosis of forehead    Aneurysm of right popliteal artery    Abdominal aortic aneurysm (AAA) 3.0 cm to 5.5 cm in diameter in male    PVD (peripheral vascular disease)    High risk medication use    Immunization deficiency    Chronic deep vein thrombosis (DVT)    Encounter for hepatitis C screening test for low risk patient    Peripheral artery aneurysm    Blurred vision    Coronary artery disease of native artery of native heart with stable angina pectoris    Coronary artery disease involving native heart    Paroxysmal atrial fibrillation     Past Medical History:   Diagnosis Date    AAA (abdominal aortic aneurysm) without rupture 10/14/2021    Abdominal aortic aneurysm, without rupture, unspecified 2023    Acute embolism and thrombosis of unspecified deep veins of right distal lower extremity     Anesthesia complication     AFTER VEIN SURGERY STATES HE WAS HARD TO WAKE UP    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     Aneurysm of artery of lower extremity 09/09/2021    Arthritis     At risk for sleep apnea     STOP BANG 5    Coronary artery disease     Dyspnea on exertion     Essential (primary) hypertension     GERD (gastroesophageal reflux disease)     [place] (hard of hearing)     Hyperlipidemia     Hypertension     Iliac artery aneurysm 10/14/2021    Localized edema     Low back pain     Mixed hyperlipidemia     Other specified soft tissue disorders     PAD (peripheral artery disease)     Peripheral artery aneurysm     PONV (postoperative nausea and vomiting)     Popliteal aneurysm     Thrombosis 09/09/2021    Calf Vein Thrombosis Rt tibial vein    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     Past Surgical History:   Procedure Laterality Date    ANGIOPLASTY POPLITEAL ARTERY Right 11/09/2021    Procedure: RIGHT POPITEAL ANEURYSM REPAIR VIABOHN;  Surgeon: Campos Archer MD;  Location: [facility] OR 18/19;  Service: Vascular;  Laterality: Right;    ANGIOPLASTY POPLITEAL ARTERY Right 01/25/2022    Procedure: AORTO ILIAC FEMORAL RIGHT POPLITEAL VIABOHN STENT PLACEMENT X 2;  Surgeon: Campos Archer MD;  Location: [facility] OR 18/19;  Service: Vascular;  Laterality: Right;    CARDIAC CATHETERIZATION N/A 06/19/2025    Procedure: Left Heart Cath;  Surgeon: Oneil Escobar MD;  Location: [facility] CATH INVASIVE LOCATION;  Service: Cardiovascular;  Laterality: N/A;    CARDIAC CATHETERIZATION N/A 06/19/2025    Procedure: Left ventriculography;  Surgeon: Oneil Escobar MD;  Location: [facility] INVASIVE LOCATION;  Service: Cardiovascular;  Laterality: N/A;    COLONOSCOPY      CORONARY ARTERY BYPASS GRAFT N/A 7/15/2025    Procedure: STERNOTOMY; CORONARY ARTERY BYPASS GRAFTING TIMES 6 USING LEFT INTERNAL MAMMARY ARTERY AND LEFT SAPHENOUS VEIN; TRANSESOPHAGEAL ECHOCARDIOGRAM WITH ANESTHESIA;LEFT ATRIAL APPENDAGE EXCLUSION; PRP;  Surgeon: Jr Oscar Patino MD;  Location: [facility];  Service: Cardiothoracic;   Laterality: N/A;    EYE SURGERY Bilateral     CATARACTS    HERNIA REPAIR Bilateral     POLITEAL ARTERY ANEURYSM REPAIR Right 11/09/2021    Viabahn Stent Graft    VEIN LIGATION AND STRIPPING N/A       General Information       [facility] Name 07/23/25 1646          Physical Therapy Time and Intention    Document Type therapy note (daily note)  -     Mode of Treatment individual therapy;physical therapy  -[facility] Name 07/23/25 1646          General Information    Patient Profile Reviewed yes  -     Existing Precautions/Restrictions fall;cardiac;sternal  2L  -[facility] Name 07/23/25 1646          Living Environment    Current Living Arrangements home  -     People in Home spouse  -[facility] Name 07/23/25 1646          Cognition    Orientation Status (Cognition) oriented x 4  -[facility] Name 07/23/25 1646          Safety Issues/Impairments Affecting Functional Mobility    Impairments Affecting Function (Mobility) balance;coordination;strength  -               User Key  (r) = Recorded By, (t) = Taken By, (c) = Cosigned By      Initials Name Provider Type     Kyleigh Jackson PTA Physical Therapist Assistant                   Mobility       Row Name 07/23/25 1646          Bed Mobility    Comment, (Bed Mobility) in chair  -[facility] Name 07/23/25 1646          Sit-Stand Transfer    Sit-Stand [place] (Transfers) standby assist;verbal cues  -     Comment, (Sit-Stand Transfer) pt stood w/o assist, but easily impulsive and loses balance  -[facility] Name 07/23/25 1646          Gait/Stairs (Locomotion)    [place] Level (Gait) contact guard  -     Assistive Device (Gait) --  -     Distance in Feet (Gait) 150  -     Deviations/Abnormal Patterns (Gait) base of support, narrow  -     Bilateral Gait Deviations forward flexed posture  -     Comment, (Gait/Stairs) cues for safety on turns  -               User Key  (r) = Recorded By, (t) = Taken By, (c) = Cosigned By      Initials Name  Provider Type    Kyleigh Thomas PTA Physical Therapist Assistant                   Obj/Interventions       Row Name 07/23/25 1651          Motor Skills    Therapeutic Exercise --  cardiac ex protocol x10 reps, written HEP/prec sheet given  -               User Key  (r) = Recorded By, (t) = Taken By, (c) = Cosigned By      Initials Name Provider Type    Kyleigh Thomas PTA Physical Therapist Assistant                   Goals/Plan    No documentation.                  Clinical Impression       Row Name 07/23/25 1651          Pain    Pretreatment Pain Rating 0/10 - no pain  -     Posttreatment Pain Rating 0/10 - no pain  -JM       Row Name 07/23/25 1651          Plan of Care Review    Plan of Care Reviewed With patient;spouse;family  -     Progress improving  -     Outcome Evaluation Pt agreed to PT session, pt still impulsive, but educ offered to pt and fam on him slowing pace for safety, pt stood from recliner w/SBA, cues for balance due to impulsivity, pt judith amb ~150ft CGA/SBA 1, pt with SATS at 98% , cardiac protocol x10 reps, written HEP /PREC sheet given  -[facility] Name 07/23/25 1651          Therapy Assessment/Plan (PT)    Rehab Potential (PT) good  -     Criteria for Skilled Interventions Met (PT) yes  -[facility] Name 07/23/25 1651          Vital Signs    Pre SpO2 (%) 97  -JM     O2 Delivery Pre Treatment room air  -     Post SpO2 (%) 98  -JM     O2 Delivery Post Treatment room air  -[facility] Name 07/23/25 1651          Positioning and Restraints    Pre-Treatment Position sitting in chair/recliner  -     Post Treatment Position chair  -JM     In Chair sitting;call light within reach;encouraged to call for assist;exit alarm on;with family/caregiver;notified [facility]  -               User Key  (r) = Recorded By, (t) = Taken By, (c) = Cosigned By      Initials Name Provider Type    Kyleigh Thomas PTA Physical Therapist Assistant                   Outcome Measures       [facility]  Name 07/23/25 1750 07/23/25 0830       How much help from another person do you currently need...    Turning from your back to your side while in flat bed without using bedrails? 4  -JM 4  -MJ    Moving from lying on back to sitting on the side of a flat bed without bedrails? 4  -JM 4  -MJ    Moving to and from a bed to a chair (including a wheelchair)? 3  -JM 3  -MJ    Standing up from a chair using your arms (e.g., wheelchair, bedside chair)? 4  -JM 4  -MJ    Climbing 3-5 steps with a railing? 3  -JM 3  -MJ    To walk in hospital room? 3  -JM 3  -MJ    AM-PAC 6 Clicks Score (PT) 21  -JM 21  -MJ    Highest Level of Mobility Goal Walk 10 Steps or More-6  -JM Walk 10 Steps or More-6  -MJ      Row Name 07/23/25 1105          Modified [place] Scale    Modified [place] Scale 3 - Moderate disability.  Requiring some help, but able to walk without assistance.  -JR       Row Name 07/23/25 1105          Functional Assessment    Outcome Measure Options AM-PAC 6 Clicks Daily Activity (OT);Modified [place]  -JR               User Key  (r) = Recorded By, (t) = Taken By, (c) = Cosigned By      Initials Name Provider Type    Kyleigh Thomas PTA Physical Therapist Assistant    Cathy Sanchez, RN Registered Nurse    Hitesh Carrizales OT Occupational Therapist                                 Physical Therapy Education       Title: PT OT SLP Therapies (Done)       Topic: Physical Therapy (Done)       Point: Mobility training (Done)       Learning Progress Summary            Patient Eager, E,TB,D,H, VU by CASANDRA at 7/23/2025 1751    Eager, E,TB,D, VU,DU by CASANDRA at 7/21/2025 1045    Acceptance, E, VU by DB at 7/19/2025 1050    Acceptance, E,D, VU,NR by MS at 7/18/2025 1121    Acceptance, E, NR by AR at 7/17/2025 1254    Acceptance, E, NR by EM at 7/16/2025 1022   Family Anurager, STEPHANIE,TB,D,H, VU by CASANDRA at 7/23/2025 1751                      Point: Home exercise program (Done)       Learning Progress Summary            Patient Eager, E,TB,D,H,  VU by JM at 7/23/2025 1751    Eager, E,TB,D, VU,DU by JM at 7/21/2025 1045    Acceptance, E, VU by DB at 7/19/2025 1050    Acceptance, E,D, VU,NR by MS at 7/18/2025 1121    Acceptance, E, NR by AR at 7/17/2025 1254    Acceptance, E, NR by EM at 7/16/2025 1022   Family Eager, E,TB,D,H, VU by JM at 7/23/2025 1751                      Point: Body mechanics (Done)       Learning Progress Summary            Patient Eager, E,TB,D,H, VU by JM at 7/23/2025 1751    Eager, E,TB,D, VU,DU by JM at 7/21/2025 1045    Acceptance, E, VU by DB at 7/19/2025 1050    Acceptance, E,D, VU,NR by MS at 7/18/2025 1121    Acceptance, E, NR by AR at 7/17/2025 1254   Family Eager, E,TB,D,H, VU by JM at 7/23/2025 1751                      Point: Precautions (Done)       Learning Progress Summary            Patient Eager, E,TB,D,H, VU by JM at 7/23/2025 1751    Eager, E,TB,D, VU,DU by JM at 7/21/2025 1045    Acceptance, E, VU by DB at 7/19/2025 1050    Acceptance, E,D, VU,NR by MS at 7/18/2025 1121    Acceptance, E, NR by AR at 7/17/2025 1254   Family Eager, E,TB,D,H, VU by JM at 7/23/2025 1751                                      User Key       Initials Effective Dates Name Provider Type Discipline    EM 06/16/21 -  Kayy Adams, PT Physical Therapist PT    JM 03/07/18 -  Kyleigh Jackson, PTA Physical Therapist Assistant PT    MS 06/16/21 -  Campos Costa, PT Physical Therapist PT    AR 06/16/21 -  Mary Curry, PT Physical Therapist PT    DB 06/27/25 -  Elise Mata, PT Physical Therapist PT                  PT Recommendation and Plan     Progress: improving  Outcome Evaluation: Pt agreed to PT session, pt still impulsive, but educ offered to pt and fam on him slowing pace for safety, pt stood from recliner w/SBA, cues for balance due to impulsivity, pt judith amb ~150ft CGA/SBA 1, pt with SATS at 98% , cardiac protocol x10 reps, written HEP /PREC sheet given     Time Calculation:         PT Charges       Row Name 07/23/25  1751             Time Calculation    Start Time 1622  -CASANDRA      Stop Time 1642  -CASANDRA      Time Calculation (min) 20 min  -CASANDRA      PT Received On 07/23/25  -CASANDRA      PT - Next Appointment 07/24/25  -CASANDRA                User Key  (r) = Recorded By, (t) = Taken By, (c) = Cosigned By      Initials Name Provider Type    Kyleigh Thomas PTA Physical Therapist Assistant                  Therapy Charges for Today       Code Description Service Date Service Provider Modifiers Qty    15947086296 HC PT THERAPEUTIC ACT EA 15 MIN 7/22/2025 Kyleigh Jackson PTA GP 1    84092516516 HC PT THER PROC EA 15 MIN 7/22/2025 Kyleigh Jackson, MAURICE GP 1    52787899006 HC PT THER SUPP EA 15 MIN 7/22/2025 Kyleigh Jackson PTA GP 2    25020773246 HC PT THERAPEUTIC ACT EA 15 MIN 7/23/2025 Kyleigh Jackson, MAURICE GP 1            PT G-Codes  Outcome Measure Options: AM-PAC 6 Clicks Daily Activity (OT), Modified [place]  AM-PAC 6 Clicks Score (PT): 21  AM-PAC 6 Clicks Score (OT): 17  Modified Kathy Scale: 3 - Moderate disability.  Requiring some help, but able to walk without assistance.  PT Discharge Summary  Anticipated Discharge Disposition (PT): home with assist, home with home health    Kyleigh Jackson PTA  7/23/2025

## 2025-07-23 NOTE — CONSULTS
Met with patient and family to discuss the benefits of cardiac rehab. Provided phase II information along with the contact information for cardiac rehab here at [facility]. Pt is not sure that will attend due to possible co pay. Pt will call if interested in attending.

## 2025-07-23 NOTE — PLAN OF CARE
Goal Outcome Evaluation:  Plan of Care Reviewed With: patient, family        Progress: improving  Outcome Evaluation: Patient seen for OT this am.  Patient resting in bed with family present.  Patient able to transition to EOB with SBA.  STS from EOB with CGA and transferred to recliner chair with CGA and No Ad.  Patient able to complete basic grooming tasks from chair level with SBA.  Patient performed Cardiac protocol exercises from chair level with 1L O2 and stat >90%.  Patient performed 3 sets x 5 reps of STS from chair level with SBA.  O2 remain good as well.  Noted improved strength, activity tolerance and balance today.  Cont OT as tolerated.    Anticipated Discharge Disposition (OT): home with assist, home with home health

## 2025-07-23 NOTE — PLAN OF CARE
Goal Outcome Evaluation:  Plan of Care Reviewed With: patient, spouse, family        Progress: improving  Outcome Evaluation: Pt agreed to PT session, pt still impulsive, but educ offered to pt and fam on him slowing pace for safety, pt stood from recliner w/SBA, cues for balance due to impulsivity, pt judith amb ~150ft CGA/SBA 1, pt with SATS at 98% , cardiac protocol x10 reps, written HEP /PREC sheet given    Anticipated Discharge Disposition (PT): home with assist, home with home health

## 2025-07-23 NOTE — CASE MANAGEMENT/SOCIAL WORK
Continued Stay Note  [facility]     Patient Name: Erickson Gerard  MRN: [id number]  Today's Date: 7/23/2025    Admit Date: 7/15/2025    Plan: Home w/ [facility]; follow for possible O2 needs at d/c.   Discharge Plan       Row Name 07/23/25 1656       Plan    Plan Comments Patient moved to CVICU today.  Pt is POD #8 status post CABG x6.  Still with AV wires.  CCP will follow up with patient tomorrow.  Patient ambulated 150 feet CGA with P.T. yesterday. Patient lives with spouse.  [facility] already accepted.  CCP following............Tami WARD/MARIE                   Discharge Codes    No documentation.                 Expected Discharge Date and Time       Expected Discharge Date Expected Discharge Time    Jul 25, 2025               Tami Martínez RN

## 2025-07-23 NOTE — PLAN OF CARE
He converted back to SR this morning at 7:16 AM, discontinue IV amiodarone. Repeat EKG this AM to re-evlaute Qtc in SR    HR now in the 60-70s. Some episodes of RVR overnight- would continue with oral amiodarone 400 three times daily while inpatient, discharge on amiodarone 400 mg daily x 1 week with plans to reduced to 200 mg daily until follow up with cardiology in the office.     He has plans for possible EVAR in 6 weeks. At this point, EP will sign off.

## 2025-07-23 NOTE — PROGRESS NOTES
LOS: 8 days   Patient Care Team:  Farhad Metcalf MD as PCP - General (Family Medicine)    Chief Complaint: CAD     Interval History: Moved to CVU, really doing well. Converted to SR. No longer on pressors.     Objective   Vital Signs  Temp:  [97.8 °F (36.6 °C)-98.7 °F (37.1 °C)] 97.9 °F (36.6 °C)  Heart Rate:  [67-92] 78  Resp:  [18] 18  BP: ()/(63-78) 126/76    Intake/Output Summary (Last 24 hours) at 7/23/2025 1647  Last data filed at 7/23/2025 1316  Gross per 24 hour   Intake 734 ml   Output 750 ml   Net -16 ml       Last Weight and Admission Weight        07/23/25  0600   Weight: 71.7 kg (158 lb)     Flowsheet Rows      Flowsheet Row First Filed Value   Admission Height --   Admission Weight 75.8 kg (167 lb 1.7 oz) Documented at 07/16/2025 0500                    Physical Exam  Constitutional:       General: He is not in acute distress.  HENT:      Head: Normocephalic.      Mouth/Throat:      Pharynx: Oropharynx is clear.   Eyes:      Conjunctiva/sclera: Conjunctivae normal.   Cardiovascular:      Rate and Rhythm: Normal rate and regular rhythm.   Pulmonary:      Effort: Pulmonary effort is normal.      Breath sounds: Normal breath sounds.   Abdominal:      Palpations: Abdomen is soft.      Tenderness: There is no abdominal tenderness.   Musculoskeletal:         General: No swelling.   Psychiatric:         Mood and Affect: Mood normal.         Results Review:      Results from last 7 days   Lab Units 07/23/25  1521 07/23/25  0253 07/22/25  1609 07/22/25  1507   SODIUM mmol/L 138 140  --  137   POTASSIUM mmol/L 3.8 4.0 4.0 4.2   CHLORIDE mmol/L 105 106  --  103   CO2 mmol/L 22.0 20.0*  --  21.0*   BUN mg/dL 44.0* 41.0*  --  45.0*   CREATININE mg/dL 2.00* 2.06*  --  2.13*   GLUCOSE mg/dL 111* 94  --  97   CALCIUM mg/dL 8.6 8.2*  --  8.8         Results from last 7 days   Lab Units 07/23/25  1533 07/23/25  0253 07/22/25  1507   WBC 10*3/mm3 12.42* 9.98  9.98 11.25*   HEMOGLOBIN g/dL 10.3* 8.9*  8.9*  10.0*   HEMATOCRIT % 31.5* 27.2*  27.2* 30.8*   PLATELETS 10*3/mm3 358 284  284 308     Results from last 7 days   Lab Units 07/23/25  0253 07/22/25  2127 07/22/25  1609 07/22/25  0959   INR   --   --   --  1.37*   APTT seconds 76.1* 67.0* 77.3* 35.8*         Results from last 7 days   Lab Units 07/23/25  1521   MAGNESIUM mg/dL 2.5*           I reviewed the patient's new clinical results.  I personally viewed and interpreted the patient's EKG/Telemetry data        Medication Review:   amiodarone, 400 mg, Oral, Q8H  aspirin, 81 mg, Oral, Daily  atorvastatin, 40 mg, Oral, Nightly  bisacodyl, 10 mg, Rectal, Daily  cefTRIAXone, 1,000 mg, Intravenous, Q24H  guaiFENesin, 1,200 mg, Oral, Q12H  insulin lispro, 2-7 Units, Subcutaneous, 4x Daily AC & at Bedtime  ipratropium-albuterol, 3 mL, Nebulization, 4x Daily - RT  melatonin, 5 mg, Oral, Nightly  [Held by provider] metoprolol tartrate, 12.5 mg, Oral, Q12H  midodrine, 10 mg, Oral, TID AC  mupirocin, 1 Application, Each Nare, BID  pantoprazole, 40 mg, Oral, Q AM  polyethylene glycol, 17 g, Oral, BID  senna-docusate sodium, 2 tablet, Oral, BID  vitamin D, 50,000 Units, Oral, Q7 Days        heparin, 12 Units/kg/hr, Last Rate: 12 Units/kg/hr (07/23/25 1644)        Assessment & Plan     1. Multivessel CAD s/p CABG x 6, POD#8  *Continue aspirin/statin    2. PAF -- difficult to manage due to hypotension requiring phenylephrine. Thankfully he has converted to SR. Dr Rankin transitioned him from IV to PO amiodarone. His QT is still long as well.  He is on IV heparin today. while he is s/p LAAO clipping, that does not eliminate the need for AC. Can we switch to DOAC soon (apixaban 2.5mg BID)?    3. Hypotension -- remains on midodrine.    4. VERN -- diuresis has been stopped, renal following    5. AAA -- up to 5.3cm, Dr Archer recommends outpatient follow up for EVAR    6. History of DVT    7. Anemia -- stable    8. Thrombocytopenia -- resolved    9. Postoperative ileus requiring  NGT -- normal bowel function now    Julio C Knutson MD  07/23/25  16:47 EDT

## 2025-07-23 NOTE — THERAPY EVALUATION
Patient Name: Erickson Gerard  : 1945    MRN: [id number]                              Today's Date: 2025       Admit Date: 7/15/2025    Visit Dx:     ICD-10-CM ICD-9-CM   1. Decreased activities of daily living (ADL)  Z78.9 V49.89   2. Coronary artery disease involving native heart, unspecified vessel or lesion type, unspecified whether angina present  I25.10 414.01   3. Abnormal findings on diagnostic imaging of heart and coronary circulation  R93.1 794.39   4. S/P CABG (coronary artery bypass graft)  Z95.1 V45.81     Patient Active Problem List   Diagnosis    Essential hypertension    GERD (gastroesophageal reflux disease)    Medicare annual wellness visit, subsequent    Benign prostatic hyperplasia with urinary frequency    Umbilical hernia without obstruction and without gangrene    Colon cancer screening    Mixed hyperlipidemia    Aneurysm of right popliteal artery    Acute deep vein thrombosis (DVT) of distal vein of right lower extremity    DDD (degenerative disc disease), lumbar    Actinic keratosis of forehead    Aneurysm of right popliteal artery    Abdominal aortic aneurysm (AAA) 3.0 cm to 5.5 cm in diameter in male    PVD (peripheral vascular disease)    High risk medication use    Immunization deficiency    Chronic deep vein thrombosis (DVT)    Encounter for hepatitis C screening test for low risk patient    Peripheral artery aneurysm    Blurred vision    Coronary artery disease of native artery of native heart with stable angina pectoris    Coronary artery disease involving native heart    Paroxysmal atrial fibrillation     Past Medical History:   Diagnosis Date    AAA (abdominal aortic aneurysm) without rupture 10/14/2021    Abdominal aortic aneurysm, without rupture, unspecified 2023    Acute embolism and thrombosis of unspecified deep veins of right distal lower extremity     Anesthesia complication     AFTER VEIN SURGERY STATES HE WAS HARD TO WAKE UP    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     Aneurysm of artery of lower extremity 09/09/2021    Arthritis     At risk for sleep apnea     STOP BANG 5    Coronary artery disease     Dyspnea on exertion     Essential (primary) hypertension     GERD (gastroesophageal reflux disease)     [place] (hard of hearing)     Hyperlipidemia     Hypertension     Iliac artery aneurysm 10/14/2021    Localized edema     Low back pain     Mixed hyperlipidemia     Other specified soft tissue disorders     PAD (peripheral artery disease)     Peripheral artery aneurysm     PONV (postoperative nausea and vomiting)     Popliteal aneurysm     Thrombosis 09/09/2021    Calf Vein Thrombosis Rt tibial vein    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     Past Surgical History:   Procedure Laterality Date    ANGIOPLASTY POPLITEAL ARTERY Right 11/09/2021    Procedure: RIGHT POPITEAL ANEURYSM REPAIR VIABOHN;  Surgeon: Campos Archer MD;  Location: [facility] OR 18/19;  Service: Vascular;  Laterality: Right;    ANGIOPLASTY POPLITEAL ARTERY Right 01/25/2022    Procedure: AORTO ILIAC FEMORAL RIGHT POPLITEAL VIABOHN STENT PLACEMENT X 2;  Surgeon: Campos Archer MD;  Location: [facility] OR 18/19;  Service: Vascular;  Laterality: Right;    CARDIAC CATHETERIZATION N/A 06/19/2025    Procedure: Left Heart Cath;  Surgeon: Oneil Escobar MD;  Location: [facility] CATH INVASIVE LOCATION;  Service: Cardiovascular;  Laterality: N/A;    CARDIAC CATHETERIZATION N/A 06/19/2025    Procedure: Left ventriculography;  Surgeon: Oneil Escobar MD;  Location: [facility] INVASIVE LOCATION;  Service: Cardiovascular;  Laterality: N/A;    COLONOSCOPY      CORONARY ARTERY BYPASS GRAFT N/A 7/15/2025    Procedure: STERNOTOMY; CORONARY ARTERY BYPASS GRAFTING TIMES 6 USING LEFT INTERNAL MAMMARY ARTERY AND LEFT SAPHENOUS VEIN; TRANSESOPHAGEAL ECHOCARDIOGRAM WITH ANESTHESIA;LEFT ATRIAL APPENDAGE EXCLUSION; PRP;  Surgeon: Jr Oscar Patino MD;  Location: [facility];  Service: Cardiothoracic;   Laterality: N/A;    EYE SURGERY Bilateral     CATARACTS    HERNIA REPAIR Bilateral     POLITEAL ARTERY ANEURYSM REPAIR Right 11/09/2021    Viabahn Stent Graft    VEIN LIGATION AND STRIPPING N/A       General Information       Row Name 07/23/25 1059          OT Time and Intention    Document Type therapy note (daily note)  -JR     Mode of Treatment occupational therapy;individual therapy  -JR     Patient Effort good  -       Row Name 07/23/25 1059          General Information    Patient Profile Reviewed yes  -JR     Existing Precautions/Restrictions fall;cardiac;sternal;oxygen therapy device and L/min  -       Row Name 07/23/25 1059          Cognition    Orientation Status (Cognition) oriented x 4  -       Row Name 07/23/25 1059          Safety Issues/Impairments Affecting Functional Mobility    Impairments Affecting Function (Mobility) balance;coordination;strength  -     Comment, Safety Issues/Impairments (Mobility) non skid socks donned  -               User Key  (r) = Recorded By, (t) = Taken By, (c) = Cosigned By      Initials Name Provider Type    JR Hitesh Mcknight, JASON Occupational Therapist                     Mobility/ADL's       Row Name 07/23/25 1059          Bed Mobility    Bed Mobility supine-sit  -     All Activities, Saffell (Bed Mobility) standby assist  -     Assistive Device (Bed Mobility) head of bed elevated  -[facility] Name 07/23/25 1059          Transfers    Transfers sit-stand transfer  -[facility] Name 07/23/25 1059          Sit-Stand Transfer    Sit-Stand Saffell (Transfers) contact guard;standby assist;verbal cues  -     Comment, (Sit-Stand Transfer) No Ad or HHA  -[facility] Name 07/23/25 1059          Functional Mobility    Patient was able to Ambulate yes  -[facility] Name 07/23/25 1059          Activities of Daily Living    BADL Assessment/Intervention grooming  -[facility] Name 07/23/25 1059          Grooming Assessment/Training    Saffell Level  (Grooming) wash face, hands;standby assist  -               User Key  (r) = Recorded By, (t) = Taken By, (c) = Cosigned By      Initials Name Provider Type    Hitesh Carrizales OT Occupational Therapist                   Obj/Interventions       Row Name 07/23/25 1101          Sensory Assessment (Somatosensory)    Sensory Assessment (Somatosensory) sensation intact  -       Row Name 07/23/25 1101          Vision Assessment/Intervention    Visual Impairment/Limitations WFL  -       Row Name 07/23/25 1101          Balance    Balance Assessment sitting static balance;sitting dynamic balance  -JR     Static Sitting Balance supervision  -JR     Dynamic Sitting Balance supervision  -JR     Position, Sitting Balance sitting edge of bed  -JR     Static Standing Balance contact guard  -JR     Dynamic Standing Balance contact guard  -JR               User Key  (r) = Recorded By, (t) = Taken By, (c) = Cosigned By      Initials Name Provider Type    Hitesh Carrizales OT Occupational Therapist                   Goals/Plan    No documentation.                  Clinical Impression       Row Name 07/23/25 1101          Pain Assessment    Pretreatment Pain Rating 0/10 - no pain  -JR     Posttreatment Pain Rating 0/10 - no pain  -       Row Name 07/23/25 1101          Plan of Care Review    Plan of Care Reviewed With patient;family  -     Progress improving  -     Outcome Evaluation Patient seen for OT this am.  Patient resting in bed with family present.  Patient able to transition to EOB with SBA.  STS from EOB with CGA and transferred to recliner chair with CGA and No Ad.  Patient able to complete basic grooming tasks from chair level with SBA.  Patient performed Cardiac protocol exercises from chair level with 1L O2 and stat >90%.  Patient performed 3 sets x 5 reps of STS from chair level with SBA.  O2 remain good as well.  Noted improved strength, activity tolerance and balance today.  Cont OT as tolerated.  -JR       [place]ly  Name 07/23/25 1101          Therapy Assessment/Plan (OT)    Rehab Potential (OT) good  -JR     Criteria for Skilled Therapeutic Interventions Met (OT) yes;meets criteria;skilled treatment is necessary  -JR     Therapy Frequency (OT) 5 times/wk  -JR       Row Name 07/23/25 1101          Therapy Plan Review/Discharge Plan (OT)    Anticipated Discharge Disposition (OT) home with assist;home with home health  -JR       Row Name 07/23/25 1101          Vital Signs    O2 Delivery Pre Treatment supplemental O2  -JR     O2 Delivery Intra Treatment supplemental O2  -JR     O2 Delivery Post Treatment supplemental O2  -JR     Pre Patient Position Supine  -JR     Intra Patient Position Standing  -JR     Post Patient Position Sitting  -JR       Row Name 07/23/25 1101          Positioning and Restraints    Pre-Treatment Position in bed  -JR     Post Treatment Position chair  -JR     In Chair notified nsg;reclined;call light within reach;encouraged to call for assist;exit alarm on;with family/caregiver  -JR               User Key  (r) = Recorded By, (t) = Taken By, (c) = Cosigned By      Initials Name Provider Type    JR Hitesh Mcknight, OT Occupational Therapist                   Outcome Measures       Row Name 07/23/25 1105          How much help from another is currently needed...    Putting on and taking off regular lower body clothing? 2  -JR     Bathing (including washing, rinsing, and drying) 2  -JR     Toileting (which includes using toilet bed pan or urinal) 3  -JR     Putting on and taking off regular upper body clothing 3  -JR     Taking care of personal grooming (such as brushing teeth) 3  -JR     Eating meals 4  -JR     AM-PAC 6 Clicks Score (OT) 17  -JR       Row Name 07/23/25 0830          How much help from another person do you currently need...    Turning from your back to your side while in flat bed without using bedrails? 4  -MJ     Moving from lying on back to sitting on the side of a flat bed without bedrails? 4   -MJ     Moving to and from a bed to a chair (including a wheelchair)? 3  -MJ     Standing up from a chair using your arms (e.g., wheelchair, bedside chair)? 4  -MJ     Climbing 3-5 steps with a railing? 3  -MJ     To walk in hospital room? 3  -MJ     AM-PAC 6 Clicks Score (PT) 21  -MJ     Highest Level of Mobility Goal Walk 10 Steps or More-6  -MJ       Row Name 07/23/25 1105          Modified Kathy Scale    Modified [place] Scale 3 - Moderate disability.  Requiring some help, but able to walk without assistance.  -JR       Row Name 07/23/25 1105          Functional Assessment    Outcome Measure Options AM-PAC 6 Clicks Daily Activity (OT);Modified [place]  -JR               User Key  (r) = Recorded By, (t) = Taken By, (c) = Cosigned By      Initials Name Provider Type    Cathy Sanchez, RN Registered Nurse    Hitesh Carrizales OT Occupational Therapist                      OT Recommendation and Plan  Therapy Frequency (OT): 5 times/wk  Plan of Care Review  Plan of Care Reviewed With: patient, family  Progress: improving  Outcome Evaluation: Patient seen for OT this am.  Patient resting in bed with family present.  Patient able to transition to EOB with SBA.  STS from EOB with CGA and transferred to recliner chair with CGA and No Ad.  Patient able to complete basic grooming tasks from chair level with SBA.  Patient performed Cardiac protocol exercises from chair level with 1L O2 and stat >90%.  Patient performed 3 sets x 5 reps of STS from chair level with SBA.  O2 remain good as well.  Noted improved strength, activity tolerance and balance today.  Cont OT as tolerated.     Time Calculation:         Time Calculation- OT       Row Name 07/23/25 1105             Time Calculation- OT    OT Start Time 1013  -JR      OT Stop Time 1041  -      OT Time Calculation (min) 28 min  -      Total Timed Code Minutes- OT 28 minute(s)  -JR      OT Received On 07/23/25  -      OT - Next Appointment 07/24/25  -          Timed Charges    58406 - OT Therapeutic Activity Minutes 28  -JR         Total Minutes    Timed Charges Total Minutes 28  -JR       Total Minutes 28  -JR                User Key  (r) = Recorded By, (t) = Taken By, (c) = Cosigned By      Initials Name Provider Type    Hitesh Carrizales OT Occupational Therapist                  Therapy Charges for Today       Code Description Service Date Service Provider Modifiers Qty    19121692393  OT SELF CARE/MGMT/TRAIN EA 15 MIN 7/22/2025 Hitesh Mcknight OT GO 3    [id number]  OT THERAPEUTIC ACT EA 15 MIN 7/23/2025 Hitesh Mcknight OT GO 2                 Hitesh Mcknight OT  7/23/2025   3 = A little assistance

## 2025-07-23 NOTE — PLAN OF CARE
Goal Outcome Evaluation:      Patient AXO4. 2L NC. Soft Bps overnight. Loaded with PO ammio. Patient up x 1 to bathroom. Bladder scan q6. Sternal site OJ clean dry and intact. Liquid mucosal stools. Ammio gtt stopped at 0600 on 7/23. No complaints of pain overnight. IJ in place. Pacer wires are detached from external box and secured to patient's chest.

## 2025-07-23 NOTE — PROGRESS NOTES
[facility] Progress Note      Patient Name: Erickson Gerard  : 1945  MRN: [id number]  Primary Care Physician:  Farhad Metcalf MD  Date of admission: 7/15/2025    Subjective     Interval History:   Acute kidney injury on chronic kidney disease    Patient is feeling better, he is off all drips..  No chest pain or shortness of air, no nausea or vomiting.  Grewal catheter was removed, voiding without any difficulty    Review of Systems:   As noted above    Objective     Vitals:   Temp:  [97.8 °F (36.6 °C)-99.5 °F (37.5 °C)] 97.8 °F (36.6 °C)  Heart Rate:  [] 67  Resp:  [18-20] 18  BP: ()/(61-87) 106/73  Flow (L/min) (Oxygen Therapy):  [2-3] 2    Intake/Output Summary (Last 24 hours) at 2025 0738  Last data filed at 2025 0606  Gross per 24 hour   Intake 1415 ml   Output 1180 ml   Net 235 ml       Physical Exam:    General Appearance: alert, awake, chronically ill no acute distress   Skin: warm and dry  HEENT: oral mucosa normal, nonicteric sclera nasal cannula in place  Neck: No JVD  Lungs: Bilateral rhonchi, breathing effort not  Heart: Irregularly irregular, no rub  Abdomen: soft, nontender, nondistended  : no palpable bladder   Extremities: No lower extremity edema  Neuro: Moving all extremities    Scheduled Meds:     amiodarone, 400 mg, Oral, Q8H  aspirin, 81 mg, Oral, Daily  atorvastatin, 40 mg, Oral, Nightly  bisacodyl, 10 mg, Rectal, Daily  cefTRIAXone, 1,000 mg, Intravenous, Q24H  guaiFENesin, 1,200 mg, Oral, Q12H  insulin lispro, 2-7 Units, Subcutaneous, 4x Daily AC & at Bedtime  ipratropium-albuterol, 3 mL, Nebulization, 4x Daily - RT  melatonin, 5 mg, Oral, Nightly  [Held by provider] metoprolol tartrate, 12.5 mg, Oral, Q12H  midodrine, 10 mg, Oral, TID AC  mupirocin, 1 Application, Each Nare, BID  pantoprazole, 40 mg, Oral, Q AM  polyethylene glycol, 17 g, Oral, BID  senna-docusate sodium, 2 tablet, Oral, BID  vitamin D, 50,000 Units, Oral, Q7  Days      IV Meds:   heparin, 12 Units/kg/hr, Last Rate: 12 Units/kg/hr (07/22/25 1043)        Results Reviewed:   I have personally reviewed the results from the time of this admission to 7/23/2025 07:38 EDT     Results from last 7 days   Lab Units 07/23/25  0253 07/22/25  1609 07/22/25  1507 07/22/25  0902 07/22/25  0204 07/21/25  1438 07/21/25  0440   SODIUM mmol/L 140  --  137  --  136   < > 139   POTASSIUM mmol/L 4.0 4.0 4.2   < > 3.7   < > 4.1   CHLORIDE mmol/L 106  --  103  --  103   < > 103   CO2 mmol/L 20.0*  --  21.0*  --  22.0   < > 19.2*   BUN mg/dL 41.0*  --  45.0*  --  49.0*   < > 46.0*   CREATININE mg/dL 2.06*  --  2.13*  --  2.25*   < > 2.23*   CALCIUM mg/dL 8.2*  --  8.8  --  8.1*   < > 8.4*   BILIRUBIN mg/dL 0.4  --   --   --  0.4  --  0.6   ALK PHOS U/L 146*  --   --   --  144*  --  181*   ALT (SGPT) U/L 44*  --   --   --  26  --  31   AST (SGOT) U/L 98*  --   --   --  57*  --  62*   GLUCOSE mg/dL 94  --  97  --  105*   < > 131*    < > = values in this interval not displayed.       Estimated Creatinine Clearance: 30 mL/min (A) (by C-G formula based on SCr of 2.06 mg/dL (H)).    Results from last 7 days   Lab Units 07/23/25  0253 07/22/25  1507 07/22/25  0204 07/21/25  1438 07/21/25  0440   MAGNESIUM mg/dL 2.4 2.6* 2.5*   < > 2.3   PHOSPHORUS mg/dL 2.9  --  4.4  --  4.6*    < > = values in this interval not displayed.       Results from last 7 days   Lab Units 07/23/25  0253 07/22/25  0204 07/21/25  0440 07/20/25  0254 07/19/25  0256   URIC ACID mg/dL 5.5 6.7 7.2* 7.3* 7.2*       Results from last 7 days   Lab Units 07/23/25  0253 07/22/25  1507 07/22/25  0204 07/21/25  1438 07/21/25  0440   WBC 10*3/mm3 9.98  9.98 11.25* 9.63 9.77 10.68   HEMOGLOBIN g/dL 8.9*  8.9* 10.0* 9.7* 11.2* 11.7*   PLATELETS 10*3/mm3 284  284 308 275 290 292       Results from last 7 days   Lab Units 07/22/25  0959   INR  1.37*       Assessment / Plan     ASSESSMENT:  Acute kidney injury on chronic kidney disease stage  II secondary to hemodynamic changes post CABG creatinine is stable.  Patient has evidence of volume excess, his electrolytes within acceptable range.  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  AAA 5.3 cm monitored by vascular surgery  Hypertension with CKD currently on vasopressors.  Volume excess,  Postop anemia hemoglobin today is 8.9.  Relative hypoxia associate with volume excess, significantly improved  Severe vitamin D deficiency, vitamin D level is 19, was started on ergocalciferol    PLAN:  No diuretics today  Continue the same treatment  Surveillance labs    I reviewed the chart and other providers notes, reviewed labs.  Discussed the case with the patient.  Copied text in this note has been reviewed and is accurate as of 07/23/25.         Thank you for involving us in the care of Erickson Gerard.  Please feel free to call with any questions.    Angel Franco MD  07/23/25  07:38 EDT    [facility]  [phone]    Please note that portions of this note were completed with a voice recognition program.

## 2025-07-23 NOTE — PROGRESS NOTES
" LOS: 8 days   Patient Care Team:  Farhad Metcalf MD as PCP - General (Family Medicine)    Chief Complaint: Post-op follow-up, s/p   CABG/WEN clip-- [place]      Subjective  Feeling better.      Vital Signs  Temp:  [97.8 °F (36.6 °C)-99.5 °F (37.5 °C)] 97.8 °F (36.6 °C)  Heart Rate:  [] 67  Resp:  [18-20] 18  BP: ()/(61-87) 106/73      07/21/25  0511 07/22/25  0441 07/23/25  0600   Weight: 70.4 kg (155 lb 3.3 oz) 70.4 kg (155 lb 3.3 oz) 71.7 kg (158 lb)     Body mass index is 26.29 kg/m².    Intake/Output Summary (Last 24 hours) at 7/23/2025 0909  Last data filed at 7/23/2025 0606  Gross per 24 hour   Intake 974 ml   Output 1005 ml   Net -31 ml     No intake/output data recorded.        Objective:  General Appearance:  Comfortable, well-appearing, in no acute distress and not in pain (Up in chair).    Vital signs: (most recent): Blood pressure 106/74, pulse 71, temperature 97.8 °F (36.6 °C), temperature source Oral, resp. rate 18, height 165.1 cm (65\"), weight 71.7 kg (158 lb), SpO2 98%.  Vital signs are normal.    Output: Producing urine (+buitrago) and producing stool.    Lungs:  Normal effort and normal respiratory rate.  He is not in respiratory distress.  (AirVo 40L/30% FiO2)  Heart: Tachycardia.  Irregular rhythm.  (Tele: Afib 113)  Abdomen: Abdomen is soft and non-distended.  There is no abdominal tenderness.     Extremities: Normal range of motion.  There is no dependent edema.    Neurological: Patient is alert and oriented to person, place and time.    Skin:  Warm and dry.  (Incisions healing well without erythema or drainage)            Results Review:      WBC WBC   Date Value Ref Range Status   07/23/2025 9.98 3.40 - 10.80 10*3/mm3 Final   07/23/2025 9.98 3.40 - 10.80 10*3/mm3 Final   07/22/2025 11.25 (H) 3.40 - 10.80 10*3/mm3 Final   07/22/2025 9.63 3.40 - 10.80 10*3/mm3 Final   07/21/2025 9.77 3.40 - 10.80 10*3/mm3 Final   07/21/2025 10.68 3.40 - 10.80 10*3/mm3 Final   07/20/2025 9.20 " 3.40 - 10.80 10*3/mm3 Final      HGB Hemoglobin   Date Value Ref Range Status   07/23/2025 8.9 (L) 13.0 - 17.7 g/dL Final   07/23/2025 8.9 (L) 13.0 - 17.7 g/dL Final   07/22/2025 10.0 (L) 13.0 - 17.7 g/dL Final   07/22/2025 9.7 (L) 13.0 - 17.7 g/dL Final   07/21/2025 11.2 (L) 13.0 - 17.7 g/dL Final   07/21/2025 11.7 (L) 13.0 - 17.7 g/dL Final   07/20/2025 11.0 (L) 13.0 - 17.7 g/dL Final      HCT Hematocrit   Date Value Ref Range Status   07/23/2025 27.2 (L) 37.5 - 51.0 % Final   07/23/2025 27.2 (L) 37.5 - 51.0 % Final   07/22/2025 30.8 (L) 37.5 - 51.0 % Final   07/22/2025 29.2 (L) 37.5 - 51.0 % Final   07/21/2025 33.6 (L) 37.5 - 51.0 % Final   07/21/2025 35.3 (L) 37.5 - 51.0 % Final   07/20/2025 32.7 (L) 37.5 - 51.0 % Final      Platelets Platelets   Date Value Ref Range Status   07/23/2025 284 140 - 450 10*3/mm3 Final   07/23/2025 284 140 - 450 10*3/mm3 Final   07/22/2025 308 140 - 450 10*3/mm3 Final   07/22/2025 275 140 - 450 10*3/mm3 Final   07/21/2025 290 140 - 450 10*3/mm3 Final   07/21/2025 292 140 - 450 10*3/mm3 Final   07/20/2025 213 140 - 450 10*3/mm3 Final        PT/INR:    Protime   Date Value Ref Range Status   07/22/2025 16.8 (H) 11.7 - 14.2 Seconds Final     /  INR   Date Value Ref Range Status   07/22/2025 1.37 (H) 0.90 - 1.10 Final         Sodium Sodium   Date Value Ref Range Status   07/23/2025 140 136 - 145 mmol/L Final   07/22/2025 137 136 - 145 mmol/L Final   07/22/2025 136 136 - 145 mmol/L Final   07/21/2025 138 136 - 145 mmol/L Final   07/21/2025 139 136 - 145 mmol/L Final   07/20/2025 138 136 - 145 mmol/L Final      Potassium Potassium   Date Value Ref Range Status   07/23/2025 4.0 3.5 - 5.2 mmol/L Final   07/22/2025 4.0 3.5 - 5.2 mmol/L Final   07/22/2025 4.2 3.5 - 5.2 mmol/L Final   07/22/2025 3.4 (L) 3.5 - 5.2 mmol/L Final   07/22/2025 3.7 3.5 - 5.2 mmol/L Final   07/21/2025 3.2 (L) 3.5 - 5.2 mmol/L Final   07/21/2025 4.1 3.5 - 5.2 mmol/L Final   07/20/2025 4.1 3.5 - 5.2 mmol/L Final    07/20/2025 3.9 3.5 - 5.2 mmol/L Final      Chloride Chloride   Date Value Ref Range Status   07/23/2025 106 98 - 107 mmol/L Final   07/22/2025 103 98 - 107 mmol/L Final   07/22/2025 103 98 - 107 mmol/L Final   07/21/2025 101 98 - 107 mmol/L Final   07/21/2025 103 98 - 107 mmol/L Final   07/20/2025 104 98 - 107 mmol/L Final      Bicarbonate CO2   Date Value Ref Range Status   07/23/2025 20.0 (L) 22.0 - 29.0 mmol/L Final   07/22/2025 21.0 (L) 22.0 - 29.0 mmol/L Final   07/22/2025 22.0 22.0 - 29.0 mmol/L Final   07/21/2025 20.0 (L) 22.0 - 29.0 mmol/L Final   07/21/2025 19.2 (L) 22.0 - 29.0 mmol/L Final   07/20/2025 20.4 (L) 22.0 - 29.0 mmol/L Final      BUN BUN   Date Value Ref Range Status   07/23/2025 41.0 (H) 8.0 - 23.0 mg/dL Final   07/22/2025 45.0 (H) 8.0 - 23.0 mg/dL Final   07/22/2025 49.0 (H) 8.0 - 23.0 mg/dL Final   07/21/2025 46.0 (H) 8.0 - 23.0 mg/dL Final   07/21/2025 46.0 (H) 8.0 - 23.0 mg/dL Final   07/20/2025 41.0 (H) 8.0 - 23.0 mg/dL Final      Creatinine Creatinine   Date Value Ref Range Status   07/23/2025 2.06 (H) 0.76 - 1.27 mg/dL Final   07/22/2025 2.13 (H) 0.76 - 1.27 mg/dL Final   07/22/2025 2.25 (H) 0.76 - 1.27 mg/dL Final   07/21/2025 2.36 (H) 0.76 - 1.27 mg/dL Final   07/21/2025 2.23 (H) 0.76 - 1.27 mg/dL Final   07/20/2025 1.94 (H) 0.76 - 1.27 mg/dL Final      Calcium Calcium   Date Value Ref Range Status   07/23/2025 8.2 (L) 8.6 - 10.5 mg/dL Final   07/22/2025 8.8 8.6 - 10.5 mg/dL Final   07/22/2025 8.1 (L) 8.6 - 10.5 mg/dL Final   07/21/2025 8.4 (L) 8.6 - 10.5 mg/dL Final   07/21/2025 8.4 (L) 8.6 - 10.5 mg/dL Final   07/20/2025 8.8 8.6 - 10.5 mg/dL Final      Magnesium Magnesium   Date Value Ref Range Status   07/23/2025 2.4 1.6 - 2.4 mg/dL Final   07/22/2025 2.6 (H) 1.6 - 2.4 mg/dL Final   07/22/2025 2.5 (H) 1.6 - 2.4 mg/dL Final   07/21/2025 2.3 1.6 - 2.4 mg/dL Final   07/21/2025 2.3 1.6 - 2.4 mg/dL Final   07/20/2025 2.3 1.6 - 2.4 mg/dL Final          amiodarone, 400 mg, Oral,  Q8H  aspirin, 81 mg, Oral, Daily  atorvastatin, 40 mg, Oral, Nightly  bisacodyl, 10 mg, Rectal, Daily  cefTRIAXone, 1,000 mg, Intravenous, Q24H  guaiFENesin, 1,200 mg, Oral, Q12H  insulin lispro, 2-7 Units, Subcutaneous, 4x Daily AC & at Bedtime  ipratropium-albuterol, 3 mL, Nebulization, 4x Daily - RT  melatonin, 5 mg, Oral, Nightly  [Held by provider] metoprolol tartrate, 12.5 mg, Oral, Q12H  midodrine, 10 mg, Oral, TID AC  mupirocin, 1 Application, Each Nare, BID  pantoprazole, 40 mg, Oral, Q AM  polyethylene glycol, 17 g, Oral, BID  senna-docusate sodium, 2 tablet, Oral, BID  vitamin D, 50,000 Units, Oral, Q7 Days      heparin, 12 Units/kg/hr, Last Rate: 12 Units/kg/hr (07/22/25 1043)        Assessment & Plan    - Severe MV-CAD -- s/p CABG, LEVH, WEN occlusion via atriclip with Dr. Patino 7/15/25  - AAA (hx aorto iliac femoral right popliteal stent repair x2 with Dr. Archer) -- AAA measuring 5.3cm--plans for surgery in 4 to 6 weeks  - HTN  - HLD  - PAD  - Hx DVT  - Right leg vein stripping  - VERN on CKD stage 2---renal following   - Post-op anemia, expected ABLA, watch closely  - Post-op TCP, resolved  - PAF with RVR post-op---currently PAF, on Amio gtt  - Post-op hypoxia  - Post-op ileus   - Prolonged QTc      POD #8  2L NC--wean as able  Converted to sinus rhythm this morning. Discussed with Dr. Patino   Will leave AV wires another day.  Creatinine 2, Renal following and managing diuretics  Discontinue central line  AST/ALT elevated this morning-- will repeat in the AM  Continue routine care        TATI Rocha  07/23/25  09:09 EDT

## 2025-07-24 ENCOUNTER — APPOINTMENT (OUTPATIENT)
Dept: GENERAL RADIOLOGY | Facility: HOSPITAL | Age: 80
DRG: 235 | End: 2025-07-24
Payer: MEDICARE

## 2025-07-24 LAB
ALBUMIN SERPL-MCNC: 3.3 G/DL (ref 3.5–5.2)
ALP SERPL-CCNC: 157 U/L (ref 39–117)
ALT SERPL W P-5'-P-CCNC: 48 U/L (ref 1–41)
ANION GAP SERPL CALCULATED.3IONS-SCNC: 11.4 MMOL/L (ref 5–15)
APTT PPP: 57.6 SECONDS (ref 22.7–35.4)
AST SERPL-CCNC: 71 U/L (ref 1–40)
BASOPHILS # BLD AUTO: 0.06 10*3/MM3 (ref 0–0.2)
BASOPHILS NFR BLD AUTO: 0.6 % (ref 0–1.5)
BH BB BLOOD EXPIRATION DATE: NORMAL
BH BB BLOOD EXPIRATION DATE: NORMAL
BH BB BLOOD TYPE BARCODE: 6200
BH BB BLOOD TYPE BARCODE: 6200
BH BB DISPENSE STATUS: NORMAL
BH BB DISPENSE STATUS: NORMAL
BH BB PRODUCT CODE: NORMAL
BH BB PRODUCT CODE: NORMAL
BH BB UNIT NUMBER: NORMAL
BH BB UNIT NUMBER: NORMAL
BILIRUB CONJ SERPL-MCNC: 0.2 MG/DL (ref 0–0.3)
BILIRUB INDIRECT SERPL-MCNC: 0.3 MG/DL
BILIRUB SERPL-MCNC: 0.5 MG/DL (ref 0–1.2)
BUN SERPL-MCNC: 43 MG/DL (ref 8–23)
BUN/CREAT SERPL: 21.7 (ref 7–25)
CA-I SERPL ISE-MCNC: 1.15 MMOL/L (ref 1.15–1.35)
CA-I SERPL ISE-MCNC: 1.16 MMOL/L (ref 1.15–1.35)
CALCIUM SPEC-SCNC: 8.5 MG/DL (ref 8.6–10.5)
CHLORIDE SERPL-SCNC: 108 MMOL/L (ref 98–107)
CO2 SERPL-SCNC: 20.6 MMOL/L (ref 22–29)
CREAT SERPL-MCNC: 1.98 MG/DL (ref 0.76–1.27)
CROSSMATCH INTERPRETATION: NORMAL
CROSSMATCH INTERPRETATION: NORMAL
DEPRECATED RDW RBC AUTO: 43.4 FL (ref 37–54)
DEPRECATED RDW RBC AUTO: 44.8 FL (ref 37–54)
DEPRECATED RDW RBC AUTO: 44.8 FL (ref 37–54)
EGFRCR SERPLBLD CKD-EPI 2021: 33.9 ML/MIN/1.73
EOSINOPHIL # BLD AUTO: 0.42 10*3/MM3 (ref 0–0.4)
EOSINOPHIL NFR BLD AUTO: 4.3 % (ref 0.3–6.2)
ERYTHROCYTE [DISTWIDTH] IN BLOOD BY AUTOMATED COUNT: 12.7 % (ref 12.3–15.4)
ERYTHROCYTE [DISTWIDTH] IN BLOOD BY AUTOMATED COUNT: 13 % (ref 12.3–15.4)
ERYTHROCYTE [DISTWIDTH] IN BLOOD BY AUTOMATED COUNT: ABNORMAL %
GLUCOSE BLDC GLUCOMTR-MCNC: 97 MG/DL (ref 70–130)
GLUCOSE SERPL-MCNC: 103 MG/DL (ref 65–99)
HCT VFR BLD AUTO: 28.7 % (ref 37.5–51)
HCT VFR BLD AUTO: 28.7 % (ref 37.5–51)
HCT VFR BLD AUTO: 30.3 % (ref 37.5–51)
HGB BLD-MCNC: 9.2 G/DL (ref 13–17.7)
HGB BLD-MCNC: 9.2 G/DL (ref 13–17.7)
HGB BLD-MCNC: 9.7 G/DL (ref 13–17.7)
IMM GRANULOCYTES # BLD AUTO: 0.26 10*3/MM3 (ref 0–0.05)
IMM GRANULOCYTES NFR BLD AUTO: 2.6 % (ref 0–0.5)
LYMPHOCYTES # BLD AUTO: 1.39 10*3/MM3 (ref 0.7–3.1)
LYMPHOCYTES NFR BLD AUTO: 14.2 % (ref 19.6–45.3)
MAGNESIUM SERPL-MCNC: 2.5 MG/DL (ref 1.6–2.4)
MAGNESIUM SERPL-MCNC: 2.6 MG/DL (ref 1.6–2.4)
MCH RBC QN AUTO: 30.1 PG (ref 26.6–33)
MCH RBC QN AUTO: 30.6 PG (ref 26.6–33)
MCH RBC QN AUTO: 30.6 PG (ref 26.6–33)
MCHC RBC AUTO-ENTMCNC: 32 G/DL (ref 31.5–35.7)
MCHC RBC AUTO-ENTMCNC: 32.1 G/DL (ref 31.5–35.7)
MCHC RBC AUTO-ENTMCNC: 32.1 G/DL (ref 31.5–35.7)
MCV RBC AUTO: 94.1 FL (ref 79–97)
MCV RBC AUTO: 95.3 FL (ref 79–97)
MCV RBC AUTO: 95.3 FL (ref 79–97)
MONOCYTES # BLD AUTO: 1.22 10*3/MM3 (ref 0.1–0.9)
MONOCYTES NFR BLD AUTO: 12.4 % (ref 5–12)
NEUTROPHILS NFR BLD AUTO: 6.47 10*3/MM3 (ref 1.7–7)
NEUTROPHILS NFR BLD AUTO: 65.9 % (ref 42.7–76)
NRBC BLD AUTO-RTO: 0.2 /100 WBC (ref 0–0.2)
PHOSPHATE SERPL-MCNC: 2.8 MG/DL (ref 2.5–4.5)
PLATELET # BLD AUTO: 311 10*3/MM3 (ref 140–450)
PLATELET # BLD AUTO: 311 10*3/MM3 (ref 140–450)
PLATELET # BLD AUTO: 356 10*3/MM3 (ref 140–450)
PMV BLD AUTO: 9.3 FL (ref 6–12)
PMV BLD AUTO: 9.3 FL (ref 6–12)
PMV BLD AUTO: ABNORMAL FL
POTASSIUM SERPL-SCNC: 3.9 MMOL/L (ref 3.5–5.2)
POTASSIUM SERPL-SCNC: 3.9 MMOL/L (ref 3.5–5.2)
POTASSIUM SERPL-SCNC: 4.2 MMOL/L (ref 3.5–5.2)
PROT SERPL-MCNC: 6 G/DL (ref 6–8.5)
QT INTERVAL: 446 MS
QT INTERVAL: 489 MS
QTC INTERVAL: 500 MS
QTC INTERVAL: 526 MS
RBC # BLD AUTO: 3.01 10*6/MM3 (ref 4.14–5.8)
RBC # BLD AUTO: 3.01 10*6/MM3 (ref 4.14–5.8)
RBC # BLD AUTO: 3.22 10*6/MM3 (ref 4.14–5.8)
SODIUM SERPL-SCNC: 140 MMOL/L (ref 136–145)
UNIT  ABO: NORMAL
UNIT  ABO: NORMAL
UNIT  RH: NORMAL
UNIT  RH: NORMAL
URATE SERPL-MCNC: 4.9 MG/DL (ref 3.4–7)
WBC NRBC COR # BLD AUTO: 11.24 10*3/MM3 (ref 3.4–10.8)
WBC NRBC COR # BLD AUTO: 9.82 10*3/MM3 (ref 3.4–10.8)
WBC NRBC COR # BLD AUTO: 9.82 10*3/MM3 (ref 3.4–10.8)

## 2025-07-24 PROCEDURE — 82330 ASSAY OF CALCIUM: CPT | Performed by: ANESTHESIOLOGY

## 2025-07-24 PROCEDURE — 84100 ASSAY OF PHOSPHORUS: CPT | Performed by: ANESTHESIOLOGY

## 2025-07-24 PROCEDURE — 85025 COMPLETE CBC W/AUTO DIFF WBC: CPT | Performed by: INTERNAL MEDICINE

## 2025-07-24 PROCEDURE — 84132 ASSAY OF SERUM POTASSIUM: CPT | Performed by: THORACIC SURGERY (CARDIOTHORACIC VASCULAR SURGERY)

## 2025-07-24 PROCEDURE — 84550 ASSAY OF BLOOD/URIC ACID: CPT | Performed by: INTERNAL MEDICINE

## 2025-07-24 PROCEDURE — 94799 UNLISTED PULMONARY SVC/PX: CPT

## 2025-07-24 PROCEDURE — 82948 REAGENT STRIP/BLOOD GLUCOSE: CPT

## 2025-07-24 PROCEDURE — 83735 ASSAY OF MAGNESIUM: CPT | Performed by: ANESTHESIOLOGY

## 2025-07-24 PROCEDURE — 85027 COMPLETE CBC AUTOMATED: CPT | Performed by: ANESTHESIOLOGY

## 2025-07-24 PROCEDURE — 97530 THERAPEUTIC ACTIVITIES: CPT

## 2025-07-24 PROCEDURE — 71046 X-RAY EXAM CHEST 2 VIEWS: CPT

## 2025-07-24 PROCEDURE — 93005 ELECTROCARDIOGRAM TRACING: CPT

## 2025-07-24 PROCEDURE — 85730 THROMBOPLASTIN TIME PARTIAL: CPT | Performed by: NURSE PRACTITIONER

## 2025-07-24 PROCEDURE — 99232 SBSQ HOSP IP/OBS MODERATE 35: CPT | Performed by: INTERNAL MEDICINE

## 2025-07-24 PROCEDURE — 25010000002 CEFTRIAXONE PER 250 MG: Performed by: ANESTHESIOLOGY

## 2025-07-24 PROCEDURE — 80076 HEPATIC FUNCTION PANEL: CPT | Performed by: ANESTHESIOLOGY

## 2025-07-24 PROCEDURE — 94762 N-INVAS EAR/PLS OXIMTRY CONT: CPT

## 2025-07-24 PROCEDURE — 25010000002 HEPARIN (PORCINE) 25000-0.45 UT/250ML-% SOLUTION: Performed by: NURSE PRACTITIONER

## 2025-07-24 PROCEDURE — 93010 ELECTROCARDIOGRAM REPORT: CPT | Performed by: INTERNAL MEDICINE

## 2025-07-24 PROCEDURE — 80048 BASIC METABOLIC PNL TOTAL CA: CPT | Performed by: ANESTHESIOLOGY

## 2025-07-24 RX ORDER — MIDODRINE HYDROCHLORIDE 10 MG/1
10 TABLET ORAL
Qty: 180 TABLET | Refills: 0 | Status: CANCELLED | OUTPATIENT
Start: 2025-07-24 | End: 2025-09-22

## 2025-07-24 RX ORDER — MIDODRINE HYDROCHLORIDE 5 MG/1
5 TABLET ORAL
Status: DISCONTINUED | OUTPATIENT
Start: 2025-07-24 | End: 2025-07-25 | Stop reason: HOSPADM

## 2025-07-24 RX ORDER — IPRATROPIUM BROMIDE AND ALBUTEROL SULFATE 2.5; .5 MG/3ML; MG/3ML
3 SOLUTION RESPIRATORY (INHALATION) EVERY 4 HOURS PRN
Status: DISCONTINUED | OUTPATIENT
Start: 2025-07-24 | End: 2025-07-25 | Stop reason: HOSPADM

## 2025-07-24 RX ORDER — POTASSIUM CHLORIDE 1500 MG/1
20 TABLET, EXTENDED RELEASE ORAL ONCE
Status: COMPLETED | OUTPATIENT
Start: 2025-07-24 | End: 2025-07-24

## 2025-07-24 RX ADMIN — POLYETHYLENE GLYCOL 3350 17 G: 17 POWDER, FOR SOLUTION ORAL at 09:23

## 2025-07-24 RX ADMIN — CEFTRIAXONE SODIUM 1000 MG: 1 INJECTION, POWDER, FOR SOLUTION INTRAMUSCULAR; INTRAVENOUS at 10:34

## 2025-07-24 RX ADMIN — IPRATROPIUM BROMIDE AND ALBUTEROL SULFATE 3 ML: .5; 3 SOLUTION RESPIRATORY (INHALATION) at 07:02

## 2025-07-24 RX ADMIN — ASPIRIN 81 MG CHEWABLE TABLET 81 MG: 81 TABLET CHEWABLE at 09:23

## 2025-07-24 RX ADMIN — GUAIFENESIN 1200 MG: 600 TABLET, EXTENDED RELEASE ORAL at 09:23

## 2025-07-24 RX ADMIN — MIDODRINE HYDROCHLORIDE 10 MG: 5 TABLET ORAL at 10:34

## 2025-07-24 RX ADMIN — AMIODARONE HYDROCHLORIDE 400 MG: 200 TABLET ORAL at 10:34

## 2025-07-24 RX ADMIN — Medication 5 MG: at 21:24

## 2025-07-24 RX ADMIN — MUPIROCIN 1 APPLICATION: 20 OINTMENT TOPICAL at 21:24

## 2025-07-24 RX ADMIN — POLYETHYLENE GLYCOL 3350 17 G: 17 POWDER, FOR SOLUTION ORAL at 21:20

## 2025-07-24 RX ADMIN — POTASSIUM CHLORIDE 20 MEQ: 1500 TABLET, EXTENDED RELEASE ORAL at 06:31

## 2025-07-24 RX ADMIN — SENNOSIDES AND DOCUSATE SODIUM 2 TABLET: 50; 8.6 TABLET ORAL at 21:20

## 2025-07-24 RX ADMIN — PANTOPRAZOLE SODIUM 40 MG: 40 TABLET, DELAYED RELEASE ORAL at 06:31

## 2025-07-24 RX ADMIN — AMIODARONE HYDROCHLORIDE 400 MG: 200 TABLET ORAL at 18:51

## 2025-07-24 RX ADMIN — GUAIFENESIN 1200 MG: 600 TABLET, EXTENDED RELEASE ORAL at 21:20

## 2025-07-24 RX ADMIN — BISACODYL 10 MG: 10 SUPPOSITORY RECTAL at 09:23

## 2025-07-24 RX ADMIN — MIDODRINE HYDROCHLORIDE 10 MG: 5 TABLET ORAL at 06:31

## 2025-07-24 RX ADMIN — HEPARIN SODIUM 13 UNITS/KG/HR: 10000 INJECTION, SOLUTION INTRAVENOUS at 05:23

## 2025-07-24 RX ADMIN — SENNOSIDES AND DOCUSATE SODIUM 2 TABLET: 50; 8.6 TABLET ORAL at 09:23

## 2025-07-24 RX ADMIN — ATORVASTATIN CALCIUM 40 MG: 20 TABLET, FILM COATED ORAL at 21:20

## 2025-07-24 NOTE — THERAPY TREATMENT NOTE
Patient Name: Erickson Gerard  : 1945    MRN: [id number]                              Today's Date: 2025       Admit Date: 7/15/2025    Visit Dx:     ICD-10-CM ICD-9-CM   1. Decreased activities of daily living (ADL)  Z78.9 V49.89   2. Coronary artery disease involving native heart, unspecified vessel or lesion type, unspecified whether angina present  I25.10 414.01   3. Abnormal findings on diagnostic imaging of heart and coronary circulation  R93.1 794.39   4. S/P CABG (coronary artery bypass graft)  Z95.1 V45.81     Patient Active Problem List   Diagnosis    Essential hypertension    GERD (gastroesophageal reflux disease)    Medicare annual wellness visit, subsequent    Benign prostatic hyperplasia with urinary frequency    Umbilical hernia without obstruction and without gangrene    Colon cancer screening    Mixed hyperlipidemia    Aneurysm of right popliteal artery    Acute deep vein thrombosis (DVT) of distal vein of right lower extremity    DDD (degenerative disc disease), lumbar    Actinic keratosis of forehead    Aneurysm of right popliteal artery    Abdominal aortic aneurysm (AAA) 3.0 cm to 5.5 cm in diameter in male    PVD (peripheral vascular disease)    High risk medication use    Immunization deficiency    Chronic deep vein thrombosis (DVT)    Encounter for hepatitis C screening test for low risk patient    Peripheral artery aneurysm    Blurred vision    Coronary artery disease of native artery of native heart with stable angina pectoris    Coronary artery disease involving native heart    Paroxysmal atrial fibrillation     Past Medical History:   Diagnosis Date    AAA (abdominal aortic aneurysm) without rupture 10/14/2021    Abdominal aortic aneurysm, without rupture, unspecified 2023    Acute embolism and thrombosis of unspecified deep veins of right distal lower extremity     Anesthesia complication     AFTER VEIN SURGERY STATES HE WAS HARD TO WAKE UP    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     Aneurysm of artery of lower extremity 09/09/2021    Arthritis     At risk for sleep apnea     STOP BANG 5    Coronary artery disease     Dyspnea on exertion     Essential (primary) hypertension     GERD (gastroesophageal reflux disease)     [place] (hard of hearing)     Hyperlipidemia     Hypertension     Iliac artery aneurysm 10/14/2021    Localized edema     Low back pain     Mixed hyperlipidemia     Other specified soft tissue disorders     PAD (peripheral artery disease)     Peripheral artery aneurysm     PONV (postoperative nausea and vomiting)     Popliteal aneurysm     Thrombosis 09/09/2021    Calf Vein Thrombosis Rt tibial vein    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     Past Surgical History:   Procedure Laterality Date    ANGIOPLASTY POPLITEAL ARTERY Right 11/09/2021    Procedure: RIGHT POPITEAL ANEURYSM REPAIR VIABOHN;  Surgeon: Campos Archer MD;  Location: [facility] OR 18/19;  Service: Vascular;  Laterality: Right;    ANGIOPLASTY POPLITEAL ARTERY Right 01/25/2022    Procedure: AORTO ILIAC FEMORAL RIGHT POPLITEAL VIABOHN STENT PLACEMENT X 2;  Surgeon: Campos Archer MD;  Location: [facility] OR 18/19;  Service: Vascular;  Laterality: Right;    CARDIAC CATHETERIZATION N/A 06/19/2025    Procedure: Left Heart Cath;  Surgeon: Oneil Escobar MD;  Location[facility] CATH INVASIVE LOCATION;  Service: Cardiovascular;  Laterality: N/A;    CARDIAC CATHETERIZATION N/A 06/19/2025    Procedure: Left ventriculography;  Surgeon: Oneil Escobar MD;  Location: [facility] INVASIVE LOCATION;  Service: Cardiovascular;  Laterality: N/A;    COLONOSCOPY      CORONARY ARTERY BYPASS GRAFT N/A 7/15/2025    Procedure: STERNOTOMY; CORONARY ARTERY BYPASS GRAFTING TIMES 6 USING LEFT INTERNAL MAMMARY ARTERY AND LEFT SAPHENOUS VEIN; TRANSESOPHAGEAL ECHOCARDIOGRAM WITH ANESTHESIA;LEFT ATRIAL APPENDAGE EXCLUSION; PRP;  Surgeon: Jr Oscar Patino MD;  Location: [facility];  Service: Cardiothoracic;   Laterality: N/A;    EYE SURGERY Bilateral     CATARACTS    HERNIA REPAIR Bilateral     POLITEAL ARTERY ANEURYSM REPAIR Right 11/09/2021    Viabahn Stent Graft    VEIN LIGATION AND STRIPPING N/A       General Information       Row Name 07/24/25 1543          Physical Therapy Time and Intention    Document Type therapy note (daily note)  -MS     Mode of Treatment physical therapy;individual therapy  -MS       Row Name 07/24/25 1543          General Information    Patient Profile Reviewed yes  -MS     Existing Precautions/Restrictions cardiac;sternal   Exit alarm;  On Room air  -MS     Barriers to Rehab none identified  -MS       Row Name 07/24/25 1543          Cognition    Orientation Status (Cognition) oriented x 3  -MS               User Key  (r) = Recorded By, (t) = Taken By, (c) = Cosigned By      Initials Name Provider Type    Campos Allen, PT Physical Therapist                   Mobility       Row Name 07/24/25 1543          Bed Mobility    Comment, (Bed Mobility) Up in chair this PM.  -MS       Row Name 07/24/25 1543          Sit-Stand Transfer    Sit-Stand Carter (Transfers) standby assist  -MS       Row Name 07/24/25 1543          Gait/Stairs (Locomotion)    Carter Level (Gait) contact guard  -MS     Distance in Feet (Gait) 300  -MS     Deviations/Abnormal Patterns (Gait) antonella decreased  -MS     Comment, (Gait/Stairs) Mild unsteadiness but no overt losses of balance noted.  -MS               User Key  (r) = Recorded By, (t) = Taken By, (c) = Cosigned By      Initials Name Provider Type    Campos Allen PT Physical Therapist                   Obj/Interventions       Row Name 07/24/25 1544          Motor Skills    Therapeutic Exercise --  Cardiac ther. ex. program x 10 reps completed  -MS               User Key  (r) = Recorded By, (t) = Taken By, (c) = Cosigned By      Initials Name Provider Type    Campos Allen PT Physical Therapist                   Goals/Plan    No  documentation.                  Clinical Impression       Row Name 07/24/25 1544          Pain    Pretreatment Pain Rating 0/10 - no pain  -MS     Posttreatment Pain Rating 0/10 - no pain  -MS     Pre/Posttreatment Pain Comment No verbal/visual signs of pain.  -MS       Row Name 07/24/25 1544          Positioning and Restraints    Pre-Treatment Position sitting in chair/recliner  -MS     Post Treatment Position chair  -MS     In Chair notified nsg;reclined;sitting;call light within reach;encouraged to call for assist;exit alarm on;with family/caregiver  All lines intact. V.S.S.  -MS               User Key  (r) = Recorded By, (t) = Taken By, (c) = Cosigned By      Initials Name Provider Type    Campos Allen, PT Physical Therapist                   Outcome Measures       Row Name 07/24/25 1545          How much help from another person do you currently need...    Turning from your back to your side while in flat bed without using bedrails? 4  -MS     Moving from lying on back to sitting on the side of a flat bed without bedrails? 3  -MS     Moving to and from a bed to a chair (including a wheelchair)? 3  -MS     Standing up from a chair using your arms (e.g., wheelchair, bedside chair)? 3  -MS     Climbing 3-5 steps with a railing? 3  -MS     To walk in hospital room? 3  -MS     AM-PAC 6 Clicks Score (PT) 19  -MS     Highest Level of Mobility Goal Walk 10 Steps or More-6  -MS       Row Name 07/24/25 1545          Functional Assessment    Outcome Measure Options AM-PAC 6 Clicks Basic Mobility (PT)  -MS               User Key  (r) = Recorded By, (t) = Taken By, (c) = Cosigned By      Initials Name Provider Type    Campos Allen, PT Physical Therapist                                 Physical Therapy Education       Title: PT OT SLP Therapies (Done)       Topic: Physical Therapy (Done)       Point: Mobility training (Done)       Learning Progress Summary            Patient Acceptance, E,D, VU,NR by MS ng  7/24/2025 1545    Eager, E,TB,D,H, VU by JM at 7/23/2025 1751    Eager, E,TB,D, VU,DU by JM at 7/21/2025 1045    Acceptance, E, VU by DB at 7/19/2025 1050    Acceptance, E,D, VU,NR by MS at 7/18/2025 1121    Acceptance, E, NR by AR at 7/17/2025 1254    Acceptance, E, NR by EM at 7/16/2025 1022   Family Eager, E,TB,D,H, VU by JM at 7/23/2025 1751                      Point: Home exercise program (Done)       Learning Progress Summary            Patient Acceptance, E,D, VU,NR by MS at 7/24/2025 1545    Eager, E,TB,D,H, VU by JM at 7/23/2025 1751    Eager, E,TB,D, VU,DU by JM at 7/21/2025 1045    Acceptance, E, VU by DB at 7/19/2025 1050    Acceptance, E,D, VU,NR by MS at 7/18/2025 1121    Acceptance, E, NR by AR at 7/17/2025 1254    Acceptance, E, NR by EM at 7/16/2025 1022   Family Eager, E,TB,D,H, VU by CASANDRA at 7/23/2025 1751                      Point: Body mechanics (Done)       Learning Progress Summary            Patient Acceptance, E,D, VU,NR by MS at 7/24/2025 1545    Eager, E,TB,D,H, VU by JM at 7/23/2025 1751    Eager, E,TB,D, VU,DU by JM at 7/21/2025 1045    Acceptance, E, VU by DB at 7/19/2025 1050    Acceptance, E,D, VU,NR by MS at 7/18/2025 1121    Acceptance, E, NR by AR at 7/17/2025 1254   Family Eager, E,TB,D,H, VU by JM at 7/23/2025 1751                      Point: Precautions (Done)       Learning Progress Summary            Patient Acceptance, E,D, VU,NR by MS at 7/24/2025 1545    Eager, E,TB,D,H, VU by  at 7/23/2025 1751    Eagzena, STEPHANIE,TB,D, VU,DU by CASANDRA at 7/21/2025 1045    Acceptance, E, VU by DB at 7/19/2025 1050    Acceptance, E,D, VU,NR by MS at 7/18/2025 1121    Acceptance, E, NR by AR at 7/17/2025 1254   Family STEPHANIE Llamas,EZEKIEL,D,H, VU by  at 7/23/2025 1751                                      User Key       Initials Effective Dates Name Provider Type Discipline    EM 06/16/21 -  Kayy Adams, PT Physical Therapist PT     03/07/18 -  Kyleigh Jackson PTA Physical Therapist Assistant PT     MS 06/16/21 -  Campos Costa, PT Physical Therapist PT    AR 06/16/21 -  Mary Curry, PT Physical Therapist PT    DB 06/27/25 -  Elise Mata, PT Physical Therapist PT                  PT Recommendation and Plan     Outcome Evaluation: Upon entering room, pt. sitting up in chair, awake/alert, and agreeable to work with P.T. this date with no c/o pain.  This PM, pt. able to ambulate 300 feet, CGA x 1, with no use of A.D. or HHA.  Pt. requires SBA x 1 for sit <-> stand transfers.  Cardiac ther. ex. program x 10 reps completed for general strengthening.  Mild unsteadiness during ambulation but no overt losses of balance noted.  V.S.S. throughout.  Overall improved tolerance to functional activity this date with an increase in gait distance.  Will continue to progress functional mobility as tolerated.     Time Calculation:         PT Charges       Row Name 07/24/25 1550             Time Calculation    Start Time 1430  -MS      Stop Time 1444  -MS      Time Calculation (min) 14 min  -MS      PT Received On 07/24/25  -MS      PT - Next Appointment 07/25/25  -MS         Time Calculation- PT    Total Timed Code Minutes- PT 14 minute(s)  -MS                User Key  (r) = Recorded By, (t) = Taken By, (c) = Cosigned By      Initials Name Provider Type    MS Campos Costa, PT Physical Therapist                  Therapy Charges for Today       Code Description Service Date Service Provider Modifiers Qty    33821297352  PT THERAPEUTIC ACT EA 15 MIN 7/24/2025 Campos Costa, PT GP 1            PT G-Codes  Outcome Measure Options: AM-PAC 6 Clicks Basic Mobility (PT)  AM-PAC 6 Clicks Score (PT): 19  AM-PAC 6 Clicks Score (OT): 17  Modified Kathy Scale: 3 - Moderate disability.  Requiring some help, but able to walk without assistance.       Campos Costa, PT  7/24/2025

## 2025-07-24 NOTE — PLAN OF CARE
Goal Outcome Evaluation:  Plan of Care Reviewed With: patient        Progress: no change  Outcome Evaluation: S/P CABG, heparin drip infusing, VSS, SR on the monitor, SBA, placed on  2L while sleeping, continue plan of care.

## 2025-07-24 NOTE — PLAN OF CARE
Goal Outcome Evaluation:  Plan of Care Reviewed With: patient, spouse           Outcome Evaluation: S/p CABG, POD 9 today. NSR on tele, prolonged QT, cardiology aware. All other VSS, on RA through the day. Overnight oximetry ordered. Ambulating independently. No c/o pain or soa. Incision care as ordered. Anticipate d/c tomorrow. Will continue with routine postop care and update POC as needed.

## 2025-07-24 NOTE — PLAN OF CARE
Goal Outcome Evaluation:  Plan of Care Reviewed With: patient           Outcome Evaluation: Upon entering room, pt. sitting up in chair, awake/alert, and agreeable to work with P.T. this date with no c/o pain.  This PM, pt. able to ambulate 300 feet, CGA x 1, with no use of A.D. or HHA.  Pt. requires SBA x 1 for sit <-> stand transfers.  Cardiac ther. ex. program x 10 reps completed for general strengthening.  Mild unsteadiness during ambulation but no overt losses of balance noted.  V.S.S. throughout.  Overall improved tolerance to functional activity this date with an increase in gait distance.  Will continue to progress functional mobility as tolerated.

## 2025-07-24 NOTE — PROGRESS NOTES
[facility] Progress Note      Patient Name: Erickson Gerard  : 1945  MRN: [id number]  Primary Care Physician:  Farhad Metcalf MD  Date of admission: 7/15/2025    Subjective     Interval History:   Acute kidney injury on chronic kidney disease    Patient is feeling better, he is off all drips..  No chest pain or shortness of air, no nausea or vomiting.      Review of Systems:   As noted above    Objective     Vitals:   Temp:  [97.5 °F (36.4 °C)-99.1 °F (37.3 °C)] 98.3 °F (36.8 °C)  Heart Rate:  [69-78] 72  Resp:  [18] 18  BP: ()/(62-76) 106/66  Flow (L/min) (Oxygen Therapy):  [1-4] 4    Intake/Output Summary (Last 24 hours) at 2025 0743  Last data filed at 2025 0632  Gross per 24 hour   Intake 420 ml   Output 1325 ml   Net -905 ml       Physical Exam:    General Appearance: alert, awake, chronically ill no acute distress   Skin: warm and dry  HEENT: oral mucosa normal, nonicteric sclera nasal cannula in place  Neck: No JVD  Lungs: Bilateral rhonchi, breathing effort not  Heart: Irregularly irregular, no rub  Abdomen: soft, nontender, nondistended  : no palpable bladder   Extremities: No lower extremity edema  Neuro: Moving all extremities    Scheduled Meds:     amiodarone, 400 mg, Oral, Q8H  aspirin, 81 mg, Oral, Daily  atorvastatin, 40 mg, Oral, Nightly  bisacodyl, 10 mg, Rectal, Daily  cefTRIAXone, 1,000 mg, Intravenous, Q24H  guaiFENesin, 1,200 mg, Oral, Q12H  insulin lispro, 2-7 Units, Subcutaneous, 4x Daily AC & at Bedtime  ipratropium-albuterol, 3 mL, Nebulization, 4x Daily - RT  melatonin, 5 mg, Oral, Nightly  [Held by provider] metoprolol tartrate, 12.5 mg, Oral, Q12H  midodrine, 10 mg, Oral, TID AC  mupirocin, 1 Application, Each Nare, BID  pantoprazole, 40 mg, Oral, Q AM  polyethylene glycol, 17 g, Oral, BID  senna-docusate sodium, 2 tablet, Oral, BID  vitamin D, 50,000 Units, Oral, Q7 Days      IV Meds:   [Held by provider] heparin, 12  Units/kg/hr, Last Rate: Stopped (07/24/25 0723)        Results Reviewed:   I have personally reviewed the results from the time of this admission to 7/24/2025 07:43 EDT     Results from last 7 days   Lab Units 07/24/25  0113 07/23/25  1521 07/23/25  0253 07/22/25  0902 07/22/25  0204   SODIUM mmol/L 140 138 140   < > 136   POTASSIUM mmol/L 3.9  3.9 3.8 4.0   < > 3.7   CHLORIDE mmol/L 108* 105 106   < > 103   CO2 mmol/L 20.6* 22.0 20.0*   < > 22.0   BUN mg/dL 43.0* 44.0* 41.0*   < > 49.0*   CREATININE mg/dL 1.98* 2.00* 2.06*   < > 2.25*   CALCIUM mg/dL 8.5* 8.6 8.2*   < > 8.1*   BILIRUBIN mg/dL 0.5  --  0.4  --  0.4   ALK PHOS U/L 157*  --  146*  --  144*   ALT (SGPT) U/L 48*  --  44*  --  26   AST (SGOT) U/L 71*  --  98*  --  57*   GLUCOSE mg/dL 103* 111* 94   < > 105*    < > = values in this interval not displayed.       Estimated Creatinine Clearance: 31.5 mL/min (A) (by C-G formula based on SCr of 1.98 mg/dL (H)).    Results from last 7 days   Lab Units 07/24/25  0113 07/23/25  1521 07/23/25  0253 07/22/25  1507 07/22/25  0204   MAGNESIUM mg/dL 2.5* 2.5* 2.4   < > 2.5*   PHOSPHORUS mg/dL 2.8  --  2.9  --  4.4    < > = values in this interval not displayed.       Results from last 7 days   Lab Units 07/24/25  0113 07/23/25  0253 07/22/25  0204 07/21/25  0440 07/20/25  0254 07/19/25  0256   URIC ACID mg/dL 4.9 5.5 6.7 7.2* 7.3* 7.2*       Results from last 7 days   Lab Units 07/24/25  0113 07/23/25  1533 07/23/25  0253 07/22/25  1507 07/22/25  0204   WBC 10*3/mm3 9.82  9.82 12.42* 9.98  9.98 11.25* 9.63   HEMOGLOBIN g/dL 9.2*  9.2* 10.3* 8.9*  8.9* 10.0* 9.7*   PLATELETS 10*3/mm3 311  311 358 284  284 308 275       Results from last 7 days   Lab Units 07/22/25  0959   INR  1.37*       Assessment / Plan     ASSESSMENT:  Acute kidney injury on chronic kidney disease stage II secondary to hemodynamic changes post CABG creatinine is stable.  Patient has evidence of volume excess, his electrolytes within  acceptable range.  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  AAA 5.3 cm monitored by vascular surgery  Hypertension with CKD currently on vasopressors.  Volume excess,  Postop anemia hemoglobin today is 9 point  Severe vitamin D deficiency, vitamin D level is 19, was started on ergocalciferol    PLAN:  No diuretics today  Continue the same treatment  Surveillance labs    I reviewed the chart and other providers notes, reviewed labs.  Discussed the case with the patient.  Copied text in this note has been reviewed and is accurate as of 07/24/25.         Thank you for involving us in the care of Erickson Gerard.  Please feel free to call with any questions.    Angel Franco MD  07/24/25  07:43 EDT    [facility]  [phone]    Please note that portions of this note were completed with a voice recognition program.

## 2025-07-24 NOTE — DISCHARGE SUMMARY
[facility] Cardiac Surgery Discharge Summary    Date of Admission: 7/15/2025  Date of Discharge:  7/25/2025    Discharge Diagnosis:   - Severe MV-CAD -- s/p CABG, LEVH, WEN occlusion via atriclip with Dr. Patino 7/15/25  - AAA (hx aorto iliac femoral right popliteal stent repair x2 with Dr. Archer) -- AAA measuring 5.3cm--plans for surgery in 4 to 6 weeks  - HTN  - HLD  - PAD  - Hx DVT  - Right leg vein stripping  - VERN on CKD stage 2---renal following   - Post-op anemia, expected ABLA, watch closely  - Post-op TCP, resolved  - PAF with RVR post-op---currently PAF, on Amio gtt  - Post-op hypoxia  - Post-op ileus   - Prolonged Qtc -- improved, taper amio at d/c per EP   - hypoxia--- Extended period of desaturations on overnight oximetry, will require nocturnal supplemental O2 at dc     Presenting Problem/History of Present Illness  Coronary artery disease involving native heart, unspecified vessel or lesion type, unspecified whether angina present [I25.10]  Abnormal findings on diagnostic imaging of heart and coronary circulation [R93.1]  Hx of CABG [Z95.1]     Hospital Course  Patient is a 79 y.o. male who was admitted on 7/15 and on that same day underwent CABGx6, LEVH, WEN occlusion with Dr. Patino. Operation went well and after, patient was transferred to [facility] in stable condition where he was later extubated. POD1, patient with increased oxygen requirements. Beta blocker started, lovenox held for plt ct of 82. POD2, patient with VERN and was diuresed. Lovenox started, platelet ct improving. Patient required NG tube placement due to abdominal distention. POD3, patient in a.fib with RVR. Patient placed on amiodarone gtt with bolus. Calcium and magnesium replaced. Patient IV diuresed. POD4, patient still in a.fib, on levophed, and beta blocker held. NG tube removed. SLP consulted due to dysphagia, this has since resolved. Nephrology consulted due to VERN on CKD. They have managed diuresis during post-op course.  Patient still with increased oxygen requirements. POD5, midodrine started and amiodarone gtt continued. POD6, amiodarone bolus given in addition to continuing gtt. Patient requiring pressor support. POD7, EP consulted to evaluate a.fib. Heparin gtt started. Vitamin D supplementation started. Patient placed on oral amiodarone. POD8, patient converted back to sinus rhythm. Central line removed. POD9, heparin gtt held and AV wires removed. Overnight oximetry completed and showed extended periods of desaturations and will require nocturnal supplemental oxygen. Discussed with Dr. Patino, will place patient on low dose eliquis at discharge. Patient has been weaned off oxygen and continues on room air. Patient to get amiodarone taper per EP at discharge. Patient deemed ready for discharge home with home health. Patient to follow up in office in 4-6 weeks with appointment listed below. Patient was provided with appropriate discharge education. He was instructed to call office with any questions or concerns.      Procedures Performed  Procedure(s):  STERNOTOMY; CORONARY ARTERY BYPASS GRAFTING TIMES 6 USING LEFT INTERNAL MAMMARY ARTERY AND LEFT SAPHENOUS VEIN; TRANSESOPHAGEAL ECHOCARDIOGRAM WITH ANESTHESIA;LEFT ATRIAL APPENDAGE EXCLUSION; PRP       [facility] CARDIAC SURGERY OP NOTE - Dr. Patino 7/15/2025     Preop Diagnosis: Severe coronary artery disease.  Abdominal aortic aneurysm.  Status post vein stripping on the left.  Iliac artery aneurysm.     Postop Diagnosis: Same     Chronic Comorbid Conditions relative to CABG include:  Cardiovascular: Coronary Artery Disease, Hyperlipidemia, Hypertension, Peripheral Artery Disease, and Peripheral Venous Disease  Respiratory: None  Endocrine: None   Nephrology: CKD GFR III 46 ml/min  Hematology: None   Other: None     Indications: This patient had an abdominal aortic aneurysm with expansion.  He has been followed by Dr. JESSICA Thakkar and was advisable before any repair of his AAA.  hazel.   Operation was advisable to prolong life and relieve symptoms.The  calculated STS Risk score was discussed with the patient and family. All risks and alternatives were discussed with the patient and family.  Counseling was done regarding abuse of tobacco, alcohol and drugs as needed.  A discussion about advanced directive was done with the patient. They understand and wish to proceed.     Procedure: CABG x 6.  Skeletonized LIMA to distal LAD.  Vein graft acute marginal branch of right coronary artery.  Vein graft to PDA.  Vein graft to left ventricular branch of right coronary artery.  Vein graft to OM1.  Vein graft to D1.  Closure of left atrial appendage with a 40 mm clip.  Temporary cardiopulmonary bypass.  Antegrade and retrograde cold blood cardioplegia with warm reperfusion.  Neurologic monitoring.  Transesophageal echo.  Endoscopic vein harvest of the left greater saphenous vein.    Consults:   Consults       Date and Time Order Name Status Description    7/21/2025  3:36 PM Cardiac Electrophysiologist Inpatient Consult      7/17/2025  7:21 PM Inpatient Nephrology Consult Completed     7/15/2025  4:36 PM Inpatient Vascular Surgery Consult Completed     7/15/2025  4:24 PM Inpatient Cardiology Consult Completed         Cardiology -- Dr. Mayfield  Vascular surgery -- Dr. Archer  Intensivist -- Dr. Melgar  Nephrology-- Dr. Franco    Pertinent Test Results:    Lab Results   Component Value Date    WBC 12.10 (H) 07/25/2025    WBC 11.38 (H) 07/25/2025    HGB 9.2 (L) 07/25/2025    HGB 8.8 (L) 07/25/2025    HCT 29.1 (L) 07/25/2025    HCT 27.4 (L) 07/25/2025    MCV 95.4 07/25/2025    MCV 93.5 07/25/2025     07/25/2025     07/25/2025      Lab Results   Component Value Date    GLUCOSE 110 (H) 07/25/2025    CALCIUM 8.2 (L) 07/25/2025     07/25/2025    K 3.9 07/25/2025    CO2 19.0 (L) 07/25/2025     (H) 07/25/2025    BUN 30.0 (H) 07/25/2025    CREATININE 1.62 (H) 07/25/2025    EGFRIFAFRI 80  12/29/2020    EGFRIFNONA 75 01/24/2022    BCR 18.5 07/25/2025    ANIONGAP 10.0 07/25/2025     Lab Results   Component Value Date    INR 1.37 (H) 07/22/2025    PROTIME 16.8 (H) 07/22/2025         Condition on Discharge: Stable     Vital Signs  Temp:  [97.6 °F (36.4 °C)-98.7 °F (37.1 °C)] 97.8 °F (36.6 °C)  Heart Rate:  [78-96] 96  Resp:  [16-17] 17  BP: ()/(65-91) 134/87      Discharge Disposition  Home or Self Care    Discharge Medications     Discharge Medications        New Medications        Instructions Start Date   acetaminophen 325 MG tablet  Commonly known as: TYLENOL   650 mg, Oral, Every 4 Hours PRN      amiodarone 400 MG tablet  Commonly known as: PACERONE   Take 1 tablet by mouth Daily for 7 days, THEN 0.5 tablets Daily for 21 days.   Start Date: July 25, 2025     apixaban 2.5 MG tablet tablet  Commonly known as: ELIQUIS   2.5 mg, Oral, 2 Times Daily      aspirin 81 MG chewable tablet   81 mg, Oral, Daily      atorvastatin 40 MG tablet  Commonly known as: LIPITOR   40 mg, Oral, Nightly      midodrine 5 MG tablet  Commonly known as: PROAMATINE   5 mg, Oral, 3 Times Daily Before Meals      vitamin D 1.25 MG (20174 UT) capsule capsule  Commonly known as: ERGOCALCIFEROL   50,000 Units, Oral, Every 7 Days   Start Date: July 29, 2025            Continue These Medications        Instructions Start Date   ezetimibe 10 MG tablet  Commonly known as: Zetia   10 mg, Oral, Daily      omeprazole 20 MG capsule  Commonly known as: priLOSEC   20 mg, Oral, Daily      trimethoprim-polymyxin b 46112-8.1 UNIT/ML-% ophthalmic solution  Commonly known as: POLYTRIM   1 drop, Every 6 Hours             Stop These Medications      amLODIPine 10 MG tablet  Commonly known as: NORVASC     chlorhexidine 0.12 % solution  Commonly known as: PERIDEX     metoprolol succinate XL 50 MG 24 hr tablet  Commonly known as: TOPROL-XL     mupirocin 2 % ointment  Commonly known as: BACTROBAN     rosuvastatin 5 MG tablet  Commonly known as:  CRESTOR     valsartan 160 MG tablet  Commonly known as: DIOVAN     VITAMIN D (CHOLECALCIFEROL) PO     Xarelto 10 MG tablet  Generic drug: rivaroxaban              Discharge Diet: Heart healthy     Activity at Discharge:   1. No driving for 2 weeks and off narcotic pain medications.  2. Shower daily. Clean incisions with warm water and antibacterial soap only. Do not put any lotion or ointments on incisions.  3. Ambulate for 10 minutes at least 3 times a day.  4. No heavy lifting > 10lbs until seen in office.   5. Take all medications as prescribed.      Follow-up Appointments  Future Appointments   Date Time Provider Department Center   8/19/2025  9:30 AM Farhad Metcalf MD [id number]   8/20/2025  1:15 PM Jr Annmarie Patino MD [facility]   8/21/2025 11:30 AM Campos Archer MD [facility]   12/29/2025 10:30 AM Trace Cabrales MD [facility] [id number] [facility]     Additional Instructions for the Follow-ups that You Need to Schedule       Ambulatory Referral to Cardiac Rehab   As directed      Ambulatory Referral to Home Health   As directed      Face to Face Visit Date: 7/24/2025   Follow-up provider for Plan of Care?: I will be treating the patient on an ongoing basis.  Please send me the Plan of Care for signature.   Follow-up provider: JR ANNMARIE PATINO [[id number]]   Reason/Clinical Findings: Post-op CABG   Describe mobility limitations that make leaving home difficult: weakness   Nursing/Therapeutic Services Requested: Skilled Nursing   Skilled nursing orders: Post CABG care   Frequency: 1 Week 1        Call MD With Problems / Concerns   As directed      Instructions:  Call office at [phone] for any drainage, increased redness, or fever over 100.5    Order Comments: Instructions:  Call office at [phone] for any drainage, increased redness, or fever over 100.5         Discharge Follow-up with PCP   As directed       Currently Documented PCP:    Farhad Metcalf MD    PCP Phone Number:     [phone]     Follow Up Details: in 1 week        Discharge Follow-up with Specialty: Cardiologist APRN/PA; 1 Week   As directed      Specialty: Cardiologist APRN/PA   Follow Up: 1 Week   Follow Up Details: bring all prescription bottles to appointment, call for appointment        Discharge Follow-up with Specified Provider: Cardiologist; 1 Month   As directed      To: Cardiologist   Follow Up: 1 Month   Follow Up Details: call for appointment, bring all medication bottles to appointment        Discharge Follow-up with Specified Provider: Dr. Patino   As directed      To: Dr. Patino   Follow Up Details: 4-6 weeks, bring all current medications to appointment                Test Results Pending at Discharge  None       Dwayne Modi PA-C  07/25/25  10:18 EDT

## 2025-07-24 NOTE — PROGRESS NOTES
LOS: 9 days   Patient Care Team:  Farhad Metcalf MD as PCP - General (Family Medicine)    Chief Complaint: CAD     Interval History: Moved to CVU, really doing well. Unhappy that he's not going home today.     Objective   Vital Signs  Temp:  [97.5 °F (36.4 °C)-99.1 °F (37.3 °C)] 97.6 °F (36.4 °C)  Heart Rate:  [69-83] 83  Resp:  [16-18] 16  BP: ()/(62-91) 138/91    Intake/Output Summary (Last 24 hours) at 7/24/2025 1630  Last data filed at 7/24/2025 0925  Gross per 24 hour   Intake 240 ml   Output 1075 ml   Net -835 ml       Last Weight and Admission Weight        07/24/25  0336   Weight: 72.5 kg (159 lb 12.8 oz)     Flowsheet Rows      Flowsheet Row First Filed Value   Admission Height --   Admission Weight 75.8 kg (167 lb 1.7 oz) Documented at 07/16/2025 0500                    Physical Exam  Constitutional:       General: He is not in acute distress.  HENT:      Head: Normocephalic.      Mouth/Throat:      Pharynx: Oropharynx is clear.   Eyes:      Conjunctiva/sclera: Conjunctivae normal.   Cardiovascular:      Rate and Rhythm: Normal rate and regular rhythm.   Pulmonary:      Effort: Pulmonary effort is normal.      Breath sounds: Normal breath sounds.   Abdominal:      Palpations: Abdomen is soft.      Tenderness: There is no abdominal tenderness.   Musculoskeletal:         General: No swelling.   Psychiatric:         Mood and Affect: Mood normal.         Results Review:      Results from last 7 days   Lab Units 07/24/25  1453 07/24/25  0113 07/23/25  1521 07/23/25  0253   SODIUM mmol/L  --  140 138 140   POTASSIUM mmol/L 4.2 3.9  3.9 3.8 4.0   CHLORIDE mmol/L  --  108* 105 106   CO2 mmol/L  --  20.6* 22.0 20.0*   BUN mg/dL  --  43.0* 44.0* 41.0*   CREATININE mg/dL  --  1.98* 2.00* 2.06*   GLUCOSE mg/dL  --  103* 111* 94   CALCIUM mg/dL  --  8.5* 8.6 8.2*         Results from last 7 days   Lab Units 07/24/25  1453 07/24/25  0113 07/23/25  1533   WBC 10*3/mm3 11.24* 9.82  9.82 12.42*   HEMOGLOBIN  g/dL 9.7* 9.2*  9.2* 10.3*   HEMATOCRIT % 30.3* 28.7*  28.7* 31.5*   PLATELETS 10*3/mm3 356 311  311 358     Results from last 7 days   Lab Units 07/24/25  0113 07/23/25  0253 07/22/25  2127 07/22/25  1609 07/22/25  0959   INR   --   --   --   --  1.37*   APTT seconds 57.6* 76.1* 67.0*   < > 35.8*    < > = values in this interval not displayed.         Results from last 7 days   Lab Units 07/24/25  1453   MAGNESIUM mg/dL 2.6*           I reviewed the patient's new clinical results.  I personally viewed and interpreted the patient's EKG/Telemetry data        Medication Review:   amiodarone, 400 mg, Oral, Q8H  aspirin, 81 mg, Oral, Daily  atorvastatin, 40 mg, Oral, Nightly  bisacodyl, 10 mg, Rectal, Daily  guaiFENesin, 1,200 mg, Oral, Q12H  melatonin, 5 mg, Oral, Nightly  [Held by provider] metoprolol tartrate, 12.5 mg, Oral, Q12H  midodrine, 10 mg, Oral, TID AC  mupirocin, 1 Application, Each Nare, BID  pantoprazole, 40 mg, Oral, Q AM  polyethylene glycol, 17 g, Oral, BID  senna-docusate sodium, 2 tablet, Oral, BID  vitamin D, 50,000 Units, Oral, Q7 Days               Assessment & Plan     1. Multivessel CAD s/p CABG x 6, POD#9  *Continue aspirin/statin    2. PAF -- difficult to manage due to hypotension requiring phenylephrine. Thankfully he has converted to SR. Dr Rankin transitioned him from IV to PO amiodarone. His QT is still long as well.  He is on IV heparin today. while he is s/p LAAO clipping, that does not eliminate the need for AC. Dr Patino recommends low dose apixaban starting tomorrow.     3. Hypotension -- remains on midodrine. However, BP is more robust, will decrease to 5mg TID.    4. VERN -- diuresis has been stopped, renal following.    5. AAA -- up to 5.3cm, Dr Archer recommends outpatient follow up for EVAR    6. History of DVT    7. Anemia -- stable    8. Thrombocytopenia -- resolved    9. Postoperative ileus requiring NGT -- normal bowel function now    Okay for d/c from our  standpoint.    Julio C Knutson MD  07/24/25  16:30 EDT

## 2025-07-24 NOTE — CASE MANAGEMENT/SOCIAL WORK
Continued Stay Note  [facility]     Patient Name: Erickson Gerard  MRN: [id number]  Today's Date: 7/24/2025    Admit Date: 7/15/2025    Plan: Home w/ sp & [facility]    Discharge Plan       Row Name 07/24/25 1547       Plan    Plan Home w/ sp & [facility]     Plan Comments CCP followed up with patient and spouse at bedside.  Pt reports he still plans to discharge home and will have assistance of his spouse.  [facility] accepted and will follow..........Tami WARD/CARLO CM                   Discharge Codes    No documentation.                 Expected Discharge Date and Time       Expected Discharge Date Expected Discharge Time    Jul 25, 2025               Tami Martínez RN

## 2025-07-24 NOTE — PROGRESS NOTES
" LOS: 9 days   Patient Care Team:  Farhad Metcalf MD as PCP - General (Family Medicine)    Chief Complaint: Post-op follow-up, s/p CABG/WEN clip-- [place]      Subjective  Looks great this morning, eager to go home     Vital Signs  Temp:  [97.5 °F (36.4 °C)-99.1 °F (37.3 °C)] 98.3 °F (36.8 °C)  Heart Rate:  [69-78] 72  Resp:  [18] 18  BP: ()/(62-76) 106/66      07/22/25  0441 07/23/25  0600 07/24/25  0336   Weight: 70.4 kg (155 lb 3.3 oz) 71.7 kg (158 lb) 72.5 kg (159 lb 12.8 oz)     Body mass index is 26.59 kg/m².    Intake/Output Summary (Last 24 hours) at 7/24/2025 0823  Last data filed at 7/24/2025 0632  Gross per 24 hour   Intake 420 ml   Output 1325 ml   Net -905 ml     No intake/output data recorded.        Objective:  General Appearance:  Comfortable, well-appearing, in no acute distress and not in pain (Up in chair).    Vital signs: (most recent): Blood pressure 110/69, pulse 78, temperature 98.2 °F (36.8 °C), temperature source Oral, resp. rate 16, height 165.1 cm (65\"), weight 72.5 kg (159 lb 12.8 oz), SpO2 96%.  Vital signs are normal.    Output: Producing urine (+buitrago) and producing stool.    Lungs:  Normal effort and normal respiratory rate.  He is not in respiratory distress.  (AirVo 40L/30% FiO2)  Heart: Tachycardia.  Irregular rhythm.  (Tele: Afib 113)  Abdomen: Abdomen is soft and non-distended.  There is no abdominal tenderness.     Extremities: Normal range of motion.  There is no dependent edema.    Neurological: Patient is alert and oriented to person, place and time.    Skin:  Warm and dry.  (Incisions healing well without erythema or drainage)            Results Review:      WBC WBC   Date Value Ref Range Status   07/24/2025 9.82 3.40 - 10.80 10*3/mm3 Final   07/24/2025 9.82 3.40 - 10.80 10*3/mm3 Final   07/23/2025 12.42 (H) 3.40 - 10.80 10*3/mm3 Final   07/23/2025 9.98 3.40 - 10.80 10*3/mm3 Final   07/23/2025 9.98 3.40 - 10.80 10*3/mm3 Final   07/22/2025 11.25 (H) 3.40 - 10.80 " 10*3/mm3 Final   07/22/2025 9.63 3.40 - 10.80 10*3/mm3 Final   07/21/2025 9.77 3.40 - 10.80 10*3/mm3 Final      HGB Hemoglobin   Date Value Ref Range Status   07/24/2025 9.2 (L) 13.0 - 17.7 g/dL Final   07/24/2025 9.2 (L) 13.0 - 17.7 g/dL Final   07/23/2025 10.3 (L) 13.0 - 17.7 g/dL Final   07/23/2025 8.9 (L) 13.0 - 17.7 g/dL Final   07/23/2025 8.9 (L) 13.0 - 17.7 g/dL Final   07/22/2025 10.0 (L) 13.0 - 17.7 g/dL Final   07/22/2025 9.7 (L) 13.0 - 17.7 g/dL Final   07/21/2025 11.2 (L) 13.0 - 17.7 g/dL Final      HCT Hematocrit   Date Value Ref Range Status   07/24/2025 28.7 (L) 37.5 - 51.0 % Final   07/24/2025 28.7 (L) 37.5 - 51.0 % Final   07/23/2025 31.5 (L) 37.5 - 51.0 % Final   07/23/2025 27.2 (L) 37.5 - 51.0 % Final   07/23/2025 27.2 (L) 37.5 - 51.0 % Final   07/22/2025 30.8 (L) 37.5 - 51.0 % Final   07/22/2025 29.2 (L) 37.5 - 51.0 % Final   07/21/2025 33.6 (L) 37.5 - 51.0 % Final      Platelets Platelets   Date Value Ref Range Status   07/24/2025 311 140 - 450 10*3/mm3 Final   07/24/2025 311 140 - 450 10*3/mm3 Final   07/23/2025 358 140 - 450 10*3/mm3 Final   07/23/2025 284 140 - 450 10*3/mm3 Final   07/23/2025 284 140 - 450 10*3/mm3 Final   07/22/2025 308 140 - 450 10*3/mm3 Final   07/22/2025 275 140 - 450 10*3/mm3 Final   07/21/2025 290 140 - 450 10*3/mm3 Final        PT/INR:    Protime   Date Value Ref Range Status   07/22/2025 16.8 (H) 11.7 - 14.2 Seconds Final     /  INR   Date Value Ref Range Status   07/22/2025 1.37 (H) 0.90 - 1.10 Final         Sodium Sodium   Date Value Ref Range Status   07/24/2025 140 136 - 145 mmol/L Final   07/23/2025 138 136 - 145 mmol/L Final   07/23/2025 140 136 - 145 mmol/L Final   07/22/2025 137 136 - 145 mmol/L Final   07/22/2025 136 136 - 145 mmol/L Final   07/21/2025 138 136 - 145 mmol/L Final      Potassium Potassium   Date Value Ref Range Status   07/24/2025 3.9 3.5 - 5.2 mmol/L Final   07/24/2025 3.9 3.5 - 5.2 mmol/L Final   07/23/2025 3.8 3.5 - 5.2 mmol/L Final    07/23/2025 4.0 3.5 - 5.2 mmol/L Final   07/22/2025 4.0 3.5 - 5.2 mmol/L Final   07/22/2025 4.2 3.5 - 5.2 mmol/L Final   07/22/2025 3.4 (L) 3.5 - 5.2 mmol/L Final   07/22/2025 3.7 3.5 - 5.2 mmol/L Final   07/21/2025 3.2 (L) 3.5 - 5.2 mmol/L Final      Chloride Chloride   Date Value Ref Range Status   07/24/2025 108 (H) 98 - 107 mmol/L Final   07/23/2025 105 98 - 107 mmol/L Final   07/23/2025 106 98 - 107 mmol/L Final   07/22/2025 103 98 - 107 mmol/L Final   07/22/2025 103 98 - 107 mmol/L Final   07/21/2025 101 98 - 107 mmol/L Final      Bicarbonate CO2   Date Value Ref Range Status   07/24/2025 20.6 (L) 22.0 - 29.0 mmol/L Final   07/23/2025 22.0 22.0 - 29.0 mmol/L Final   07/23/2025 20.0 (L) 22.0 - 29.0 mmol/L Final   07/22/2025 21.0 (L) 22.0 - 29.0 mmol/L Final   07/22/2025 22.0 22.0 - 29.0 mmol/L Final   07/21/2025 20.0 (L) 22.0 - 29.0 mmol/L Final      BUN BUN   Date Value Ref Range Status   07/24/2025 43.0 (H) 8.0 - 23.0 mg/dL Final   07/23/2025 44.0 (H) 8.0 - 23.0 mg/dL Final   07/23/2025 41.0 (H) 8.0 - 23.0 mg/dL Final   07/22/2025 45.0 (H) 8.0 - 23.0 mg/dL Final   07/22/2025 49.0 (H) 8.0 - 23.0 mg/dL Final   07/21/2025 46.0 (H) 8.0 - 23.0 mg/dL Final      Creatinine Creatinine   Date Value Ref Range Status   07/24/2025 1.98 (H) 0.76 - 1.27 mg/dL Final   07/23/2025 2.00 (H) 0.76 - 1.27 mg/dL Final   07/23/2025 2.06 (H) 0.76 - 1.27 mg/dL Final   07/22/2025 2.13 (H) 0.76 - 1.27 mg/dL Final   07/22/2025 2.25 (H) 0.76 - 1.27 mg/dL Final   07/21/2025 2.36 (H) 0.76 - 1.27 mg/dL Final      Calcium Calcium   Date Value Ref Range Status   07/24/2025 8.5 (L) 8.6 - 10.5 mg/dL Final   07/23/2025 8.6 8.6 - 10.5 mg/dL Final   07/23/2025 8.2 (L) 8.6 - 10.5 mg/dL Final   07/22/2025 8.8 8.6 - 10.5 mg/dL Final   07/22/2025 8.1 (L) 8.6 - 10.5 mg/dL Final   07/21/2025 8.4 (L) 8.6 - 10.5 mg/dL Final      Magnesium Magnesium   Date Value Ref Range Status   07/24/2025 2.5 (H) 1.6 - 2.4 mg/dL Final   07/23/2025 2.5 (H) 1.6 - 2.4  mg/dL Final   07/23/2025 2.4 1.6 - 2.4 mg/dL Final   07/22/2025 2.6 (H) 1.6 - 2.4 mg/dL Final   07/22/2025 2.5 (H) 1.6 - 2.4 mg/dL Final   07/21/2025 2.3 1.6 - 2.4 mg/dL Final          amiodarone, 400 mg, Oral, Q8H  aspirin, 81 mg, Oral, Daily  atorvastatin, 40 mg, Oral, Nightly  bisacodyl, 10 mg, Rectal, Daily  cefTRIAXone, 1,000 mg, Intravenous, Q24H  guaiFENesin, 1,200 mg, Oral, Q12H  insulin lispro, 2-7 Units, Subcutaneous, 4x Daily AC & at Bedtime  ipratropium-albuterol, 3 mL, Nebulization, 4x Daily - RT  melatonin, 5 mg, Oral, Nightly  [Held by provider] metoprolol tartrate, 12.5 mg, Oral, Q12H  midodrine, 10 mg, Oral, TID AC  mupirocin, 1 Application, Each Nare, BID  pantoprazole, 40 mg, Oral, Q AM  polyethylene glycol, 17 g, Oral, BID  senna-docusate sodium, 2 tablet, Oral, BID  vitamin D, 50,000 Units, Oral, Q7 Days      [Held by provider] heparin, 12 Units/kg/hr, Last Rate: Stopped (07/24/25 0723)        Assessment & Plan    - Severe MV-CAD -- s/p CABG, LEVH, WEN occlusion via atriclip with Dr. Patino 7/15/25  - AAA (hx aorto iliac femoral right popliteal stent repair x2 with Dr. Archer) -- AAA measuring 5.3cm--plans for surgery in 4 to 6 weeks  - HTN  - HLD  - PAD  - Hx DVT  - Right leg vein stripping  - VERN on CKD stage 2---renal following   - Post-op anemia, expected ABLA, watch closely  - Post-op TCP, resolved  - PAF with RVR post-op---currently PAF, on Amio gtt  - Post-op hypoxia  - Post-op ileus   - Prolonged QTc    POD9:     Patient on room air during the day, last night he was placed on O2, will order overnight oximetry   Patient in SR, continue oral amiodarone, taper per EF at discharge   Hold heparin gtt and discontinue AV wires, low dose eliquis at discharge per Dr. Patino   Continue midodrine, unable to tolerate beta blocker, continue aspirin and statin   Nephrology following and managing diuresis, creatinine 1.98 this morning    Continue routine care, mobilize, encourage IS  Hopefully can  discharge home with HH tomorrow morning    Luma Austin PA-C  07/24/25  08:23 EDT

## 2025-07-25 ENCOUNTER — READMISSION MANAGEMENT (OUTPATIENT)
Dept: CALL CENTER | Facility: HOSPITAL | Age: 80
End: 2025-07-25
Payer: MEDICARE

## 2025-07-25 VITALS
HEIGHT: 65 IN | OXYGEN SATURATION: 92 % | SYSTOLIC BLOOD PRESSURE: 134 MMHG | DIASTOLIC BLOOD PRESSURE: 87 MMHG | WEIGHT: 159.8 LBS | BODY MASS INDEX: 26.62 KG/M2 | HEART RATE: 96 BPM | TEMPERATURE: 97.8 F | RESPIRATION RATE: 17 BRPM

## 2025-07-25 LAB
ALBUMIN SERPL-MCNC: 3.3 G/DL (ref 3.5–5.2)
ANION GAP SERPL CALCULATED.3IONS-SCNC: 10 MMOL/L (ref 5–15)
BASOPHILS # BLD AUTO: 0.05 10*3/MM3 (ref 0–0.2)
BASOPHILS NFR BLD AUTO: 0.4 % (ref 0–1.5)
BUN SERPL-MCNC: 30 MG/DL (ref 8–23)
BUN/CREAT SERPL: 18.5 (ref 7–25)
CA-I SERPL ISE-MCNC: 1.16 MMOL/L (ref 1.15–1.35)
CALCIUM SPEC-SCNC: 8.2 MG/DL (ref 8.6–10.5)
CHLORIDE SERPL-SCNC: 108 MMOL/L (ref 98–107)
CO2 SERPL-SCNC: 19 MMOL/L (ref 22–29)
CREAT SERPL-MCNC: 1.62 MG/DL (ref 0.76–1.27)
DEPRECATED RDW RBC AUTO: 42.7 FL (ref 37–54)
DEPRECATED RDW RBC AUTO: 44.5 FL (ref 37–54)
EGFRCR SERPLBLD CKD-EPI 2021: 43.2 ML/MIN/1.73
EOSINOPHIL # BLD AUTO: 0.29 10*3/MM3 (ref 0–0.4)
EOSINOPHIL NFR BLD AUTO: 2.5 % (ref 0.3–6.2)
ERYTHROCYTE [DISTWIDTH] IN BLOOD BY AUTOMATED COUNT: 12.5 % (ref 12.3–15.4)
ERYTHROCYTE [DISTWIDTH] IN BLOOD BY AUTOMATED COUNT: 12.7 % (ref 12.3–15.4)
GLUCOSE SERPL-MCNC: 110 MG/DL (ref 65–99)
HCT VFR BLD AUTO: 27.4 % (ref 37.5–51)
HCT VFR BLD AUTO: 29.1 % (ref 37.5–51)
HGB BLD-MCNC: 8.8 G/DL (ref 13–17.7)
HGB BLD-MCNC: 9.2 G/DL (ref 13–17.7)
IMM GRANULOCYTES # BLD AUTO: 0.36 10*3/MM3 (ref 0–0.05)
IMM GRANULOCYTES NFR BLD AUTO: 3.2 % (ref 0–0.5)
LYMPHOCYTES # BLD AUTO: 1 10*3/MM3 (ref 0.7–3.1)
LYMPHOCYTES NFR BLD AUTO: 8.8 % (ref 19.6–45.3)
MAGNESIUM SERPL-MCNC: 2.4 MG/DL (ref 1.6–2.4)
MCH RBC QN AUTO: 30 PG (ref 26.6–33)
MCH RBC QN AUTO: 30.2 PG (ref 26.6–33)
MCHC RBC AUTO-ENTMCNC: 31.6 G/DL (ref 31.5–35.7)
MCHC RBC AUTO-ENTMCNC: 32.1 G/DL (ref 31.5–35.7)
MCV RBC AUTO: 93.5 FL (ref 79–97)
MCV RBC AUTO: 95.4 FL (ref 79–97)
MONOCYTES # BLD AUTO: 1.42 10*3/MM3 (ref 0.1–0.9)
MONOCYTES NFR BLD AUTO: 12.5 % (ref 5–12)
NEUTROPHILS NFR BLD AUTO: 72.6 % (ref 42.7–76)
NEUTROPHILS NFR BLD AUTO: 8.26 10*3/MM3 (ref 1.7–7)
NRBC BLD AUTO-RTO: 0 /100 WBC (ref 0–0.2)
PHOSPHATE SERPL-MCNC: 2.4 MG/DL (ref 2.5–4.5)
PLATELET # BLD AUTO: 358 10*3/MM3 (ref 140–450)
PLATELET # BLD AUTO: 361 10*3/MM3 (ref 140–450)
PMV BLD AUTO: 9.6 FL (ref 6–12)
PMV BLD AUTO: 9.7 FL (ref 6–12)
POTASSIUM SERPL-SCNC: 3.9 MMOL/L (ref 3.5–5.2)
POTASSIUM SERPL-SCNC: 4.2 MMOL/L (ref 3.5–5.2)
QT INTERVAL: 421 MS
QTC INTERVAL: 495 MS
RBC # BLD AUTO: 2.93 10*6/MM3 (ref 4.14–5.8)
RBC # BLD AUTO: 3.05 10*6/MM3 (ref 4.14–5.8)
SODIUM SERPL-SCNC: 137 MMOL/L (ref 136–145)
URATE SERPL-MCNC: 4 MG/DL (ref 3.4–7)
WBC NRBC COR # BLD AUTO: 11.38 10*3/MM3 (ref 3.4–10.8)
WBC NRBC COR # BLD AUTO: 12.1 10*3/MM3 (ref 3.4–10.8)

## 2025-07-25 PROCEDURE — 83735 ASSAY OF MAGNESIUM: CPT | Performed by: ANESTHESIOLOGY

## 2025-07-25 PROCEDURE — 93010 ELECTROCARDIOGRAM REPORT: CPT | Performed by: INTERNAL MEDICINE

## 2025-07-25 PROCEDURE — 85027 COMPLETE CBC AUTOMATED: CPT | Performed by: ANESTHESIOLOGY

## 2025-07-25 PROCEDURE — 84132 ASSAY OF SERUM POTASSIUM: CPT | Performed by: THORACIC SURGERY (CARDIOTHORACIC VASCULAR SURGERY)

## 2025-07-25 PROCEDURE — 93005 ELECTROCARDIOGRAM TRACING: CPT | Performed by: PHYSICIAN ASSISTANT

## 2025-07-25 PROCEDURE — 80069 RENAL FUNCTION PANEL: CPT | Performed by: INTERNAL MEDICINE

## 2025-07-25 PROCEDURE — 82330 ASSAY OF CALCIUM: CPT | Performed by: ANESTHESIOLOGY

## 2025-07-25 PROCEDURE — 84550 ASSAY OF BLOOD/URIC ACID: CPT | Performed by: INTERNAL MEDICINE

## 2025-07-25 PROCEDURE — 85025 COMPLETE CBC W/AUTO DIFF WBC: CPT | Performed by: INTERNAL MEDICINE

## 2025-07-25 RX ORDER — POTASSIUM CHLORIDE 1500 MG/1
20 TABLET, EXTENDED RELEASE ORAL ONCE
Status: COMPLETED | OUTPATIENT
Start: 2025-07-25 | End: 2025-07-25

## 2025-07-25 RX ORDER — ATORVASTATIN CALCIUM 40 MG/1
40 TABLET, FILM COATED ORAL NIGHTLY
Qty: 90 TABLET | Refills: 0 | Status: SHIPPED | OUTPATIENT
Start: 2025-07-25 | End: 2025-10-23

## 2025-07-25 RX ORDER — AMIODARONE HYDROCHLORIDE 400 MG/1
TABLET ORAL
Qty: 18 TABLET | Refills: 0 | Status: SHIPPED | OUTPATIENT
Start: 2025-07-25 | End: 2025-08-22

## 2025-07-25 RX ORDER — ERGOCALCIFEROL 1.25 MG/1
50000 CAPSULE, LIQUID FILLED ORAL
Qty: 8 CAPSULE | Refills: 0 | Status: SHIPPED | OUTPATIENT
Start: 2025-07-29 | End: 2025-09-27

## 2025-07-25 RX ORDER — ASPIRIN 81 MG/1
81 TABLET, CHEWABLE ORAL DAILY
Qty: 90 TABLET | Refills: 0 | Status: SHIPPED | OUTPATIENT
Start: 2025-07-25 | End: 2025-10-23

## 2025-07-25 RX ORDER — MIDODRINE HYDROCHLORIDE 5 MG/1
5 TABLET ORAL
Qty: 90 TABLET | Refills: 0 | Status: SHIPPED | OUTPATIENT
Start: 2025-07-25

## 2025-07-25 RX ORDER — ACETAMINOPHEN 325 MG/1
650 TABLET ORAL EVERY 4 HOURS PRN
Start: 2025-07-25

## 2025-07-25 RX ADMIN — POTASSIUM CHLORIDE 20 MEQ: 1500 TABLET, EXTENDED RELEASE ORAL at 06:36

## 2025-07-25 RX ADMIN — AMIODARONE HYDROCHLORIDE 400 MG: 200 TABLET ORAL at 10:57

## 2025-07-25 RX ADMIN — AMIODARONE HYDROCHLORIDE 400 MG: 200 TABLET ORAL at 02:30

## 2025-07-25 RX ADMIN — PANTOPRAZOLE SODIUM 40 MG: 40 TABLET, DELAYED RELEASE ORAL at 06:36

## 2025-07-25 RX ADMIN — GUAIFENESIN 1200 MG: 600 TABLET, EXTENDED RELEASE ORAL at 09:25

## 2025-07-25 RX ADMIN — MIDODRINE HYDROCHLORIDE 5 MG: 5 TABLET ORAL at 06:36

## 2025-07-25 RX ADMIN — MUPIROCIN 1 APPLICATION: 20 OINTMENT TOPICAL at 09:26

## 2025-07-25 RX ADMIN — ASPIRIN 81 MG CHEWABLE TABLET 81 MG: 81 TABLET CHEWABLE at 09:25

## 2025-07-25 RX ADMIN — MIDODRINE HYDROCHLORIDE 5 MG: 5 TABLET ORAL at 11:47

## 2025-07-25 NOTE — PROGRESS NOTES
[facility] Clinical Pharmacy Services: National Cardiology Data Registry (NCDR) Medication Review    Erickson Gerard is s/p CABG x6 for severe multivessel disease. Pharmacy to review discharge medications to make sure appropriate medications have been prescribed.    Patient has been discharged on the following:  Aspirin 81 mg daily  High Intensity Statin: Atorvastatin 40 mg nightly  Beta-blocker: not prescribed  LVEF=60% (no requirement for ACE/ARB)    Patient could not tolerate a beta-blocker at this time due to hypotension in the hospital. Patient received a prescription for midodrine at discharge.    These medications meet the requirements for NCDR discharge medication for chest pain and MI.    Joellen Rush, Pharm.D., [facility]   Clinical Pharmacist   Phone Extension #[phone]

## 2025-07-25 NOTE — PLAN OF CARE
Goal Outcome Evaluation:  Plan of Care Reviewed With: patient        Progress: no change  Outcome Evaluation: S/P CABG, POD #10, A&O, no complaints of pain, SR on the monitor, still has prolonged QTc, amiodarone given, did not pass overnight oximetry, possible dc today, VSS, continue plan of care.                              Yes

## 2025-07-25 NOTE — PROGRESS NOTES
Start PACC Note    Home Health Referral    Evaluated patient and daughter on Home Care and services available. Patient offered choice of available HHC and agreeable to SN services with [facility].    Isolation Precautions: No active isolations    START PATIENT REGISTRATION INFORMATION  Order Information  Order Signing Physician: Jr Oscar Patino MD  Service Ordered RN?: Yes  Service Ordered PT?: No  Service Ordered OT?: No  Service Ordered ST?: No  Service Ordered MSW?: No  Service Ordered HHA?: No  Following Physician: Jr Oscar Patino MD  Following Physician Phone: [phone]  Overseeing Physician: Jr Oscar Patino MD  (Required for Residents Only)  Agreeable to Follow ? Yes  Date/Time of Call 07/25/25 11:56 EDT, Spoke with: Written order in [facility] chart    Care Coordination  Same Day SOC?: No  Primary Care Physician: Farhad Metcalf MD  Primary Care Physician Phone: [phone]  Primary Care Physician Address: [address] [place] [address]  Visit Instructions: Back up contact # = [email or web address], at [phone]  Service Discharge Location Type: Home  Service Facility Name: N/A  Service Floor Facility: N/A  Service Room No: N/A        Demographics  Patient Last Name: Rajani  Patient First Name: Erickson  Language/Communication Barrier: no  Service Address: [address] [place] [address]  Service City: [place]  Service State: [place]  Service Zip: [address]  Service Home Phone: [phone]  Other Phone Numbers:   Telephone Information:   Mobile [phone]     Emergency Contact:   Extended Emergency Contact Information  Primary Emergency Contact: ANNA WERNER  Address: [address] [place], [place] [id number] [facility]  Home Phone: [phone]  Mobile Phone: [phone]  Relation: Spouse    Admission Information  Admit Date: 7/15/2025  Patient Status at Discharge: Inpatient  Admitting Diagnosis: Coronary artery disease involving native  heart, unspecified vessel or lesion type, unspecified whether angina present [I25.10]  Abnormal findings on diagnostic imaging of heart and coronary circulation [R93.1]  Hx of CABG [Z95.1]    Caregiver Information  Caregiver First Name:   Caregiver Last Name:   Caregiver Relationship to Patient:   Caregiver Phone Number:   Caregiver Notes: N/A    [employer]  [employer]  No      END PATIENT REGISTRATION INFORMATION        Start PACC Summary    Additional Comments: Back up contact= daughterFozia, at # [phone]    END PACC Summary    Discharge Date: 7/25/25    Referral Source:  [facility]    Signed By: Dory Anna RN, 7/25/2025, 11:56 EDT     Date/Time: 07/25/25 11:56 EDT    End PACC Note

## 2025-07-25 NOTE — PROGRESS NOTES
[facility] Progress Note      Patient Name: Erickson Gerard  : 1945  MRN: [id number]  Primary Care Physician:  Farhad Metcalf MD  Date of admission: 7/15/2025    Subjective     Interval History:   Acute kidney injury on chronic kidney disease    Patient is feeling better, he is off all drips..  No chest pain or shortness of air, no nausea or vomiting.      Review of Systems:   As noted above    Objective     Vitals:   Temp:  [97.6 °F (36.4 °C)-98.7 °F (37.1 °C)] 98.2 °F (36.8 °C)  Heart Rate:  [75-95] 95  Resp:  [16] 16  BP: ()/(65-91) 117/74    Intake/Output Summary (Last 24 hours) at 2025 0843  Last data filed at 2025 0142  Gross per 24 hour   Intake 240 ml   Output 700 ml   Net -460 ml       Physical Exam:    General Appearance: alert, awake, chronically ill no acute distress   Skin: warm and dry  HEENT: oral mucosa normal, nonicteric sclera nasal cannula in place  Neck: No JVD  Lungs: Bilateral rhonchi, breathing effort not  Heart: Irregularly irregular, no rub  Abdomen: soft, nontender, nondistended  : no palpable bladder   Extremities: No lower extremity edema  Neuro: Moving all extremities    Scheduled Meds:     amiodarone, 400 mg, Oral, Q8H  aspirin, 81 mg, Oral, Daily  atorvastatin, 40 mg, Oral, Nightly  bisacodyl, 10 mg, Rectal, Daily  guaiFENesin, 1,200 mg, Oral, Q12H  melatonin, 5 mg, Oral, Nightly  midodrine, 5 mg, Oral, TID AC  mupirocin, 1 Application, Each Nare, BID  pantoprazole, 40 mg, Oral, Q AM  polyethylene glycol, 17 g, Oral, BID  senna-docusate sodium, 2 tablet, Oral, BID  vitamin D, 50,000 Units, Oral, Q7 Days      IV Meds:          Results Reviewed:   I have personally reviewed the results from the time of this admission to 2025 08:43 EDT     Results from last 7 days   Lab Units 25  0258 25  1453 25  0113 25  1521 25  0253 25  0902 25  0204   SODIUM mmol/L 137  --  140 138 140   < >  136   POTASSIUM mmol/L 3.9 4.2 3.9  3.9 3.8 4.0   < > 3.7   CHLORIDE mmol/L 108*  --  108* 105 106   < > 103   CO2 mmol/L 19.0*  --  20.6* 22.0 20.0*   < > 22.0   BUN mg/dL 30.0*  --  43.0* 44.0* 41.0*   < > 49.0*   CREATININE mg/dL 1.62*  --  1.98* 2.00* 2.06*   < > 2.25*   CALCIUM mg/dL 8.2*  --  8.5* 8.6 8.2*   < > 8.1*   BILIRUBIN mg/dL  --   --  0.5  --  0.4  --  0.4   ALK PHOS U/L  --   --  157*  --  146*  --  144*   ALT (SGPT) U/L  --   --  48*  --  44*  --  26   AST (SGOT) U/L  --   --  71*  --  98*  --  57*   GLUCOSE mg/dL 110*  --  103* 111* 94   < > 105*    < > = values in this interval not displayed.       Estimated Creatinine Clearance: 38.5 mL/min (A) (by C-G formula based on SCr of 1.62 mg/dL (H)).    Results from last 7 days   Lab Units 07/25/25 0258 07/24/25  1453 07/24/25  0113 07/23/25  1521 07/23/25  0253   MAGNESIUM mg/dL 2.4 2.6* 2.5*   < > 2.4   PHOSPHORUS mg/dL 2.4*  --  2.8  --  2.9    < > = values in this interval not displayed.       Results from last 7 days   Lab Units 07/25/25  0258 07/24/25  0113 07/23/25  0253 07/22/25  0204 07/21/25  0440 07/20/25  0254 07/19/25  0256   URIC ACID mg/dL 4.0 4.9 5.5 6.7 7.2* 7.3* 7.2*       Results from last 7 days   Lab Units 07/25/25  0258 07/24/25  1453 07/24/25  0113 07/23/25  1533 07/23/25  0253   WBC 10*3/mm3 12.10*  11.38* 11.24* 9.82  9.82 12.42* 9.98  9.98   HEMOGLOBIN g/dL 9.2*  8.8* 9.7* 9.2*  9.2* 10.3* 8.9*  8.9*   PLATELETS 10*3/mm3 358  361 356 311  311 358 284  284       Results from last 7 days   Lab Units 07/22/25  0959   INR  1.37*       Assessment / Plan     ASSESSMENT:  Acute kidney injury on chronic kidney disease stage II secondary to hemodynamic changes post CABG creatinine is stable.  Patient has evidence of volume excess, his electrolytes within acceptable range.  Creatinine today is 1.62, improving., his electrolyte within acceptable range of sodium is 140, stable appears to be euvolemic  CKD stage II, volume  secondary to nephrosclerosis  Volume excess, resolved,  Paroxysmal atrial fibrillation tachycardic   coronary artery disease status post 6 vessel CABG.  AAA 5.3 cm monitored by vascular surgery  Hypertension with CKD currently on vasopressors.  Volume excess,  Postop anemia hemoglobin today is 9.2, stable  Severe vitamin D deficiency, vitamin D level is 19, was started on ergocalciferol    PLAN:  He is cleared for discharge from the renal standpoint  Continue the same treatment  Plan follow-up in our office postdischarge.    I reviewed the chart and other providers notes, reviewed labs.  Discussed the case with the patient.  Copied text in this note has been reviewed and is accurate as of 07/25/25.         Thank you for involving us in the care of Erickson Gerard.  Please feel free to call with any questions.    Angel Franco MD  07/25/25  08:43 EDT    [facility]  [phone]    Please note that portions of this note were completed with a voice recognition program.

## 2025-07-25 NOTE — PROGRESS NOTES
Enter Query Response Below      Query Response: as stated presumed pneumonia, no culture data             If applicable, please update the problem list.

## 2025-07-25 NOTE — PROGRESS NOTES
" LOS: 10 days   Patient Care Team:  Farhad Metcalf MD as PCP - General (Family Medicine)    Chief Complaint:   Post-op follow-up, s/p CABG    Subjective  Sitting up in chair. No new complaints. Ready to go home.     Vital Signs  Temp:  [97.6 °F (36.4 °C)-98.7 °F (37.1 °C)] 98.2 °F (36.8 °C)  Heart Rate:  [75-95] 95  Resp:  [16] 16  BP: ()/(65-91) 117/74      07/23/25  0600 07/24/25  0336 07/25/25  0532   Weight: 71.7 kg (158 lb) 72.5 kg (159 lb 12.8 oz) 72.5 kg (159 lb 12.8 oz)     Body mass index is 26.59 kg/m².    Intake/Output Summary (Last 24 hours) at 7/25/2025 0857  Last data filed at 7/25/2025 0142  Gross per 24 hour   Intake 240 ml   Output 700 ml   Net -460 ml     No intake/output data recorded.        Objective:  Vital signs: (most recent): Blood pressure 117/74, pulse 95, temperature 98.2 °F (36.8 °C), temperature source Oral, resp. rate 16, height 165.1 cm (65\"), weight 72.5 kg (159 lb 12.8 oz), SpO2 92%.                Physical Exam:   General Appearance: awake and alert, no acute distress   Lungs: respirations regular and respirations unlabored   Heart: regular rhythm & normal rate and normal S1, S2   Abdomen: soft or nontender, + bowel sounds    Skin: sternal incision clean, dry, intact   Neuro: alert and oriented, no focal deficits.     Results Review:      WBC WBC   Date Value Ref Range Status   07/25/2025 12.10 (H) 3.40 - 10.80 10*3/mm3 Final   07/25/2025 11.38 (H) 3.40 - 10.80 10*3/mm3 Final   07/24/2025 11.24 (H) 3.40 - 10.80 10*3/mm3 Final   07/24/2025 9.82 3.40 - 10.80 10*3/mm3 Final   07/24/2025 9.82 3.40 - 10.80 10*3/mm3 Final   07/23/2025 12.42 (H) 3.40 - 10.80 10*3/mm3 Final   07/23/2025 9.98 3.40 - 10.80 10*3/mm3 Final   07/23/2025 9.98 3.40 - 10.80 10*3/mm3 Final   07/22/2025 11.25 (H) 3.40 - 10.80 10*3/mm3 Final      HGB Hemoglobin   Date Value Ref Range Status   07/25/2025 9.2 (L) 13.0 - 17.7 g/dL Final   07/25/2025 8.8 (L) 13.0 - 17.7 g/dL Final   07/24/2025 9.7 (L) 13.0 - " 17.7 g/dL Final   07/24/2025 9.2 (L) 13.0 - 17.7 g/dL Final   07/24/2025 9.2 (L) 13.0 - 17.7 g/dL Final   07/23/2025 10.3 (L) 13.0 - 17.7 g/dL Final   07/23/2025 8.9 (L) 13.0 - 17.7 g/dL Final   07/23/2025 8.9 (L) 13.0 - 17.7 g/dL Final   07/22/2025 10.0 (L) 13.0 - 17.7 g/dL Final      HCT Hematocrit   Date Value Ref Range Status   07/25/2025 29.1 (L) 37.5 - 51.0 % Final   07/25/2025 27.4 (L) 37.5 - 51.0 % Final   07/24/2025 30.3 (L) 37.5 - 51.0 % Final   07/24/2025 28.7 (L) 37.5 - 51.0 % Final   07/24/2025 28.7 (L) 37.5 - 51.0 % Final   07/23/2025 31.5 (L) 37.5 - 51.0 % Final   07/23/2025 27.2 (L) 37.5 - 51.0 % Final   07/23/2025 27.2 (L) 37.5 - 51.0 % Final   07/22/2025 30.8 (L) 37.5 - 51.0 % Final      Platelets Platelets   Date Value Ref Range Status   07/25/2025 358 140 - 450 10*3/mm3 Final   07/25/2025 361 140 - 450 10*3/mm3 Final   07/24/2025 356 140 - 450 10*3/mm3 Final   07/24/2025 311 140 - 450 10*3/mm3 Final   07/24/2025 311 140 - 450 10*3/mm3 Final   07/23/2025 358 140 - 450 10*3/mm3 Final   07/23/2025 284 140 - 450 10*3/mm3 Final   07/23/2025 284 140 - 450 10*3/mm3 Final   07/22/2025 308 140 - 450 10*3/mm3 Final        PT/INR:    Protime   Date Value Ref Range Status   07/22/2025 16.8 (H) 11.7 - 14.2 Seconds Final   /  INR   Date Value Ref Range Status   07/22/2025 1.37 (H) 0.90 - 1.10 Final       Sodium Sodium   Date Value Ref Range Status   07/25/2025 137 136 - 145 mmol/L Final   07/24/2025 140 136 - 145 mmol/L Final   07/23/2025 138 136 - 145 mmol/L Final   07/23/2025 140 136 - 145 mmol/L Final   07/22/2025 137 136 - 145 mmol/L Final      Potassium Potassium   Date Value Ref Range Status   07/25/2025 3.9 3.5 - 5.2 mmol/L Final   07/24/2025 4.2 3.5 - 5.2 mmol/L Final   07/24/2025 3.9 3.5 - 5.2 mmol/L Final   07/24/2025 3.9 3.5 - 5.2 mmol/L Final   07/23/2025 3.8 3.5 - 5.2 mmol/L Final   07/23/2025 4.0 3.5 - 5.2 mmol/L Final   07/22/2025 4.0 3.5 - 5.2 mmol/L Final   07/22/2025 4.2 3.5 - 5.2 mmol/L  Final   07/22/2025 3.4 (L) 3.5 - 5.2 mmol/L Final      Chloride Chloride   Date Value Ref Range Status   07/25/2025 108 (H) 98 - 107 mmol/L Final   07/24/2025 108 (H) 98 - 107 mmol/L Final   07/23/2025 105 98 - 107 mmol/L Final   07/23/2025 106 98 - 107 mmol/L Final   07/22/2025 103 98 - 107 mmol/L Final      Bicarbonate CO2   Date Value Ref Range Status   07/25/2025 19.0 (L) 22.0 - 29.0 mmol/L Final   07/24/2025 20.6 (L) 22.0 - 29.0 mmol/L Final   07/23/2025 22.0 22.0 - 29.0 mmol/L Final   07/23/2025 20.0 (L) 22.0 - 29.0 mmol/L Final   07/22/2025 21.0 (L) 22.0 - 29.0 mmol/L Final      BUN BUN   Date Value Ref Range Status   07/25/2025 30.0 (H) 8.0 - 23.0 mg/dL Final   07/24/2025 43.0 (H) 8.0 - 23.0 mg/dL Final   07/23/2025 44.0 (H) 8.0 - 23.0 mg/dL Final   07/23/2025 41.0 (H) 8.0 - 23.0 mg/dL Final   07/22/2025 45.0 (H) 8.0 - 23.0 mg/dL Final      Creatinine Creatinine   Date Value Ref Range Status   07/25/2025 1.62 (H) 0.76 - 1.27 mg/dL Final   07/24/2025 1.98 (H) 0.76 - 1.27 mg/dL Final   07/23/2025 2.00 (H) 0.76 - 1.27 mg/dL Final   07/23/2025 2.06 (H) 0.76 - 1.27 mg/dL Final   07/22/2025 2.13 (H) 0.76 - 1.27 mg/dL Final      Calcium Calcium   Date Value Ref Range Status   07/25/2025 8.2 (L) 8.6 - 10.5 mg/dL Final   07/24/2025 8.5 (L) 8.6 - 10.5 mg/dL Final   07/23/2025 8.6 8.6 - 10.5 mg/dL Final   07/23/2025 8.2 (L) 8.6 - 10.5 mg/dL Final   07/22/2025 8.8 8.6 - 10.5 mg/dL Final      Magnesium Magnesium   Date Value Ref Range Status   07/25/2025 2.4 1.6 - 2.4 mg/dL Final   07/24/2025 2.6 (H) 1.6 - 2.4 mg/dL Final   07/24/2025 2.5 (H) 1.6 - 2.4 mg/dL Final   07/23/2025 2.5 (H) 1.6 - 2.4 mg/dL Final   07/23/2025 2.4 1.6 - 2.4 mg/dL Final   07/22/2025 2.6 (H) 1.6 - 2.4 mg/dL Final        amiodarone, 400 mg, Oral, Q8H  aspirin, 81 mg, Oral, Daily  atorvastatin, 40 mg, Oral, Nightly  bisacodyl, 10 mg, Rectal, Daily  guaiFENesin, 1,200 mg, Oral, Q12H  melatonin, 5 mg, Oral, Nightly  midodrine, 5 mg, Oral, TID  AC  mupirocin, 1 Application, Each Nare, BID  pantoprazole, 40 mg, Oral, Q AM  polyethylene glycol, 17 g, Oral, BID  senna-docusate sodium, 2 tablet, Oral, BID  vitamin D, 50,000 Units, Oral, Q7 Days             Essential hypertension    PVD (peripheral vascular disease)    Coronary artery disease involving native heart    Paroxysmal atrial fibrillation      Assessment & Plan    - Severe MV-CAD -- s/p CABG, LEVH, WEN occlusion via atriclip with Dr. Patino 7/15/25  - AAA (hx aorto iliac femoral right popliteal stent repair x2 with Dr. Archer) -- AAA measuring 5.3cm--plans for surgery in 4 to 6 weeks  - HTN  - HLD  - PAD  - Hx DVT  - Right leg vein stripping  - VERN on CKD stage 2---renal following   - Post-op anemia, expected ABLA, watch closely  - Post-op TCP, resolved  - PAF with RVR post-op--- converted to sinus, amio per EP  - Post-op hypoxia-- improving  - Post-op ileus-- resolved   - Prolonged QTc  - hypoxia---- extended desats on overnight oximetry.     POD10:   Doing well.   Extended period of desaturations on overnight oximetry, will require nocturnal supplemental O2 at dc  Patient in SR, continue oral amiodarone, taper per EP at discharge   Epicardial wires wires removed yesterday, low dose eliquis at discharge per Dr. Patino   BP stable on midodrine, unable to tolerate beta blocker, continue aspirin and statin   Nephrology following, creatinine 1.62, follow-up outpatient.  Continue routine care, mobilize, encourage IS  Plan discharge home with home health.     Dwayne Modi PA-C  07/25/25  08:57 EDT

## 2025-07-25 NOTE — CASE MANAGEMENT/SOCIAL WORK
Discharge Planning Assessment  [facility]     Patient Name: Erickson Gerard  MRN: [id number]  Today's Date: 7/25/2025    Admit Date: 7/15/2025    Plan: Home w/ [facility];  Referral pending for [facility] for nocturnal oxygen   Discharge Needs Assessment       Row Name 07/25/25 1126       Discharge Needs Assessment    Provided Post Acute Provider Quality & Resource List? Yes    Post Acute Provider Quality and Resource List --  DME    Delivered To Patient                   Discharge Plan       Row Name 07/25/25 1124       Plan    Plan Home w/ [facility];  Referral pending for [facility] for nocturnal oxygen    Plan Comments CCP called referral to [facility] for nocturnal oxygen                  Continued Care and Services - Admitted Since 7/15/2025       Durable Medical Equipment       Service Provider Request Status Services Address Phone Fax Patient Preferred    [facility] Pending - Request Sent -- [address] [place] [address] [phone] --              Home Medical Care Coordination complete.      Service Provider Request Status Services Address Phone Fax Patient Preferred    [facility] [address], [facility] [address] [id number] [phone] --                  Expected Discharge Date and Time       Expected Discharge Date Expected Discharge Time    Jul 25, 2025            Demographic Summary    No documentation.                  Functional Status    No documentation.                  Psychosocial    No documentation.                  Abuse/Neglect    No documentation.                  Legal    No documentation.                  Substance Abuse    No documentation.                  Patient Forms    No documentation.                     Tami Martínez RN

## 2025-07-25 NOTE — CASE MANAGEMENT/SOCIAL WORK
Case Management Discharge Note      Final Note: Home with [facility] and nocturnal home O2 per [facility]. Home via private vehicle.    Provided Post Acute Provider Quality & Resource List?: Yes  Post Acute Provider Quality and Resource List:  (DME)  Delivered To: Patient  Method of Delivery: In person    Selected Continued Care - Discharged on 7/25/2025 Admission date: 7/15/2025 - Discharge disposition: Home or Self Care      Destination    No services have been selected for the patient.                Durable Medical Equipment    No services have been selected for the patient.                Dialysis/Infusion    No services have been selected for the patient.                Home Medical Care Coordination complete.      Service Provider Services Address Phone Fax Patient Preferred    [facility] [address] [place] [address], [facility] [address], [place] [address] [phone] --              Therapy    No services have been selected for the patient.                Community Resources    No services have been selected for the patient.                Community & DME    No services have been selected for the patient.                    Transportation Services  Transportation: Private Transportation  Private: Car    Final Discharge Disposition Code: 06 - home with home health care

## 2025-07-25 NOTE — PROGRESS NOTES
[facility] Clinical Pharmacy Services: Patient Counseling Consult    Erickson Gerard and family have been counseled on the following medications: amiodarone, apixaban, aspirin, atorvastatin, midodrine, and Vitamin D. Counseling points included the following:    Explained indication of each medication, and patient's need for these medications.  Went over dosing and frequency of each medication (see discharge instructions).  Discussed any special administration, storage, or monitoring instructions with medications.  Discussed all important drug interactions, including over-the-counter medications (ibuprofen, naproxen, Aleve).  Explained possible side effects for each medication.  Instructed the patient not to begin or discontinue any medications without informing his physician.  Talked about keeping a 1-2 week journal to bring to his/her next appointment with the nurse practitioner. Things to be included are blood pressure, heart rate, temperature, and weight.     Amiodarone (take with food)-nausea/appetite changes, photosensitivity, changes in blood pressure and heart rate, vision changes (halos)  Apixaban-bruising, minor bleeding, cold intolerance  Atorvastatin (take at night)-muscle and joint aches  Midodrine (get all doses in 4 hours before bedtime)-high blood pressure while laying flat, goose bumps  Vitamin D (take at the same day once a week)    Patient expressed understanding and had no further questions.      Joellen Rush, Pharm.D., [facility]   Clinical Pharmacist  Phone Extension #[phone]

## 2025-07-28 ENCOUNTER — TRANSITIONAL CARE MANAGEMENT TELEPHONE ENCOUNTER (OUTPATIENT)
Dept: CALL CENTER | Facility: HOSPITAL | Age: 80
End: 2025-07-28
Payer: MEDICARE

## 2025-07-28 ENCOUNTER — TELEPHONE (OUTPATIENT)
Age: 80
End: 2025-07-28
Payer: MEDICARE

## 2025-07-28 NOTE — OUTREACH NOTE
Call Center TCM Note      Flowsheet Row Responses   [facility] patient discharged from? [place]   Does the patient have one of the following disease processes/diagnoses(primary or secondary)? Cardiothoracic surgery   TCM attempt successful? Yes   Call start time 1312   Call end time 1317   Discharge diagnosis STERNOTOMY,  CORONARY ARTERY BYPASS GRAFTING TIMES 6 USING LEFT INTERNAL MAMMARY ARTERY AND LEFT SAPHENOUS VEIN,  TRANSESOPHAGEAL ECHOCARDIOGRAM WITH ANESTHESIA, LEFT ATRIAL APPENDAGE EXCLUSION,  PRP   Meds reviewed with patient/caregiver? Yes   Is the patient having any side effects they believe may be caused by any medication additions or changes? No   Does the patient have all medications related to this admission filled (includes all antibiotics, pain medications, cardiac medications, etc.) Yes   Prescription comments Multiple medication additions and changes. Pt states his daughter is managing and has everything in order   Is the patient taking all medications as directed (includes completed medication regime)? Yes   Comments TCM APPT WITH PCP DR EPPS IS 08/06/2025   Does the patient have an appointment with their PCP within 7-14 days of discharge? Yes   What is the Home health agency?  [facility]   Has home health visited the patient within 72 hours of discharge? Yes   Home health comments [facility] following, scheduled to see pt tomorrow 07/29/2025   What DME was ordered? [facility] for nocturnal oxygen   Has all DME been delivered? Yes   Psychosocial issues? No   Did the patient receive a copy of their discharge instructions? Yes   Nursing interventions Reviewed instructions with patient   What is the patient's perception of their health status since discharge? Improving   Nursing interventions Nurse provided patient education   Is the patient/caregiver able to teach back normal signs of recovery? Nausea and lack of appetite, Constipation, Depression or irritability, Pain or  discomfort at incisional site   Nursing interventions Reassured on normal signs of recovery   Is the patient /caregiver able to teach back basic post-op care? Continue use of incentive spirometry at least 1 week post discharge, Hold pillow to support chest when coughing, Shower daily, Use a clean wash cloth and antibacterial bar or liquid soap to clean incisions, Lifting as instructed by MD in discharge instructions, No tub bath, swimming, or hot tub until instructed by MD, Drive as instructed by MD in discharge instructions, Practice cough and deep breath every 4 hours while awake, If the steri-strips are falling off, it is okay to remove them. (If applicable)   Is the patient/caregiver able to teach back signs and symptoms of incisional infection? Increased redness, swelling or pain at the incisonal site, Pus or odor from incision, Fever, Increased drainage or bleeding, Incisional warmth   Is the patient/caregiver able to teach back steps to recovery at home? Set small, achievable goals for return to baseline health, Practice good oral hygiene, Eat a well-balance diet, Rest and rebuild strength, gradually increase activity, Weigh daily, Make a list of questions for surgeon's appointment   Is the patient /caregiver able to teach back the importance of cardiac rehab? Yes   Nursing interventions Provided education on importance of cardiac rehab   If the patient is a current smoker, are they able to teach back resources for cessation? Not a smoker   Is the patient/caregiver able to teach back the hierarchy of who to call/visit for symptoms/problems? PCP, Specialist, Home health nurse, Urgent Care, ED, 911 Yes   TCM call completed? Yes   Wrap up additional comments D/C DX: CABG x 6 - Pt doing pretty well, good family support. Surgical site good. No fever, chills, no SOB, chest pain. No GI issues. TCM APPT with PCP Dr Metcalf is 08/06/2025, C/T POST OP appt is 08/20/2025.   Call end time 1317            JULIETH Dooley  Medical Assistant    7/28/2025, 13:21 EDT

## 2025-07-28 NOTE — TELEPHONE ENCOUNTER
7/28/25 Called patient to schedule a hospital follow up with Dr. Trace Cabrales or next available APRN. I was unable to leave a voicemail.    Thanks,  Pearl

## 2025-08-01 ENCOUNTER — TELEPHONE (OUTPATIENT)
Dept: CARDIOLOGY | Age: 80
End: 2025-08-01
Payer: MEDICARE

## 2025-08-01 NOTE — TELEPHONE ENCOUNTER
I spoke with Oneyda, home health nurse.  He states that pt's BP has been running 150's/90's with HR 70's.  Pt continues to take midodrine 5mg three times daily.  Pt has some trouble sleeping currently, but denies any other symptoms at this time.  Per home health nurse, pt has been on metoprolol 50mg daily, amlodipine 10mg daily and valsartan 160mg daily prior to surgery (on 7/15, discharged on 7/25).    Recommendations?  I'll set up hospital follow up for pt when I speak with him with your recommendations.    Thanks so much,  Rebecca Montanez, RN  Triage RN  08/01/25 15:02 EDT

## 2025-08-01 NOTE — TELEPHONE ENCOUNTER
Caller: [facility]    Relationship:     Best call back number: [phone]    What is the best time to reach you: ANYTIME    Who are you requesting to speak with (clinical staff, provider,  specific staff member): CLINICAL      What was the call regardin/15/2025 PT HAD HEART SURGERY. HOME HEALTH NEED TO TALK TO YOU CONCERNING HTN AND MEDICATIONS  MIDODRINE AND METOPOROLO .  HIS BP /190.  PLEASE CALL TO ADVISE

## 2025-08-01 NOTE — TELEPHONE ENCOUNTER
Can someone please reach out and find out some additional information.  There is no way that a BP of 150/190 is an accurate blood pressure reading.

## 2025-08-02 ENCOUNTER — TELEPHONE (OUTPATIENT)
Dept: FAMILY MEDICINE CLINIC | Facility: CLINIC | Age: 80
End: 2025-08-02
Payer: MEDICARE

## 2025-08-02 NOTE — TELEPHONE ENCOUNTER
Patient's primary care APRN reached out today. His BP is still high, 160/90. Patient stopped midodrine yesterday.     Home meds prior to hospitalization (at which time he had VERN and hypotension) included amlodipine, metoprolol and valsartan. Last known creatinine 1.62.     Patient instructed to resume metoprolol 50 and amlodipine 5 mg.     He will need office follow up and repeat labs to consider valsartan.     Electronically signed by TATI Starks, 08/02/25, 2:19 PM EDT.

## 2025-08-02 NOTE — TELEPHONE ENCOUNTER
Patient's daughter called answering service with concern regarding pt elevated BP; spoke with patient and patient's daughter over the phone; pt was discharge home from hospital over 1 week ago; BP was low at hospital and patient was started on Midodrine; BP was 150/97 yesterday per home health nurse and left message for cardiology to call back regarding recommendations; pt stopped Midodrine yesterday and /106 today; no headaches; no dizziness; no chest pain; pt had tried to reach out to cardiology and did not hear back today; I called cardiology office and spoke with TATI Bernstein regarding patient BP; recommended to resume Metoprolol XL 50 mg and Amlodipine 5 mg daily; patient and daughter notified of recommendations; cardio to call patient on Monday to schedule follow up appointment sooner.

## 2025-08-06 ENCOUNTER — OFFICE VISIT (OUTPATIENT)
Dept: FAMILY MEDICINE CLINIC | Facility: CLINIC | Age: 80
End: 2025-08-06
Payer: MEDICARE

## 2025-08-06 VITALS
HEIGHT: 65 IN | HEART RATE: 61 BPM | TEMPERATURE: 97.9 F | BODY MASS INDEX: 27.32 KG/M2 | WEIGHT: 164 LBS | DIASTOLIC BLOOD PRESSURE: 80 MMHG | OXYGEN SATURATION: 95 % | SYSTOLIC BLOOD PRESSURE: 142 MMHG

## 2025-08-06 DIAGNOSIS — I48.0 PAROXYSMAL ATRIAL FIBRILLATION: Primary | ICD-10-CM

## 2025-08-06 DIAGNOSIS — G47.34 NOCTURNAL HYPOXIA: ICD-10-CM

## 2025-08-06 DIAGNOSIS — Z09 HOSPITAL DISCHARGE FOLLOW-UP: ICD-10-CM

## 2025-08-06 DIAGNOSIS — E78.2 MIXED HYPERLIPIDEMIA: ICD-10-CM

## 2025-08-06 DIAGNOSIS — I10 ESSENTIAL HYPERTENSION: ICD-10-CM

## 2025-08-06 RX ORDER — AMLODIPINE BESYLATE 10 MG/1
10 TABLET ORAL DAILY
COMMUNITY
Start: 2025-05-28 | End: 2025-08-06 | Stop reason: SDUPTHER

## 2025-08-06 RX ORDER — AMLODIPINE BESYLATE 5 MG/1
5 TABLET ORAL DAILY
Qty: 90 TABLET | Refills: 3 | Status: SHIPPED | OUTPATIENT
Start: 2025-08-06

## 2025-08-06 RX ORDER — METOPROLOL SUCCINATE 50 MG/1
50 TABLET, EXTENDED RELEASE ORAL DAILY
Qty: 90 TABLET | Refills: 3 | Status: SHIPPED | OUTPATIENT
Start: 2025-08-06

## 2025-08-06 RX ORDER — ROSUVASTATIN CALCIUM 5 MG/1
5 TABLET, COATED ORAL
COMMUNITY
Start: 2025-06-09 | End: 2025-08-06 | Stop reason: SDUPTHER

## 2025-08-06 RX ORDER — METOPROLOL SUCCINATE 50 MG/1
50 TABLET, EXTENDED RELEASE ORAL DAILY
COMMUNITY
Start: 2025-06-19 | End: 2025-08-06 | Stop reason: SDUPTHER

## 2025-08-06 RX ORDER — ROSUVASTATIN CALCIUM 10 MG/1
10 TABLET, COATED ORAL DAILY
Qty: 90 TABLET | Refills: 3 | Status: SHIPPED | OUTPATIENT
Start: 2025-08-06

## 2025-08-06 RX ORDER — VALSARTAN 160 MG/1
160 TABLET ORAL
COMMUNITY
Start: 2025-06-09 | End: 2025-08-06

## 2025-08-18 ENCOUNTER — READMISSION MANAGEMENT (OUTPATIENT)
Dept: CALL CENTER | Facility: HOSPITAL | Age: 80
End: 2025-08-18
Payer: MEDICARE

## 2025-08-20 ENCOUNTER — OFFICE VISIT (OUTPATIENT)
Dept: CARDIAC SURGERY | Facility: CLINIC | Age: 80
End: 2025-08-20
Payer: MEDICARE

## 2025-08-20 VITALS
WEIGHT: 160 LBS | HEART RATE: 55 BPM | HEIGHT: 66 IN | OXYGEN SATURATION: 96 % | DIASTOLIC BLOOD PRESSURE: 81 MMHG | TEMPERATURE: 98 F | BODY MASS INDEX: 25.71 KG/M2 | SYSTOLIC BLOOD PRESSURE: 124 MMHG | RESPIRATION RATE: 18 BRPM

## 2025-08-20 DIAGNOSIS — Z95.1 S/P CABG X 6: Primary | ICD-10-CM

## 2025-08-20 PROCEDURE — 99024 POSTOP FOLLOW-UP VISIT: CPT

## 2025-08-20 RX ORDER — FUROSEMIDE 40 MG/1
40 TABLET ORAL 2 TIMES DAILY
COMMUNITY

## 2025-08-21 ENCOUNTER — OFFICE VISIT (OUTPATIENT)
Age: 80
End: 2025-08-21
Payer: MEDICARE

## 2025-08-21 VITALS
WEIGHT: 153.3 LBS | HEIGHT: 66 IN | HEART RATE: 60 BPM | SYSTOLIC BLOOD PRESSURE: 121 MMHG | BODY MASS INDEX: 24.64 KG/M2 | DIASTOLIC BLOOD PRESSURE: 78 MMHG

## 2025-08-21 DIAGNOSIS — I72.8: ICD-10-CM

## 2025-08-21 DIAGNOSIS — I71.40 ABDOMINAL AORTIC ANEURYSM (AAA) 3.0 CM TO 5.5 CM IN DIAMETER IN MALE: Primary | ICD-10-CM

## 2025-08-21 DIAGNOSIS — I72.4 ANEURYSM OF RIGHT POPLITEAL ARTERY: ICD-10-CM

## 2025-08-21 DIAGNOSIS — I82.4Z1 ACUTE DEEP VEIN THROMBOSIS (DVT) OF DISTAL VEIN OF RIGHT LOWER EXTREMITY: ICD-10-CM

## 2025-08-21 DIAGNOSIS — I73.9 PVD (PERIPHERAL VASCULAR DISEASE): ICD-10-CM

## 2025-08-21 RX ORDER — SODIUM CHLORIDE 9 MG/ML
40 INJECTION, SOLUTION INTRAVENOUS AS NEEDED
OUTPATIENT
Start: 2025-08-21

## 2025-08-21 RX ORDER — SODIUM CHLORIDE 0.9 % (FLUSH) 0.9 %
10 SYRINGE (ML) INJECTION EVERY 12 HOURS SCHEDULED
OUTPATIENT
Start: 2025-08-21

## 2025-08-21 RX ORDER — SODIUM CHLORIDE 0.9 % (FLUSH) 0.9 %
10 SYRINGE (ML) INJECTION AS NEEDED
OUTPATIENT
Start: 2025-08-21

## 2025-08-22 ENCOUNTER — HOSPITAL ENCOUNTER (OUTPATIENT)
Facility: HOSPITAL | Age: 80
Discharge: HOME | End: 2025-08-22
Payer: MEDICARE

## 2025-08-22 ENCOUNTER — OFFICE VISIT (OUTPATIENT)
Age: 80
End: 2025-08-22
Payer: MEDICARE

## 2025-08-22 VITALS
HEIGHT: 66 IN | WEIGHT: 153.4 LBS | DIASTOLIC BLOOD PRESSURE: 88 MMHG | SYSTOLIC BLOOD PRESSURE: 140 MMHG | BODY MASS INDEX: 24.65 KG/M2

## 2025-08-22 DIAGNOSIS — I71.40 ABDOMINAL AORTIC ANEURYSM (AAA) 3.0 CM TO 5.5 CM IN DIAMETER IN MALE: Primary | ICD-10-CM

## 2025-08-22 DIAGNOSIS — I71.40 ABDOMINAL AORTIC ANEURYSM (AAA) 3.0 CM TO 5.5 CM IN DIAMETER IN MALE: ICD-10-CM

## 2025-08-22 DIAGNOSIS — I72.4 ANEURYSM OF RIGHT POPLITEAL ARTERY: ICD-10-CM

## 2025-08-22 PROCEDURE — 93979 VASCULAR STUDY: CPT

## 2025-08-24 LAB
ABDOMINAL DIST AORTA AP: 2.9 CM
ABDOMINAL DIST AORTA TRANS: 3.1 CM
ABDOMINAL DIST AORTA VEL: 61.1 CM/S
ABDOMINAL LT COM ILIAC AP: 1.6 CM
ABDOMINAL LT COM ILIAC TRANS: 1.6 CM
ABDOMINAL LT COM ILIAC VEL: 78.5 CM/S
ABDOMINAL LT EXT ILIAC VEL: 92.2 CM/S
ABDOMINAL MID AORTA AP: 5 CM
ABDOMINAL MID AORTA TRANS: 5.3 CM
ABDOMINAL MID AORTA VEL: 37.9 CM/S
ABDOMINAL PROX AORTA AP: 2.8 CM
ABDOMINAL PROX AORTA TRANS: 2.8 CM
ABDOMINAL PROX AORTA VEL: 81.7 CM/S
ABDOMINAL RT COM ILIAC AP: 2.1 CM
ABDOMINAL RT COM ILIAC TRANS: 2.1 CM
ABDOMINAL RT COM ILIAC VEL: 44.7 CM/S
ABDOMINAL RT EXT ILIAC VEL: 77.4 CM/S
ABDOMINAL RT INT ILIAC VEL: 35.1 CM/S
BH CV VAS ABD AO LT EXTERNAL ILIAC AP: 1.3 CM
BH CV VAS ABD AO RT EXTERNAL ILIAC AP: 1.2 CM
BH CV VAS ABD AO RT INTERNAL ILIAC AP: 2.3 CM

## 2025-08-25 ENCOUNTER — TELEPHONE (OUTPATIENT)
Dept: CARDIAC SURGERY | Facility: CLINIC | Age: 80
End: 2025-08-25
Payer: MEDICARE

## (undated) DEVICE — SOL NACL 0.9PCT 1000ML

## (undated) DEVICE — 1LYRTR 16FR10ML100%SILTMPS SNP: Brand: MEDLINE INDUSTRIES, INC.

## (undated) DEVICE — ABSORBABLE HEMOSTAT (OXIDIZED REGENERATED CELLULOSE)
Type: IMPLANTABLE DEVICE | Site: STERNUM | Status: NON-FUNCTIONAL
Brand: SURGICEL
Removed: 2025-07-15

## (undated) DEVICE — AXIUS BLOWER/MISTER: Brand: AXIUS BLOWER/MISTER

## (undated) DEVICE — PK ATS CUST W CARDIOTOMY RESEVOIR

## (undated) DEVICE — PK ANGIO 40

## (undated) DEVICE — GLV SURG BIOGEL M LTX PF 7 1/2

## (undated) DEVICE — DRSNG WND GEL FIBR OPTICELL AG PLS W/SLV LF 4X5IN  STRL

## (undated) DEVICE — ST ACC MICROPUNCTURE STFF .018 ECHO/PLDM/TP 4F/10CM 21G/7CM

## (undated) DEVICE — NAVICROSS SUPPORT CATHETER: Brand: NAVICROSS

## (undated) DEVICE — HEMOST ABS SURGIFOAM SZ100 8X12 10MM
Type: IMPLANTABLE DEVICE | Site: STERNUM | Status: NON-FUNCTIONAL
Removed: 2025-07-15

## (undated) DEVICE — Device

## (undated) DEVICE — SYS PERFUS SEP PLATLT W TIPS CUST

## (undated) DEVICE — BG TRANSF W/COUPLER SPK 600ML

## (undated) DEVICE — THE SAPHENA MEDICAL ONEPASS ENDOSCOPIC VESSEL HARVESTING SYSTEM IS INDICATED FOR USE IN MINIMALLY INVASIVE SURGERY ALLOWING ACCESS FOR VESSEL HARVESTING, AND IS PRIMARILY INDICATED FOR PATIENTS UNDERGOING ENDOSCOPIC SURGERY FOR ARTERIAL BYPASS. IT IS INDICATED FOR CUTTING TISSUE AND CONTROLLING BLEEDING THROUGH COAGULATION, AND FOR PATIENTS REQUIRING BLUNT DISSECTION OF TISSUE INCLUDING DISSECTION OF BLOOD VESSELS, DISSECTION OF BLOOD VESSELS OF THE EXTREMITIES, DISSECTION OF DUCTS AND OTHER STRUCTURES IN THE EXTRA PERITONEALL OR SUBCUTANEOUS EXTREMITY AND THORACIC SPACE. EXTREMITY PROCEDURES INCLUDE TISSUE DISSECTION ALONG THE SAPHENOUS VEIN FOR USE IN CORONARY ARTERY BYPASS GRAFTING AND PERIPHERAL ARTERY BYPASS OR RADIAL ARTERY FOR USE IN CORONARY ARTERY BYPASS GRAFTING. THORASCOPIC PROCEDURES INCLUDE EXPOSURE AND DISSECTION OF STRUCTURES EXTERNAL TOTHE PARIETAL PLEURA, INCLUDING NERVES, BLOOD VESSELS, AND OTHER TISSUES OF THE CHEST WALL.: Brand: VENAPAX

## (undated) DEVICE — GAUZE,BORDER,4"X8",2"X6"PAD,STERILE: Brand: MEDLINE

## (undated) DEVICE — PK HEART OPN 40

## (undated) DEVICE — RADIFOCUS GLIDEWIRE ADVANTAGE GUIDEWIRE: Brand: GLIDEWIRE ADVANTAGE

## (undated) DEVICE — KT MANIFLD CARDIAC

## (undated) DEVICE — CATH ANGIO TRCN NB BCN .038 5F 65CM RIM

## (undated) DEVICE — DRN WND CH RND FUL/FLUT NO/TROC 3/8IN 28F

## (undated) DEVICE — ROTATING SURGICAL PUNCHES, 1 PER POUCH: Brand: A&E MEDICAL / ROTATING SURGICAL PUNCHES

## (undated) DEVICE — SYR LUERLOK 30CC

## (undated) DEVICE — DGW .035 FC J3MM 260CM TEF: Brand: EMERALD

## (undated) DEVICE — THE VASC BAND HEMOSTAT IS A COMPRESSION DEVICE TO ASSIST HEMOSTASIS OF ARTERIAL, VENOUS AND HEMODIALYSIS PERCUTANEOUS ACCESS SITES.: Brand: VASC BAND™ HEMOSTAT

## (undated) DEVICE — HI-TORQUE BALANCE MIDDLEWEIGHT GUIDE WIRE .014 STRAIGHT TIP 3.0 CM X 190 CM: Brand: HI-TORQUE BALANCE MIDDLEWEIGHT

## (undated) DEVICE — INFLATION DEVICE: Brand: ENCORE™ 26

## (undated) DEVICE — SYR LL TP 10ML STRL

## (undated) DEVICE — SENSR CERBRL O2 PK/2

## (undated) DEVICE — OASIS DRAIN, SINGLE, INLINE & ATS COMPATIBLE: Brand: OASIS

## (undated) DEVICE — ADHS SKIN PREMIERPRO EXOFIN TOPICAL HI/VISC .5ML

## (undated) DEVICE — DECANTER BAG 9": Brand: MEDLINE INDUSTRIES, INC.

## (undated) DEVICE — PTA BALLOON DILATATION CATHETER: Brand: MUSTANG™

## (undated) DEVICE — CVR PROB 96IN LF STRL

## (undated) DEVICE — CORONARY ARTERY BYPASS GRAFT MARKERS, STAINLESS STEEL, DISTAL, WITHOUT HOLDER: Brand: ANASTOMARK CORONARY ARTERY BYPASS GRAFT MARKERS, STAINLESS STEEL, DISTAL

## (undated) DEVICE — SOL ISO/ALC 70PCT 4OZ

## (undated) DEVICE — GLV SURG BIOGEL LTX PF 7 1/2

## (undated) DEVICE — CANN ART SOFTFLOW EXT W/SUT/RNG 7MM

## (undated) DEVICE — RADIFOCUS GLIDEWIRE: Brand: GLIDEWIRE

## (undated) DEVICE — PINNACLE R/O II INTRODUCER SHEATH WITH RADIOPAQUE MARKER: Brand: PINNACLE

## (undated) DEVICE — PK PERFUS CUST W/CARDIOPLEGIA

## (undated) DEVICE — PINNACLE R/O II HIFLO INTRODUCER SHEATH WITH RADIOPAQUE MARKER: Brand: PINNACLE

## (undated) DEVICE — DRP SLUSH WARMR MACH CIR 44X44IN

## (undated) DEVICE — BLD SCLPL BEAVR MINI STR 2BVL 180D LF

## (undated) DEVICE — CONN TBG Y 6 IN 1 LF STRL

## (undated) DEVICE — HEMOCONCENTRATOR PERFUS LPS06

## (undated) DEVICE — APPL CHLORAPREP HI/LITE 26ML ORNG

## (undated) DEVICE — 28 FR RIGHT ANGLE – SOFT PVC CATHETER: Brand: PVC THORACIC CATHETERS

## (undated) DEVICE — CATH GUIDE SOFTVU FLUSH HT PIG .035 5F 65CM

## (undated) DEVICE — GAUZE,SPONGE,4"X4",12PLY,STRL,LF,10/TRAY: Brand: MEDLINE

## (undated) DEVICE — GLIDESHEATH SLENDER STAINLESS STEEL KIT: Brand: GLIDESHEATH SLENDER

## (undated) DEVICE — CATH DIAG DXTERITY ULTRA TRANSRADIAL 4.0 5F 100CM 0/SH

## (undated) DEVICE — LOU OPEN HEART DR POLLOCK: Brand: MEDLINE INDUSTRIES, INC.

## (undated) DEVICE — TBG ART PRESS 60 IN

## (undated) DEVICE — CANN VESL FREE FLO 2MM

## (undated) DEVICE — CLAMP INSERT: Brand: STEALTH® CLAMP INSERT

## (undated) DEVICE — 3M™ TEGADERM™ CHG DRESSING 25/CARTON 4 CARTONS/CASE 1658: Brand: TEGADERM™

## (undated) DEVICE — PK CATH CARD 40

## (undated) DEVICE — PERCLOSE PROGLIDE™ SUTURE-MEDIATED CLOSURE SYSTEM: Brand: PERCLOSE PROGLIDE™

## (undated) DEVICE — EXOFIN PRECISION PEN HIGH VISCOSITY TOPICAL SKIN ADHESIVE: Brand: EXOFIN PRECISION PEN, 1G

## (undated) DEVICE — TBG INSUFFLATION LUER LOCK: Brand: MEDLINE INDUSTRIES, INC.

## (undated) DEVICE — SYRINGE KIT,PACKAGED,,150FT,MK 7(ANGIO-ARTERION, 150ML SYR KIT W/QFT,MC)(60729385): Brand: MEDRAD® MARK 7 ARTERION DISPOSABLE SYRINGE 150 ML WITH QUICK FILL TUBE

## (undated) DEVICE — DESTINATION PERIPHERAL GUIDING SHEATH: Brand: DESTINATION